# Patient Record
Sex: MALE | Race: WHITE | NOT HISPANIC OR LATINO | Employment: OTHER | URBAN - METROPOLITAN AREA
[De-identification: names, ages, dates, MRNs, and addresses within clinical notes are randomized per-mention and may not be internally consistent; named-entity substitution may affect disease eponyms.]

---

## 2020-06-04 ENCOUNTER — LAB VISIT (OUTPATIENT)
Dept: PRIMARY CARE CLINIC | Facility: OTHER | Age: 70
End: 2020-06-04
Payer: MEDICARE

## 2020-06-04 DIAGNOSIS — Z11.59 ENCOUNTER FOR SCREENING FOR OTHER VIRAL DISEASES: ICD-10-CM

## 2020-06-04 PROCEDURE — U0003 INFECTIOUS AGENT DETECTION BY NUCLEIC ACID (DNA OR RNA); SEVERE ACUTE RESPIRATORY SYNDROME CORONAVIRUS 2 (SARS-COV-2) (CORONAVIRUS DISEASE [COVID-19]), AMPLIFIED PROBE TECHNIQUE, MAKING USE OF HIGH THROUGHPUT TECHNOLOGIES AS DESCRIBED BY CMS-2020-01-R: HCPCS

## 2020-06-05 LAB — SARS-COV-2 RNA RESP QL NAA+PROBE: NOT DETECTED

## 2021-01-07 ENCOUNTER — CLINICAL SUPPORT (OUTPATIENT)
Dept: URGENT CARE | Facility: CLINIC | Age: 71
End: 2021-01-07
Payer: MEDICARE

## 2021-01-07 DIAGNOSIS — Z03.818 ENCOUNTER FOR OBSERVATION FOR SUSPECTED EXPOSURE TO OTHER BIOLOGICAL AGENTS RULED OUT: Primary | ICD-10-CM

## 2021-01-07 LAB
CTP QC/QA: YES
SARS-COV-2 RDRP RESP QL NAA+PROBE: NEGATIVE

## 2021-01-07 PROCEDURE — U0002: ICD-10-PCS | Mod: QW,S$GLB,, | Performed by: INTERNAL MEDICINE

## 2021-01-07 PROCEDURE — U0002 COVID-19 LAB TEST NON-CDC: HCPCS | Mod: QW,S$GLB,, | Performed by: INTERNAL MEDICINE

## 2022-08-10 ENCOUNTER — OFFICE VISIT (OUTPATIENT)
Dept: URGENT CARE | Facility: CLINIC | Age: 72
End: 2022-08-10
Payer: MEDICARE

## 2022-08-10 VITALS
TEMPERATURE: 97 F | SYSTOLIC BLOOD PRESSURE: 161 MMHG | HEART RATE: 90 BPM | OXYGEN SATURATION: 96 % | DIASTOLIC BLOOD PRESSURE: 96 MMHG | RESPIRATION RATE: 18 BRPM | WEIGHT: 150 LBS | BODY MASS INDEX: 22.22 KG/M2 | HEIGHT: 69 IN

## 2022-08-10 DIAGNOSIS — R05.9 COUGH: ICD-10-CM

## 2022-08-10 DIAGNOSIS — B97.89 VIRAL SINUSITIS: Primary | ICD-10-CM

## 2022-08-10 DIAGNOSIS — J32.9 VIRAL SINUSITIS: Primary | ICD-10-CM
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
## 2022-08-10 PROBLEM — Z86.73 HISTORY OF CVA (CEREBROVASCULAR ACCIDENT): Status: ACTIVE | Noted: 2019-03-06

## 2022-08-10 PROBLEM — I25.2 HISTORY OF MI (MYOCARDIAL INFARCTION): Status: ACTIVE | Noted: 2019-03-06

## 2022-08-10 PROBLEM — Z87.891 FORMER SMOKER: Status: ACTIVE | Noted: 2020-06-23

## 2022-08-10 PROBLEM — Z95.810 ICD (IMPLANTABLE CARDIOVERTER-DEFIBRILLATOR), SINGLE, IN SITU: Status: ACTIVE | Noted: 2020-07-09

## 2022-08-10 PROBLEM — I25.5 ISCHEMIC CARDIOMYOPATHY: Status: ACTIVE | Noted: 2022-02-16

## 2022-08-10 PROBLEM — Z79.01 CHRONIC ANTICOAGULATION: Status: ACTIVE | Noted: 2020-06-23

## 2022-08-10 PROBLEM — I48.91 ATRIAL FIBRILLATION: Status: ACTIVE | Noted: 2019-03-06

## 2022-08-10 PROBLEM — G40.909 SEIZURE DISORDER: Status: ACTIVE | Noted: 2019-03-06

## 2022-08-10 PROBLEM — E78.00 PURE HYPERCHOLESTEROLEMIA: Status: ACTIVE | Noted: 2019-03-06

## 2022-08-10 PROBLEM — I47.29 NONSUSTAINED VENTRICULAR TACHYCARDIA: Status: ACTIVE | Noted: 2022-08-10

## 2022-08-10 PROBLEM — I45.2 RIGHT BUNDLE BRANCH BLOCK (RBBB) WITH LEFT ANTERIOR FASCICULAR BLOCK (LAFB): Status: ACTIVE | Noted: 2020-01-08

## 2022-08-10 PROBLEM — I63.9 CVA (CEREBRAL VASCULAR ACCIDENT): Status: ACTIVE | Noted: 2022-08-10

## 2022-08-10 PROBLEM — I25.10 CORONARY ARTERY DISEASE INVOLVING NATIVE CORONARY ARTERY OF NATIVE HEART WITHOUT ANGINA PECTORIS: Status: ACTIVE | Noted: 2019-03-06

## 2022-08-10 PROCEDURE — 1159F PR MEDICATION LIST DOCUMENTED IN MEDICAL RECORD: ICD-10-PCS | Mod: CPTII,S$GLB,, | Performed by: FAMILY MEDICINE

## 2022-08-10 PROCEDURE — 1159F MED LIST DOCD IN RCRD: CPT | Mod: CPTII,S$GLB,, | Performed by: FAMILY MEDICINE

## 2022-08-10 PROCEDURE — 3008F PR BODY MASS INDEX (BMI) DOCUMENTED: ICD-10-PCS | Mod: CPTII,S$GLB,, | Performed by: FAMILY MEDICINE

## 2022-08-10 PROCEDURE — 71046 X-RAY EXAM CHEST 2 VIEWS: CPT | Mod: S$GLB,,, | Performed by: RADIOLOGY

## 2022-08-10 PROCEDURE — 3077F SYST BP >= 140 MM HG: CPT | Mod: CPTII,S$GLB,, | Performed by: FAMILY MEDICINE

## 2022-08-10 PROCEDURE — 1126F PR PAIN SEVERITY QUANTIFIED, NO PAIN PRESENT: ICD-10-PCS | Mod: CPTII,S$GLB,, | Performed by: FAMILY MEDICINE

## 2022-08-10 PROCEDURE — 3008F BODY MASS INDEX DOCD: CPT | Mod: CPTII,S$GLB,, | Performed by: FAMILY MEDICINE

## 2022-08-10 PROCEDURE — 3080F PR MOST RECENT DIASTOLIC BLOOD PRESSURE >= 90 MM HG: ICD-10-PCS | Mod: CPTII,S$GLB,, | Performed by: FAMILY MEDICINE

## 2022-08-10 PROCEDURE — 71046 XR CHEST PA AND LATERAL: ICD-10-PCS | Mod: S$GLB,,, | Performed by: RADIOLOGY

## 2022-08-10 PROCEDURE — 99203 PR OFFICE/OUTPT VISIT, NEW, LEVL III, 30-44 MIN: ICD-10-PCS | Mod: S$GLB,,, | Performed by: FAMILY MEDICINE

## 2022-08-10 PROCEDURE — 1160F RVW MEDS BY RX/DR IN RCRD: CPT | Mod: CPTII,S$GLB,, | Performed by: FAMILY MEDICINE

## 2022-08-10 PROCEDURE — 3080F DIAST BP >= 90 MM HG: CPT | Mod: CPTII,S$GLB,, | Performed by: FAMILY MEDICINE

## 2022-08-10 PROCEDURE — 99203 OFFICE O/P NEW LOW 30 MIN: CPT | Mod: S$GLB,,, | Performed by: FAMILY MEDICINE

## 2022-08-10 PROCEDURE — 1160F PR REVIEW ALL MEDS BY PRESCRIBER/CLIN PHARMACIST DOCUMENTED: ICD-10-PCS | Mod: CPTII,S$GLB,, | Performed by: FAMILY MEDICINE

## 2022-08-10 PROCEDURE — 3077F PR MOST RECENT SYSTOLIC BLOOD PRESSURE >= 140 MM HG: ICD-10-PCS | Mod: CPTII,S$GLB,, | Performed by: FAMILY MEDICINE

## 2022-08-10 PROCEDURE — 1126F AMNT PAIN NOTED NONE PRSNT: CPT | Mod: CPTII,S$GLB,, | Performed by: FAMILY MEDICINE

## 2022-08-10 RX ORDER — CARBAMAZEPINE 200 MG/1
400 TABLET ORAL 2 TIMES DAILY
Status: ON HOLD | COMMUNITY
Start: 2022-06-22 | End: 2022-12-06 | Stop reason: HOSPADM

## 2022-08-10 RX ORDER — CETIRIZINE HYDROCHLORIDE 10 MG/1
10 TABLET ORAL DAILY
Qty: 30 TABLET | Refills: 0 | Status: ON HOLD | OUTPATIENT
Start: 2022-08-10 | End: 2022-12-06 | Stop reason: HOSPADM

## 2022-08-10 RX ORDER — PRAVASTATIN SODIUM 20 MG/1
20 TABLET ORAL NIGHTLY
Status: ON HOLD | COMMUNITY
Start: 2021-09-28 | End: 2022-12-06 | Stop reason: HOSPADM

## 2022-08-10 RX ORDER — NITROGLYCERIN 0.4 MG/1
0.4 TABLET SUBLINGUAL EVERY 5 MIN PRN
Status: ON HOLD | COMMUNITY
Start: 2022-02-16 | End: 2022-12-06 | Stop reason: SDUPTHER

## 2022-08-10 RX ORDER — WARFARIN 6 MG/1
6 TABLET ORAL DAILY
Status: ON HOLD | COMMUNITY
Start: 2021-09-28 | End: 2022-12-06 | Stop reason: HOSPADM

## 2022-08-10 RX ORDER — FLUTICASONE PROPIONATE 50 MCG
1 SPRAY, SUSPENSION (ML) NASAL DAILY
Qty: 16 G | Refills: 0 | Status: ON HOLD | OUTPATIENT
Start: 2022-08-10 | End: 2022-12-06 | Stop reason: HOSPADM

## 2022-08-10 NOTE — PROGRESS NOTES
"Subjective:       Patient ID: Blabir Rebollar Sr. is a 72 y.o. male.    Vitals:  height is 5' 9" (1.753 m) and weight is 68 kg (150 lb). His temperature is 97.1 °F (36.2 °C). His blood pressure is 161/96 (abnormal) and his pulse is 90. His respiration is 18 and oxygen saturation is 96%.     Chief Complaint: URI    Pt states that he is having congestion and nasal congestion for a couple of months now . Pt states that he is taking otc meds wit no relief   Provider note begins below:  Problem List:  Active Problem List with Overview Notes   Diagnosis Date Noted    AICD (automatic cardioverter/defibrillator) present 07/09/2020    Former smoker 06/23/2020    Chronic anticoagulation 06/23/2020    Right bundle branch block (RBBB) with left anterior fascicular block (LAFB) 01/08/2020    Pure hypercholesterolemia 03/06/2019    Paroxysmal atrial fibrillation 03/06/2019    Coronary artery disease involving native coronary artery of native heart without angina pectoris 03/06/2019    History of MI (myocardial infarction) 03/06/2019    History of CVA (cerebrovascular accident) 03/06/2019    Seizure disorder 03/06/2019    Patient with above past medical history presents with clear nasal drainage and clear non odorous sputum x 4 mos, he says he got tired of it so came today. He is est with pcp. No fever or chills. No cp or SOB. No GI related symptoms, including, N/v/D or constipation. No anosmia or ageusia. He tried coricidin and has relief with this. Has fu with pcp, Dr. Hanna next week. He says the drainage may be coming from his teeth, wears dentures.     URI   This is a new problem. The current episode started more than 1 month ago. The problem has been unchanged. There has been no fever. Associated symptoms include congestion, coughing and rhinorrhea. Pertinent negatives include no abdominal pain, chest pain, diarrhea, dysuria, ear pain, headaches, joint pain, joint swelling, nausea, neck pain, plugged ear " sensation, rash, sinus pain, sneezing, sore throat, swollen glands, vomiting or wheezing. Treatments tried: OTC meds  The treatment provided no relief.       Constitution: Negative for activity change, appetite change, chills, sweating, fatigue, fever, unexpected weight change and generalized weakness.   HENT: Positive for congestion. Negative for ear pain, nosebleeds, foreign body in nose, postnasal drip, sinus pain, sinus pressure, sore throat, trouble swallowing and voice change.    Neck: Negative for neck pain and neck stiffness.   Cardiovascular: Negative for chest pain, leg swelling, palpitations and sob on exertion.   Respiratory: Positive for cough and sputum production. Negative for sleep apnea, chest tightness, bloody sputum, COPD, shortness of breath, stridor, wheezing and asthma.    Gastrointestinal: Negative for abdominal pain, nausea, vomiting and diarrhea.   Genitourinary: Negative for dysuria.   Skin: Negative for rash.   Allergic/Immunologic: Negative for asthma and sneezing.   Neurological: Negative for headaches.   Hematologic/Lymphatic: Positive for easy bruising/bleeding (pt on coumadin). Bruises/bleeds easily (pt on coumadin).       Objective:      Physical Exam   Constitutional: He is oriented to person, place, and time. He appears well-developed.  Non-toxic appearance. He does not appear ill. No distress.   HENT:   Head: Normocephalic and atraumatic.   Ears:   Right Ear: External ear normal.   Left Ear: External ear normal.   Nose: Nose normal.   Mouth/Throat: Uvula is midline, oropharynx is clear and moist and mucous membranes are normal. He has dentures.   Eyes: Conjunctivae, EOM and lids are normal. Pupils are equal, round, and reactive to light.   Neck: Trachea normal and phonation normal. Neck supple.   Cardiovascular: S1 normal and S2 normal.   Pulmonary/Chest: Effort normal and breath sounds normal.   Musculoskeletal: Normal range of motion.         General: Normal range of motion.    Neurological: He is alert and oriented to person, place, and time.   Skin: Skin is warm, dry, intact and not diaphoretic.   Psychiatric: His speech is normal and behavior is normal. Judgment and thought content normal.   Nursing note and vitals reviewed.        Assessment:       1. Viral sinusitis    2. Cough        XR CHEST PA AND LATERAL    Result Date: 8/10/2022  EXAMINATION: XR CHEST PA AND LATERAL CLINICAL HISTORY: Cough, unspecified TECHNIQUE: PA and lateral views of the chest were performed. COMPARISON: None FINDINGS: Left subclavian approach AICD.  Cardiomediastinal contours appear within normal limits.  Lungs appear essentially clear.  No definite pneumothorax or large volume pleural effusion.  No acute findings identified in the visualized abdomen.  Osseous and soft tissue structures appear without definite acute abnormality.     No convincing evidence of acute cardiopulmonary disease. Electronically signed by: Tam Fung Date:    08/10/2022 Time:    10:57    Plan:       Vss, lungs ctab, cxr wnl, fu with pcp, try antihistamine and flonase, benign exam, well appearing. May continue the coricidin.   Risks v benefits discussed, decided to hold off of any abx given interactions with coumadin, he verbalized understanding and agreed with plan. No sign of bacterial infection at this time.     Discussed results/diagnosis/plan with patient in clinic. Strict precautions given to patient to monitor for worsening signs and symptoms. Advised to follow up with PCP or specialist.    Explained side effects of medications prescribed with patient and informed him/her to discontinue use if he/she has any side effects and to inform UC or PCP if this occurs. All questions answered. Strict ED verses clinic return precautions stressed and given in depth. Advised if symptoms worsens of fail to improve he/she should go to the Emergency Room. Discharge and follow-up instructions given verbally/printed with the patient who  expressed understanding and willingness to comply with my recommendations. Patient voiced understanding and in agreement with current treatment plan. Patient exits the exam room in no acute distress. Conversant and engaged during discharge discussion, verbalized understanding.      Viral sinusitis  -     fluticasone propionate (FLONASE) 50 mcg/actuation nasal spray; 1 spray (50 mcg total) by Each Nostril route once daily.  Dispense: 16 g; Refill: 0  -     cetirizine (ZYRTEC) 10 MG tablet; Take 1 tablet (10 mg total) by mouth once daily.  Dispense: 30 tablet; Refill: 0    Cough  -     XR CHEST PA AND LATERAL; Future; Expected date: 08/10/2022           Medical Decision Making:   History:   Old Medical Records: I decided to obtain old medical records.  Old Records Summarized: records from clinic visits, records from another hospital and other records.  Clinical Tests:   Radiological Study: Ordered and Reviewed    Additional MDM:     Heart Failure Score:   COPD = No    Patient Instructions   General Discharge Instructions   PLEASE READ YOUR DISCHARGE INSTRUCTIONS ENTIRELY AS IT CONTAINS IMPORTANT INFORMATION.  If you were prescribed a narcotic or controlled medication, do not drive or operate heavy equipment or machinery while taking these medications.  If you were prescribed antibiotics, please take them to completion.  You must understand that you've received an Urgent Care treatment only and that you may be released before all your medical problems are known or treated. You, the patient, will arrange for follow up care as instructed.    OVER THE COUNTER RECOMMENDATIONS/SUGGESTIONS.    Make sure to stay well hydrated.    Use Nasal Saline to mechanically move any post nasal drip from your eustachian tube or from the back of your throat.    Use warm salt water gargles to ease your throat pain. Warm salt water gargles as needed for sore throat- 1/2 tsp salt to 1 cup warm water, gargle as desired.    Use an antihistamine  such as Claritin, Zyrtec or Allegra to dry you out.    Use pseudoephedrine (behind the counter) to decongest. Pseudoephedrine 30 mg up to 240 mg /day. It can raise your blood pressure and give you palpitations.    Use mucinex (guaifenesin) to break up mucous up to 2400mg/day to loosen any mucous.    The mucinex DM pill has a cough suppressant that can be sedating. It can be used at night to stop the tickle at the back of your throat.    You can use Mucinex D (it has guaifenesin and a high dose of pseudoephedrine) in the mornings to help decongest.    Use Afrin in each nare for no longer than 3 days, as it is addictive. It can also dry out your mucous membranes and cause elevated blood pressure. This is especially useful if you are flying.    Use Flonase 1-2 sprays/nostril per day. It is a local acting steroid nasal spray, if you develop a bloody nose, stop using the medication immediately.    Sometimes Nyquil at night is beneficial to help you get some rest, however it is sedating and it does have an antihistamine, and tylenol.    Honey is a natural cough suppressant that can be used.    Tylenol up to 4,000 mg a day is safe for short periods and can be used for body aches, pain, and fever. However in high doses and prolonged use it can cause liver irritation.    Ibuprofen is a non-steroidal anti-inflammatory that can be used for body aches, pain, and fever.However it can also cause stomach irritation if over used.     Follow up with your PCP or specialty clinic as instructed in the next 2-3 days if not improved or as needed. You can call [phone] to schedule an appointment with appropriate provider.      If you condition worsens, we recommend that you receive another evaluation at the emergency room immediately or contact your primary medical clinic's after hours call service to discuss your concerns.      Please return here or go to the Emergency Department for any concerns or worsening condition.   You can  also call [phone] to schedule an appointment with the appropriate provider.    Please return here or go to the Emergency Department for any concerns or worsening of condition.    Thank you for choosing [facility]!    Our goal in the Urgent Care is to always provide outstanding medical care. You may receive a survey by mail or e-mail in the next week regarding your experience today. We would greatly appreciate you completing and returning the survey. Your feedback provides us with a way to recognize our staff who provide very good care, and it helps us learn how to improve when your experience was below our aspiration of excellence.      We appreciate you trusting us with your medical care. We hope you feel better soon. We will be happy to take care of you for all of your future medical needs.    Sincerely,    DIXIE Miller  Follow up with your Primary Care Provider in 2-3 days if no improvement.  If you do not have one, please see the list provided and become established with one.  If your condition worsens we recommend that you receive another evaluation at the emergency room immediately or contact your primary medical clinics after hours call service to discuss your concerns.  Flonase nasal spray as directed. Breathe right strips at night to help you breathe.  A cool mist humidifier in bedroom may help with cough and relieve stuffy nose.  Sore throat:  Lozenge, hard candy or honey.  Sinus rinses DO NOT USE TAP WATER, if you must, water must be a rolling boil for 1 minute, let it cool, then use.  May use distilled water.  Vics vapor rub in shower to help open nasal passages.  May use nasal gel to keep passages moisturized.  May use Nasal saline sprays during the day for added relief of congestion.   For those who go to the gym, please do not use the sauna or steam room now to clear sinuses.  During pollen season, change shirt if you are outside for a while when you go in.  Also wash your  face.  Do not touch your face with your hands.  Wash your hands often in general while ill, avoid face contact with hands. Good nutrition. Lots of rest You may or may not have received a steroid injection, if you have, you may experience some jitters or develop nervousness.  You may also not rest well this night- these symptoms will resolve.  If you were give a prescription for steroids, do not medications such as Motrin, Advil, Ibuprofen, Aleve, Mobic, or Toradol while taking the steroid. these are non-steroidal anti-inflammatory medications which you do not need while taking steroids.  If you were given an antibiotic to take at home, please take it until it is completed even if you begin feeling better prior to the course of therapy being completed.  To attempt to minimize abdominal discomfort while taking antibiotics, you may try to take probiotics.  SEPARATE the antibiotics and probiotics by TWO hours, if not neither medication will work.  If you received a steroid shot today - this can elevate your blood pressure, elevate your blood sugar, water weight gain, nervous energy, redness to the face and dimpling of the skin where the shot goes in.     You must understand that you've received an Urgent Care treatment only and that you may be released before all your medical problems are known or treated. You, the patient, will arrange for follow up care as instructed

## 2022-08-10 NOTE — PATIENT INSTRUCTIONS
General Discharge Instructions   PLEASE READ YOUR DISCHARGE INSTRUCTIONS ENTIRELY AS IT CONTAINS IMPORTANT INFORMATION.  If you were prescribed a narcotic or controlled medication, do not drive or operate heavy equipment or machinery while taking these medications.  If you were prescribed antibiotics, please take them to completion.  You must understand that you've received an Urgent Care treatment only and that you may be released before all your medical problems are known or treated. You, the patient, will arrange for follow up care as instructed.    OVER THE COUNTER RECOMMENDATIONS/SUGGESTIONS.    Make sure to stay well hydrated.    Use Nasal Saline to mechanically move any post nasal drip from your eustachian tube or from the back of your throat.    Use warm salt water gargles to ease your throat pain. Warm salt water gargles as needed for sore throat- 1/2 tsp salt to 1 cup warm water, gargle as desired.    Use an antihistamine such as Claritin, Zyrtec or Allegra to dry you out.    Use pseudoephedrine (behind the counter) to decongest. Pseudoephedrine 30 mg up to 240 mg /day. It can raise your blood pressure and give you palpitations.    Use mucinex (guaifenesin) to break up mucous up to 2400mg/day to loosen any mucous.    The mucinex DM pill has a cough suppressant that can be sedating. It can be used at night to stop the tickle at the back of your throat.    You can use Mucinex D (it has guaifenesin and a high dose of pseudoephedrine) in the mornings to help decongest.    Use Afrin in each nare for no longer than 3 days, as it is addictive. It can also dry out your mucous membranes and cause elevated blood pressure. This is especially useful if you are flying.    Use Flonase 1-2 sprays/nostril per day. It is a local acting steroid nasal spray, if you develop a bloody nose, stop using the medication immediately.    Sometimes Nyquil at night is beneficial to help you get some rest, however it is sedating and it  does have an antihistamine, and tylenol.    Honey is a natural cough suppressant that can be used.    Tylenol up to 4,000 mg a day is safe for short periods and can be used for body aches, pain, and fever. However in high doses and prolonged use it can cause liver irritation.    Ibuprofen is a non-steroidal anti-inflammatory that can be used for body aches, pain, and fever.However it can also cause stomach irritation if over used.     Follow up with your PCP or specialty clinic as instructed in the next 2-3 days if not improved or as needed. You can call [phone] to schedule an appointment with appropriate provider.      If you condition worsens, we recommend that you receive another evaluation at the emergency room immediately or contact your primary medical clinic's after hours call service to discuss your concerns.      Please return here or go to the Emergency Department for any concerns or worsening condition.   You can also call [phone] to schedule an appointment with the appropriate provider.    Please return here or go to the Emergency Department for any concerns or worsening of condition.    Thank you for choosing [facility]!    Our goal in the Urgent Care is to always provide outstanding medical care. You may receive a survey by mail or e-mail in the next week regarding your experience today. We would greatly appreciate you completing and returning the survey. Your feedback provides us with a way to recognize our staff who provide very good care, and it helps us learn how to improve when your experience was below our aspiration of excellence.      We appreciate you trusting us with your medical care. We hope you feel better soon. We will be happy to take care of you for all of your future medical needs.    Sincerely,    DIXIE Miller  Follow up with your Primary Care Provider in 2-3 days if no improvement.  If you do not have one, please see the list provided and become  established with one.  If your condition worsens we recommend that you receive another evaluation at the emergency room immediately or contact your primary medical clinics after hours call service to discuss your concerns.  Flonase nasal spray as directed. Breathe right strips at night to help you breathe.  A cool mist humidifier in bedroom may help with cough and relieve stuffy nose.  Sore throat:  Lozenge, hard candy or honey.  Sinus rinses DO NOT USE TAP WATER, if you must, water must be a rolling boil for 1 minute, let it cool, then use.  May use distilled water.  Vics vapor rub in shower to help open nasal passages.  May use nasal gel to keep passages moisturized.  May use Nasal saline sprays during the day for added relief of congestion.   For those who go to the gym, please do not use the sauna or steam room now to clear sinuses.  During pollen season, change shirt if you are outside for a while when you go in.  Also wash your face.  Do not touch your face with your hands.  Wash your hands often in general while ill, avoid face contact with hands. Good nutrition. Lots of rest You may or may not have received a steroid injection, if you have, you may experience some jitters or develop nervousness.  You may also not rest well this night- these symptoms will resolve.  If you were give a prescription for steroids, do not medications such as Motrin, Advil, Ibuprofen, Aleve, Mobic, or Toradol while taking the steroid. these are non-steroidal anti-inflammatory medications which you do not need while taking steroids.  If you were given an antibiotic to take at home, please take it until it is completed even if you begin feeling better prior to the course of therapy being completed.  To attempt to minimize abdominal discomfort while taking antibiotics, you may try to take probiotics.  SEPARATE the antibiotics and probiotics by TWO hours, if not neither medication will work.  If you received a steroid shot today - this  can elevate your blood pressure, elevate your blood sugar, water weight gain, nervous energy, redness to the face and dimpling of the skin where the shot goes in.     You must understand that you've received an Urgent Care treatment only and that you may be released before all your medical problems are known or treated. You, the patient, will arrange for follow up care as instructed

## 2022-11-14 PROBLEM — I63.9 CVA (CEREBRAL VASCULAR ACCIDENT): Status: RESOLVED | Noted: 2022-08-10 | Resolved: 2022-11-14

## 2022-11-22 ENCOUNTER — HOSPITAL ENCOUNTER (INPATIENT)
Facility: HOSPITAL | Age: 72
LOS: 13 days | Discharge: NURSING, REHAB OR LONG-TERM CARE | DRG: 246 | End: 2022-12-06
Attending: EMERGENCY MEDICINE | Admitting: STUDENT IN AN ORGANIZED HEALTH CARE EDUCATION/TRAINING PROGRAM
Payer: MEDICARE

## 2022-11-22 DIAGNOSIS — Z98.61 HISTORY OF PERCUTANEOUS CORONARY INTERVENTION: ICD-10-CM

## 2022-11-22 DIAGNOSIS — I47.20 VENTRICULAR TACHYCARDIA: ICD-10-CM

## 2022-11-22 DIAGNOSIS — R53.1 WEAKNESS: ICD-10-CM

## 2022-11-22 DIAGNOSIS — I25.10 CAD (CORONARY ARTERY DISEASE): ICD-10-CM

## 2022-11-22 DIAGNOSIS — I25.10 CORONARY ARTERY DISEASE INVOLVING NATIVE CORONARY ARTERY OF NATIVE HEART WITHOUT ANGINA PECTORIS: ICD-10-CM

## 2022-11-22 DIAGNOSIS — I50.40 COMBINED SYSTOLIC AND DIASTOLIC CONGESTIVE HEART FAILURE, UNSPECIFIED HF CHRONICITY: ICD-10-CM

## 2022-11-22 DIAGNOSIS — I50.9 CHF (CONGESTIVE HEART FAILURE): ICD-10-CM

## 2022-11-22 DIAGNOSIS — I50.22 CHRONIC SYSTOLIC CONGESTIVE HEART FAILURE: ICD-10-CM

## 2022-11-22 DIAGNOSIS — I47.20 SUSTAINED VT (VENTRICULAR TACHYCARDIA): ICD-10-CM

## 2022-11-22 DIAGNOSIS — I47.20 VENTRICULAR TACHYARRHYTHMIA: ICD-10-CM

## 2022-11-22 DIAGNOSIS — R07.9 CHEST PAIN: ICD-10-CM

## 2022-11-22 DIAGNOSIS — I25.5 ISCHEMIC CARDIOMYOPATHY: ICD-10-CM

## 2022-11-22 DIAGNOSIS — Z86.73 HISTORY OF CVA (CEREBROVASCULAR ACCIDENT): ICD-10-CM

## 2022-11-22 DIAGNOSIS — I49.8 BIGEMINY: ICD-10-CM

## 2022-11-22 DIAGNOSIS — I50.43 ACUTE ON CHRONIC COMBINED SYSTOLIC AND DIASTOLIC CONGESTIVE HEART FAILURE: ICD-10-CM

## 2022-11-22 DIAGNOSIS — I25.10 CORONARY ARTERY DISEASE, UNSPECIFIED VESSEL OR LESION TYPE, UNSPECIFIED WHETHER ANGINA PRESENT, UNSPECIFIED WHETHER NATIVE OR TRANSPLANTED HEART: ICD-10-CM

## 2022-11-22 DIAGNOSIS — Z86.69 HISTORY OF SEIZURE DISORDER: ICD-10-CM

## 2022-11-22 DIAGNOSIS — G93.41 ENCEPHALOPATHY, METABOLIC: Primary | ICD-10-CM

## 2022-11-22 LAB
ALBUMIN SERPL BCP-MCNC: 3.6 G/DL (ref 3.5–5.2)
ALP SERPL-CCNC: 81 U/L (ref 55–135)
ALT SERPL W/O P-5'-P-CCNC: 12 U/L (ref 10–44)
ANION GAP SERPL CALC-SCNC: 10 MMOL/L (ref 8–16)
AST SERPL-CCNC: 29 U/L (ref 10–40)
BASOPHILS # BLD AUTO: 0.03 K/UL (ref 0–0.2)
BASOPHILS NFR BLD: 0.6 % (ref 0–1.9)
BILIRUB SERPL-MCNC: 0.3 MG/DL (ref 0.1–1)
BILIRUB UR QL STRIP: NEGATIVE
BNP SERPL-MCNC: 1441 PG/ML (ref 0–99)
BUN SERPL-MCNC: 18 MG/DL (ref 8–23)
CALCIUM SERPL-MCNC: 8.6 MG/DL (ref 8.7–10.5)
CHLORIDE SERPL-SCNC: 113 MMOL/L (ref 95–110)
CLARITY UR: ABNORMAL
CO2 SERPL-SCNC: 20 MMOL/L (ref 23–29)
COLOR UR: YELLOW
CREAT SERPL-MCNC: 1 MG/DL (ref 0.5–1.4)
CTP QC/QA: YES
CTP QC/QA: YES
DIFFERENTIAL METHOD: ABNORMAL
EOSINOPHIL # BLD AUTO: 0.1 K/UL (ref 0–0.5)
EOSINOPHIL NFR BLD: 1.5 % (ref 0–8)
ERYTHROCYTE [DISTWIDTH] IN BLOOD BY AUTOMATED COUNT: 13.7 % (ref 11.5–14.5)
EST. GFR  (NO RACE VARIABLE): >60 ML/MIN/1.73 M^2
GLUCOSE SERPL-MCNC: 131 MG/DL (ref 70–110)
GLUCOSE UR QL STRIP: ABNORMAL
HCT VFR BLD AUTO: 43.3 % (ref 40–54)
HGB BLD-MCNC: 14.9 G/DL (ref 14–18)
HGB UR QL STRIP: ABNORMAL
IMM GRANULOCYTES # BLD AUTO: 0.01 K/UL (ref 0–0.04)
IMM GRANULOCYTES NFR BLD AUTO: 0.2 % (ref 0–0.5)
KETONES UR QL STRIP: NEGATIVE
LEUKOCYTE ESTERASE UR QL STRIP: NEGATIVE
LYMPHOCYTES # BLD AUTO: 1.6 K/UL (ref 1–4.8)
LYMPHOCYTES NFR BLD: 33.4 % (ref 18–48)
MCH RBC QN AUTO: 31.2 PG (ref 27–31)
MCHC RBC AUTO-ENTMCNC: 34.4 G/DL (ref 32–36)
MCV RBC AUTO: 91 FL (ref 82–98)
MONOCYTES # BLD AUTO: 0.5 K/UL (ref 0.3–1)
MONOCYTES NFR BLD: 10 % (ref 4–15)
NEUTROPHILS # BLD AUTO: 2.6 K/UL (ref 1.8–7.7)
NEUTROPHILS NFR BLD: 54.3 % (ref 38–73)
NITRITE UR QL STRIP: NEGATIVE
NRBC BLD-RTO: 0 /100 WBC
PH UR STRIP: 7 [PH] (ref 5–8)
PLATELET # BLD AUTO: 117 K/UL (ref 150–450)
PMV BLD AUTO: 11.8 FL (ref 9.2–12.9)
POC MOLECULAR INFLUENZA A AGN: NEGATIVE
POC MOLECULAR INFLUENZA B AGN: NEGATIVE
POTASSIUM SERPL-SCNC: ABNORMAL MMOL/L (ref 3.5–5.1)
PROT SERPL-MCNC: 7.2 G/DL (ref 6–8.4)
PROT UR QL STRIP: ABNORMAL
RBC # BLD AUTO: 4.78 M/UL (ref 4.6–6.2)
SARS-COV-2 RDRP RESP QL NAA+PROBE: NEGATIVE
SODIUM SERPL-SCNC: 143 MMOL/L (ref 136–145)
SP GR UR STRIP: 1.02 (ref 1–1.03)
TROPONIN I SERPL DL<=0.01 NG/ML-MCNC: 0.04 NG/ML (ref 0–0.03)
TSH SERPL DL<=0.005 MIU/L-ACNC: 1.46 UIU/ML (ref 0.4–4)
URN SPEC COLLECT METH UR: ABNORMAL
UROBILINOGEN UR STRIP-ACNC: ABNORMAL EU/DL
WBC # BLD AUTO: 4.82 K/UL (ref 3.9–12.7)

## 2022-11-22 PROCEDURE — 87635 SARS-COV-2 COVID-19 AMP PRB: CPT | Performed by: EMERGENCY MEDICINE

## 2022-11-22 PROCEDURE — 93010 EKG 12-LEAD: ICD-10-PCS | Mod: ,,, | Performed by: INTERNAL MEDICINE

## 2022-11-22 PROCEDURE — 81003 URINALYSIS AUTO W/O SCOPE: CPT | Performed by: STUDENT IN AN ORGANIZED HEALTH CARE EDUCATION/TRAINING PROGRAM

## 2022-11-22 PROCEDURE — 87502 INFLUENZA DNA AMP PROBE: CPT

## 2022-11-22 PROCEDURE — 93005 ELECTROCARDIOGRAM TRACING: CPT

## 2022-11-22 PROCEDURE — 93010 ELECTROCARDIOGRAM REPORT: CPT | Mod: ,,, | Performed by: INTERNAL MEDICINE

## 2022-11-22 PROCEDURE — 85025 COMPLETE CBC W/AUTO DIFF WBC: CPT | Performed by: STUDENT IN AN ORGANIZED HEALTH CARE EDUCATION/TRAINING PROGRAM

## 2022-11-22 PROCEDURE — 83880 ASSAY OF NATRIURETIC PEPTIDE: CPT | Performed by: STUDENT IN AN ORGANIZED HEALTH CARE EDUCATION/TRAINING PROGRAM

## 2022-11-22 PROCEDURE — 80053 COMPREHEN METABOLIC PANEL: CPT | Performed by: STUDENT IN AN ORGANIZED HEALTH CARE EDUCATION/TRAINING PROGRAM

## 2022-11-22 PROCEDURE — 84484 ASSAY OF TROPONIN QUANT: CPT | Performed by: STUDENT IN AN ORGANIZED HEALTH CARE EDUCATION/TRAINING PROGRAM

## 2022-11-22 PROCEDURE — 84443 ASSAY THYROID STIM HORMONE: CPT | Performed by: EMERGENCY MEDICINE

## 2022-11-22 PROCEDURE — 99285 EMERGENCY DEPT VISIT HI MDM: CPT | Mod: 25

## 2022-11-22 NOTE — Clinical Note
The DP pulses were 1+ bilaterally. The PT pulses were 1+ bilaterally. The radial pulses were +1 bilaterally.

## 2022-11-22 NOTE — Clinical Note
The catheter was repositioned into the ostium   right coronary artery. An angiography was performed of the right coronary arteries. Multiple views were taken.  Catheter removed.

## 2022-11-22 NOTE — Clinical Note
Diagnosis: Weakness [401340]   Future Attending Provider: SPENCER BENSON [[id number]]   Admitting Provider:: SPENCER BENSON [[id number]]

## 2022-11-22 NOTE — Clinical Note
25 ml of contrast were injected throughout the case. 75 mL of contrast was the total wasted during the case. 100 mL was the total amount used during the case.

## 2022-11-22 NOTE — Clinical Note
The catheter was inserted into the left ventricle. Hemodynamics were performed.   Wire removed prior to hemos.

## 2022-11-22 NOTE — Clinical Note
The site was marked. The right groin was prepped. The site was prepped with ChloraPrep. The site was clipped. The patient was draped. The patient was positioned supine.

## 2022-11-22 NOTE — Clinical Note
35 ml of contrast were injected throughout the case. 65 mL of contrast was the total wasted during the case. 100 mL was the total amount used during the case.

## 2022-11-22 NOTE — Clinical Note
The catheter was repositioned into the aorta. Hemodynamics were performed.  and Pullback was recorded.

## 2022-11-22 NOTE — Clinical Note
The catheter was removed from the ostium   left main. An angiography was performed of the left coronary arteries.

## 2022-11-23 PROBLEM — I77.810 AORTIC ROOT DILATATION: Status: ACTIVE | Noted: 2022-11-23

## 2022-11-23 PROBLEM — I47.20 VENTRICULAR TACHYCARDIA: Status: ACTIVE | Noted: 2022-11-23

## 2022-11-23 PROBLEM — I50.43 ACUTE ON CHRONIC COMBINED SYSTOLIC AND DIASTOLIC CONGESTIVE HEART FAILURE: Status: ACTIVE | Noted: 2022-11-23

## 2022-11-23 PROBLEM — I48.0 PAROXYSMAL ATRIAL FIBRILLATION: Status: ACTIVE | Noted: 2019-03-06

## 2022-11-23 PROBLEM — I49.8 BIGEMINAL RHYTHM: Status: ACTIVE | Noted: 2022-11-23

## 2022-11-23 PROBLEM — R53.1 WEAKNESS: Status: ACTIVE | Noted: 2022-11-23

## 2022-11-23 LAB
ANION GAP SERPL CALC-SCNC: 10 MMOL/L (ref 8–16)
BASOPHILS # BLD AUTO: 0.04 K/UL (ref 0–0.2)
BASOPHILS NFR BLD: 1 % (ref 0–1.9)
BUN SERPL-MCNC: 12 MG/DL (ref 8–23)
CALCIUM SERPL-MCNC: 6.4 MG/DL (ref 8.7–10.5)
CHLORIDE SERPL-SCNC: 121 MMOL/L (ref 95–110)
CO2 SERPL-SCNC: 16 MMOL/L (ref 23–29)
CREAT SERPL-MCNC: 0.7 MG/DL (ref 0.5–1.4)
DIFFERENTIAL METHOD: ABNORMAL
EOSINOPHIL # BLD AUTO: 0.1 K/UL (ref 0–0.5)
EOSINOPHIL NFR BLD: 1.4 % (ref 0–8)
ERYTHROCYTE [DISTWIDTH] IN BLOOD BY AUTOMATED COUNT: 13.8 % (ref 11.5–14.5)
EST. GFR  (NO RACE VARIABLE): >60 ML/MIN/1.73 M^2
GLUCOSE SERPL-MCNC: 84 MG/DL (ref 70–110)
HCT VFR BLD AUTO: 31.7 % (ref 40–54)
HGB BLD-MCNC: 10.9 G/DL (ref 14–18)
IMM GRANULOCYTES # BLD AUTO: 0.01 K/UL (ref 0–0.04)
IMM GRANULOCYTES NFR BLD AUTO: 0.2 % (ref 0–0.5)
INR PPP: 4.2 (ref 0.8–1.2)
INR PPP: 4.6 (ref 0.8–1.2)
LYMPHOCYTES # BLD AUTO: 1.4 K/UL (ref 1–4.8)
LYMPHOCYTES NFR BLD: 32.3 % (ref 18–48)
MAGNESIUM SERPL-MCNC: 1.5 MG/DL (ref 1.6–2.6)
MCH RBC QN AUTO: 31.7 PG (ref 27–31)
MCHC RBC AUTO-ENTMCNC: 34.4 G/DL (ref 32–36)
MCV RBC AUTO: 92 FL (ref 82–98)
MONOCYTES # BLD AUTO: 0.4 K/UL (ref 0.3–1)
MONOCYTES NFR BLD: 9.8 % (ref 4–15)
NEUTROPHILS # BLD AUTO: 2.3 K/UL (ref 1.8–7.7)
NEUTROPHILS NFR BLD: 55.3 % (ref 38–73)
NRBC BLD-RTO: 0 /100 WBC
PLATELET # BLD AUTO: 119 K/UL (ref 150–450)
PMV BLD AUTO: 9.8 FL (ref 9.2–12.9)
POCT GLUCOSE: 119 MG/DL (ref 70–110)
POTASSIUM SERPL-SCNC: 2.5 MMOL/L (ref 3.5–5.1)
POTASSIUM SERPL-SCNC: 4.5 MMOL/L (ref 3.5–5.1)
PROTHROMBIN TIME: 41.4 SEC (ref 9–12.5)
PROTHROMBIN TIME: 45.2 SEC (ref 9–12.5)
RBC # BLD AUTO: 3.44 M/UL (ref 4.6–6.2)
SODIUM SERPL-SCNC: 147 MMOL/L (ref 136–145)
TROPONIN I SERPL DL<=0.01 NG/ML-MCNC: 0.04 NG/ML (ref 0–0.03)
TROPONIN I SERPL DL<=0.01 NG/ML-MCNC: 0.04 NG/ML (ref 0–0.03)
WBC # BLD AUTO: 4.18 K/UL (ref 3.9–12.7)

## 2022-11-23 PROCEDURE — 63600175 PHARM REV CODE 636 W HCPCS: Performed by: HOSPITALIST

## 2022-11-23 PROCEDURE — 85025 COMPLETE CBC W/AUTO DIFF WBC: CPT | Performed by: HOSPITALIST

## 2022-11-23 PROCEDURE — 63600175 PHARM REV CODE 636 W HCPCS: Performed by: INTERNAL MEDICINE

## 2022-11-23 PROCEDURE — 21400001 HC TELEMETRY ROOM

## 2022-11-23 PROCEDURE — 99223 PR INITIAL HOSPITAL CARE,LEVL III: ICD-10-PCS | Mod: ,,, | Performed by: INTERNAL MEDICINE

## 2022-11-23 PROCEDURE — 25000003 PHARM REV CODE 250

## 2022-11-23 PROCEDURE — 85610 PROTHROMBIN TIME: CPT | Mod: 91 | Performed by: EMERGENCY MEDICINE

## 2022-11-23 PROCEDURE — 25000003 PHARM REV CODE 250: Performed by: HOSPITALIST

## 2022-11-23 PROCEDURE — 97165 OT EVAL LOW COMPLEX 30 MIN: CPT

## 2022-11-23 PROCEDURE — 84132 ASSAY OF SERUM POTASSIUM: CPT | Performed by: HOSPITALIST

## 2022-11-23 PROCEDURE — 85610 PROTHROMBIN TIME: CPT | Performed by: STUDENT IN AN ORGANIZED HEALTH CARE EDUCATION/TRAINING PROGRAM

## 2022-11-23 PROCEDURE — 83735 ASSAY OF MAGNESIUM: CPT | Performed by: HOSPITALIST

## 2022-11-23 PROCEDURE — 80048 BASIC METABOLIC PNL TOTAL CA: CPT | Performed by: HOSPITALIST

## 2022-11-23 PROCEDURE — 97161 PT EVAL LOW COMPLEX 20 MIN: CPT

## 2022-11-23 PROCEDURE — 99223 1ST HOSP IP/OBS HIGH 75: CPT | Mod: ,,, | Performed by: INTERNAL MEDICINE

## 2022-11-23 PROCEDURE — 84484 ASSAY OF TROPONIN QUANT: CPT | Mod: 91

## 2022-11-23 RX ORDER — IPRATROPIUM BROMIDE AND ALBUTEROL SULFATE 2.5; .5 MG/3ML; MG/3ML
3 SOLUTION RESPIRATORY (INHALATION) EVERY 4 HOURS PRN
Status: DISCONTINUED | OUTPATIENT
Start: 2022-11-23 | End: 2022-12-03

## 2022-11-23 RX ORDER — ACETAMINOPHEN 325 MG/1
650 TABLET ORAL EVERY 4 HOURS PRN
Status: DISCONTINUED | OUTPATIENT
Start: 2022-11-23 | End: 2022-12-06 | Stop reason: HOSPADM

## 2022-11-23 RX ORDER — ONDANSETRON 8 MG/1
8 TABLET, ORALLY DISINTEGRATING ORAL EVERY 8 HOURS PRN
Status: DISCONTINUED | OUTPATIENT
Start: 2022-11-23 | End: 2022-11-26

## 2022-11-23 RX ORDER — POTASSIUM CHLORIDE 20 MEQ/1
40 TABLET, EXTENDED RELEASE ORAL ONCE
Status: COMPLETED | OUTPATIENT
Start: 2022-11-23 | End: 2022-11-23

## 2022-11-23 RX ORDER — NALOXONE HCL 0.4 MG/ML
0.02 VIAL (ML) INJECTION
Status: DISCONTINUED | OUTPATIENT
Start: 2022-11-23 | End: 2022-12-06 | Stop reason: HOSPADM

## 2022-11-23 RX ORDER — CARBAMAZEPINE 200 MG/1
400 TABLET ORAL 2 TIMES DAILY
Status: DISCONTINUED | OUTPATIENT
Start: 2022-11-23 | End: 2022-12-06 | Stop reason: HOSPADM

## 2022-11-23 RX ORDER — LOPERAMIDE HYDROCHLORIDE 2 MG/1
2 CAPSULE ORAL 4 TIMES DAILY PRN
Status: DISCONTINUED | OUTPATIENT
Start: 2022-11-23 | End: 2022-12-06 | Stop reason: HOSPADM

## 2022-11-23 RX ORDER — PROCHLORPERAZINE EDISYLATE 5 MG/ML
5 INJECTION INTRAMUSCULAR; INTRAVENOUS EVERY 6 HOURS PRN
Status: DISCONTINUED | OUTPATIENT
Start: 2022-11-23 | End: 2022-11-24

## 2022-11-23 RX ORDER — MAG HYDROX/ALUMINUM HYD/SIMETH 200-200-20
30 SUSPENSION, ORAL (FINAL DOSE FORM) ORAL 4 TIMES DAILY PRN
Status: DISCONTINUED | OUTPATIENT
Start: 2022-11-23 | End: 2022-12-06 | Stop reason: HOSPADM

## 2022-11-23 RX ORDER — PRAVASTATIN SODIUM 10 MG/1
20 TABLET ORAL DAILY
Status: DISCONTINUED | OUTPATIENT
Start: 2022-11-23 | End: 2022-11-29

## 2022-11-23 RX ORDER — POTASSIUM CHLORIDE 7.45 MG/ML
10 INJECTION INTRAVENOUS
Status: COMPLETED | OUTPATIENT
Start: 2022-11-23 | End: 2022-11-23

## 2022-11-23 RX ORDER — AMIODARONE HYDROCHLORIDE 200 MG/1
200 TABLET ORAL 2 TIMES DAILY
Status: ON HOLD | COMMUNITY
End: 2022-12-06 | Stop reason: HOSPADM

## 2022-11-23 RX ORDER — L. ACIDOPHILUS/L.BULGARICUS 100MM CELL
1 GRANULES IN PACKET (EA) ORAL 2 TIMES DAILY
Status: DISCONTINUED | OUTPATIENT
Start: 2022-11-23 | End: 2022-12-06 | Stop reason: HOSPADM

## 2022-11-23 RX ORDER — AMOXICILLIN 250 MG
1 CAPSULE ORAL 2 TIMES DAILY
Status: DISCONTINUED | OUTPATIENT
Start: 2022-11-23 | End: 2022-11-23

## 2022-11-23 RX ORDER — WARFARIN 6 MG/1
6 TABLET ORAL DAILY
Status: DISCONTINUED | OUTPATIENT
Start: 2022-11-23 | End: 2022-11-23

## 2022-11-23 RX ORDER — GLUCAGON 1 MG
1 KIT INJECTION
Status: DISCONTINUED | OUTPATIENT
Start: 2022-11-23 | End: 2022-12-06 | Stop reason: HOSPADM

## 2022-11-23 RX ORDER — TALC
6 POWDER (GRAM) TOPICAL NIGHTLY PRN
Status: DISCONTINUED | OUTPATIENT
Start: 2022-11-23 | End: 2022-11-26

## 2022-11-23 RX ORDER — AMIODARONE HYDROCHLORIDE 200 MG/1
200 TABLET ORAL 2 TIMES DAILY
Status: DISCONTINUED | OUTPATIENT
Start: 2022-11-23 | End: 2022-11-24

## 2022-11-23 RX ORDER — FUROSEMIDE 10 MG/ML
20 INJECTION INTRAMUSCULAR; INTRAVENOUS DAILY
Status: DISCONTINUED | OUTPATIENT
Start: 2022-11-23 | End: 2022-11-24

## 2022-11-23 RX ORDER — MAGNESIUM SULFATE HEPTAHYDRATE 40 MG/ML
2 INJECTION, SOLUTION INTRAVENOUS ONCE
Status: COMPLETED | OUTPATIENT
Start: 2022-11-23 | End: 2022-11-23

## 2022-11-23 RX ORDER — IBUPROFEN 200 MG
16 TABLET ORAL
Status: DISCONTINUED | OUTPATIENT
Start: 2022-11-23 | End: 2022-12-06 | Stop reason: HOSPADM

## 2022-11-23 RX ORDER — IBUPROFEN 200 MG
24 TABLET ORAL
Status: DISCONTINUED | OUTPATIENT
Start: 2022-11-23 | End: 2022-12-06 | Stop reason: HOSPADM

## 2022-11-23 RX ORDER — ACETAMINOPHEN 325 MG/1
650 TABLET ORAL EVERY 8 HOURS PRN
Status: DISCONTINUED | OUTPATIENT
Start: 2022-11-23 | End: 2022-12-01

## 2022-11-23 RX ADMIN — PRAVASTATIN SODIUM 20 MG: 10 TABLET ORAL at 11:11

## 2022-11-23 RX ADMIN — FUROSEMIDE 20 MG: 10 INJECTION, SOLUTION INTRAMUSCULAR; INTRAVENOUS at 07:11

## 2022-11-23 RX ADMIN — CARBAMAZEPINE 400 MG: 200 TABLET ORAL at 11:11

## 2022-11-23 RX ADMIN — AMIODARONE HYDROCHLORIDE 200 MG: 200 TABLET ORAL at 08:11

## 2022-11-23 RX ADMIN — POTASSIUM CHLORIDE 10 MEQ: 10 INJECTION, SOLUTION INTRAVENOUS at 12:11

## 2022-11-23 RX ADMIN — POTASSIUM CHLORIDE 10 MEQ: 10 INJECTION, SOLUTION INTRAVENOUS at 09:11

## 2022-11-23 RX ADMIN — LACTOBACILLUS ACIDOPHILUS / LACTOBACILLUS BULGARICUS 1 EACH: 100 MILLION CFU STRENGTH GRANULES at 11:11

## 2022-11-23 RX ADMIN — POTASSIUM CHLORIDE 40 MEQ: 1500 TABLET, EXTENDED RELEASE ORAL at 09:11

## 2022-11-23 RX ADMIN — POTASSIUM CHLORIDE 10 MEQ: 10 INJECTION, SOLUTION INTRAVENOUS at 11:11

## 2022-11-23 RX ADMIN — LACTOBACILLUS ACIDOPHILUS / LACTOBACILLUS BULGARICUS 1 EACH: 100 MILLION CFU STRENGTH GRANULES at 08:11

## 2022-11-23 RX ADMIN — CARBAMAZEPINE 400 MG: 200 TABLET ORAL at 08:11

## 2022-11-23 RX ADMIN — MAGNESIUM SULFATE HEPTAHYDRATE 2 G: 40 INJECTION, SOLUTION INTRAVENOUS at 04:11

## 2022-11-23 RX ADMIN — METOPROLOL SUCCINATE 25 MG: 25 TABLET, EXTENDED RELEASE ORAL at 07:11

## 2022-11-23 RX ADMIN — POTASSIUM CHLORIDE 10 MEQ: 10 INJECTION, SOLUTION INTRAVENOUS at 01:11

## 2022-11-23 RX ADMIN — AMIODARONE HYDROCHLORIDE 200 MG: 200 TABLET ORAL at 09:11

## 2022-11-23 NOTE — ED NOTES
Patient reports also having spots in his field of vision that pulsate. Reports this as having happened the past two times he has been in the hospital. Ongoing 2-3 weeks

## 2022-11-23 NOTE — HPI
72 y.o. male with CAD with LAD PCI placement in 2006, ischemic cardiomyopathy HTN, Afib on long term use of anticoagulants with warfarin, CVA, and ICD, presents to the OSH with a chief complaint of fatigue and weakness with acute onset of one-week. Patient reports he was seen 3 times in a period of a week for the same symptoms and was discharged. Patient's daughter says he was having difficulty getting around and did not have the same enjoy he normally does. Patient reports he was prescribed a new medication (amiodarone) on 11/18/2022 that is causing him to feel more fatigued.     In the OSH ED patient is afebrile without leukocytosis INR 4.6, BNP 1 441, troponin 0.038, 2nd troponin 0.041 Patient follows with Dr. Fournier seen earlier this month. Pt's device should pt had been having frequent VT which is why he was placed on amio by his cardiologist Dr. Fournier at [facility]. At [facility] pt underwent a LHC on 11/29 that showed an occluded LAD, severe Lcx stenosis. His cath was complicated by Vtach. Pt was requested to be transferred to [facility] in [facility] for high risk PCI. Pt is full code.

## 2022-11-23 NOTE — SUBJECTIVE & OBJECTIVE
No past medical history on file.    Past Surgical History:   Procedure Laterality Date    REPLACEMENT OF DUAL CHAMBER IMPLANTABLE CARDIOVERTER-DEFIBRILLATOR         Review of patient's allergies indicates:  No Known Allergies    No current facility-administered medications on file prior to encounter.     Current Outpatient Medications on File Prior to Encounter   Medication Sig    amiodarone (PACERONE) 200 MG Tab Take 200 mg by mouth 2 (two) times daily.    carBAMazepine (TEGRETOL) 200 mg tablet Take 400 mg by mouth 2 (two) times daily.    cetirizine (ZYRTEC) 10 MG tablet Take 1 tablet (10 mg total) by mouth once daily.    fluticasone propionate (FLONASE) 50 mcg/actuation nasal spray 1 spray (50 mcg total) by Each Nostril route once daily.    nitroGLYCERIN (NITROSTAT) 0.4 MG SL tablet Place 0.4 mg under the tongue.    pravastatin (PRAVACHOL) 20 MG tablet Take 20 mg by mouth.    warfarin (COUMADIN) 6 MG tablet Take 6 mg by mouth.     Family History    None       Tobacco Use    Smoking status: Former    Smokeless tobacco: Never   Substance and Sexual Activity    Alcohol use: Never    Drug use: Never    Sexual activity: Not on file     Review of Systems   Constitutional: Positive for malaise/fatigue. Negative for chills, diaphoresis and fever.   HENT:  Negative for nosebleeds.    Eyes:  Negative for blurred vision and double vision.   Cardiovascular:  Negative for chest pain, claudication, cyanosis, dyspnea on exertion, leg swelling, orthopnea, palpitations, paroxysmal nocturnal dyspnea and syncope.   Respiratory:  Negative for cough, shortness of breath and wheezing.    Skin:  Negative for dry skin and poor wound healing.   Musculoskeletal:  Negative for back pain, joint swelling and myalgias.   Gastrointestinal:  Negative for abdominal pain, nausea and vomiting.   Genitourinary:  Negative for hematuria.   Neurological:  Negative for dizziness, headaches, numbness, seizures and weakness.   Psychiatric/Behavioral:   Negative for altered mental status and depression.    Objective:     Vital Signs (Most Recent):  Temp: 98.5 °F (36.9 °C) (11/22/22 2129)  Pulse: 68 (11/23/22 0502)  Resp: 16 (11/23/22 0502)  BP: (!) 144/81 (11/23/22 0502)  SpO2: 95 % (11/23/22 0502)   Vital Signs (24h Range):  Temp:  [98.5 °F (36.9 °C)] 98.5 °F (36.9 °C)  Pulse:  [44-78] 68  Resp:  [16-22] 16  SpO2:  [95 %-99 %] 95 %  BP: (115-174)/(60-81) 144/81     Weight: 68.5 kg (151 lb)  Body mass index is 22.3 kg/m².    SpO2: 95 %  O2 Device (Oxygen Therapy): room air    No intake or output data in the 24 hours ending 11/23/22 0642    Lines/Drains/Airways       Peripheral Intravenous Line  Duration                  Peripheral IV - Single Lumen 11/22/22 2142 20 G Anterior;Right Forearm <1 day                    Physical Exam  Constitutional:       General: He is not in acute distress.     Appearance: Normal appearance. He is well-developed and normal weight. He is not ill-appearing, toxic-appearing or diaphoretic.   HENT:      Head: Normocephalic and atraumatic.   Eyes:      General: No scleral icterus.     Extraocular Movements: Extraocular movements intact.      Conjunctiva/sclera: Conjunctivae normal.      Pupils: Pupils are equal, round, and reactive to light.   Neck:      Thyroid: No thyromegaly.      Vascular: No JVD.      Trachea: No tracheal deviation.   Cardiovascular:      Rate and Rhythm: Normal rate and regular rhythm. FrequentExtrasystoles are present.     Heart sounds: S1 normal and S2 normal. No murmur heard.    No friction rub. No gallop.   Pulmonary:      Effort: Pulmonary effort is normal. No respiratory distress.      Breath sounds: Normal breath sounds. No stridor. No wheezing, rhonchi or rales.   Chest:      Chest wall: No tenderness.   Abdominal:      General: There is no distension.      Palpations: Abdomen is soft.   Musculoskeletal:         General: No swelling or tenderness. Normal range of motion.      Cervical back: Normal range of  motion and neck supple. No rigidity.      Right lower leg: No edema.      Left lower leg: No edema.   Skin:     General: Skin is warm and dry.      Coloration: Skin is not jaundiced.   Neurological:      General: No focal deficit present.      Mental Status: He is alert and oriented to person, place, and time.      Cranial Nerves: No cranial nerve deficit.   Psychiatric:         Mood and Affect: Mood normal.         Behavior: Behavior normal.       Current Medications:   amiodarone  200 mg Oral BID    carBAMazepine  400 mg Oral BID    pravastatin  20 mg Oral Daily    senna-docusate 8.6-50 mg  1 tablet Oral BID    warfarin  6 mg Oral Daily       acetaminophen, acetaminophen, albuterol-ipratropium, aluminum-magnesium hydroxide-simethicone, dextrose 10%, dextrose 10%, glucagon (human recombinant), glucose, glucose, melatonin, naloxone, ondansetron, prochlorperazine    Laboratory (all labs reviewed):  CBC:  Recent Labs   Lab 11/22/22 2152   WBC 4.82   Hemoglobin 14.9   Hematocrit 43.3   Platelets 117 L       CHEMISTRIES:  Recent Labs   Lab 11/22/22 2152   Glucose 131 H   Sodium 143   Potassium SEE COMMENT   BUN 18   Creatinine 1.0   eGFR >60   Calcium 8.6 L       CARDIAC BIOMARKERS:  Recent Labs   Lab 11/22/22 2152 11/23/22  0230   Troponin I 0.038 H 0.041 H       COAGS:  Recent Labs   Lab 11/23/22  0020 11/23/22  0516   INR 4.6 H 4.2 H       LIPIDS/LFTS:  Recent Labs   Lab 11/22/22 2152   AST 29   ALT 12       BNP:  Recent Labs   Lab 11/22/22 2152   BNP 1,441 H       TSH:  Recent Labs   Lab 11/22/22  2306   TSH 1.461       Free T4:        Diagnostic Results:  ECG (personally reviewed and interpreted tracing(s)):  11/22/22 2143 SR 87, RBBB/LAD, bigeminal PACs, QTc 474    Chest X-Ray (personally reviewed and interpreted image(s)): 11/22/22 NAD, L ICD 1 lead    Echo: 10/19/22 (care everywhere)  EF is approximately 20-30%, Ao root 4.1cm.    1. Severely decreased left ventricular systolic function.       2. Grade II  diastolic dysfunction.       3. Wall motion abnormalities imply disease of the left anterior descending coronary artery.     4. Mild nonrheumatic mitral valve regurgitation with no suggestion of left atrial enlargement.     5. Mild nonrheumatic aortic valve regurgitation.       6. Mild-to-moderate aortic root dilation.     7. Although estimation of pulmonary artery systolic pressure is not possible due to incomplete tricuspid regurgitation velocity       profile, pulmonary hypertension is not suspected.     Compared to previous echocardiogram 11/01/2021, no significant changes are seen       Cath: ([place] 11/17[place]22)  (09/03/2006):  DE Stent placement in LAD due to anterior STEMI

## 2022-11-23 NOTE — ASSESSMENT & PLAN NOTE
Patient presents with weakness for 1 week Associated symptoms include difficulty walking, diarrhea, frequent urination, and muscle spasms. interrogation of ICS in ED showed that pt is going in and out of bigeminal rhythm with runs of Vtach and an increase in PVC's to over 600 per hour.   Tele monitoring   Continue home amiodarone   Consult to cardiology   PT/OT eval pending  Continue warfarin, statin

## 2022-11-23 NOTE — HPI
"Balbir Rebollar Sr. 72 y.o. male with CAD, HTN, Afib, long term use of anticoagulants, CVA, and ICD, presents to the hospital with a chief complaint of fatigue and weakness with acute onset of one-week. Patient reports he was seen 3 times in a period of a week for the same symptoms and was discharged.  Patient describes his symptoms as feeling tired but I can not sleep. Associated symptoms include difficulty walking, diarrhea, frequent urination, and muscle spasms. Patient's daughter says," he is stumbling going up and down the stairs". Patient reports he was prescribed a new medication that is causing him to have dry mouth and feeling more fatigued (on top of original fatigue complaint). Patient's daughter reports patient is usually very active, but recently has been staying in bed more often this past week. No other exacerbating or alleviating factors. Patient denies dysuria, nausea, vomiting, cough, numbness, slurred speech, black stool , paresthesia, or other associated symptoms. Patient endorses compliance of coumadin. Family reports that the patient seen at  several times recently for symptoms and state they were told nothing was found. Follows with cardiology at  and was recently started on Amiodarone on 11/18/2022.      In the ED patient is afebrile without leukocytosis PT 45.2, INR 4.6, CO2 20, calcium 8.6, BNP 1 441, troponin 0.038, 2nd troponin 0.041 TSH WNL, UA is hazy with trace protein glucose and occult blood, negative for flu and COVID, CXR was negative, EKG showed "Sinus rhythm with Premature supraventricular complexes in a pattern of bigeminy, Right bundle branch block, Left anterior fascicular block" without previous EKG for comparison.  Patient was placed in observation for further workup an assessment by Cardiology.  Previous Echo from [facility] everywhere     ECHOCARDIOGRAM   Interpretation Summary  11/01/2021  MRN:  [id number]        FIN:    [id number]      Accession #:[id number]     "     Order #:[id number]    Indications:    Coronary artery disease involving native coronary artery of native heart without angina pectoris; Paroxysmal                     atrial fibrillation (CMS/HCC); Nonsustained ventricular tachycardia (CMS/HCC)       BP:  128   / 74      HR: 67                       Rhythm:           Sinus                                                      Technical Quality:Very technically difficult study    MEASUREMENTS  (Male / Female) Normal Values   Left Ventricle:    Appears to be normal left ventricular cavity size.  Severely reduced left ventricular systolic function.  Dense hypokinesis and thinning of the anterior wall, septum, and apex with normal contractility elsewhere.   Visually estimated EF is less than 30%.  Grade I diastolic dysfunction (abnormal relaxation filling pattern),    with normal or mildly elevated filling pressures.

## 2022-11-23 NOTE — SUBJECTIVE & OBJECTIVE
No past medical history on file.    Past Surgical History:   Procedure Laterality Date    REPLACEMENT OF DUAL CHAMBER IMPLANTABLE CARDIOVERTER-DEFIBRILLATOR         Review of patient's allergies indicates:  No Known Allergies    No current facility-administered medications on file prior to encounter.     Current Outpatient Medications on File Prior to Encounter   Medication Sig    amiodarone (PACERONE) 200 MG Tab Take 200 mg by mouth 2 (two) times daily.    carBAMazepine (TEGRETOL) 200 mg tablet Take 400 mg by mouth 2 (two) times daily.    cetirizine (ZYRTEC) 10 MG tablet Take 1 tablet (10 mg total) by mouth once daily.    fluticasone propionate (FLONASE) 50 mcg/actuation nasal spray 1 spray (50 mcg total) by Each Nostril route once daily.    nitroGLYCERIN (NITROSTAT) 0.4 MG SL tablet Place 0.4 mg under the tongue.    pravastatin (PRAVACHOL) 20 MG tablet Take 20 mg by mouth.    warfarin (COUMADIN) 6 MG tablet Take 6 mg by mouth.     Family History    None       Tobacco Use    Smoking status: Former    Smokeless tobacco: Never   Substance and Sexual Activity    Alcohol use: Never    Drug use: Never    Sexual activity: Not on file     Review of Systems   Constitutional:  Positive for fatigue. Negative for chills and fever.   HENT:  Negative for congestion and rhinorrhea.    Eyes:  Negative for photophobia and visual disturbance.   Respiratory:  Negative for cough and shortness of breath.    Cardiovascular:  Negative for chest pain, palpitations and leg swelling.   Gastrointestinal:  Positive for diarrhea. Negative for abdominal pain, nausea and vomiting.   Genitourinary:  Negative for dysuria, frequency, hematuria and urgency.        (+) increased urine frequency    Musculoskeletal:  Positive for gait problem and neck stiffness.        (+) muscle spasms    Skin:  Negative for pallor, rash and wound.   Neurological:  Positive for weakness. Negative for light-headedness and headaches.   Psychiatric/Behavioral:  Negative  for confusion and decreased concentration.    Objective:     Vital Signs (Most Recent):  Temp: 98.5 °F (36.9 °C) (11/22/22 2129)  Pulse: 68 (11/23/22 0233)  Resp: 20 (11/23/22 0233)  BP: (!) 151/70 (11/23/22 0233)  SpO2: 99 % (11/23/22 0233)   Vital Signs (24h Range):  Temp:  [98.5 °F (36.9 °C)] 98.5 °F (36.9 °C)  Pulse:  [44-78] 68  Resp:  [18-22] 20  SpO2:  [96 %-99 %] 99 %  BP: (115-174)/(60-74) 151/70     Weight: 68.5 kg (151 lb)  Body mass index is 22.3 kg/m².    Physical Exam  Vitals and nursing note reviewed.   Constitutional:       General: He is not in acute distress.     Appearance: He is well-developed. He is ill-appearing. He is not diaphoretic.   HENT:      Head: Normocephalic and atraumatic.      Right Ear: External ear normal.      Left Ear: External ear normal.      Nose: Nose normal.      Mouth/Throat:      Mouth: Mucous membranes are dry.   Eyes:      Conjunctiva/sclera: Conjunctivae normal.      Pupils: Pupils are equal, round, and reactive to light.   Cardiovascular:      Rate and Rhythm: Bradycardia present. Rhythm irregularly irregular.      Heart sounds: Murmur heard.      Comments: Patient is going in and out of [facility]. Heart rate fluctuates between 40's and 80's.   Pulmonary:      Effort: Pulmonary effort is normal. No respiratory distress.      Breath sounds: Normal breath sounds. No wheezing or rales.   Abdominal:      General: Bowel sounds are normal. There is no distension.      Palpations: Abdomen is soft.      Tenderness: There is no abdominal tenderness.      Comments: No palpable hepatomegaly or splenomegaly   Musculoskeletal:         General: No tenderness. Normal range of motion.      Cervical back: Normal range of motion and neck supple.   Skin:     General: Skin is warm and dry.      Coloration: Skin is pale.   Neurological:      Mental Status: He is alert and oriented to person, place, and time.   Psychiatric:         Thought Content: Thought content normal.         CRANIAL  NERVES     CN III, IV, VI   Pupils are equal, round, and reactive to light.     Significant Labs: All pertinent labs within the past 24 hours have been reviewed.  Recent Lab Results  (Last 5 results in the past 24 hours)        11/23/22  0230   11/23/22  0020   11/22/22  2333   11/22/22  2306   11/22/22  2257        POC Molecular Influenza A Ag     Negative           POC Molecular Influenza B Ag     Negative           Appearance, UA         Hazy       Bilirubin (UA)         Negative       Color, UA         Yellow       Glucose, UA         Trace       INR   4.6  Comment: Coumadin Therapy:  2.0 - 3.0 for INR for all indicators except mechanical heart valves  and antiphospholipid syndromes which should use 2.5 - 3.5.               Ketones, UA         Negative       Leukocytes, UA         Negative       NITRITE UA         Negative       Occult Blood UA         Trace       pH, UA         7.0       Protein, UA         Trace  Comment: Recommend a 24 hour urine protein or a urine   protein/creatinine ratio if globulin induced proteinuria is  clinically suspected.         Protime   45.2              Acceptable     Yes                Yes           SARS-CoV-2 RNA, Amplification, Qual     Negative           Specific [place], UA         1.025       Specimen UA         Urine, Clean Catch       Troponin I 0.041  Comment: The reference interval for Troponin I represents the 99th percentile   cutoff   for our facility and is consistent with 3rd generation assay   performance.                 TSH       1.461         UROBILINOGEN UA         2.0-3.0                              Significant Imaging: I have reviewed all pertinent imaging results/findings within the past 24 hours.  Imaging Results              X-Ray Chest AP Portable (Final result)  Result time 11/22/22 22:28:24      Final result by Marti Moreno MD (11/22/22 22:28:24)                   Impression:      No acute intrathoracic abnormality  detected.      Electronically signed by: Marti Moreno  Date:    11/22/2022  Time:    22:28               Narrative:    EXAMINATION:  AP PORTABLE CHEST    CLINICAL HISTORY:  weakness;    TECHNIQUE:  AP portable chest radiograph was submitted.    COMPARISON:  08/10/2022    FINDINGS:  There is single leads left subclavian pacer close to the right ventricle.  AP portable chest radiograph demonstrates a cardiac silhouette within normal limits.  There is no focal consolidation, pneumothorax, or pleural effusion. The bones are diffusely osteopenic.

## 2022-11-23 NOTE — FIRST PROVIDER EVALUATION
"Medical screening examination initiated.  I have conducted a focused provider triage encounter, findings are as follows:    Brief history of present illness:  71 yo M with multiple medical conditions including a pacemaker who presents with weakness. Family reports that the patient seen at  several times recently for symptoms and state they were told nothing was found. Follows with cardiology at  and was recently started on Amiodarone on 11/18/2022.     Vitals:    11/22/22 2129   BP: (!) 174/74   BP Location: Left arm   Patient Position: Sitting   Pulse: (!) 44   Resp: 18   Temp: 98.5 °F (36.9 °C)   TempSrc: Oral   SpO2: 97%   Weight: 68.5 kg (151 lb)   Height: 5' 9" (1.753 m)       Pertinent physical exam:  ill appearing, pale, no respiratory distress, bradycardic, no focal neurologic deficits    Brief workup plan:  labs, ekg, chest xray     Preliminary workup initiated; this workup will be continued and followed by the physician or advanced practice provider that is assigned to the patient when roomed.  "

## 2022-11-23 NOTE — PLAN OF CARE
Problem: Physical Therapy  Goal: Physical Therapy Goal  Outcome: Adequate for Care Transition   Initial eval completed, see in chart for details.

## 2022-11-23 NOTE — ED NOTES
Pt presents to ED with generalized weakness and vision changes x2-3 days. Pt states he is seeing spots and is fatigue denies dizziness, falls, N/V/D, SOB, HA, CP, fever, or chills. PMHx HLD, arrhythmias, and MI. Pt currently taking blood thinner and amiodarone. Pt is AAOx4, NAD at this time.

## 2022-11-23 NOTE — ASSESSMENT & PLAN NOTE
Known CAD s/p LAD PCI 2006  Hx ICM, EF <30% by recent echo.  No overt anginal sxs, but freq ventricular tachycardia noted.  Hold coumadin for possible cath.

## 2022-11-23 NOTE — NURSING
Patient admitted to the floor, accompanied by his son Chris.  Patient AAOX4, denies chest pain or SOB at the present time.  Potassium rider infusing without difficulty.  Applied cardiac monitoring per orders, VSS at the present time.  Acclimated to call bell use, denies any concerns at the present time.  Will continue to monitor.

## 2022-11-23 NOTE — ED PROVIDER NOTES
"Encounter Date: 11/22/2022    SCRIBE #1 NOTE: I, Marizol Marisa, am scribing for, and in the presence of,  Parrish Morris MD. I have scribed the following portions of the note - Other sections scribed: HPI, ROS, PE.     History     Chief Complaint   Patient presents with    Fatigue     X several days. Was seen at  for same c/o and discharged. Just started amiodarone 11/18     Balbir Rebollar Sr. is a 72 y.o. male, with a PMHx of CAD, CVA, and ICD, who presents to the ED with fatigue/weakness for the past week. Patient reports he was seen 3 times in a period of a week for the same symptoms and was discharged. Patient is complaining of difficulty walking, diarrhea, frequent urination, and muscle spasms. Patient's daughter says," he is stumbling going up and down the stairs". Patient reports he was prescribed a new medication that is causing him to have dry mouth and feeling more fatigued (on top of original fatigue complaint). Patient's daughter reports patient is usually very active, but recently has been staying in bed more often this past week. No other exacerbating or alleviating factors. Patient denies dysuria, nausea, vomiting, cough, numbness, slurred speech, black stool , paresthesia, or other associated symptoms. Patient endorses compliance of coumadin.    The history is provided by the patient and a relative. No  was used.   Review of patient's allergies indicates:  No Known Allergies  No past medical history on file.  Past Surgical History:   Procedure Laterality Date    REPLACEMENT OF DUAL CHAMBER IMPLANTABLE CARDIOVERTER-DEFIBRILLATOR       No family history on file.  Social History     Tobacco Use    Smoking status: Former    Smokeless tobacco: Never   Substance Use Topics    Alcohol use: Never    Drug use: Never     Review of Systems   Constitutional:  Positive for fatigue. Negative for fever.   HENT:  Negative for congestion.         (+) dry mouth   Eyes:  Negative " for pain.   Respiratory:  Negative for cough.    Cardiovascular:  Negative for chest pain.   Gastrointestinal:  Positive for diarrhea. Negative for nausea and vomiting.        (-) black stool   Endocrine: Negative for polyuria.   Genitourinary:  Negative for dysuria.        (+) increased urine frequency   Musculoskeletal:  Positive for gait problem.        (+) muscle spasms   Skin:  Negative for rash.   Neurological:  Positive for weakness. Negative for speech difficulty and numbness.        (-) paresthesia     Physical Exam     Initial Vitals [11/22/22 2129]   BP Pulse Resp Temp SpO2   (!) 174/74 (!) 44 18 98.5 °F (36.9 °C) 97 %      MAP       --         Physical Exam    Nursing note and vitals reviewed.  Constitutional: He appears well-developed. No distress.   Patient appears frail.   HENT:   Head: Normocephalic and atraumatic.   Nose: Nose normal.   Dry mouth noted.   Eyes: EOM are normal. Pupils are equal, round, and reactive to light. No scleral icterus.   Neck: Neck supple. No JVD present.   Normal range of motion.  Cardiovascular:            Patient is going in and out of bigeminy. Heart rate fluctuates between 40's and 80's.   Pulmonary/Chest: Breath sounds normal. No stridor. He has no wheezes. He has no rales.   Abdominal: Abdomen is soft. Bowel sounds are normal. He exhibits no distension. There is no abdominal tenderness. There is no rebound and no guarding.   Musculoskeletal:         General: No tenderness.      Cervical back: Normal range of motion and neck supple.      Right lower leg: No edema.      Left lower leg: No edema.     Neurological: He is alert and oriented to person, place, and time. No cranial nerve deficit.   Skin: Skin is warm and dry. There is pallor (mild).   Psychiatric: He has a normal mood and affect.       ED Course   Procedures  Labs Reviewed   CBC W/ AUTO DIFFERENTIAL - Abnormal; Notable for the following components:       Result Value    MCH 31.2 (*)     Platelets 117 (*)      All other components within normal limits   COMPREHENSIVE METABOLIC PANEL - Abnormal; Notable for the following components:    Chloride 113 (*)     CO2 20 (*)     Glucose 131 (*)     Calcium 8.6 (*)     All other components within normal limits   TROPONIN I - Abnormal; Notable for the following components:    Troponin I 0.038 (*)     All other components within normal limits   B-TYPE NATRIURETIC PEPTIDE - Abnormal; Notable for the following components:    BNP 1,441 (*)     All other components within normal limits   URINALYSIS, REFLEX TO URINE CULTURE - Abnormal; Notable for the following components:    Appearance, UA Hazy (*)     Protein, UA Trace (*)     Glucose, UA Trace (*)     Occult Blood UA Trace (*)     Urobilinogen, UA 2.0-3.0 (*)     All other components within normal limits    Narrative:     Specimen Source->Urine   PROTIME-INR - Abnormal; Notable for the following components:    Prothrombin Time 45.2 (*)     INR 4.6 (*)     All other components within normal limits   TROPONIN I - Abnormal; Notable for the following components:    Troponin I 0.041 (*)     All other components within normal limits    Narrative:     STAT, if not done in ED, then at 2nd and 6th hour from  initial draw.   PROTIME-INR - Abnormal; Notable for the following components:    Prothrombin Time 41.4 (*)     INR 4.2 (*)     All other components within normal limits   TSH   TROPONIN I   SARS-COV-2 RDRP GENE   POCT INFLUENZA A/B MOLECULAR          Imaging Results              X-Ray Chest AP Portable (Final result)  Result time 11/22/22 22:28:24      Final result by Marti Moreno MD (11/22/22 22:28:24)                   Impression:      No acute intrathoracic abnormality detected.      Electronically signed by: Marti Moreno  Date:    11/22/2022  Time:    22:28               Narrative:    EXAMINATION:  AP PORTABLE CHEST    CLINICAL HISTORY:  weakness;    TECHNIQUE:  AP portable chest radiograph was  submitted.    COMPARISON:  08/10/2022    FINDINGS:  There is single leads left subclavian pacer close to the right ventricle.  AP portable chest radiograph demonstrates a cardiac silhouette within normal limits.  There is no focal consolidation, pneumothorax, or pleural effusion. The bones are diffusely osteopenic.                                       Medications   carBAMazepine tablet 400 mg (has no administration in time range)   pravastatin tablet 20 mg (has no administration in time range)   warfarin (COUMADIN) tablet 6 mg (has no administration in time range)   melatonin tablet 6 mg (has no administration in time range)   ondansetron disintegrating tablet 8 mg (has no administration in time range)   prochlorperazine injection Soln 5 mg (has no administration in time range)   acetaminophen tablet 650 mg (has no administration in time range)   aluminum-magnesium hydroxide-simethicone 200-200-20 mg/5 mL suspension 30 mL (has no administration in time range)   naloxone 0.4 mg/mL injection 0.02 mg (has no administration in time range)   glucose chewable tablet 16 g (has no administration in time range)   glucose chewable tablet 24 g (has no administration in time range)   glucagon (human recombinant) injection 1 mg (has no administration in time range)   acetaminophen tablet 650 mg (has no administration in time range)   senna-docusate 8.6-50 mg per tablet 1 tablet (has no administration in time range)   albuterol-ipratropium 2.5 mg-0.5 mg/3 mL nebulizer solution 3 mL (has no administration in time range)   dextrose 10% bolus 125 mL (has no administration in time range)   dextrose 10% bolus 250 mL (has no administration in time range)   amiodarone tablet 200 mg (has no administration in time range)     Medical Decision Making:   History:   Old Medical Records: I decided to obtain old medical records.  Initial Assessment:   This is an emergent evaluation of a 72 y.o. male who presents with fatigue. The patient was  seen and examined. The history and physical exam was obtained. The nursing notes and vital signs were reviewed.   Differential Diagnosis:   Differential Diagnosis includes, but is not limited to: CVA/TIA, vertigo, anemia/blood loss, cardiogenic shock, arrhythmia, orthostatic hypotension, dehydration, medication side effect, vitamin deficiency, liver disease, hypothyroidism, drug intoxication/withdrawal, metabolic derangement. Will obtain labs and imaging labs including a chest x-ray. Will monitor and frequently reassess pending results of labs, treatments, and final disposition.  Clinical Tests:   Lab Tests: Ordered and Reviewed  Radiological Study: Ordered and Reviewed        Scribe Attestation:   Scribe #1: I performed the above scribed service and the documentation accurately describes the services I performed. I attest to the accuracy of the note.            Pt presents complaining of severe fatigue and episodes of near syncope. Reports seeing black spots in vision and feeling tingling sensations in body while feeling faint.  Reports being seen at  twice and by his cardiologist since symptoms started. He was started on pacerone. Feels he is having a dry mouth and increased fatigue since. Chart review shows EF 20-25% and grade 2 diastolic failure. Pt is in bigeminy pulse drops to 40's.  [employer] interrogated and pt having significant increase in PVCs and having runs o VTACH below pacer threshold lasting for seconds to minutes with the longest 11 minutes. PT declining transfer to  where his cardiologist is located. Will keep overnight in the hospital and consult cardiology.        Clinical Impression:   Final diagnoses:  [R53.1] Weakness  [I50.40] Combined systolic and diastolic congestive heart failure, unspecified HF chronicity (Primary)  [I49.8] Bigeminy  [I47.20] Ventricular tachyarrhythmia        ED Disposition Condition    Observation Stable        I, Parrish Parker, personally performed the services  described in this documentation. All medical record entries made by the scribe were at my direction and in my presence. I have reviewed the chart and agree that the record reflects my personal performance and is accurate and complete.         Parrish Morris MD  11/23/22 0672

## 2022-11-23 NOTE — ASSESSMENT & PLAN NOTE
Known severe LV dysfxn/ICM  Not grossly vol overloaded, but optivol crossed on ICD and BNP elevated  Not on GDMT  Start toprol, then entresto/aldact  IV lasix  Consider as candidate for CardioMEMS.

## 2022-11-23 NOTE — ASSESSMENT & PLAN NOTE
Continue statin    Ap Luna RN  01/06/22 5811

## 2022-11-23 NOTE — PT/OT/SLP EVAL
Occupational Therapy   Evaluation and Discharge Note    Name: Balbir Rebollar Sr.  MRN: [id number]  Admitting Diagnosis:  Weakness   Recent Surgery: * No surgery found *      Recommendations:     Discharge Recommendations: home (w/ family support)  Discharge Equipment Recommendations:  shower chair  Barriers to discharge:  None    Assessment:     Balbir Rebollar Sr. is a 72 y.o. male with a medical diagnosis of Weakness. At this time, patient is functioning at their prior level of function and does not require further acute OT services.     Initial eval complete. Pt was able to ambulate functional distances and perform ADLs w/ supervision and no AD. Pt w/ mild weakness but tolerated well w/o LOB. Pt w/ SPO2 >95% and HR in low 90s w/ exertion. Pt is not in need of skilled acute OT services at this time; no furhter needs at tome of d/c.     Plan:     During this hospitalization, patient does not require further acute OT services.  Please re-consult if situation changes.    Plan of Care Reviewed with: patient, son    Subjective     Chief Complaint: Mild weakness  Patient/Family Comments/goals: Very pleasant; agreeable to eval     Occupational Profile:  Living Environment: Pt lives w/ son in 2SH w/ 5 AINSLEY and BHR; pt resides on 2nd floor w/ access to full bath, equipped w/ t/s combo and no DME. Pt is able to reside on 1st floor.   Previous level of function: Pt was (I) w/ ADLs and functional mobility.   Roles and Routines: None stated  Equipment Used at home:  none  Assistance upon Discharge: Son     Pain/Comfort:  Pain Rating 1: 0/10  Pain Rating Post-Intervention 1: 0/10    Patients cultural, spiritual, Roman [employer] conflicts given the current situation: no    Objective:     Communicated with: Nurse prior to session.  Patient found HOB elevated with telemetry, blood pressure cuff, pulse ox (continuous) upon OT entry to room.    General Precautions: Standard, fall   Orthopedic Precautions:N/A   Braces:  N/A  Respiratory Status: Room air     Occupational Performance:    Bed Mobility:    Patient completed Scooting/Bridging with supervision  Patient completed Supine to Sit with supervision  Patient completed Sit to Supine with supervision    Functional Mobility/Transfers:  Patient completed Sit <> Stand Transfer with supervision  with  no assistive device   Patient completed Toilet Transfer Step Transfer technique with supervision with  no AD  Functional Mobility: Pt was able to ambulate functional distances in room and in unit w/ SBA-supervision and no AD; pt w/ o LOB despite complaints of mild weakness.     Activities of Daily Living:  Upper Body Dressing: minimum assistance for donning gown over back   Lower Body Dressing: stand by assistance for donning socks while sitting EOB   Toileting: supervision for performing front ingrid-care and urinating in standing   Grooming: supervision-SBA for standing at sink to wash hands     Cognitive/Visual Perceptual:  Cognitive/Psychosocial Skills:     -       Oriented to: Person, Place, and Time   -       Follows Commands/attention:Follows multistep  commands  -       Communication: clear/fluent  -       Memory: No Deficits noted  -       Safety awareness/insight to disability: intact     Physical Exam:  Balance:    -       good sitting balance; fair + standing balance  Postural examination/scapula alignment:    -       Rounded shoulders  -       Forward head  Skin integrity: Visible skin intact  Edema:  None noted  Sensation:    -       Intact  Upper Extremity Range of Motion:     -       Right Upper Extremity: WFL  -       Left Upper Extremity: WFL  Upper Extremity Strength:    -       Right Upper Extremity: WFL  -       Left Upper Extremity: WFL   Strength:    -       Right Upper Extremity: WFL  -       Left Upper Extremity: WFL    AMPAC 6 Click ADL:  AMPAC Total Score: 24    Treatment & Education:  -Initial eval complete.   -Pt educated on role of OT and POC.   -Pt  educated on safe functional mobility and ADL performance.   -Pt educated on importance of implementing rest breaks and energy conservation strategies into daily routines.   -Questions and concerns addressed within OT scope.     Patient left HOB elevated with all lines intact, call button in reach, and nurse notified    GOALS:   Multidisciplinary Problems       Occupational Therapy Goals       Not on file              Multidisciplinary Problems (Resolved)          Problem: Occupational Therapy    Goal Priority Disciplines Outcome Interventions   Occupational Therapy Goal   (Resolved)     OT, PT/OT Met                        History:     No past medical history on file.      Past Surgical History:   Procedure Laterality Date    REPLACEMENT OF DUAL CHAMBER IMPLANTABLE CARDIOVERTER-DEFIBRILLATOR         Time Tracking:     OT Date of Treatment: 11/23/22  OT Start Time: 0908  OT Stop Time: 0923  OT Total Time (min): 15 min    Billable Minutes:Evaluation 15  Total Time 15 (co-modesta w/ PT)     11/23/2022

## 2022-11-23 NOTE — H&P
"[facility]  History & Physical    Patient Name: Balbir Rebollar Sr.  MRN: [id number]  Patient Class: OP- Observation  Admission Date: 11/22/2022  Attending Physician: Ron Pickard MD   Primary Care Provider: Primary Doctor No         Patient information was obtained from patient, past medical records and ER records.     Subjective:     Principal Problem:Weakness    Chief Complaint:   Chief Complaint   Patient presents with    Fatigue     X several days. Was seen at  for same c/o and discharged. Just started amiodarone 11/18        HPI: Balbir Rebollar Sr. 72 y.o. male with CAD, HTN, Afib, long term use of anticoagulants, CVA, and ICD, presents to the hospital with a chief complaint of fatigue and weakness with acute onset of one-week. Patient reports he was seen 3 times in a period of a week for the same symptoms and was discharged.  Patient describes his symptoms as feeling tired but I can not sleep. Associated symptoms include difficulty walking, diarrhea, frequent urination, and muscle spasms. Patient's daughter says," he is stumbling going up and down the stairs". Patient reports he was prescribed a new medication that is causing him to have dry mouth and feeling more fatigued (on top of original fatigue complaint). Patient's daughter reports patient is usually very active, but recently has been staying in bed more often this past week. No other exacerbating or alleviating factors. Patient denies dysuria, nausea, vomiting, cough, numbness, slurred speech, black stool , paresthesia, or other associated symptoms. Patient endorses compliance of coumadin. Family reports that the patient seen at  several times recently for symptoms and state they were told nothing was found. Follows with cardiology at  and was recently started on Amiodarone on 11/18/2022.      In the ED patient is afebrile without leukocytosis PT 45.2, INR 4.6, CO2 20, calcium 8.6, BNP 1 441, " "troponin 0.038, 2nd troponin 0.041 TSH WNL, UA is hazy with trace protein glucose and occult blood, negative for flu and COVID, CXR was negative, EKG showed "Sinus rhythm with Premature supraventricular complexes in a pattern of bigeminy, Right bundle branch block, Left anterior fascicular block" without previous EKG for comparison.  Patient was placed in observation for further workup an assessment by Cardiology.  Previous Echo from [facility] everywhere     ECHOCARDIOGRAM   Interpretation Summary  11/01/2021  MRN:  [id number]        FIN:    [id number]      Accession #:[id number]         Order #:[id number]    Indications:    Coronary artery disease involving native coronary artery of native heart without angina pectoris; Paroxysmal                     atrial fibrillation (CMS/HCC); Nonsustained ventricular tachycardia (CMS/HCC)       BP:  128   / 74      HR: 67                       Rhythm:           Sinus                                                      Technical Quality:Very technically difficult study    MEASUREMENTS  (Male / Female) Normal Values   Left Ventricle:    Appears to be normal left ventricular cavity size.  Severely reduced left ventricular systolic function.  Dense hypokinesis and thinning of the anterior wall, septum, and apex with normal contractility elsewhere.   Visually estimated EF is less than 30%.  Grade I diastolic dysfunction (abnormal relaxation filling pattern),    with normal or mildly elevated filling pressures.      No past medical history on file.    Past Surgical History:   Procedure Laterality Date    REPLACEMENT OF DUAL CHAMBER IMPLANTABLE CARDIOVERTER-DEFIBRILLATOR         Review of patient's allergies indicates:  No Known Allergies    No current facility-administered medications on file prior to encounter.     Current Outpatient Medications on File Prior to Encounter   Medication Sig    amiodarone (PACERONE) 200 MG Tab Take 200 mg by mouth 2 (two) times daily.    " carBAMazepine (TEGRETOL) 200 mg tablet Take 400 mg by mouth 2 (two) times daily.    cetirizine (ZYRTEC) 10 MG tablet Take 1 tablet (10 mg total) by mouth once daily.    fluticasone propionate (FLONASE) 50 mcg/actuation nasal spray 1 spray (50 mcg total) by Each Nostril route once daily.    nitroGLYCERIN (NITROSTAT) 0.4 MG SL tablet Place 0.4 mg under the tongue.    pravastatin (PRAVACHOL) 20 MG tablet Take 20 mg by mouth.    warfarin (COUMADIN) 6 MG tablet Take 6 mg by mouth.     Family History    None       Tobacco Use    Smoking status: Former    Smokeless tobacco: Never   Substance and Sexual Activity    Alcohol use: Never    Drug use: Never    Sexual activity: Not on file     Review of Systems   Constitutional:  Positive for fatigue. Negative for chills and fever.   HENT:  Negative for congestion and rhinorrhea.    Eyes:  Negative for photophobia and visual disturbance.   Respiratory:  Negative for cough and shortness of breath.    Cardiovascular:  Negative for chest pain, palpitations and leg swelling.   Gastrointestinal:  Positive for diarrhea. Negative for abdominal pain, nausea and vomiting.   Genitourinary:  Negative for dysuria, frequency, hematuria and urgency.        (+) increased urine frequency    Musculoskeletal:  Positive for gait problem and neck stiffness.        (+) muscle spasms    Skin:  Negative for pallor, rash and wound.   Neurological:  Positive for weakness. Negative for light-headedness and headaches.   Psychiatric/Behavioral:  Negative for confusion and decreased concentration.    Objective:     Vital Signs (Most Recent):  Temp: 98.5 °F (36.9 °C) (11/22/22 2129)  Pulse: 68 (11/23/22 0233)  Resp: 20 (11/23/22 0233)  BP: (!) 151/70 (11/23/22 0233)  SpO2: 99 % (11/23/22 0233)   Vital Signs (24h Range):  Temp:  [98.5 °F (36.9 °C)] 98.5 °F (36.9 °C)  Pulse:  [44-78] 68  Resp:  [18-22] 20  SpO2:  [96 %-99 %] 99 %  BP: (115-174)/(60-74) 151/70     Weight: 68.5 kg (151 lb)  Body mass  index is 22.3 kg/m².    Physical Exam  Vitals and nursing note reviewed.   Constitutional:       General: He is not in acute distress.     Appearance: He is well-developed. He is ill-appearing. He is not diaphoretic.   HENT:      Head: Normocephalic and atraumatic.      Right Ear: External ear normal.      Left Ear: External ear normal.      Nose: Nose normal.      Mouth/Throat:      Mouth: Mucous membranes are dry.   Eyes:      Conjunctiva/sclera: Conjunctivae normal.      Pupils: Pupils are equal, round, and reactive to light.   Cardiovascular:      Rate and Rhythm: Bradycardia present. Rhythm irregularly irregular.      Heart sounds: Murmur heard.      Comments: [facility] Heart rate fluctuates between 40's and 80's.   Pulmonary:      Effort: Pulmonary effort is normal. No respiratory distress.      Breath sounds: Normal breath sounds. No wheezing or rales.   Abdominal:      General: Bowel sounds are normal. There is no distension.      Palpations: Abdomen is soft.      Tenderness: There is no abdominal tenderness.      Comments: No palpable hepatomegaly or splenomegaly   Musculoskeletal:         General: No tenderness. Normal range of motion.      Cervical back: Normal range of motion and neck supple.   Skin:     General: Skin is warm and dry.      Coloration: Skin is pale.   Neurological:      Mental Status: He is alert and oriented to person, place, and time.   Psychiatric:         Thought Content: Thought content normal.         CRANIAL NERVES     CN III, IV, VI   Pupils are equal, round, and reactive to light.     Significant Labs: All pertinent labs within the past 24 hours have been reviewed.  Recent Lab Results  (Last 5 results in the past 24 hours)        11/23/22  0230   11/23/22  0020   11/22/22  2333   11/22/22  2306   11/22/22  2257        POC Molecular Influenza A Ag     Negative           POC Molecular Influenza B Ag     Negative           Appearance, UA         Hazy        Bilirubin (UA)         Negative       Color, UA         Yellow       Glucose, UA         Trace       INR   4.6  Comment: Coumadin Therapy:  2.0 - 3.0 for INR for all indicators except mechanical heart valves  and antiphospholipid syndromes which should use 2.5 - 3.5.               Ketones, UA         Negative       Leukocytes, UA         Negative       NITRITE UA         Negative       Occult Blood UA         Trace       pH, UA         7.0       Protein, UA         Trace  Comment: Recommend a 24 hour urine protein or a urine   protein/creatinine ratio if globulin induced proteinuria is  clinically suspected.         Protime   45.2              Acceptable     Yes                Yes           SARS-CoV-2 RNA, Amplification, Qual     Negative           Specific Duncan, UA         1.025       Specimen UA         Urine, Clean Catch       Troponin I 0.041  Comment: The reference interval for Troponin I represents the 99th percentile   cutoff   for our facility and is consistent with 3rd generation assay   performance.                 TSH       1.461         UROBILINOGEN UA         2.0-3.0                              Significant Imaging: I have reviewed all pertinent imaging results/findings within the past 24 hours.  Imaging Results              X-Ray Chest AP Portable (Final result)  Result time 11/22/22 22:28:24      Final result by Marti Moreno MD (11/22/22 22:28:24)                   Impression:      No acute intrathoracic abnormality detected.      Electronically signed by: Marti Moreno  Date:    11/22/2022  Time:    22:28               Narrative:    EXAMINATION:  AP PORTABLE CHEST    CLINICAL HISTORY:  weakness;    TECHNIQUE:  AP portable chest radiograph was submitted.    COMPARISON:  08/10/2022    FINDINGS:  There is single leads left subclavian pacer close to the right ventricle.  AP portable chest radiograph demonstrates a cardiac silhouette within normal limits.  There is no focal  consolidation, pneumothorax, or pleural effusion. The bones are diffusely osteopenic.                                        Assessment/Plan:     * Weakness  Patient presents with weakness for 1 week Associated symptoms include difficulty walking, diarrhea, frequent urination, and muscle spasms. interrogation of ICS in ED showed that pt is going in and out of bigeminal rhythm with runs of Vtach and an increase in PVC's to over 600 per hour.   Tele monitoring   Continue home amiodarone   Consult to cardiology   PT/OT eval pending  Continue warfarin, statin      Bigeminal rhythm  As above #1      Pure hypercholesterolemia  Continue statin     History of MI (myocardial infarction)  chronically elevated troponin, continue to monitor     History of CVA (cerebrovascular accident)  Chronic, stable without complaint today     Chronic anticoagulation  As above #1    Atrial fibrillation  Patient with Long standing persistent (>12 months) atrial fibrillation which is uncontrolled currently with Beta Blocker and Amiodarone. Patient is currently in sinus rhythm.[id number] Score: 2. Anticoagulation indicated. Anticoagulation done with warfarin.      VTE Risk Mitigation (From admission, onward)         Ordered     Reason for No Pharmacological VTE Prophylaxis  Once        Question:  Reasons:  Answer:  Already adequately anticoagulated on oral Anticoagulants    11/23/22 0042     IP VTE HIGH RISK PATIENT  Once         11/23/22 0042     Place sequential compression device  Until discontinued         11/23/22 0042                 As clarification, on 11/23/2022, patient should be admitted for hospital observation services under my care in collaboration with SPENCER BENSON NP  Department of Hospital Medicine   [facility] [place] - Emergency Dept

## 2022-11-23 NOTE — ASSESSMENT & PLAN NOTE
Patient with Long standing persistent (>12 months) atrial fibrillation which is uncontrolled currently with Beta Blocker and Amiodarone. Patient is currently in sinus rhythm.[id number] Score: 2. Anticoagulation indicated. Anticoagulation done with warfarin.

## 2022-11-23 NOTE — ASSESSMENT & PLAN NOTE
Freq sustained episodes of VT noted on ICD interrogation, below treatment threshold.  ?r/t CHF, ischemia, meds.  Will reprogram ICD.  Start toprol XL, IV lasix, eventual entresto +/- aldact.

## 2022-11-23 NOTE — ED NOTES
Report received. Care assumed. Patient lying in bed awake, alert. Denies any needs at this time. Call light within reach. Son at bedside. Updated on plan of care.

## 2022-11-23 NOTE — CONSULTS
"[facility] [place] Emergency Dept  Cardiology  Consult Note    Patient Name: Balbir Rebollar Sr.  MRN: [id number]  Admission Date: 11/22/2022  Hospital Length of Stay: 0 days  Code Status: Full Code   Attending Provider: Sarmad Banegas MD   Consulting Provider: Wesley Rico MD  Primary Care Physician: Primary Doctor No  Principal Problem:Weakness    Patient information was obtained from patient and ER records.     Inpatient consult to Cardiology  Consult performed by: Wesley Rico MD  Consult ordered by: Derrick Bernardo NP  Reason for consult: CHF/CAD/ICM/VT        Subjective:     Chief Complaint:  weakness     HPI:   72 y.o. male with CAD, HTN, Afib, long term use of anticoagulants, CVA, and ICD, presents to the hospital with a chief complaint of fatigue and weakness with acute onset of one-week. Patient reports he was seen 3 times in a period of a week for the same symptoms and was discharged.  Patient describes his symptoms as feeling tired but I can not sleep. Associated symptoms include difficulty walking, diarrhea, frequent urination, and muscle spasms. Patient's daughter says," he is stumbling going up and down the stairs". Patient reports he was prescribed a new medication that is causing him to have dry mouth and feeling more fatigued (on top of original fatigue complaint). Patient's daughter reports patient is usually very active, but recently has been staying in bed more often this past week. No other exacerbating or alleviating factors. Patient denies dysuria, nausea, vomiting, cough, numbness, slurred speech, black stool , paresthesia, or other associated symptoms. Patient endorses compliance of coumadin. Family reports that the patient seen at  several times recently for symptoms and state they were told nothing was found. Follows with cardiology at  and was recently started on Amiodarone on 11/18/2022.    In the ED patient is afebrile without leukocytosis PT 45.2, INR 4.6, CO2 20, " "calcium 8.6, BNP 1 441, troponin 0.038, 2nd troponin 0.041 TSH WNL, UA is hazy with trace protein glucose and occult blood, negative for flu and COVID, CXR was negative, EKG showed "Sinus rhythm with Premature supraventricular complexes in a pattern of bigeminy, Right bundle branch block, Left anterior fascicular block" without previous EKG for comparison.  Patient was placed in observation for further workup an assessment by Cardiology.  Previous Echo from care everywhere.    Patient follows with Dr. Fournier seen earlier this month.    Cardiology consulted for sustained VT and freq PVCs.    The patient presents to the emergency room with progressive fatigue and weakness of the past week.  He was seen at the [facility] emergency room, apparently discharged without a diagnosis.  He has a history of coronary artery disease status post LAD PCI in 2006 with resultant ischemic cardiomyopathy status post Medtronic ICD.  He also has a history of paroxysmal atrial fibrillation maintained on warfarin.  He was recently started on amiodarone for apparent frequent ventricular tachycardia.  He tells me he is feeling worse since that time.  He denies any defibrillator discharges.  Interrogation of the Medtronic device notes frequent sustained ventricular tachycardia below the threshold for treatment.  The patient denies syncope.  Has cross the OptiVol threshold.  He is not on any goal directed medical therapy for his heart failure.  At this point, I recommend initiating beta-blocker and Entresto next.  I will also gently diurese him.  I plan to stop his Coumadin and start him on NOAC prior to discharge.  Given the frequent ventricular tachycardia known CAD, I also think he should have a heart catheterization for further assessment of his coronary anatomy seeing as how he is not had any ischemic assessment in some time.      No past medical history on file.    Past Surgical History:   Procedure Laterality Date    REPLACEMENT OF " DUAL CHAMBER IMPLANTABLE CARDIOVERTER-DEFIBRILLATOR         Review of patient's allergies indicates:  No Known Allergies    No current facility-administered medications on file prior to encounter.     Current Outpatient Medications on File Prior to Encounter   Medication Sig    amiodarone (PACERONE) 200 MG Tab Take 200 mg by mouth 2 (two) times daily.    carBAMazepine (TEGRETOL) 200 mg tablet Take 400 mg by mouth 2 (two) times daily.    cetirizine (ZYRTEC) 10 MG tablet Take 1 tablet (10 mg total) by mouth once daily.    fluticasone propionate (FLONASE) 50 mcg/actuation nasal spray 1 spray (50 mcg total) by Each Nostril route once daily.    nitroGLYCERIN (NITROSTAT) 0.4 MG SL tablet Place 0.4 mg under the tongue.    pravastatin (PRAVACHOL) 20 MG tablet Take 20 mg by mouth.    warfarin (COUMADIN) 6 MG tablet Take 6 mg by mouth.     Family History    None       Tobacco Use    Smoking status: Former    Smokeless tobacco: Never   Substance and Sexual Activity    Alcohol use: Never    Drug use: Never    Sexual activity: Not on file     Review of Systems   Constitutional: Positive for malaise/fatigue. Negative for chills, diaphoresis and fever.   HENT:  Negative for nosebleeds.    Eyes:  Negative for blurred vision and double vision.   Cardiovascular:  Negative for chest pain, claudication, cyanosis, dyspnea on exertion, leg swelling, orthopnea, palpitations, paroxysmal nocturnal dyspnea and syncope.   Respiratory:  Negative for cough, shortness of breath and wheezing.    Skin:  Negative for dry skin and poor wound healing.   Musculoskeletal:  Negative for back pain, joint swelling and myalgias.   Gastrointestinal:  Negative for abdominal pain, nausea and vomiting.   Genitourinary:  Negative for hematuria.   Neurological:  Negative for dizziness, headaches, numbness, seizures and weakness.   Psychiatric/Behavioral:  Negative for altered mental status and depression.    Objective:     Vital Signs (Most  Recent):  Temp: 98.5 °F (36.9 °C) (11/22/22 2129)  Pulse: 68 (11/23/22 0502)  Resp: 16 (11/23/22 0502)  BP: (!) 144/81 (11/23/22 0502)  SpO2: 95 % (11/23/22 0502)   Vital Signs (24h Range):  Temp:  [98.5 °F (36.9 °C)] 98.5 °F (36.9 °C)  Pulse:  [44-78] 68  Resp:  [16-22] 16  SpO2:  [95 %-99 %] 95 %  BP: (115-174)/(60-81) 144/81     Weight: 68.5 kg (151 lb)  Body mass index is 22.3 kg/m².    SpO2: 95 %  O2 Device (Oxygen Therapy): room air    No intake or output data in the 24 hours ending 11/23/22 0642    Lines/Drains/Airways       Peripheral Intravenous Line  Duration                  Peripheral IV - Single Lumen 11/22/22 2142 20 G Anterior;Right Forearm <1 day                    Physical Exam  Constitutional:       General: He is not in acute distress.     Appearance: Normal appearance. He is well-developed and normal weight. He is not ill-appearing, toxic-appearing or diaphoretic.   HENT:      Head: Normocephalic and atraumatic.   Eyes:      General: No scleral icterus.     Extraocular Movements: Extraocular movements intact.      Conjunctiva/sclera: Conjunctivae normal.      Pupils: Pupils are equal, round, and reactive to light.   Neck:      Thyroid: No thyromegaly.      Vascular: No JVD.      Trachea: No tracheal deviation.   Cardiovascular:      Rate and Rhythm: Normal rate and regular rhythm. FrequentExtrasystoles are present.     Heart sounds: S1 normal and S2 normal. No murmur heard.    No friction rub. No gallop.   Pulmonary:      Effort: Pulmonary effort is normal. No respiratory distress.      Breath sounds: Normal breath sounds. No stridor. No wheezing, rhonchi or rales.   Chest:      Chest wall: No tenderness.   Abdominal:      General: There is no distension.      Palpations: Abdomen is soft.   Musculoskeletal:         General: No swelling or tenderness. Normal range of motion.      Cervical back: Normal range of motion and neck supple. No rigidity.      Right lower leg: No edema.      Left lower  leg: No edema.   Skin:     General: Skin is warm and dry.      Coloration: Skin is not jaundiced.   Neurological:      General: No focal deficit present.      Mental Status: He is alert and oriented to person, place, and time.      Cranial Nerves: No cranial nerve deficit.   Psychiatric:         Mood and Affect: Mood normal.         Behavior: Behavior normal.       Current Medications:   amiodarone  200 mg Oral BID    carBAMazepine  400 mg Oral BID    pravastatin  20 mg Oral Daily    senna-docusate 8.6-50 mg  1 tablet Oral BID    warfarin  6 mg Oral Daily       acetaminophen, acetaminophen, albuterol-ipratropium, aluminum-magnesium hydroxide-simethicone, dextrose 10%, dextrose 10%, glucagon (human recombinant), glucose, glucose, melatonin, naloxone, ondansetron, prochlorperazine    Laboratory (all labs reviewed):  CBC:  Recent Labs   Lab 11/22/22 2152   WBC 4.82   Hemoglobin 14.9   Hematocrit 43.3   Platelets 117 L       CHEMISTRIES:  Recent Labs   Lab 11/22/22 2152   Glucose 131 H   Sodium 143   Potassium SEE COMMENT   BUN 18   Creatinine 1.0   eGFR >60   Calcium 8.6 L       CARDIAC BIOMARKERS:  Recent Labs   Lab 11/22/22 2152 11/23/22  0230   Troponin I 0.038 H 0.041 H       COAGS:  Recent Labs   Lab 11/23/22  0020 11/23/22  0516   INR 4.6 H 4.2 H       LIPIDS/LFTS:  Recent Labs   Lab 11/22/22 2152   AST 29   ALT 12       BNP:  Recent Labs   Lab 11/22/22 2152   BNP 1,441 H       TSH:  Recent Labs   Lab 11/22/22  2306   TSH 1.461       Free T4:        Diagnostic Results:  ECG (personally reviewed and interpreted tracing(s)):  11/22/22 2143 SR 87, RBBB/LAD, bigeminal PACs, QTc 474    Chest X-Ray (personally reviewed and interpreted image(s)): 11/22/22 NAD, L ICD 1 lead    Echo: 10/19/22 (care everywhere)  EF is approximately 20-30%, Ao root 4.1cm.    1. Severely decreased left ventricular systolic function.       2. Grade II diastolic dysfunction.       3. Wall motion abnormalities imply disease of the  left anterior descending coronary artery.     4. Mild nonrheumatic mitral valve regurgitation with no suggestion of left atrial enlargement.     5. Mild nonrheumatic aortic valve regurgitation.       6. Mild-to-moderate aortic root dilation.     7. Although estimation of pulmonary artery systolic pressure is not possible due to incomplete tricuspid regurgitation velocity       profile, pulmonary hypertension is not suspected.     Compared to previous echocardiogram 11/01/2021, no significant changes are seen       Cath: ([facility] [place] 11/17/22)  (09/03/2006):  DE Stent placement in LAD due to anterior STEMI      Assessment and Plan:     * Weakness  ?related to freq ventricular ectopy/sustained VT.  Other dx/mgmt per IM    Acute on chronic combined systolic and diastolic congestive heart failure  Known severe LV dysfxn/ICM  Not grossly vol overloaded, but optivol crossed on ICD and BNP elevated  Not on GDMT  Start toprol, then entresto/aldact  IV lasix  Consider as candidate for CardioMEMS.      Ventricular tachycardia  Freq sustained episodes of VT noted on ICD interrogation, below treatment threshold.  ?r/t CHF, ischemia, meds.  Will reprogram ICD.  Start toprol XL, IV lasix, eventual entresto +/- aldact.    Coronary artery disease involving native coronary artery of native heart without angina pectoris  Known CAD s/p LAD PCI 2006  Hx ICM, EF <30% by recent echo.  No overt anginal sxs, but freq ventricular tachycardia noted.  Hold coumadin for possible cath.    Paroxysmal atrial fibrillation  In SR on coumadin  Stop coumadin (allow INR to drift down for cath)  Will start DOAC on discharge    Ischemic cardiomyopathy  As above  Start IV lasix  Start toprol XL  Add entresto +/- aldact    ICD (implantable cardioverter-defibrillator), single, in situ  Medtronic single chamber ICD    Chronic anticoagulation  INR high.  Hold coumadin and allow to drift down for cath.    Pure hypercholesterolemia  Cont  statin    Aortic root dilatation  4.1 cm by echo 10/2022  Repeat echo in 6 months (May 2023)        VTE Risk Mitigation (From admission, onward)         Ordered     Reason for No Pharmacological VTE Prophylaxis  Once        Question:  Reasons:  Answer:  Already adequately anticoagulated on oral Anticoagulants    11/23/22 0042     IP VTE HIGH RISK PATIENT  Once         11/23/22 0042     Place sequential compression device  Until discontinued         11/23/22 0042              Pt wishes to transfer his care to my practice.    Thank you for your consult. I will follow-up with patient. Please contact us if you have any additional questions.    Wesely Rico MD  Cardiology   [facility] [place] - Emergency Dept

## 2022-11-23 NOTE — PLAN OF CARE
11/23/22 0912   Discharge Planning   Assessment Type Discharge Planning Brief Assessment   Resource/Environmental Concerns none   Support Systems Children;Family members   Equipment Currently Used at Home none   Current Living Arrangements home/apartment/condo   Patient/Family Anticipates Transition to home with family   Patient/Family Anticipated Services at Transition none   DME Needed Upon Discharge  none   Discharge Plan A Home with family  (with instructions to follow up)     [facility] [facility] [place] - [address]  [address]  [place] [address]  Phone: [phone] Fax: [phone]

## 2022-11-23 NOTE — ED NOTES
Bed: 09main  Expected date: 11/22/22  Expected time: 9:30 PM  Means of arrival: Personal Transportation  Comments:

## 2022-11-23 NOTE — CARE UPDATE
Patient seen and examined.  Chart reviewed.  72 year old man with CAD, HTN, Afib on coumadin, prior stroke, chronic systolic/diastolic CHF w AICD who presented for evaluation of lethargy/increased somnolence since recently starting amiodarone for frequent ventricular tachycardia.  He was diagnosed with recurrent/frequent VT, supratherapeutic INR, Hypokalemia and debility.  This is his third ER/hospital visit in the last week.  Cardiology consulted and input appreciated.  AICD interrogated and showed frequent sustained VT below threshold for device discharge  AICD was reprogramed, amiodarone continued and and toprol initiated.  We are gently diuresing him with IV lasix.  Cardiology suspects VT/chf is etiology of his lethargy and that VT is being driven by his severe CHF.  If we can get his CHF under control and if he has no further VT at that time then can pursue outpatient angiogram.  If further VT once CHF controlled will need angio sooner.  Anticipate at least 48 hours in the hospital but could be longer.  Additionally his INR is supratherapeutic and we are holding coumadin and plan transition to NOAC prior to discharge.  We are replacing potassium and monitoring closely as it was very low.  PT/OT are consulted for evaluation.  Presently in observation but more appropriate for inpatient so will discuss with MELANY.

## 2022-11-23 NOTE — PLAN OF CARE
Problem: Occupational Therapy  Goal: Occupational Therapy Goal  Outcome: Met     Initial eval complete. Pt was able to ambulate functional distances and perform ADLs w/ supervision and no AD. Pt w/ mild weakness but tolerated well w/o LOB. Pt w/ SPO2 >95% and HR in low 90s w/ exertion. Pt is not in need of skilled acute OT services at this time; no furhter needs at tome of d/c.

## 2022-11-23 NOTE — PT/OT/SLP EVAL
"Physical Therapy Evaluation and Discharge Note    Patient Name:  Balbir Rebollar Sr.   MRN:  [id number]    Recommendations:     Discharge Recommendations:  home   Discharge Equipment Recommendations: none     Assessment:     Balbir Rebollar Sr. is a 72 y.o. male admitted with a medical diagnosis of Weakness.   At this time, patient is functioning at their prior level of function and does not require further acute PT services.     Recent Surgery: * No surgery found *      Plan:     During this hospitalization, patient does not require further acute PT services.  Please re-consult if situation changes.      Subjective     Chief Complaint: "I need to have a BM."  Patient/Family Comments/goals: Pt agreeable to therapy eval.  Pain/Comfort:  Pain Rating 1: 0/10    Patients cultural, spiritual, Yarsanism conflicts given the current situation: no    Living Environment:  PTA pt's son lived with him in a 2 story house with 5 steps to enter, (B) HR; (R) HR going upstairs.  Prior to admission, patients level of function was independent.  Equipment used at home: none.  DME owned (not currently used): none.  Upon discharge, patient will have assistance from son.    Objective:     Communicated with nurse prior to session.  Patient found supine with pulse ox (continuous), telemetry, blood pressure cuff upon PT entry to room.    General Precautions: Standard,     Orthopedic Precautions:N/A   Braces: N/A   Respiratory Status: Room air    Exams:  Cognitive Exam:  Patient is oriented to Person, Place, and Situation  RLE ROM: WFL  RLE Strength: WFL  LLE ROM: WFL  LLE Strength: WFL    Functional Mobility:  Bed Mobility:     Supine to Sit: modified independence  Sit to Supine: modified independence  Transfers:     Sit to Stand:  supervision with no AD  Gait: 130' /s AD SBA  Balance: Good static/dynamic standing balance.    AM-PAC 6 CLICK MOBILITY  Total Score:22       Treatment and Education:   Educated on role of PT and " POC; pt ambulated to BR; on/off toilet mod I, stood at sink to wash hands.    AM-PAC 6 CLICK MOBILITY  Total Score:22     Patient left supine with all lines intact, call button in reach, and nurse notified.    GOALS:   Multidisciplinary Problems       Physical Therapy Goals          Problem: Physical Therapy    Goal Priority Disciplines Outcome Goal Variances Interventions   Physical Therapy Goal     PT, PT/OT Adequate for Care Transition                         History:     No past medical history on file.    Past Surgical History:   Procedure Laterality Date    REPLACEMENT OF DUAL CHAMBER IMPLANTABLE CARDIOVERTER-DEFIBRILLATOR         Time Tracking:     PT Received On: 11/23/22  PT Start Time: 0908     PT Stop Time: 0923  PT Total Time (min): 15 min     Billable Minutes: Evaluation 15 (Co-eval with FAM Braun)       11/23/2022

## 2022-11-23 NOTE — PLAN OF CARE
Patient from home with children and independent.  He has no DME or OP services.  His cardiologist is Dr. Fournier @ [facility] and his PCP is Dr. Flip Hanna.  Patient prefers afternoon appts. Patient is on Coumadin.  He wasn't sure who was monitoring.

## 2022-11-24 PROBLEM — R79.1 SUPRATHERAPEUTIC INR: Status: ACTIVE | Noted: 2022-11-24

## 2022-11-24 PROBLEM — E87.6 HYPOKALEMIA: Status: ACTIVE | Noted: 2022-11-24

## 2022-11-24 PROBLEM — Z71.89 ACP (ADVANCE CARE PLANNING): Status: ACTIVE | Noted: 2022-11-24

## 2022-11-24 PROBLEM — I25.5 ISCHEMIC CARDIOMYOPATHY: Status: RESOLVED | Noted: 2022-02-16 | Resolved: 2022-11-24

## 2022-11-24 LAB
ANION GAP SERPL CALC-SCNC: 9 MMOL/L (ref 8–16)
BASOPHILS # BLD AUTO: 0.03 K/UL (ref 0–0.2)
BASOPHILS NFR BLD: 0.5 % (ref 0–1.9)
BUN SERPL-MCNC: 15 MG/DL (ref 8–23)
CALCIUM SERPL-MCNC: 8.6 MG/DL (ref 8.7–10.5)
CHLORIDE SERPL-SCNC: 110 MMOL/L (ref 95–110)
CO2 SERPL-SCNC: 22 MMOL/L (ref 23–29)
CREAT SERPL-MCNC: 1 MG/DL (ref 0.5–1.4)
DIFFERENTIAL METHOD: NORMAL
EOSINOPHIL # BLD AUTO: 0.1 K/UL (ref 0–0.5)
EOSINOPHIL NFR BLD: 0.9 % (ref 0–8)
ERYTHROCYTE [DISTWIDTH] IN BLOOD BY AUTOMATED COUNT: 14.1 % (ref 11.5–14.5)
EST. GFR  (NO RACE VARIABLE): >60 ML/MIN/1.73 M^2
GLUCOSE SERPL-MCNC: 103 MG/DL (ref 70–110)
HCT VFR BLD AUTO: 44.1 % (ref 40–54)
HGB BLD-MCNC: 14.5 G/DL (ref 14–18)
IMM GRANULOCYTES # BLD AUTO: 0 K/UL (ref 0–0.04)
IMM GRANULOCYTES NFR BLD AUTO: 0 % (ref 0–0.5)
INR PPP: 3.1 (ref 0.8–1.2)
LYMPHOCYTES # BLD AUTO: 1.6 K/UL (ref 1–4.8)
LYMPHOCYTES NFR BLD: 24.1 % (ref 18–48)
MAGNESIUM SERPL-MCNC: 2.3 MG/DL (ref 1.6–2.6)
MCH RBC QN AUTO: 30.3 PG (ref 27–31)
MCHC RBC AUTO-ENTMCNC: 32.9 G/DL (ref 32–36)
MCV RBC AUTO: 92 FL (ref 82–98)
MONOCYTES # BLD AUTO: 0.5 K/UL (ref 0.3–1)
MONOCYTES NFR BLD: 8.4 % (ref 4–15)
NEUTROPHILS # BLD AUTO: 4.2 K/UL (ref 1.8–7.7)
NEUTROPHILS NFR BLD: 66.1 % (ref 38–73)
NRBC BLD-RTO: 0 /100 WBC
PLATELET # BLD AUTO: 167 K/UL (ref 150–450)
PMV BLD AUTO: 10.5 FL (ref 9.2–12.9)
POTASSIUM SERPL-SCNC: 5.1 MMOL/L (ref 3.5–5.1)
PROTHROMBIN TIME: 31.1 SEC (ref 9–12.5)
RBC # BLD AUTO: 4.79 M/UL (ref 4.6–6.2)
SODIUM SERPL-SCNC: 141 MMOL/L (ref 136–145)
WBC # BLD AUTO: 6.42 K/UL (ref 3.9–12.7)

## 2022-11-24 PROCEDURE — 25000003 PHARM REV CODE 250: Performed by: INTERNAL MEDICINE

## 2022-11-24 PROCEDURE — 80048 BASIC METABOLIC PNL TOTAL CA: CPT | Performed by: HOSPITALIST

## 2022-11-24 PROCEDURE — 99900035 HC TECH TIME PER 15 MIN (STAT)

## 2022-11-24 PROCEDURE — 85025 COMPLETE CBC W/AUTO DIFF WBC: CPT | Performed by: HOSPITALIST

## 2022-11-24 PROCEDURE — 99233 SBSQ HOSP IP/OBS HIGH 50: CPT | Mod: ,,, | Performed by: INTERNAL MEDICINE

## 2022-11-24 PROCEDURE — 36415 COLL VENOUS BLD VENIPUNCTURE: CPT | Performed by: STUDENT IN AN ORGANIZED HEALTH CARE EDUCATION/TRAINING PROGRAM

## 2022-11-24 PROCEDURE — 85610 PROTHROMBIN TIME: CPT | Performed by: STUDENT IN AN ORGANIZED HEALTH CARE EDUCATION/TRAINING PROGRAM

## 2022-11-24 PROCEDURE — 25000003 PHARM REV CODE 250: Performed by: HOSPITALIST

## 2022-11-24 PROCEDURE — 99233 PR SUBSEQUENT HOSPITAL CARE,LEVL III: ICD-10-PCS | Mod: ,,, | Performed by: INTERNAL MEDICINE

## 2022-11-24 PROCEDURE — 25000003 PHARM REV CODE 250

## 2022-11-24 PROCEDURE — 83735 ASSAY OF MAGNESIUM: CPT | Performed by: HOSPITALIST

## 2022-11-24 PROCEDURE — 63600175 PHARM REV CODE 636 W HCPCS: Performed by: INTERNAL MEDICINE

## 2022-11-24 PROCEDURE — 94761 N-INVAS EAR/PLS OXIMETRY MLT: CPT

## 2022-11-24 PROCEDURE — 21400001 HC TELEMETRY ROOM

## 2022-11-24 RX ORDER — AMIODARONE HYDROCHLORIDE 200 MG/1
200 TABLET ORAL DAILY
Status: DISCONTINUED | OUTPATIENT
Start: 2022-11-24 | End: 2022-11-24

## 2022-11-24 RX ORDER — FUROSEMIDE 10 MG/ML
40 INJECTION INTRAMUSCULAR; INTRAVENOUS DAILY
Status: DISCONTINUED | OUTPATIENT
Start: 2022-11-24 | End: 2022-11-25

## 2022-11-24 RX ADMIN — LACTOBACILLUS ACIDOPHILUS / LACTOBACILLUS BULGARICUS 1 EACH: 100 MILLION CFU STRENGTH GRANULES at 09:11

## 2022-11-24 RX ADMIN — SACUBITRIL AND VALSARTAN 1 TABLET: 24; 26 TABLET, FILM COATED ORAL at 09:11

## 2022-11-24 RX ADMIN — PRAVASTATIN SODIUM 20 MG: 10 TABLET ORAL at 09:11

## 2022-11-24 RX ADMIN — Medication 6 MG: at 09:11

## 2022-11-24 RX ADMIN — ONDANSETRON 8 MG: 8 TABLET, ORALLY DISINTEGRATING ORAL at 10:11

## 2022-11-24 RX ADMIN — CARBAMAZEPINE 400 MG: 200 TABLET ORAL at 09:11

## 2022-11-24 RX ADMIN — FUROSEMIDE 40 MG: 10 INJECTION, SOLUTION INTRAVENOUS at 09:11

## 2022-11-24 RX ADMIN — METOPROLOL SUCCINATE 25 MG: 25 TABLET, EXTENDED RELEASE ORAL at 09:11

## 2022-11-24 NOTE — ASSESSMENT & PLAN NOTE
Freq sustained episodes of VT noted on ICD interrogation, below treatment threshold.  ?r/t CHF, ischemia, meds.  ICD reprogrammed 11/23/22.  Cont toprol  Inc lasix 40mg IV qd, strict I/Os  Add entresto 24/26mg bid   Eventual aldact.  Amio stopped

## 2022-11-24 NOTE — PLAN OF CARE
Problem: Fall Injury Risk  Goal: Absence of Fall and Fall-Related Injury  Outcome: Ongoing, Progressing     Problem: Adult Inpatient Plan of Care  Goal: Plan of Care Review  11/24/2022 0216 by Gricel Singleton RN  Outcome: Ongoing, Progressing  11/24/2022 0214 by Gricel Singleton RN  Outcome: Ongoing, Progressing  11/24/2022 0213 by Gricel Singleton RN  Outcome: Ongoing, Progressing     Problem: Adult Inpatient Plan of Care  Goal: Patient-Specific Goal (Individualized)  11/24/2022 0216 by Gricel Singleton RN  Outcome: Ongoing, Progressing  11/24/2022 0214 by Gricel Singleton RN  Outcome: Ongoing, Progressing  11/24/2022 0213 by Gricel Singleton RN  Outcome: Ongoing, Progressing     Problem: Adult Inpatient Plan of Care  Goal: Absence of Hospital-Acquired Illness or Injury  11/24/2022 0216 by Gricel Singleton RN  Outcome: Ongoing, Progressing  11/24/2022 0214 by Gricel Singleton RN  Outcome: Ongoing, Progressing  11/24/2022 0213 by Gricel Singleton RN  Outcome: Ongoing, Progressing     Problem: Adult Inpatient Plan of Care  Goal: Optimal Comfort and Wellbeing  11/24/2022 0216 by Gricel Singleton RN  Outcome: Ongoing, Progressing  11/24/2022 0213 by Gricel Singleton RN  Outcome: Ongoing, Progressing     Problem: Adult Inpatient Plan of Care  Goal: Readiness for Transition of Care  11/24/2022 0216 by Gricel Singleton RN  Outcome: Ongoing, Progressing  11/24/2022 0214 by Gricel Singleton RN  Outcome: Ongoing, Progressing  11/24/2022 0213 by Gricel Singleton RN  Outcome: Ongoing, Progressing

## 2022-11-24 NOTE — ASSESSMENT & PLAN NOTE
-History noted  -No chest pain  -Needs angiogram at some point - possibly as outpatient if no further VT.

## 2022-11-24 NOTE — PROGRESS NOTES
"[facility] Medicine  Progress Note    Patient Name: Balbir Rebollar Sr.  MRN: [id number]  Patient Class: IP- Inpatient   Admission Date: 11/22/2022  Length of Stay: 1 days  Attending Physician: Sarmad Banegas MD  Primary Care Provider: Flip Hanna MD        Subjective:     Principal Problem:Acute on chronic combined systolic and diastolic congestive heart failure        HPI:  Balbir Rebollar Sr. 72 y.o. male with CAD, HTN, Afib, long term use of anticoagulants, CVA, and ICD, presents to the hospital with a chief complaint of fatigue and weakness with acute onset of one-week. Patient reports he was seen 3 times in a period of a week for the same symptoms and was discharged.  Patient describes his symptoms as feeling tired but I can not sleep. Associated symptoms include difficulty walking, diarrhea, frequent urination, and muscle spasms. Patient's daughter says," he is stumbling going up and down the stairs". Patient reports he was prescribed a new medication that is causing him to have dry mouth and feeling more fatigued (on top of original fatigue complaint). Patient's daughter reports patient is usually very active, but recently has been staying in bed more often this past week. No other exacerbating or alleviating factors. Patient denies dysuria, nausea, vomiting, cough, numbness, slurred speech, black stool , paresthesia, or other associated symptoms. Patient endorses compliance of coumadin. Family reports that the patient seen at  several times recently for symptoms and state they were told nothing was found. Follows with cardiology at  and was recently started on Amiodarone on 11/18/2022.      In the ED patient is afebrile without leukocytosis PT 45.2, INR 4.6, CO2 20, calcium 8.6, BNP 1 441, troponin 0.038, 2nd troponin 0.041 TSH WNL, UA is hazy with trace protein glucose and occult blood, negative for flu and COVID, CXR was negative, EKG showed "Sinus rhythm with " "Premature supraventricular complexes in a pattern of bigeminy, Right bundle branch block, Left anterior fascicular block" without previous EKG for comparison.  Patient was placed in observation for further workup an assessment by Cardiology.  Previous Echo from [facility] everywhere     ECHOCARDIOGRAM   Interpretation Summary  11/01/2021  MRN:  [id number]        FIN:    [id number]      Accession #:[id number]         Order #:[id number]    Indications:    Coronary artery disease involving native coronary artery of native heart without angina pectoris; Paroxysmal                     atrial fibrillation (CMS/HCC); Nonsustained ventricular tachycardia (CMS/HCC)       BP:  128   / 74      HR: 67                       Rhythm:           Sinus                                                      Technical Quality:Very technically difficult study    MEASUREMENTS  (Male / Female) Normal Values   Left Ventricle:    Appears to be normal left ventricular cavity size.  Severely reduced left ventricular systolic function.  Dense hypokinesis and thinning of the anterior wall, septum, and apex with normal contractility elsewhere.   Visually estimated EF is less than 30%.  Grade I diastolic dysfunction (abnormal relaxation filling pattern),    with normal or mildly elevated filling pressures.      Overview/Hospital Course:  No notes on file    Interval History: Noted patient had fall from toilet overnight - scraped his elbow but no head trauma.  He is in good spirits today but still feels very weak and mildly confused.  He and his son feel the amiodarone causes his significant weakness and confusion.  Son at bedside.  All questions answered and patient had no further complaints.    Review of Systems   Constitutional:  Positive for fatigue. Negative for chills and fever.   HENT:  Negative for congestion and rhinorrhea.    Eyes:  Negative for photophobia and visual disturbance.   Respiratory:  Negative for cough and shortness of breath.  "   Cardiovascular:  Negative for chest pain, palpitations and leg swelling.   Gastrointestinal:  Negative for abdominal pain, diarrhea, nausea and vomiting.   Genitourinary:  Negative for dysuria, frequency, hematuria and urgency.        (+) increased urine frequency    Musculoskeletal:  Positive for gait problem and neck stiffness.        (+) muscle spasms    Skin:  Negative for pallor, rash and wound.   Neurological:  Positive for weakness. Negative for light-headedness and headaches.   Psychiatric/Behavioral:  Positive for confusion. Negative for decreased concentration.    Objective:     Vital Signs (Most Recent):  Temp: 97.8 °F (36.6 °C) (11/24/22 1119)  Pulse: 70 (11/24/22 1119)  Resp: 17 (11/24/22 1119)  BP: 133/72 (11/24/22 1119)  SpO2: (!) 92 % (11/24/22 1119)   Vital Signs (24h Range):  Temp:  [97.4 °F (36.3 °C)-98.2 °F (36.8 °C)] 97.8 °F (36.6 °C)  Pulse:  [63-73] 70  Resp:  [16-19] 17  SpO2:  [91 %-99 %] 92 %  BP: (120-140)/(67-81) 133/72     Weight: 68 kg (149 lb 14.6 oz)  Body mass index is 22.14 kg/m².    Intake/Output Summary (Last 24 hours) at 11/24/2022 1250  Last data filed at 11/24/2022 1030  Gross per 24 hour   Intake 587.53 ml   Output 300 ml   Net 287.53 ml      Physical Exam  Vitals and nursing note reviewed.   Constitutional:       General: He is not in acute distress.     Appearance: He is well-developed. He is not ill-appearing or diaphoretic.      Comments: Frail in appearance   HENT:      Head: Normocephalic and atraumatic.      Right Ear: External ear normal.      Left Ear: External ear normal.      Nose: Nose normal.      Mouth/Throat:      Mouth: Mucous membranes are moist.   Eyes:      Extraocular Movements: Extraocular movements intact.      Conjunctiva/sclera: Conjunctivae normal.   Cardiovascular:      Rate and Rhythm: Normal rate and regular rhythm.      Heart sounds: Murmur heard.      Comments: Patient is going in and out of bigeminy. Heart rate fluctuates between 40's and 80's.    Pulmonary:      Effort: Pulmonary effort is normal. No respiratory distress.      Breath sounds: Normal breath sounds. No wheezing or rales.   Abdominal:      General: Bowel sounds are normal. There is no distension.      Palpations: Abdomen is soft.      Tenderness: There is no abdominal tenderness.      Comments: No palpable hepatomegaly or splenomegaly   Musculoskeletal:         General: No tenderness. Normal range of motion.      Cervical back: Normal range of motion. No rigidity.   Skin:     General: Skin is warm and dry.      Coloration: Skin is pale.   Neurological:      Mental Status: He is alert.      Comments: Oriented x 3, but some responses to questions are nonsensical.  Diffusely weak.   Psychiatric:         Mood and Affect: Mood normal.       Significant Labs: All pertinent labs within the past 24 hours have been reviewed.    Significant Imaging: I have reviewed all pertinent imaging results/findings within the past 24 hours.      Assessment/Plan:      * Acute on chronic combined systolic and diastolic congestive heart failure  -Admitted to inpatient status  -BNP elevated at 1441  -Echo 10/22 showed EF 20-30%, grade II diastolic dysfunction  -Known chronic ischemic cardiomyopathy with AICD in place  -Cardiology consulted and input appreciated  -AICD interogated and noted frequent sustained VT episodes below threshold of treatment.  AICD reprogramed with lower threshold  -Strict ins/outs and daily weights  -Continue diuresis with iv lasix  -Troprol started 11/23 and entresto today.  -No further VT thus far    Ventricular tachycardia  -AICD interrogated and noted sustained VT episodes below threshold of treatment  -AICD reprogrammed  -Continue toprol  -Have stopped amiodarone - family and patient feels it causes severe weakness and confusion.      ICD (implantable cardioverter-defibrillator), single, in situ  -Treatment as above.    Paroxysmal atrial fibrillation  -Patient with Long standing persistent  (>12 months) atrial fibrillation which is controlled currently with toprol. -Patient is currently in sinus rhythm.[id number] Score: 2.   -Anticoagulation indicated and is on warfarin at home.    -Holding warfarin due to supratheraputic INR on admit as well as possible need for angiogram (inpatient vs outpatient yet to be determined).  -Plan for DOAC on discharge.    Supratherapeutic INR  -INR >4 on admit and now 3.1  -Continue to hold warfarin  -Check pt/inr daily.    Chronic anticoagulation  -Treatment as above.    Weakness  -Presenting complaint was weakness x 1 week since starting amiodarone.  -Patient and son felt weakness clearly due to amiodarone, but we suspected weakness was driven by the episodes of VT which were driven by uncontrolled severe chf.  -Noted fall overnight from toilet 11/23-11/24 - no head trauma and CT head without acute findings  -Fall precautions ordered  -Amiodarone stopped 11/24  -PT/OT consulted.    ACP (advance care planning)  -Discussed with patient's son to clarify goals of care.  Patient a bit too confused to participate.  Son wishes to speak with his sister.  -Continue full code for now  -Consult palliative care team.    Hypokalemia  -K normal today  -Repeat labs in AM.    Pure hypercholesterolemia  -Continue statin     History of CVA (cerebrovascular accident)  -History noted    Coronary artery disease involving native coronary artery of native heart without angina pectoris  -History noted  -No chest pain  -Needs angiogram at some point - possibly as outpatient if no further VT.      VTE Risk Mitigation (From admission, onward)         Ordered     Reason for No Pharmacological VTE Prophylaxis  Once        Question:  Reasons:  Answer:  Already adequately anticoagulated on oral Anticoagulants    11/23/22 0042     IP VTE HIGH RISK PATIENT  Once         11/23/22 0042     Place sequential compression device  Until discontinued         11/23/22 0042                Discharge Planning    MOR: 11/24/2022     Code Status: Full Code   Is the patient medically ready for discharge?:     Reason for patient still in hospital (select all that apply): Treatment  Discharge Plan A: Home with family (with instructions to follow up)                  Sarmad Banegas MD  Department of [facility] Medicine   [facility]

## 2022-11-24 NOTE — ASSESSMENT & PLAN NOTE
-Patient with Long standing persistent (>12 months) atrial fibrillation which is controlled currently with toprol. -Patient is currently in sinus rhythm.[id number] Score: 2.   -Anticoagulation indicated and is on warfarin at home.    -Holding warfarin due to supratheraputic INR on admit as well as possible need for angiogram (inpatient vs outpatient yet to be determined).  -Plan for DOAC on discharge.

## 2022-11-24 NOTE — ASSESSMENT & PLAN NOTE
-Discussed with patient's son to clarify goals of care.  Patient a bit too confused to participate.  Son wishes to speak with his sister.  -Continue full code for now  -Consult palliative care team.

## 2022-11-24 NOTE — SUBJECTIVE & OBJECTIVE
Interval History: Noted patient had fall from toilet overnight - scraped his elbow but no head trauma.  He is in good spirits today but still feels very weak and mildly confused.  He and his son feel the amiodarone causes his significant weakness and confusion.  Son at bedside.  All questions answered and patient had no further complaints.    Review of Systems   Constitutional:  Positive for fatigue. Negative for chills and fever.   HENT:  Negative for congestion and rhinorrhea.    Eyes:  Negative for photophobia and visual disturbance.   Respiratory:  Negative for cough and shortness of breath.    Cardiovascular:  Negative for chest pain, palpitations and leg swelling.   Gastrointestinal:  Negative for abdominal pain, diarrhea, nausea and vomiting.   Genitourinary:  Negative for dysuria, frequency, hematuria and urgency.        (+) increased urine frequency    Musculoskeletal:  Positive for gait problem and neck stiffness.        (+) muscle spasms    Skin:  Negative for pallor, rash and wound.   Neurological:  Positive for weakness. Negative for light-headedness and headaches.   Psychiatric/Behavioral:  Positive for confusion. Negative for decreased concentration.    Objective:     Vital Signs (Most Recent):  Temp: 97.8 °F (36.6 °C) (11/24/22 1119)  Pulse: 70 (11/24/22 1119)  Resp: 17 (11/24/22 1119)  BP: 133/72 (11/24/22 1119)  SpO2: (!) 92 % (11/24/22 1119)   Vital Signs (24h Range):  Temp:  [97.4 °F (36.3 °C)-98.2 °F (36.8 °C)] 97.8 °F (36.6 °C)  Pulse:  [63-73] 70  Resp:  [16-19] 17  SpO2:  [91 %-99 %] 92 %  BP: (120-140)/(67-81) 133/72     Weight: 68 kg (149 lb 14.6 oz)  Body mass index is 22.14 kg/m².    Intake/Output Summary (Last 24 hours) at 11/24/2022 1250  Last data filed at 11/24/2022 1030  Gross per 24 hour   Intake 587.53 ml   Output 300 ml   Net 287.53 ml      Physical Exam  Vitals and nursing note reviewed.   Constitutional:       General: He is not in acute distress.     Appearance: He is  well-developed. He is not ill-appearing or diaphoretic.      Comments: Frail in appearance   HENT:      Head: Normocephalic and atraumatic.      Right Ear: External ear normal.      Left Ear: External ear normal.      Nose: Nose normal.      Mouth/Throat:      Mouth: Mucous membranes are moist.   Eyes:      Extraocular Movements: Extraocular movements intact.      Conjunctiva/sclera: Conjunctivae normal.   Cardiovascular:      Rate and Rhythm: Normal rate and regular rhythm.      Heart sounds: Murmur heard.      Comments: Patient is going in and out of [facility]. Heart rate fluctuates between 40's and 80's.   Pulmonary:      Effort: Pulmonary effort is normal. No respiratory distress.      Breath sounds: Normal breath sounds. No wheezing or rales.   Abdominal:      General: Bowel sounds are normal. There is no distension.      Palpations: Abdomen is soft.      Tenderness: There is no abdominal tenderness.      Comments: No palpable hepatomegaly or splenomegaly   Musculoskeletal:         General: No tenderness. Normal range of motion.      Cervical back: Normal range of motion. No rigidity.   Skin:     General: Skin is warm and dry.      Coloration: Skin is pale.   Neurological:      Mental Status: He is alert.      Comments: Oriented x 3, but some responses to questions are nonsensical.  Diffusely weak.   Psychiatric:         Mood and Affect: Mood normal.       Significant Labs: All pertinent labs within the past 24 hours have been reviewed.    Significant Imaging: I have reviewed all pertinent imaging results/findings within the past 24 hours.

## 2022-11-24 NOTE — HOSPITAL COURSE
Pt presented to OSH 11/22 with fatigue and weakness for about a week duration. Pt was found to have elevated INR (4.6), BNP (1441) and troponin 0.041 and having some frequent episodes of VT. His device was adjusted and pt underwent a LHC at [facility] that demonstrated LAD and Lcx occulsions (note that LHC report has EF 25%). While pt was in the procedure, he continued to have episodes of VT, patient was transferred to [facility] for a high risk PCI to place stents. Pt was having no chest pain or SOB on arrival. Pt underwent LHC 12/1 and had a PCI placed in distal R main. Pt's LAD was completely occluded and stent was not able to be placed. Pt has placed on DAPT, heparin, entresto and BB. Pt experienced 2 episodes of VT on the night of 12/2. Pt was shocked by ICD once and placed back on amio gtt and also on lidocaine gtt. Subsequently discontinued, amio transitioned to oral home dose. Remained rate controlled for remainder of admission. Patient experienced altered mental status, likely due to lidocaine. PT/OT recommending SNF placement.     Patient taking carbamazepine for mood stabilization, a medication which induces amiodarone metabolism and therefore possibly decreases efficacy. Also affects metabolism of DAOCs. Contacted prescriber, patient's physchiatrist Dr. Blanchard with [facility] and discussed. Plan to discontinue carbamazepine with follow up with Dr. Blanchard. Will continue po amiodarone. Due carbamazepine's enzyme induction (affecting Eliquis metabolism) even after discontinuation, will give one week of Lovenox upon resolution of INR, and then transition to Eliquis. This allows adequate time for carbamazepine to fully clear. Initiated on GDMT with Toprol XL, Entresto, aldactone, and Farxiga. Also discharged with DAPT with asa/plavix and high intensity statin. He will need follow up with PCP. Discharge to SNF 12/6.    Constitutional:       General: sleepy, no apparent distress  HENT:      Head:  Normocephalic and atraumatic.   Cardiovascular:      Rate and Rhythm: Normal rate and regular rhythm.      Heart sounds: Normal heart sounds.   Pulmonary:      Effort: Pulmonary effort is normal.      Breath sounds: Normal breath sounds.   Abdominal:      General: Abdomen is flat.      Palpations: Abdomen is soft.   Skin:     General: Skin is warm and dry   Neurological:      Mental Status: alert, but sleepy. Engaging in conversation. Generalized weakness.  Psychiatric:         Mood and Affect: Mood normal.         Somewhat inappropriate thought processes

## 2022-11-24 NOTE — NURSING
2220 Rounded on patient. Patient son states that he assisted patient to bathroom to sit on toilet to have a bowel movement. Son left patient alone on toilet. When son checked on patient, patient was found lying on his left side. Son had already moved the patient back to bed when rounded on. Patient has a left elbow abrasion and no signs of injury to head.   2222 Vitals signs and glucose level taken. Patient was diaphoretic and Alert and oriented X2. Patient stated that he was wiping and felt dizzy and lost his balance after bowel movement. Patient unsure if he hit his head or not.   2030 Notified Vic OC  2035 Notified Dr. Kanu Aguilera and received orders for CT of head without contrast.

## 2022-11-24 NOTE — CARE UPDATE
"Notified by patient's RN that patient sustained a fall.     The patient was trying to get up to the bathroom and was assisted by his son. Once on the toilet, he was left alone and later was found on the floor when his son checked on him. He scraped his left elbow with minimal blood loss. The patient is A&Ox2 and does not remember what happened. He reports that "he could have passed out" but was unsure. Head appears atraumatic. Last INR was 4.2. Warfarin is currently held. Neurologic exam is non focal. Given that this was an unwitnessed fall, with possible prior syncopal episode and a supra-therapeutic INR. Will obtain a CT head for further evaluation. WCTM.  "

## 2022-11-24 NOTE — ASSESSMENT & PLAN NOTE
Known CAD s/p LAD PCI 2006  Hx ICM, EF <30% by recent echo.  No overt anginal sxs, but freq ventricular tachycardia noted.  Hold coumadin for possible cath (start DOAC on discharge).  May be able to do as outpatient as VT seems to have calmed down with rx of CHF.

## 2022-11-24 NOTE — ASSESSMENT & PLAN NOTE
In SR on coumadin  Coumadin stopped (allow INR to drift down for cath)  Will start DOAC on discharge

## 2022-11-24 NOTE — ASSESSMENT & PLAN NOTE
-Admitted to inpatient status  -BNP elevated at 1441  -Echo 10/22 showed EF 20-30%, grade II diastolic dysfunction  -Known chronic ischemic cardiomyopathy with AICD in place  -Cardiology consulted and input appreciated  -AICD interogated and noted frequent sustained VT episodes below threshold of treatment.  AICD reprogramed with lower threshold  -Strict ins/outs and daily weights  -Continue diuresis with iv lasix  -Troprol started 11/23 and entresto today.  -No further VT thus far

## 2022-11-24 NOTE — PLAN OF CARE
Problem: Fall Injury Risk  Goal: Absence of Fall and Fall-Related Injury  Outcome: Ongoing, Progressing     Problem: Coping Ineffective  Goal: Effective Coping  Outcome: Ongoing, Progressing     Problem: Adjustment to Illness (Delirium)  Goal: Optimal Coping  Outcome: Ongoing, Progressing     Problem: Altered Behavior (Delirium)  Goal: Improved Behavioral Control  Outcome: Ongoing, Progressing     Problem: Attention and Thought Clarity Impairment (Delirium)  Goal: Improved Attention and Thought Clarity  Outcome: Ongoing, Progressing     Problem: Sleep Disturbance (Delirium)  Goal: Improved Sleep  Outcome: Ongoing, Progressing

## 2022-11-24 NOTE — ASSESSMENT & PLAN NOTE
Known severe LV dysfxn/ICM  Not grossly vol overloaded, but optivol crossed on ICD and BNP elevated  Not on GDMT on admission  Cont toprol  Inc lasix 40mg IV qd, strict I/Os  Add entresto 24/26mg bid   Eventual aldact.  Amio stopped  Consider as candidate for CardioMEMS.

## 2022-11-24 NOTE — ASSESSMENT & PLAN NOTE
INR still supratherapeutic.  Hold coumadin and allow to drift down for cath.  Switch to DOAC on discharge.

## 2022-11-24 NOTE — ASSESSMENT & PLAN NOTE
-Presenting complaint was weakness x 1 week since starting amiodarone.  -Patient and son felt weakness clearly due to amiodarone, but we suspected weakness was driven by the episodes of VT which were driven by uncontrolled severe chf.  -Noted fall overnight from toilet 11/23-11/24 - no head trauma and CT head without acute findings  -Fall precautions ordered  -Amiodarone stopped 11/24  -PT/OT consulted.

## 2022-11-24 NOTE — PROGRESS NOTES
[facility] Telemetry  Cardiology  Progress Note    Patient Name: Balbir Rebollar Sr.  MRN: [id number]  Admission Date: 11/22/2022  Hospital Length of Stay: 1 days  Code Status: Full Code   Attending Physician: Sarmad Banegas MD   Primary Care Physician: Flip Hanna MD  Expected Discharge Date: 11/24/2022  Principal Problem:Weakness    Subjective:     Interval Hx: No cp, stil weak and with MORRELL.  Family at bedside.  Makes note of worsening sxs with amio.    Tele: SR 60s, no VT noted (personally reviewed and interpreted)        Past Medical History:   Diagnosis Date    AICD (automatic cardioverter/defibrillator) present     Anticoagulant long-term use     Coronary artery disease     History of myocardial infarction     Paroxysmal A-fib     Stroke        Past Surgical History:   Procedure Laterality Date    CHOLECYSTECTOMY      REPLACEMENT OF DUAL CHAMBER IMPLANTABLE CARDIOVERTER-DEFIBRILLATOR         Review of patient's allergies indicates:  No Known Allergies    No current facility-administered medications on file prior to encounter.     Current Outpatient Medications on File Prior to Encounter   Medication Sig    amiodarone (PACERONE) 200 MG Tab Take 200 mg by mouth 2 (two) times daily.    carBAMazepine (TEGRETOL) 200 mg tablet Take 400 mg by mouth 2 (two) times daily.    nitroGLYCERIN (NITROSTAT) 0.4 MG SL tablet Place 0.4 mg under the tongue every 5 (five) minutes as needed.    pravastatin (PRAVACHOL) 20 MG tablet Take 20 mg by mouth.    warfarin (COUMADIN) 6 MG tablet Take 6 mg by mouth. 0.5 (3 mg) Sun, Mon, Tues; 6 mg Wed.; 0.5 (3 mg) Thurs, Fri.; 6 mg Sat.    cetirizine (ZYRTEC) 10 MG tablet Take 1 tablet (10 mg total) by mouth once daily. (Patient not taking: Reported on 11/23/2022)    fluticasone propionate (FLONASE) 50 mcg/actuation nasal spray 1 spray (50 mcg total) by Each Nostril route once daily. (Patient not taking: Reported on 11/23/2022)     Family History    None        Tobacco Use    Smoking status: Former    Smokeless tobacco: Never   Substance and Sexual Activity    Alcohol use: Never    Drug use: Never    Sexual activity: Not on file     Review of Systems   Gastrointestinal:  Negative for melena.   Genitourinary:  Negative for hematuria.   Objective:     Vital Signs (Most Recent):  Temp: 97.6 °F (36.4 °C) (11/24/22 0723)  Pulse: 63 (11/24/22 0723)  Resp: 18 (11/24/22 0723)  BP: 128/72 (11/24/22 0723)  SpO2: 95 % (11/24/22 0723)   Vital Signs (24h Range):  Temp:  [97.4 °F (36.3 °C)-98.2 °F (36.8 °C)] 97.6 °F (36.4 °C)  Pulse:  [63-73] 63  Resp:  [16-19] 18  SpO2:  [91 %-99 %] 95 %  BP: (120-162)/(67-81) 128/72     Weight: 68 kg (149 lb 14.6 oz)  Body mass index is 22.14 kg/m².    SpO2: 95 %  O2 Device (Oxygen Therapy): room air      Intake/Output Summary (Last 24 hours) at 11/24/2022 0806  Last data filed at 11/23/2022 1757  Gross per 24 hour   Intake 147.53 ml   Output 300 ml   Net -152.47 ml       Lines/Drains/Airways       Peripheral Intravenous Line  Duration                  Peripheral IV - Single Lumen 11/22/22 2142 20 G Anterior;Right Forearm 1 day                    Physical Exam  Constitutional:       General: He is not in acute distress.     Appearance: Normal appearance. He is well-developed and normal weight. He is not ill-appearing, toxic-appearing or diaphoretic.   HENT:      Head: Normocephalic and atraumatic.   Eyes:      General: No scleral icterus.     Extraocular Movements: Extraocular movements intact.      Conjunctiva/sclera: Conjunctivae normal.      Pupils: Pupils are equal, round, and reactive to light.   Neck:      Thyroid: No thyromegaly.      Vascular: No JVD.      Trachea: No tracheal deviation.   Cardiovascular:      Rate and Rhythm: Normal rate and regular rhythm.      Heart sounds: S1 normal and S2 normal. No murmur heard.    No friction rub. No gallop.   Pulmonary:      Effort: Pulmonary effort is normal. No respiratory distress.       Breath sounds: Normal breath sounds. No stridor. No wheezing, rhonchi or rales.   Chest:      Chest wall: No tenderness.   Abdominal:      General: There is no distension.      Palpations: Abdomen is soft.   Musculoskeletal:         General: No swelling or tenderness. Normal range of motion.      Cervical back: Normal range of motion and neck supple. No rigidity.      Right lower leg: No edema.      Left lower leg: No edema.   Skin:     General: Skin is warm and dry.      Coloration: Skin is not jaundiced.   Neurological:      General: No focal deficit present.      Mental Status: He is alert and oriented to person, place, and time.      Cranial Nerves: No cranial nerve deficit.   Psychiatric:         Mood and Affect: Mood normal.         Behavior: Behavior normal.       Current Medications:   amiodarone  200 mg Oral BID    carBAMazepine  400 mg Oral BID    furosemide (LASIX) injection  20 mg Intravenous Daily    lactobacillus acidophilus & bulgar  1 packet Oral BID    metoprolol succinate  25 mg Oral Daily    pravastatin  20 mg Oral Daily       acetaminophen, acetaminophen, albuterol-ipratropium, aluminum-magnesium hydroxide-simethicone, dextrose 10%, dextrose 10%, glucagon (human recombinant), glucose, glucose, loperamide, melatonin, naloxone, ondansetron, prochlorperazine    Laboratory (all labs reviewed):  CBC:  Recent Labs   Lab 11/22/22 2152 11/23/22  0756 11/24/22  0448   WBC 4.82 4.18 6.42   Hemoglobin 14.9 10.9 L 14.5   Hematocrit 43.3 31.7 L 44.1   Platelets 117 L 119 L 167         CHEMISTRIES:  Recent Labs   Lab 11/22/22 2152 11/23/22  0756 11/23/22  1415 11/24/22  0448   Glucose 131 H 84  --  103   Sodium 143 147 H  --  141   Potassium SEE COMMENT 2.5 LL 4.5 5.1   BUN 18 12  --  15   Creatinine 1.0 0.7  --  1.0   eGFR >60 >60  --  >60   Calcium 8.6 L 6.4 LL  --  8.6 L   Magnesium  --  1.5 L  --  2.3         CARDIAC BIOMARKERS:  Recent Labs   Lab 11/22/22 2152 11/23/22  0230 11/23/22  0616    Troponin I 0.038 H 0.041 H 0.038 H         COAGS:  Recent Labs   Lab 11/23/22  0020 11/23/22  0516 11/24/22  0448   INR 4.6 H 4.2 H 3.1 H         LIPIDS/LFTS:  Recent Labs   Lab 11/22/22  2152   AST 29   ALT 12         BNP:  Recent Labs   Lab 11/22/22  2152   BNP 1,441 H         TSH:  Recent Labs   Lab 11/22/22  2306   TSH 1.461         Free T4:        Diagnostic Results:  ECG (personally reviewed and interpreted tracing(s)):  11/22/22 2143 SR 87, RBBB/LAD, bigeminal PACs, QTc 474    Chest X-Ray (personally reviewed and interpreted image(s)): 11/22/22 NAD, L ICD 1 lead    Echo: 10/19/22 (care everywhere)  EF is approximately 20-30%, Ao root 4.1cm.    1. Severely decreased left ventricular systolic function.       2. Grade II diastolic dysfunction.       3. Wall motion abnormalities imply disease of the left anterior descending coronary artery.     4. Mild nonrheumatic mitral valve regurgitation with no suggestion of left atrial enlargement.     5. Mild nonrheumatic aortic valve regurgitation.       6. Mild-to-moderate aortic root dilation.     7. Although estimation of pulmonary artery systolic pressure is not possible due to incomplete tricuspid regurgitation velocity       profile, pulmonary hypertension is not suspected.     Compared to previous echocardiogram 11/01/2021, no significant changes are seen       Cath: ([place] note 11/17/22)  (09/03/2006):  DE Stent placement in LAD due to anterior STEMI      Assessment and Plan:     * Weakness  ?related to freq ventricular ectopy/sustained VT.  Other dx/mgmt per IM    Acute on chronic combined systolic and diastolic congestive heart failure  Known severe LV dysfxn/ICM  Not grossly vol overloaded, but optivol crossed on ICD and BNP elevated  Not on GDMT on admission  Cont toprol  Inc lasix 40mg IV qd, strict I/Os  Add entresto 24/26mg bid   Eventual aldact.  Amio stopped  Consider as candidate for CardioMEMS.      Ventricular tachycardia  Freq  sustained episodes of VT noted on ICD interrogation, below treatment threshold.  ?r/t CHF, ischemia, meds.  ICD reprogrammed 11/23/22.  Cont toprol  Inc lasix 40mg IV qd, strict I/Os  Add entresto 24/26mg bid   Eventual aldact.  Amio stopped    Coronary artery disease involving native coronary artery of native heart without angina pectoris  Known CAD s/p LAD PCI 2006  Hx ICM, EF <30% by recent echo.  No overt anginal sxs, but freq ventricular tachycardia noted.  Hold coumadin for possible cath (start DOAC on discharge).  May be able to do as outpatient as VT seems to have calmed down with rx of CHF.    Paroxysmal atrial fibrillation  In SR on coumadin  Coumadin stopped (allow INR to drift down for cath)  Will start DOAC on discharge    Ischemic cardiomyopathy  As above    ICD (implantable cardioverter-defibrillator), single, in situ  [employer] single chamber ICD  Reprogrammed with lower rate threshold and more ATP on 11/23/22    Chronic anticoagulation  INR still supratherapeutic.  Hold coumadin and allow to drift down for cath.  Switch to DOAC on discharge.    Pure hypercholesterolemia  Cont statin    Aortic root dilatation  4.1 cm by echo 10/2022  Repeat echo in 6 months (May 2023)        VTE Risk Mitigation (From admission, onward)         Ordered     Reason for No Pharmacological VTE Prophylaxis  Once        Question:  Reasons:  Answer:  Already adequately anticoagulated on oral Anticoagulants    11/23/22 0042     IP VTE HIGH RISK PATIENT  Once         11/23/22 0042     Place sequential compression device  Until discontinued         11/23/22 0042                Wesley Rico MD  Cardiology  [facility] [place] [facility]

## 2022-11-24 NOTE — ASSESSMENT & PLAN NOTE
-AICD interrogated and noted sustained VT episodes below threshold of treatment  -AICD reprogrammed  -Continue toprol  -Have stopped amiodarone - family and patient feels it causes severe weakness and confusion.

## 2022-11-24 NOTE — SUBJECTIVE & OBJECTIVE
Past Medical History:   Diagnosis Date    AICD (automatic cardioverter/defibrillator) present     Anticoagulant long-term use     Coronary artery disease     History of myocardial infarction     Paroxysmal A-fib     Stroke        Past Surgical History:   Procedure Laterality Date    CHOLECYSTECTOMY      REPLACEMENT OF DUAL CHAMBER IMPLANTABLE CARDIOVERTER-DEFIBRILLATOR         Review of patient's allergies indicates:  No Known Allergies    No current facility-administered medications on file prior to encounter.     Current Outpatient Medications on File Prior to Encounter   Medication Sig    amiodarone (PACERONE) 200 MG Tab Take 200 mg by mouth 2 (two) times daily.    carBAMazepine (TEGRETOL) 200 mg tablet Take 400 mg by mouth 2 (two) times daily.    nitroGLYCERIN (NITROSTAT) 0.4 MG SL tablet Place 0.4 mg under the tongue every 5 (five) minutes as needed.    pravastatin (PRAVACHOL) 20 MG tablet Take 20 mg by mouth.    warfarin (COUMADIN) 6 MG tablet Take 6 mg by mouth. 0.5 (3 mg) Sun, Mon, Tues; 6 mg Wed.; 0.5 (3 mg) Thurs, Fri.; 6 mg Sat.    cetirizine (ZYRTEC) 10 MG tablet Take 1 tablet (10 mg total) by mouth once daily. (Patient not taking: Reported on 11/23/2022)    fluticasone propionate (FLONASE) 50 mcg/actuation nasal spray 1 spray (50 mcg total) by Each Nostril route once daily. (Patient not taking: Reported on 11/23/2022)     Family History    None       Tobacco Use    Smoking status: Former    Smokeless tobacco: Never   Substance and Sexual Activity    Alcohol use: Never    Drug use: Never    Sexual activity: Not on file     Review of Systems   Gastrointestinal:  Negative for melena.   Genitourinary:  Negative for hematuria.   Objective:     Vital Signs (Most Recent):  Temp: 97.6 °F (36.4 °C) (11/24/22 0723)  Pulse: 63 (11/24/22 0723)  Resp: 18 (11/24/22 0723)  BP: 128/72 (11/24/22 0723)  SpO2: 95 % (11/24/22 0723)   Vital Signs (24h Range):  Temp:  [97.4 °F (36.3 °C)-98.2 °F (36.8 °C)] 97.6 °F (36.4  °C)  Pulse:  [63-73] 63  Resp:  [16-19] 18  SpO2:  [91 %-99 %] 95 %  BP: (120-162)/(67-81) 128/72     Weight: 68 kg (149 lb 14.6 oz)  Body mass index is 22.14 kg/m².    SpO2: 95 %  O2 Device (Oxygen Therapy): room air      Intake/Output Summary (Last 24 hours) at 11/24/2022 0806  Last data filed at 11/23/2022 1757  Gross per 24 hour   Intake 147.53 ml   Output 300 ml   Net -152.47 ml       Lines/Drains/Airways       Peripheral Intravenous Line  Duration                  Peripheral IV - Single Lumen 11/22/22 2142 20 G Anterior;Right Forearm 1 day                    Physical Exam  Constitutional:       General: He is not in acute distress.     Appearance: Normal appearance. He is well-developed and normal weight. He is not ill-appearing, toxic-appearing or diaphoretic.   HENT:      Head: Normocephalic and atraumatic.   Eyes:      General: No scleral icterus.     Extraocular Movements: Extraocular movements intact.      Conjunctiva/sclera: Conjunctivae normal.      Pupils: Pupils are equal, round, and reactive to light.   Neck:      Thyroid: No thyromegaly.      Vascular: No JVD.      Trachea: No tracheal deviation.   Cardiovascular:      Rate and Rhythm: Normal rate and regular rhythm.      Heart sounds: S1 normal and S2 normal. No murmur heard.    No friction rub. No gallop.   Pulmonary:      Effort: Pulmonary effort is normal. No respiratory distress.      Breath sounds: Normal breath sounds. No stridor. No wheezing, rhonchi or rales.   Chest:      Chest wall: No tenderness.   Abdominal:      General: There is no distension.      Palpations: Abdomen is soft.   Musculoskeletal:         General: No swelling or tenderness. Normal range of motion.      Cervical back: Normal range of motion and neck supple. No rigidity.      Right lower leg: No edema.      Left lower leg: No edema.   Skin:     General: Skin is warm and dry.      Coloration: Skin is not jaundiced.   Neurological:      General: No focal deficit present.       Mental Status: He is alert and oriented to person, place, and time.      Cranial Nerves: No cranial nerve deficit.   Psychiatric:         Mood and Affect: Mood normal.         Behavior: Behavior normal.       Current Medications:   amiodarone  200 mg Oral BID    carBAMazepine  400 mg Oral BID    furosemide (LASIX) injection  20 mg Intravenous Daily    lactobacillus acidophilus & bulgar  1 packet Oral BID    metoprolol succinate  25 mg Oral Daily    pravastatin  20 mg Oral Daily       acetaminophen, acetaminophen, albuterol-ipratropium, aluminum-magnesium hydroxide-simethicone, dextrose 10%, dextrose 10%, glucagon (human recombinant), glucose, glucose, loperamide, melatonin, naloxone, ondansetron, prochlorperazine    Laboratory (all labs reviewed):  CBC:  Recent Labs   Lab 11/22/22 2152 11/23/22  0756 11/24/22  0448   WBC 4.82 4.18 6.42   Hemoglobin 14.9 10.9 L 14.5   Hematocrit 43.3 31.7 L 44.1   Platelets 117 L 119 L 167         CHEMISTRIES:  Recent Labs   Lab 11/22/22 2152 11/23/22  0756 11/23/22  1415 11/24/22  0448   Glucose 131 H 84  --  103   Sodium 143 147 H  --  141   Potassium SEE COMMENT 2.5 LL 4.5 5.1   BUN 18 12  --  15   Creatinine 1.0 0.7  --  1.0   eGFR >60 >60  --  >60   Calcium 8.6 L 6.4 LL  --  8.6 L   Magnesium  --  1.5 L  --  2.3         CARDIAC BIOMARKERS:  Recent Labs   Lab 11/22/22 2152 11/23/22  0230 11/23/22  0616   Troponin I 0.038 H 0.041 H 0.038 H         COAGS:  Recent Labs   Lab 11/23/22  0020 11/23/22  0516 11/24/22  0448   INR 4.6 H 4.2 H 3.1 H         LIPIDS/LFTS:  Recent Labs   Lab 11/22/22 2152   AST 29   ALT 12         BNP:  Recent Labs   Lab 11/22/22 2152   BNP 1,441 H         TSH:  Recent Labs   Lab 11/22/22  2306   TSH 1.461         Free T4:        Diagnostic Results:  ECG (personally reviewed and interpreted tracing(s)):  11/22/22 2143 SR 87, RBBB/LAD, bigeminal PACs, QTc 474    Chest X-Ray (personally reviewed and interpreted image(s)): 11/22/22 NAD, L ICD 1  lead    Echo: 10/19/22 (care everywhere)  EF is approximately 20-30%, Ao root 4.1cm.    1. Severely decreased left ventricular systolic function.       2. Grade II diastolic dysfunction.       3. Wall motion abnormalities imply disease of the left anterior descending coronary artery.     4. Mild nonrheumatic mitral valve regurgitation with no suggestion of left atrial enlargement.     5. Mild nonrheumatic aortic valve regurgitation.       6. Mild-to-moderate aortic root dilation.     7. Although estimation of pulmonary artery systolic pressure is not possible due to incomplete tricuspid regurgitation velocity       profile, pulmonary hypertension is not suspected.     Compared to previous echocardiogram 11/01/2021, no significant changes are seen       Cath: (care [place] note 11/17/22)  (09/03/2006):  DE Stent placement in LAD due to anterior STEMI

## 2022-11-25 PROBLEM — G93.41 ENCEPHALOPATHY, METABOLIC: Status: ACTIVE | Noted: 2022-11-25

## 2022-11-25 LAB
AMMONIA PLAS-SCNC: 25 UMOL/L (ref 10–50)
ANION GAP SERPL CALC-SCNC: 13 MMOL/L (ref 8–16)
BASOPHILS # BLD AUTO: 0.03 K/UL (ref 0–0.2)
BASOPHILS NFR BLD: 0.5 % (ref 0–1.9)
BUN SERPL-MCNC: 20 MG/DL (ref 8–23)
CALCIUM SERPL-MCNC: 8.8 MG/DL (ref 8.7–10.5)
CHLORIDE SERPL-SCNC: 103 MMOL/L (ref 95–110)
CO2 SERPL-SCNC: 22 MMOL/L (ref 23–29)
CREAT SERPL-MCNC: 1.1 MG/DL (ref 0.5–1.4)
DIFFERENTIAL METHOD: NORMAL
EOSINOPHIL # BLD AUTO: 0.1 K/UL (ref 0–0.5)
EOSINOPHIL NFR BLD: 0.9 % (ref 0–8)
ERYTHROCYTE [DISTWIDTH] IN BLOOD BY AUTOMATED COUNT: 13.9 % (ref 11.5–14.5)
EST. GFR  (NO RACE VARIABLE): >60 ML/MIN/1.73 M^2
GLUCOSE SERPL-MCNC: 104 MG/DL (ref 70–110)
HCT VFR BLD AUTO: 46.2 % (ref 40–54)
HGB BLD-MCNC: 15.3 G/DL (ref 14–18)
IMM GRANULOCYTES # BLD AUTO: 0.01 K/UL (ref 0–0.04)
IMM GRANULOCYTES NFR BLD AUTO: 0.2 % (ref 0–0.5)
INR PPP: 1.9 (ref 0.8–1.2)
LYMPHOCYTES # BLD AUTO: 1.1 K/UL (ref 1–4.8)
LYMPHOCYTES NFR BLD: 19.9 % (ref 18–48)
MAGNESIUM SERPL-MCNC: 2.3 MG/DL (ref 1.6–2.6)
MCH RBC QN AUTO: 30 PG (ref 27–31)
MCHC RBC AUTO-ENTMCNC: 33.1 G/DL (ref 32–36)
MCV RBC AUTO: 91 FL (ref 82–98)
MONOCYTES # BLD AUTO: 0.6 K/UL (ref 0.3–1)
MONOCYTES NFR BLD: 10.5 % (ref 4–15)
NEUTROPHILS # BLD AUTO: 3.9 K/UL (ref 1.8–7.7)
NEUTROPHILS NFR BLD: 68 % (ref 38–73)
NRBC BLD-RTO: 0 /100 WBC
PLATELET # BLD AUTO: 180 K/UL (ref 150–450)
PMV BLD AUTO: 10.1 FL (ref 9.2–12.9)
POTASSIUM SERPL-SCNC: 4.6 MMOL/L (ref 3.5–5.1)
PROTHROMBIN TIME: 19.4 SEC (ref 9–12.5)
RBC # BLD AUTO: 5.1 M/UL (ref 4.6–6.2)
SODIUM SERPL-SCNC: 138 MMOL/L (ref 136–145)
TSH SERPL DL<=0.005 MIU/L-ACNC: 0.81 UIU/ML (ref 0.4–4)
WBC # BLD AUTO: 5.72 K/UL (ref 3.9–12.7)

## 2022-11-25 PROCEDURE — 80048 BASIC METABOLIC PNL TOTAL CA: CPT | Performed by: HOSPITALIST

## 2022-11-25 PROCEDURE — 82140 ASSAY OF AMMONIA: CPT | Performed by: HOSPITALIST

## 2022-11-25 PROCEDURE — 85610 PROTHROMBIN TIME: CPT | Performed by: STUDENT IN AN ORGANIZED HEALTH CARE EDUCATION/TRAINING PROGRAM

## 2022-11-25 PROCEDURE — 99223 PR INITIAL HOSPITAL CARE,LEVL III: ICD-10-PCS | Mod: ,,, | Performed by: STUDENT IN AN ORGANIZED HEALTH CARE EDUCATION/TRAINING PROGRAM

## 2022-11-25 PROCEDURE — 99900035 HC TECH TIME PER 15 MIN (STAT)

## 2022-11-25 PROCEDURE — 25000003 PHARM REV CODE 250: Performed by: HOSPITALIST

## 2022-11-25 PROCEDURE — 99233 SBSQ HOSP IP/OBS HIGH 50: CPT | Mod: ,,, | Performed by: INTERNAL MEDICINE

## 2022-11-25 PROCEDURE — 83735 ASSAY OF MAGNESIUM: CPT | Performed by: HOSPITALIST

## 2022-11-25 PROCEDURE — 21400001 HC TELEMETRY ROOM

## 2022-11-25 PROCEDURE — 84443 ASSAY THYROID STIM HORMONE: CPT | Performed by: HOSPITALIST

## 2022-11-25 PROCEDURE — 82746 ASSAY OF FOLIC ACID SERUM: CPT | Performed by: HOSPITALIST

## 2022-11-25 PROCEDURE — 97164 PT RE-EVAL EST PLAN CARE: CPT

## 2022-11-25 PROCEDURE — 97110 THERAPEUTIC EXERCISES: CPT

## 2022-11-25 PROCEDURE — 99233 PR SUBSEQUENT HOSPITAL CARE,LEVL III: ICD-10-PCS | Mod: ,,, | Performed by: INTERNAL MEDICINE

## 2022-11-25 PROCEDURE — 25000003 PHARM REV CODE 250

## 2022-11-25 PROCEDURE — 82607 VITAMIN B-12: CPT | Performed by: HOSPITALIST

## 2022-11-25 PROCEDURE — 97530 THERAPEUTIC ACTIVITIES: CPT

## 2022-11-25 PROCEDURE — 85025 COMPLETE CBC W/AUTO DIFF WBC: CPT | Performed by: HOSPITALIST

## 2022-11-25 PROCEDURE — 36415 COLL VENOUS BLD VENIPUNCTURE: CPT | Performed by: HOSPITALIST

## 2022-11-25 PROCEDURE — 36415 COLL VENOUS BLD VENIPUNCTURE: CPT | Performed by: STUDENT IN AN ORGANIZED HEALTH CARE EDUCATION/TRAINING PROGRAM

## 2022-11-25 PROCEDURE — 86592 SYPHILIS TEST NON-TREP QUAL: CPT | Performed by: HOSPITALIST

## 2022-11-25 PROCEDURE — 99223 1ST HOSP IP/OBS HIGH 75: CPT | Mod: ,,, | Performed by: STUDENT IN AN ORGANIZED HEALTH CARE EDUCATION/TRAINING PROGRAM

## 2022-11-25 PROCEDURE — 94760 N-INVAS EAR/PLS OXIMETRY 1: CPT

## 2022-11-25 RX ORDER — POLYETHYLENE GLYCOL 3350 17 G/17G
17 POWDER, FOR SOLUTION ORAL DAILY
Status: DISCONTINUED | OUTPATIENT
Start: 2022-11-25 | End: 2022-12-06 | Stop reason: HOSPADM

## 2022-11-25 RX ORDER — SCOLOPAMINE TRANSDERMAL SYSTEM 1 MG/1
1 PATCH, EXTENDED RELEASE TRANSDERMAL
Status: DISCONTINUED | OUTPATIENT
Start: 2022-11-26 | End: 2022-12-06 | Stop reason: HOSPADM

## 2022-11-25 RX ORDER — DOCUSATE SODIUM 100 MG/1
100 CAPSULE, LIQUID FILLED ORAL 2 TIMES DAILY
Status: DISCONTINUED | OUTPATIENT
Start: 2022-11-25 | End: 2022-12-06 | Stop reason: HOSPADM

## 2022-11-25 RX ADMIN — Medication 6 MG: at 09:11

## 2022-11-25 RX ADMIN — ONDANSETRON 8 MG: 8 TABLET, ORALLY DISINTEGRATING ORAL at 11:11

## 2022-11-25 RX ADMIN — LACTOBACILLUS ACIDOPHILUS / LACTOBACILLUS BULGARICUS 1 EACH: 100 MILLION CFU STRENGTH GRANULES at 09:11

## 2022-11-25 RX ADMIN — DOCUSATE SODIUM 100 MG: 100 CAPSULE, LIQUID FILLED ORAL at 09:11

## 2022-11-25 RX ADMIN — PROMETHAZINE HYDROCHLORIDE 12.5 MG: 25 INJECTION INTRAMUSCULAR; INTRAVENOUS at 11:11

## 2022-11-25 RX ADMIN — CARBAMAZEPINE 400 MG: 200 TABLET ORAL at 09:11

## 2022-11-25 RX ADMIN — CARBAMAZEPINE 400 MG: 200 TABLET ORAL at 08:11

## 2022-11-25 RX ADMIN — LACTOBACILLUS ACIDOPHILUS / LACTOBACILLUS BULGARICUS 1 EACH: 100 MILLION CFU STRENGTH GRANULES at 08:11

## 2022-11-25 RX ADMIN — PRAVASTATIN SODIUM 20 MG: 10 TABLET ORAL at 08:11

## 2022-11-25 RX ADMIN — POLYETHYLENE GLYCOL 3350 17 G: 17 POWDER, FOR SOLUTION ORAL at 02:11

## 2022-11-25 NOTE — CONSULTS
[facility] - Telemetry  Palliative Medicine  Consult Note    Patient Name: Balbir Rebollar Sr.  MRN: [id number]  Admission Date: 11/22/2022  Hospital Length of Stay: 2 days  Code Status: Full Code   Attending Provider: Sarmad Banegas MD  Consulting Provider: Tim Paul MD  Primary Care Physician: Flip Hanna MD  Principal Problem:Acute on chronic combined systolic and diastolic congestive heart failure    Patient information was obtained from patient, relative(s), past medical records and primary team.      Inpatient consult to Palliative Care  Consult performed by: Tim Paul MD  Consult ordered by: Sarmad Banegas MD  Reason for consult: ACP/Support/GOC      Assessment/Plan:     * Acute on chronic combined systolic and diastolic congestive heart failure  - per 10/2022 echo on care everywhere:     -EF is approximately 20-30%     1. Severely decreased left ventricular systolic function.       2. Grade II diastolic dysfunction.       - cardiology following with plans for cath on Monday for further evaluation    ACP (advance care planning)  - patient unable to participate in conversation thus talked with his son Chris and daughter Silvia   - they have a good understanding of his medical ongoings and care for their father a lot  - he has been declining over the past few weeks saying he has been feeling off and weak  - they found out it might have been due to his heart failure and his odd rythyms at times  - I went over this further with them and explained to them what heart failure entails and what to expect longer term  - they understood and felt like they had a better understanding now. Looking forward to the cath on Monday to get more answers.   - Chris had talked some with Dr. Banegas yesterday regarding big picture GOC/code status  - I talked with Chris and Silvia more regarding this and the nature of DNR in a patient who has a defibrillator and what it entails  - they would like to get more  "answers from the cath Monday, get their father eating and pooping and hopefully feeling more like himself before they talk more about that, but they definitely understood the severity of his condition and the importance of hoping for the best but planning for the worst. They were appreciative of the open and honest conversation.   [] no changes to treatment plan  [] our team will follow up after his cath for further support    Ventricular tachycardia  - seen on pacemaker  - cardiology following and adjusted some ICD settings    Weakness  - unclear etiology, primary team and cardiology following  - to work with pt/ot        Thank you for your consult. I will follow-up with patient. Please contact us if you have any additional questions.    Subjective:     HPI:   Per H&P: "Balbir Rebollar Sr. 72 y.o. male with CAD, HTN, Afib, long term use of anticoagulants, CVA, and ICD, presents to the hospital with a chief complaint of fatigue and weakness with acute onset of one-week. Patient reports he was seen 3 times in a period of a week for the same symptoms and was discharged.  Patient describes his symptoms as feeling tired but I can not sleep. Associated symptoms include difficulty walking, diarrhea, frequent urination, and muscle spasms. Patient's daughter says," he is stumbling going up and down the stairs". Patient reports he was prescribed a new medication that is causing him to have dry mouth and feeling more fatigued (on top of original fatigue complaint). Patient's daughter reports patient is usually very active, but recently has been staying in bed more often this past week. No other exacerbating or alleviating factors. Patient denies dysuria, nausea, vomiting, cough, numbness, slurred speech, black stool , paresthesia, or other associated symptoms. Patient endorses compliance of coumadin. Family reports that the patient seen at  several times recently for symptoms and state they were told nothing was " "found. Follows with cardiology at  and was recently started on Amiodarone on 11/18/2022.       In the ED patient is afebrile without leukocytosis PT 45.2, INR 4.6, CO2 20, calcium 8.6, BNP 1 441, troponin 0.038, 2nd troponin 0.041 TSH WNL, UA is hazy with trace protein glucose and occult blood, negative for flu and COVID, CXR was negative, EKG showed "Sinus rhythm with Premature supraventricular complexes in a pattern of bigeminy, Right bundle branch block, Left anterior fascicular block" without previous EKG for comparison.  Patient was placed in observation for further workup an assessment by Cardiology.  Previous Echo from care everywhere "       Hospital Course:  No notes on file    Interval History/Subjective:     - seen at bedside, he is too tired to participate in a meaningful conversation. At times chooses not to give a response, but when he does it tends to be appropriate.   - he has not had a proper bowel movement in a couple days and his food/drink intake has been rather limited  - daughter Silvia and son Chris present at bedside too, talked outside the room further    Past Medical History:   Diagnosis Date    AICD (automatic cardioverter/defibrillator) present     Anticoagulant long-term use     Coronary artery disease     History of myocardial infarction     Paroxysmal A-fib     Stroke        Past Surgical History:   Procedure Laterality Date    CHOLECYSTECTOMY      REPLACEMENT OF DUAL CHAMBER IMPLANTABLE CARDIOVERTER-DEFIBRILLATOR         Review of patient's allergies indicates:  No Known Allergies    Medications:  Continuous Infusions:  Scheduled Meds:   carBAMazepine  400 mg Oral BID    furosemide (LASIX) injection  40 mg Intravenous Daily    lactobacillus acidophilus & bulgar  1 packet Oral BID    metoprolol succinate  25 mg Oral Daily    pravastatin  20 mg Oral Daily    sacubitriL-valsartan  1 tablet Oral BID     PRN Meds:acetaminophen, acetaminophen, albuterol-ipratropium, aluminum-magnesium " hydroxide-simethicone, dextrose 10%, dextrose 10%, glucagon (human recombinant), glucose, glucose, loperamide, melatonin, naloxone, ondansetron    Family History    None       Tobacco Use    Smoking status: Former    Smokeless tobacco: Never   Substance and Sexual Activity    Alcohol use: Never    Drug use: Never    Sexual activity: Not on file       Review of Systems: Per HPI  Objective:     Vital Signs (Most Recent):  Temp: 97.7 °F (36.5 °C) (11/25/22 1113)  Pulse: (!) 56 (11/25/22 1113)  Resp: 17 (11/25/22 1113)  BP: 108/65 (11/25/22 1113)  SpO2: (!) 90 % (11/25/22 1113)   Vital Signs (24h Range):  Temp:  [97.5 °F (36.4 °C)-97.9 °F (36.6 °C)] 97.7 °F (36.5 °C)  Pulse:  [56-69] 56  Resp:  [15-18] 17  SpO2:  [90 %-94 %] 90 %  BP: ()/(50-69) 108/65     Weight: 68 kg (149 lb 14.6 oz)  Body mass index is 22.14 kg/m².    Physical Exam  Vitals and nursing note reviewed.   Constitutional:       Appearance: He is well-developed. He is ill-appearing.      Comments: tired   HENT:      Head: Normocephalic and atraumatic.   Eyes:      Extraocular Movements: Extraocular movements intact.   Pulmonary:      Effort: Pulmonary effort is normal. No respiratory distress.   Abdominal:      General: Abdomen is flat. There is no distension.      Palpations: Abdomen is soft.   Skin:     General: Skin is warm and dry.      Coloration: Skin is pale.   Neurological:      Comments: Arousable, answers appropriately at times, but often chooses not to respond.        Review of Symptoms      Symptom Assessment (ESAS 0-10 Scale)  Pain:  0  Dyspnea:  0  Anxiety:  0  Nausea:  0  Depression:  0  Anorexia:  0  Fatigue:  0  Insomnia:  0  Restlessness:  0  Agitation:  0         Living Arrangements:  Lives with family    Psychosocial/Cultural: - lives with son and daughter and daughters  and grand children at home  - used to be a  and used to repair typewriters  - enjoys black and white classic movies  - enjoys tv but no  particular favorite show   - love ihop usually       Advance Care Planning   Advance Directives:     Decision Making:  Patient answered questions and Family answered questions       Significant Labs: Reviewed  CBC:   Recent Labs   Lab 11/25/22 0426   WBC 5.72   HGB 15.3   HCT 46.2   MCV 91        BMP:  Recent Labs   Lab 11/25/22 0426         K 4.6      CO2 22*   BUN 20   CREATININE 1.1   CALCIUM 8.8   MG 2.3     LFT:  Lab Results   Component Value Date    AST 29 11/22/2022    ALKPHOS 81 11/22/2022    BILITOT 0.3 11/22/2022     Albumin:   Albumin   Date Value Ref Range Status   11/22/2022 3.6 3.5 - 5.2 g/dL Final     Protein:   Total Protein   Date Value Ref Range Status   11/22/2022 7.2 6.0 - 8.4 g/dL Final     Lactic acid:   No results found for: LACTATE    Significant Imaging: Reviewed        > 50% of 70 min visit spent in chart review, face to face discussion of goals of care,  symptom assessment, coordination of care and emotional support.    Tim Paul MD  Palliative Medicine  [facility]

## 2022-11-25 NOTE — ASSESSMENT & PLAN NOTE
-Patient with Long standing persistent (>12 months) atrial fibrillation which is controlled currently with toprol. -Patient is currently in sinus rhythm.[id number] Score: 2.   -Anticoagulation indicated and is on warfarin at home.    -Holding warfarin due to supratheraputic INR on admit as well as possible need for angiogram (inpatient vs outpatient yet to be determined).  -Plan for DOAC on discharge.

## 2022-11-25 NOTE — PLAN OF CARE
Problem: Physical Therapy  Goal: Physical Therapy Goal  Description: Goals to be met by: 22     Patient will increase functional independence with mobility by performin. Supine to sit with Modified [place]  2. Sit to stand transfer with Supervision  3. Bed to chair transfer with Supervision    4. Gait  x 50 feet with Contact Guard Assistance with or without AD   5. Ascend/descend 10 stair with right Handrails Contact Guard Assistance    6. Sitting at edge of bed x15 minutes with Supervision  7. Lower extremity exercise program x10 reps per handout, with supervision    Outcome: Ongoing, Progressing   PT Re-evaluation performed today.  Pt could benefit from skilled PT services 5-6x/wk in order to maximize function prior to D/C.   HHPT and family support recommended at time of D/C.  Ongoing assessment pending pt progress for DME.

## 2022-11-25 NOTE — ASSESSMENT & PLAN NOTE
- per 10/2022 echo on care everywhere:     -EF is approximately 20-30%     1. Severely decreased left ventricular systolic function.       2. Grade II diastolic dysfunction.       - cardiology following with plans for cath on Monday for further evaluation

## 2022-11-25 NOTE — PLAN OF CARE
Problem: Adult Inpatient Plan of Care  Goal: Plan of Care Review  Outcome: Ongoing, Progressing     Problem: Adult Inpatient Plan of Care  Goal: Absence of Hospital-Acquired Illness or Injury  Outcome: Ongoing, Progressing     Problem: Fall Injury Risk  Goal: Absence of Fall and Fall-Related Injury  Outcome: Ongoing, Progressing

## 2022-11-25 NOTE — ASSESSMENT & PLAN NOTE
- patient unable to participate in conversation thus talked with his son Chris and daughter Silvia   - they have a good understanding of his medical ongoings and care for their father a lot  - he has been declining over the past few weeks saying he has been feeling off and weak  - they found out it might have been due to his heart failure and his odd rythyms at times  - I went over this further with them and explained to them what heart failure entails and what to expect longer term  - they understood and felt like they had a better understanding now. Looking forward to the cath on Monday to get more answers.   - Chris had talked some with Dr. Banegas yesterday regarding big picture GOC/code status  - I talked with Chris and Silvia more regarding this and the nature of DNR in a patient who has a defibrillator and what it entails  - they would like to get more answers from the cath Monday, get their father eating and pooping and hopefully feeling more like himself before they talk more about that, but they definitely understood the severity of his condition and the importance of hoping for the best but planning for the worst. They were appreciative of the open and honest conversation.   [] no changes to treatment plan  [] our team will follow up after his cath for further support

## 2022-11-25 NOTE — ASSESSMENT & PLAN NOTE
-Presenting complaint was weakness x 1 week since starting amiodarone.  -Patient and son felt weakness clearly due to amiodarone, but we suspected weakness was driven by the episodes of VT which were driven by uncontrolled severe chf.  -Noted fall overnight from toilet 11/23-11/24 - no head trauma and CT head without acute findings  -Fall precautions ordered  -Amiodarone stopped 11/24.    -PT/OT consulted and recommend HH at discharge.

## 2022-11-25 NOTE — ASSESSMENT & PLAN NOTE
-Admitted to inpatient status  -BNP elevated at 1441  -Echo 10/22 showed EF 20-30%, grade II diastolic dysfunction  -Known chronic ischemic cardiomyopathy with AICD in place  -Cardiology consulted and input appreciated  -AICD interogated and noted frequent sustained VT episodes below threshold of treatment.  AICD reprogramed with lower threshold  -Strict ins/outs and daily weights  -Thus far have attempted gentle diuresis with iv lasix and started toprol and entresto and no further VT thus far  -He is bradycardic, mildly hypotensive and encephalopathic today  -Will stop lasix  and decrease dose of toprol today  -Place hold parameters on entresto and toprol

## 2022-11-25 NOTE — ASSESSMENT & PLAN NOTE
-History noted  -No chest pain  -Needs angiogram at some point - possibly Monday if still in hospital.

## 2022-11-25 NOTE — NURSING
Pt in bed, able to turn and reposition self. Denies pain or discomfort at this time. BP reassessed, remains on the low end. BP meds and lasix held until further notice. Tele-sitter in place.

## 2022-11-25 NOTE — PLAN OF CARE
Recommendations  1) Continue cardiac diet- consider liberalizing if low sodium restriction affecting PO intake   2) Add Boost plus BID for now   3) weigh daily or as needed   4) nutrition education in discharge packet    Goals: 1) PO inakes > 50% of meals/supplements at f/u  Nutrition Goal Status: new  Communication of RD Recs:  (POC, sticky note)

## 2022-11-25 NOTE — ASSESSMENT & PLAN NOTE
-On 11/25 more confused and lethargic  -Noted fall 2 nights ago - no apparent trauma and head ct at that time without evidence of bleeding.  -No evidence of infection  -Could be secondary to mild dehydration and low blood pressure - stopped lasix and decreased toprol.  -Ordered delirium precautions  -Check for other reversible encephalopathies:  UA, TSH, RPR, B12, Folate, Ammonia  -If not improved tomorrow will repeat head CT.

## 2022-11-25 NOTE — NURSING
Pt assisted to bedside commode by this RN. Pt began to vomit while on commode. Pt had small formed bm, approx 2 inches long, also voided. Upon transferring back to chair, pt slid from RN's grasp and slid down to floor assisted by RN. No injury noted, pt made contact with floor only with buttocks and right hand, RN assisted pt to the floor. Pt able to get up from floor with assistance from RN, placed back on chair with alarm, telesitter remains in place. Charge and MD made aware.

## 2022-11-25 NOTE — ASSESSMENT & PLAN NOTE
-AICD interrogated and noted sustained VT episodes below threshold of treatment  -AICD reprogrammed  -Stopped amiodarone 11/24- family and patient feels it causes severe weakness and confusion.  -Continue toprol but at lower dose and with hold parameters due to bradycardia and lowish blood pressure

## 2022-11-25 NOTE — PROGRESS NOTES
[facility] - Telemetry  Adult Nutrition  Progress Note    SUMMARY       Recommendations  1) Continue cardiac diet- consider liberalizing if low sodium restriction affecting PO intake   2) Add Boost plus BID for now   3) weigh daily or as needed   4) nutrition education in discharge packet    Goals: 1) PO inakes > 50% of meals/supplements at f/u  Nutrition Goal Status: new  Communication of RD Recs:  (POC, sticky note)    Assessment and Plan    Inadequate energy intake  R/t fatigue, N/V/D, decreased appetite  As evidenced by PO intakes < 50% of needs x at least 5 days  Intervention: fat/sodium modified diet, nutrition education, and nutrition supplement therapy  new    Malnutrition Assessment     Skin (Micronutrient):  (Bg = 14)  Teeth (Micronutrient):  (missing some)   Micronutrient Evaluation: suspected deficiency   Energy Intake (Malnutrition): less than or equal to 50% for greater than or equal to 5 days           Edema (Fluid Accumulation):  (none noted)             Reason for Assessment    Reason For Assessment: identified at risk by screening criteria  Diagnosis:  (acute on chronic heart failure)  Relevant Medical History: CAD, HTN, AFIB, CVA, MI  Interdisciplinary Rounds: did not attend (remote)    General Information Comments: 71 y/o male admitted with acute on chronic heart failure s/p 1 week of weankess/fatigues, loss of appetite, losse stool ( last 11/23 noted). N/V noted 11/24. Poor intakes this admission. Will add supplements and add nutrition education materials to discharge packet.  NFPE to be done by onsite RD at f/u. No significant wt loss per chart review.    Nutrition Discharge Planning: To be determined- Cardiac diet + Boost plus as needed/Boost pudding    Nutrition Risk Screen    Nutrition Risk Screen: no indicators present    Nutrition/Diet History    Spiritual, Cultural Beliefs, Mu-ism Practices, Values that Affect Care: no  Food Allergies: NKFA  Factors Affecting Nutritional Intake:  "decreased appetite, nausea/vomiting, diarrhea    Anthropometrics    Temp: 97.7 °F (36.5 °C)  Height Method: Stated  Height: 5' 9"  Height (inches): 69 in  Weight Method: Bed Scale  Weight: 68 kg (149 lb 14.6 oz)  Weight (lb): 149.91 lb  Ideal Body Weight (IBW), Male: 160 lb  % Ideal Body Weight, Male (lb): 93.69 %  BMI (Calculated): 22.1  BMI Grade: 18.5-24.9 - normal  Usual Body Weight (UBW), k.3 kg (21)  % Usual Body Weight: 96.93  % Weight Change From Usual Weight: -3.27 %       Lab/Procedures/Meds    Pertinent Labs Reviewed: reviewed  BMP  Lab Results   Component Value Date     2022    K 4.6 2022     2022    CO2 22 (L) 2022    BUN 20 2022    CREATININE 1.1 2022    CALCIUM 8.8 2022    ANIONGAP 13 2022    EGFRNORACEVR >60 2022     POCT Glucose   Date Value Ref Range Status   2022 119 (H) 70 - 110 mg/dL Final     Lab Results   Component Value Date    ALBUMIN 3.6 2022     BNP  Recent Labs   Lab 22   BNP 1,441*       Pertinent Medications Reviewed: reviewed  Pertinent Medications Comments: lasix, probiotic, senna, zofran    Estimated/Assessed Needs    Weight Used For Calorie Calculations: 68 kg (149 lb 14.6 oz)  Energy Calorie Requirements (kcal): MSJ ( x 1.2) = 1690 kcal  Energy Need Method: [facility]  Protein Requirements: 1-1.2 g protein/kg ( CHF/age) = 68-81 g  Weight Used For Protein Calculations: 68 kg (149 lb 14.6 oz)  Fluid Requirements (mL): 1700 ml or per MD     CHO Requirement: N/A      Nutrition Prescription Ordered    Current Diet Order: Cardiac    Evaluation of Received Nutrient/Fluid Intake    Energy Calories Required: not meeting needs  Protein Required: not meeting needs  Fluid Required: not meeting needs  Tolerance: tolerating     Intake/Output Summary (Last 24 hours) at 2022 1328  Last data filed at 2022 1200  Gross per 24 hour   Intake 0 ml   Output 250 ml   Net -250 ml       % " Intake of Estimated Energy Needs: 0%  % Meal Intake: 0%    Nutrition Risk    Level of Risk/Frequency of Follow-up: moderate 2 x weekly    Monitor and Evaluation    Food and Nutrient Intake: energy intake, food and beverage intake  Food and Nutrient Adminstration: diet order  Knowledge/Beliefs/Attitudes: food and nutrition knowledge/skill  Physical Activity and Function: nutrition-related ADLs and IADLs  Anthropometric Measurements: weight  Biochemical Data, Medical Tests and Procedures: electrolyte and renal panel, gastrointestinal profile  Nutrition-Focused Physical Findings: overall appearance     Nutrition Follow-Up    RD Follow-up?: Yes

## 2022-11-25 NOTE — PLAN OF CARE
11/25/22 1117   Medicare Message   Important Message from Medicare regarding Discharge Appeal Rights Given to patient/caregiver;Explained to patient/caregiver;Signed/date by patient/caregiver   Date IMM was signed 11/25/22   Time IMM was signed [id number]

## 2022-11-25 NOTE — SUBJECTIVE & OBJECTIVE
Interval History: No acute events overnight.  Patient is more encephalopathic and lethargic today.  Barely eating because afraid to have bowel movement and doesn't like assistance from female PCT.  Denies pain and sob.  Seen at bedside during SIBR rounds with palliative physician.  His son and daughter were at bedside.  All questions answered and patient had no further complaints.    Review of Systems   Constitutional:  Positive for fatigue. Negative for chills and fever.   HENT:  Negative for congestion and rhinorrhea.    Eyes:  Negative for photophobia and visual disturbance.   Respiratory:  Negative for cough and shortness of breath.    Cardiovascular:  Negative for chest pain, palpitations and leg swelling.   Gastrointestinal:  Negative for abdominal pain, diarrhea, nausea and vomiting.   Genitourinary:  Negative for dysuria, frequency, hematuria and urgency.   Musculoskeletal:  Positive for gait problem. Negative for neck stiffness.   Skin:  Negative for pallor, rash and wound.   Neurological:  Positive for weakness. Negative for light-headedness and headaches.   Psychiatric/Behavioral:  Positive for confusion. Negative for decreased concentration.    Objective:     Vital Signs (Most Recent):  Temp: 97.7 °F (36.5 °C) (11/25/22 1113)  Pulse: (!) 56 (11/25/22 1113)  Resp: 17 (11/25/22 1113)  BP: 108/65 (11/25/22 1113)  SpO2: (!) 90 % (11/25/22 1113)   Vital Signs (24h Range):  Temp:  [97.5 °F (36.4 °C)-97.9 °F (36.6 °C)] 97.7 °F (36.5 °C)  Pulse:  [56-69] 56  Resp:  [15-18] 17  SpO2:  [90 %-94 %] 90 %  BP: ()/(50-69) 108/65     Weight: 68 kg (149 lb 14.6 oz)  Body mass index is 22.14 kg/m².    Intake/Output Summary (Last 24 hours) at 11/25/2022 1414  Last data filed at 11/25/2022 1200  Gross per 24 hour   Intake 0 ml   Output 250 ml   Net -250 ml        Physical Exam  Vitals and nursing note reviewed.   Constitutional:       General: He is not in acute distress.     Appearance: He is well-developed. He is  ill-appearing. He is not diaphoretic.      Comments: Frail in appearance   HENT:      Head: Normocephalic and atraumatic.      Right Ear: External ear normal.      Left Ear: External ear normal.      Nose: Nose normal.      Mouth/Throat:      Mouth: Mucous membranes are moist.   Eyes:      Extraocular Movements: Extraocular movements intact.      Conjunctiva/sclera: Conjunctivae normal.   Cardiovascular:      Rate and Rhythm: Regular rhythm. Bradycardia present.      Heart sounds: Murmur heard.   Pulmonary:      Effort: Pulmonary effort is normal. No respiratory distress.      Breath sounds: Normal breath sounds. No wheezing or rales.   Abdominal:      General: Bowel sounds are normal. There is no distension.      Palpations: Abdomen is soft.      Tenderness: There is no abdominal tenderness.      Comments: No palpable hepatomegaly or splenomegaly   Musculoskeletal:         General: No tenderness. Normal range of motion.      Cervical back: Normal range of motion. No rigidity.   Skin:     General: Skin is warm and dry.      Coloration: Skin is pale.   Neurological:      Mental Status: He is alert.      Comments: Mildlly lethargic and confused today with significant diffuse weakness   Psychiatric:         Mood and Affect: Mood normal.       Significant Labs: All pertinent labs within the past 24 hours have been reviewed.    Significant Imaging: I have reviewed all pertinent imaging results/findings within the past 24 hours.

## 2022-11-25 NOTE — ASSESSMENT & PLAN NOTE
-Discussed with patient's son to clarify goals of care.  Patient a bit too confused to participate.  Son wishes to speak with his sister.  -Consulted palliative care team and discussed with Dr. Paul today.  Appreciate input.  -Continue full code for now

## 2022-11-25 NOTE — PLAN OF CARE
Problem: Adult Inpatient Plan of Care  Goal: Readiness for Transition of Care  Outcome: Ongoing, Progressing     Problem: Fall Injury Risk  Goal: Absence of Fall and Fall-Related Injury  Outcome: Ongoing, Progressing     Problem: Coping Ineffective  Goal: Effective Coping  Outcome: Ongoing, Progressing     Problem: Adjustment to Illness (Delirium)  Goal: Optimal Coping  Outcome: Ongoing, Progressing     Problem: Attention and Thought Clarity Impairment (Delirium)  Goal: Improved Attention and Thought Clarity  Outcome: Ongoing, Progressing     Problem: Sleep Disturbance (Delirium)  Goal: Improved Sleep  Outcome: Ongoing, Progressing     Problem: Oral Intake Inadequate  Goal: Improved Oral Intake  Outcome: Ongoing, Not Progressing     Problem: Fatigue  Goal: Improved Activity Tolerance  Outcome: Ongoing, Progressing

## 2022-11-25 NOTE — PROGRESS NOTES
"[facility]  Progress Note    Patient Name: Balbir Rebollar Sr.  MRN: [id number]  Patient Class: IP- Inpatient   Admission Date: 11/22/2022  Length of Stay: 2 days  Attending Physician: Sarmad Banegas MD  Primary Care Provider: Flip Hanna MD        Subjective:     Principal Problem:Acute on chronic combined systolic and diastolic congestive heart failure        HPI:  Balbir Rebollar Sr. 72 y.o. male with CAD, HTN, Afib, long term use of anticoagulants, CVA, and ICD, presents to the hospital with a chief complaint of fatigue and weakness with acute onset of one-week. Patient reports he was seen 3 times in a period of a week for the same symptoms and was discharged.  Patient describes his symptoms as feeling tired but I can not sleep. Associated symptoms include difficulty walking, diarrhea, frequent urination, and muscle spasms. Patient's daughter says," he is stumbling going up and down the stairs". Patient reports he was prescribed a new medication that is causing him to have dry mouth and feeling more fatigued (on top of original fatigue complaint). Patient's daughter reports patient is usually very active, but recently has been staying in bed more often this past week. No other exacerbating or alleviating factors. Patient denies dysuria, nausea, vomiting, cough, numbness, slurred speech, black stool , paresthesia, or other associated symptoms. Patient endorses compliance of coumadin. Family reports that the patient seen at  several times recently for symptoms and state they were told nothing was found. Follows with cardiology at  and was recently started on Amiodarone on 11/18/2022.      In the ED patient is afebrile without leukocytosis PT 45.2, INR 4.6, CO2 20, calcium 8.6, BNP 1 441, troponin 0.038, 2nd troponin 0.041 TSH WNL, UA is hazy with trace protein glucose and occult blood, negative for flu and COVID, CXR was negative, EKG showed "Sinus rhythm with " "Premature supraventricular complexes in a pattern of bigeminy, Right bundle branch block, Left anterior fascicular block" without previous EKG for comparison.  Patient was placed in observation for further workup an assessment by Cardiology.  Previous Echo from [facility] everywhere     ECHOCARDIOGRAM   Interpretation Summary  11/01/2021  MRN:  [id number]        FIN:    [id number]      Accession #:[id number]         Order #:[id number]    Indications:    Coronary artery disease involving native coronary artery of native heart without angina pectoris; Paroxysmal                     atrial fibrillation (CMS/HCC); Nonsustained ventricular tachycardia (CMS/HCC)       BP:  128   / 74      HR: 67                       Rhythm:           Sinus                                                      Technical Quality:Very technically difficult study    MEASUREMENTS  (Male / Female) Normal Values   Left Ventricle:    Appears to be normal left ventricular cavity size.  Severely reduced left ventricular systolic function.  Dense hypokinesis and thinning of the anterior wall, septum, and apex with normal contractility elsewhere.   Visually estimated EF is less than 30%.  Grade I diastolic dysfunction (abnormal relaxation filling pattern),    with normal or mildly elevated filling pressures.      Overview/Hospital Course:  No notes on file    Interval History: No acute events overnight.  Patient is more encephalopathic and lethargic today.  Barely eating because afraid to have bowel movement and doesn't like assistance from female PCT.  Denies pain and sob.  Seen at bedside during SIBR rounds with palliative physician.  His son and daughter were at bedside.  All questions answered and patient had no further complaints.    Review of Systems   Constitutional:  Positive for fatigue. Negative for chills and fever.   HENT:  Negative for congestion and rhinorrhea.    Eyes:  Negative for photophobia and visual disturbance.   Respiratory:  " Negative for cough and shortness of breath.    Cardiovascular:  Negative for chest pain, palpitations and leg swelling.   Gastrointestinal:  Negative for abdominal pain, diarrhea, nausea and vomiting.   Genitourinary:  Negative for dysuria, frequency, hematuria and urgency.   Musculoskeletal:  Positive for gait problem. Negative for neck stiffness.   Skin:  Negative for pallor, rash and wound.   Neurological:  Positive for weakness. Negative for light-headedness and headaches.   Psychiatric/Behavioral:  Positive for confusion. Negative for decreased concentration.    Objective:     Vital Signs (Most Recent):  Temp: 97.7 °F (36.5 °C) (11/25/22 1113)  Pulse: (!) 56 (11/25/22 1113)  Resp: 17 (11/25/22 1113)  BP: 108/65 (11/25/22 1113)  SpO2: (!) 90 % (11/25/22 1113)   Vital Signs (24h Range):  Temp:  [97.5 °F (36.4 °C)-97.9 °F (36.6 °C)] 97.7 °F (36.5 °C)  Pulse:  [56-69] 56  Resp:  [15-18] 17  SpO2:  [90 %-94 %] 90 %  BP: ()/(50-69) 108/65     Weight: 68 kg (149 lb 14.6 oz)  Body mass index is 22.14 kg/m².    Intake/Output Summary (Last 24 hours) at 11/25/2022 1414  Last data filed at 11/25/2022 1200  Gross per 24 hour   Intake 0 ml   Output 250 ml   Net -250 ml        Physical Exam  Vitals and nursing note reviewed.   Constitutional:       General: He is not in acute distress.     Appearance: He is well-developed. He is ill-appearing. He is not diaphoretic.      Comments: Frail in appearance   HENT:      Head: Normocephalic and atraumatic.      Right Ear: External ear normal.      Left Ear: External ear normal.      Nose: Nose normal.      Mouth/Throat:      Mouth: Mucous membranes are moist.   Eyes:      Extraocular Movements: Extraocular movements intact.      Conjunctiva/sclera: Conjunctivae normal.   Cardiovascular:      Rate and Rhythm: Regular rhythm. Bradycardia present.      Heart sounds: Murmur heard.   Pulmonary:      Effort: Pulmonary effort is normal. No respiratory distress.      Breath sounds:  Normal breath sounds. No wheezing or rales.   Abdominal:      General: Bowel sounds are normal. There is no distension.      Palpations: Abdomen is soft.      Tenderness: There is no abdominal tenderness.      Comments: No palpable hepatomegaly or splenomegaly   Musculoskeletal:         General: No tenderness. Normal range of motion.      Cervical back: Normal range of motion. No rigidity.   Skin:     General: Skin is warm and dry.      Coloration: Skin is pale.   Neurological:      Mental Status: He is alert.      Comments: Mildlly lethargic and confused today with significant diffuse weakness   Psychiatric:         Mood and Affect: Mood normal.       Significant Labs: All pertinent labs within the past 24 hours have been reviewed.    Significant Imaging: I have reviewed all pertinent imaging results/findings within the past 24 hours.      Assessment/Plan:      * Acute on chronic combined systolic and diastolic congestive heart failure  -Admitted to inpatient status  -BNP elevated at 1441  -Echo 10/22 showed EF 20-30%, grade II diastolic dysfunction  -Known chronic ischemic cardiomyopathy with AICD in place  -Cardiology consulted and input appreciated  -AICD interogated and noted frequent sustained VT episodes below threshold of treatment.  AICD reprogramed with lower threshold  -Strict ins/outs and daily weights  -Thus far have attempted gentle diuresis with iv lasix and started toprol and entresto and no further VT thus far  -He is bradycardic, mildly hypotensive and encephalopathic today  -Will stop lasix  and decrease dose of toprol today  -Place hold parameters on entresto and toprol    Ventricular tachycardia  -AICD interrogated and noted sustained VT episodes below threshold of treatment  -AICD reprogrammed  -Stopped amiodarone 11/24- family and patient feels it causes severe weakness and confusion.  -Continue toprol but at lower dose and with hold parameters due to bradycardia and lowish blood  pressure    ICD (implantable cardioverter-defibrillator), single, in situ  -Treatment as above.    Paroxysmal atrial fibrillation  -Patient with Long standing persistent (>12 months) atrial fibrillation which is controlled currently with toprol. -Patient is currently in sinus rhythm.[id number] Score: 2.   -Anticoagulation indicated and is on warfarin at home.    -Holding warfarin due to supratheraputic INR on admit as well as possible need for angiogram (inpatient vs outpatient yet to be determined).  -Plan for DOAC on discharge.    Supratherapeutic INR  -INR >4 on admit and now 1.9  -Continue to hold warfarin  -Check pt/inr daily.    Chronic anticoagulation  -Treatment as above.    Encephalopathy, metabolic  -On 11/25 more confused and lethargic  -Noted fall 2 nights ago - no apparent trauma and head ct at that time without evidence of bleeding.  -No evidence of infection  -Could be secondary to mild dehydration and low blood pressure - stopped lasix and decreased toprol.  -Ordered delirium precautions  -Check for other reversible encephalopathies:  UA, TSH, RPR, B12, Folate, Ammonia  -If not improved tomorrow will repeat head CT.    Weakness  -Presenting complaint was weakness x 1 week since starting amiodarone.  -Patient and son felt weakness clearly due to amiodarone, but we suspected weakness was driven by the episodes of VT which were driven by uncontrolled severe chf.  -Noted fall overnight from toilet 11/23-11/24 - no head trauma and CT head without acute findings  -Fall precautions ordered  -Amiodarone stopped 11/24.    -PT/OT consulted and recommend HH at discharge.    ACP (advance care planning)  -Discussed with patient's son to clarify goals of care.  Patient a bit too confused to participate.  Son wishes to speak with his sister.  -Consulted palliative care team and discussed with Dr. Paul today.  Appreciate input.  -Continue full code for now    Hypokalemia  -K normal today  -Repeat labs in  AM.    Pure hypercholesterolemia  -Continue statin     History of CVA (cerebrovascular accident)  -History noted    Coronary artery disease involving native coronary artery of native heart without angina pectoris  -History noted  -No chest pain  -Needs angiogram at some point - possibly Monday if still in hospital.      VTE Risk Mitigation (From admission, onward)         Ordered     Reason for No Pharmacological VTE Prophylaxis  Once        Question:  Reasons:  Answer:  Already adequately anticoagulated on oral Anticoagulants    11/23/22 0042     IP VTE HIGH RISK PATIENT  Once         11/23/22 0042     Place sequential compression device  Until discontinued         11/23/22 0042                Discharge Planning   MOR: 11/24/2022     Code Status: Full Code   Is the patient medically ready for discharge?:     Reason for patient still in hospital (select all that apply): Treatment  Discharge Plan A: Home with family                  Sarmad Banegas MD  Department of Hospital Medicine   [facility] [place] [facility]

## 2022-11-25 NOTE — HPI
"Per H&P: "Balbir Rebollar Sr. 72 y.o. male with CAD, HTN, Afib, long term use of anticoagulants, CVA, and ICD, presents to the hospital with a chief complaint of fatigue and weakness with acute onset of one-week. Patient reports he was seen 3 times in a period of a week for the same symptoms and was discharged.  Patient describes his symptoms as feeling tired but I can not sleep. Associated symptoms include difficulty walking, diarrhea, frequent urination, and muscle spasms. Patient's daughter says," he is stumbling going up and down the stairs". Patient reports he was prescribed a new medication that is causing him to have dry mouth and feeling more fatigued (on top of original fatigue complaint). Patient's daughter reports patient is usually very active, but recently has been staying in bed more often this past week. No other exacerbating or alleviating factors. Patient denies dysuria, nausea, vomiting, cough, numbness, slurred speech, black stool , paresthesia, or other associated symptoms. Patient endorses compliance of coumadin. Family reports that the patient seen at  several times recently for symptoms and state they were told nothing was found. Follows with cardiology at  and was recently started on Amiodarone on 11/18/2022.       In the ED patient is afebrile without leukocytosis PT 45.2, INR 4.6, CO2 20, calcium 8.6, BNP 1 441, troponin 0.038, 2nd troponin 0.041 TSH WNL, UA is hazy with trace protein glucose and occult blood, negative for flu and COVID, CXR was negative, EKG showed "Sinus rhythm with Premature supraventricular complexes in a pattern of bigeminy, Right bundle branch block, Left anterior fascicular block" without previous EKG for comparison.  Patient was placed in observation for further workup an assessment by Cardiology.  Previous Echo from care everywhere "   "

## 2022-11-25 NOTE — SUBJECTIVE & OBJECTIVE
Interval History:     Review of Systems   Constitutional: Negative for decreased appetite.   HENT:  Negative for ear discharge.    Eyes:  Negative for blurred vision.   Endocrine: Negative for polyphagia.   Skin:  Negative for nail changes.   Genitourinary:  Negative for bladder incontinence.   Neurological:  Negative for aphonia.   Psychiatric/Behavioral:  Negative for hallucinations.    Allergic/Immunologic: Negative for hives.   Objective:     Vital Signs (Most Recent):  Temp: 97.7 °F (36.5 °C) (11/25/22 0713)  Pulse: (!) 59 (11/25/22 0800)  Resp: 18 (11/25/22 0713)  BP: (!) 90/50 (11/25/22 0800)  SpO2: (!) 92 % (11/25/22 0829)   Vital Signs (24h Range):  Temp:  [97.5 °F (36.4 °C)-97.9 °F (36.6 °C)] 97.7 °F (36.5 °C)  Pulse:  [59-70] 59  Resp:  [15-18] 18  SpO2:  [90 %-94 %] 92 %  BP: ()/(50-72) 90/50     Weight: 68 kg (149 lb 14.6 oz)  Body mass index is 22.14 kg/m².     SpO2: (!) 92 %  O2 Device (Oxygen Therapy): room air      Intake/Output Summary (Last 24 hours) at 11/25/2022 0943  Last data filed at 11/25/2022 0900  Gross per 24 hour   Intake 100 ml   Output 350 ml   Net -250 ml       Lines/Drains/Airways       Peripheral Intravenous Line  Duration                  Peripheral IV - Single Lumen 11/22/22 2142 20 G Anterior;Right Forearm 2 days                    Physical Exam  Constitutional:       Appearance: He is well-developed.   HENT:      Head: Normocephalic and atraumatic.   Eyes:      Conjunctiva/sclera: Conjunctivae normal.      Pupils: Pupils are equal, round, and reactive to light.   Cardiovascular:      Rate and Rhythm: Normal rate.      Pulses: Intact distal pulses.      Heart sounds: Normal heart sounds.   Pulmonary:      Effort: Pulmonary effort is normal.      Breath sounds: Normal breath sounds.   Abdominal:      General: Bowel sounds are normal.      Palpations: Abdomen is soft.   Musculoskeletal:         General: Normal range of motion.      Cervical back: Normal range of motion and  neck supple.   Skin:     General: Skin is warm and dry.   Neurological:      Mental Status: He is alert and oriented to person, place, and time.       Significant Labs: All pertinent lab results from the last 24 hours have been reviewed.    Significant Imaging: Echocardiogram: 2D echo with color flow doppler: No results found for this or any previous visit.

## 2022-11-25 NOTE — ASSESSMENT & PLAN NOTE
Known CAD s/p LAD PCI 2006  Hx ICM, EF <30% by recent echo.  No overt anginal sxs, but freq ventricular tachycardia noted.  Hold coumadin for possible cath (start DOAC on discharge).  May be able to do as outpatient as VT seems to have calmed down with rx of CHF.    11/25/22 INR 1.9. Case discussed with Dr Rico. Ok to do cath out patient or Monday if he is still in the hospital. Start DOAC at discharge if he goes home

## 2022-11-25 NOTE — NURSING
Per family, pt is not eating so that he does not have a bm and only wants male staff to help him with toileting. Pt offered toilet and urinal by male RN. Pt refuses and states he wants to sleep. He is offered different solutions to toilet and eat but continues to refuse.

## 2022-11-25 NOTE — PT/OT/SLP RE-EVAL
Physical Therapy Re-evaluation    Patient Name:  Balbir Rebollar Sr.   MRN:  [id number]    Recommendations:     Discharge Recommendations:  home health PT (Family support)   Discharge Equipment Recommendations:  (Ongoing assessment pending pt progress)   Barriers to discharge:  Dizziness with N&V sitting at EOB, pt not functioning at prior level and is at increased risk for falls and readmission    Assessment:     Balbir Rebollar Sr. is a 72 y.o. 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  He presents with the following impairments/functional limitations:  weakness, gait instability, impaired endurance, impaired balance, impaired coordination, decreased safety awareness, impaired self care skills, impaired functional mobility, decreased coordination (N&V) .    Rehab Prognosis:  Good; patient would benefit from acute skilled PT services to address these deficits and reach maximum level of function.      Recent Surgery: * No surgery found *      Plan:     During this hospitalization, patient to be seen  (5-6x/wk) to address the above listed problems via gait training, therapeutic activities, therapeutic exercises  Plan of Care Expires:  12/09/22  Plan of Care Reviewed with: patient, son    Subjective     Communicated with nsg prior to session.  Patient found  slid down in bed with feet touching Foot board  with telemetry, bed alarm upon PT entry to room, agreeable to evaluation.      Chief Complaint: Dizziness sitting at EOB  Patient comments/goals: Pt requesting to lay back down  Pain/Comfort:  Pain Rating 1: 0/10    Patients cultural, spiritual, Adventism conflicts given the current situation: no      Objective:     Patient found with: telemetry, bed alarm     General Precautions: Standard, fall   Orthopedic Precautions:N/A   Braces: N/A  Respiratory Status: Room air    Exams:  Cognitive Exam:  Patient is oriented to Pt  lethargic, able to follow commands, A&O person, place  Gross Motor Coordination:  impaired 2/2 gen weakness and deconditioning  Postural Exam:  Patient presented with the following abnormalities:    -       Rounded shoulders  -       Forward head  Skin Integrity/Edema:      -       Skin integrity: Visible skin intact  -       Edema: None noted   LLE Strength: WFL  RLE ROM: WFL  RLE Strength: WFL  LLE ROM: WFL    Functional Mobility:  Bed Mobility:     Rolling Right: contact guard assistance and with Vc/TC for reaching for BR  Scooting: Min A and with VC/TC for hand placement and forward weight shift   Supine to Sit: contact guard assistance and with VC/TC for hand placement and body mechanics with HOB elevated  Sit to Supine: moderate assistance and with Vc/TC for hand placement and body mechanics  Transfers:     Sit to Stand:  minimum assistance with no AD and hand-held assist  Bed to Chair: moderate assistance with  no AD and hand-held assist  using  Step Transfer  Gait: Mod A/HHA approx 3 steps from bed to chair  Balance: Fair sit, fair- stand    AM-PAC 6 CLICK MOBILITY  Total Score:12       Treatment and Education:   Pt performed B HS, TKE's, and AP's x 10 rps ea.  Pt sat at EOB with Min A for balance then required Mod A 2/2 vomiting.  Pt returned to R SL then dependently repositioned with HOB elevated in R SL for continued vomiting.  Nursing notified and administered Nausea meds.  Pt Required Total A to dof/don Gown and set-up assist for face, hand and mouth hygiene.  Pt transferred to chair per nursing request.  Handout provided to and reviewed with CG for:  B FAQ's, AP's, and Marching to be performed sitting up in chair and B AP's, TKE's, and GS in recliner x 10 reps ea.  CG verbalized  understanding.  Educated CG on importance of mobility on recovery and PT POC.      Patient left up in chair with all lines intact, call button in reach, chair alarm on, nsg notified, and son present.    GOALS:    Multidisciplinary Problems       Physical Therapy Goals          Problem: Physical Therapy    Goal Priority Disciplines Outcome Goal Variances Interventions   Physical Therapy Goal     PT, PT/OT Ongoing, Progressing     Description: Goals to be met by: 22     Patient will increase functional independence with mobility by performin. Supine to sit with Modified [place]  2. Sit to stand transfer with Supervision  3. Bed to chair transfer with Supervision    4. Gait  x 50 feet with Contact Guard Assistance with or without AD   5. Ascend/descend 10 stair with right Handrails Contact Guard Assistance    6. Sitting at edge of bed x15 minutes with Supervision  7. Lower extremity exercise program x10 reps per handout, with supervision                         History:     Past Medical History:   Diagnosis Date    AICD (automatic cardioverter/defibrillator) present     Anticoagulant long-term use     Coronary artery disease     History of myocardial infarction     Paroxysmal A-fib     Stroke        Past Surgical History:   Procedure Laterality Date    CHOLECYSTECTOMY      REPLACEMENT OF DUAL CHAMBER IMPLANTABLE CARDIOVERTER-DEFIBRILLATOR         Time Tracking:     PT Received On: 22  PT Start Time: 1114     PT Stop Time: 1147  PT Total Time (min): 33 min     Billable Minutes: Re-eval 8, Therapeutic Activity 10, and Therapeutic Exercise 15      2022

## 2022-11-25 NOTE — PLAN OF CARE
[facility] [place] - Telemetry  Initial Discharge Assessment       Primary Care Provider: Flip Hanna MD    Admission Diagnosis: Ventricular tachyarrhythmia [I47.20]  Bigeminy [I49.8]  Weakness [R53.1]  Chest pain [R07.9]  Combined systolic and diastolic congestive heart failure, unspecified HF chronicity [I50.40]  CHF (congestive heart failure) [I50.9]    Admission Date: 11/22/2022  Expected Discharge Date: 11/24/2022    CM discussed discharge planning with pt's daughter, Cassie. CM explained Important Message from Medicare and signature obtained. Assessment completed and role of CM discussed. Plan A ( home with family) and Plan B ( other- tbd).    Discharge Barriers Identified: None    Payor: HU[employer] MANAGED MEDICARE / Plan: HU[employer] MEDICARE HMO / Product Type: Capitation /     Extended Emergency Contact Information  Primary Emergency Contact: Silvia Braga  Mobile Phone: [phone]  Relation: Daughter  Preferred language: English   needed? No  Secondary Emergency Contact: HILARY JIMENEZ  Mobile Phone: [phone]  Relation: Son  Preferred language: English   needed? No    Discharge Plan A: Home with family  Discharge Plan B: Other (tbd)      [facility], [place] - [address]  [address] [place] [address]  Phone: [phone] Fax: [phone]      Initial Assessment (most recent)       Adult Discharge Assessment - 11/25/22 1123          Discharge Assessment    Assessment Type Discharge Planning Assessment     Confirmed/corrected address, phone number and insurance Yes     Confirmed Demographics Correct on Facesheet     Source of Information family     When was your last doctors appointment? 11/17/22     Reason For Admission Acute on chronic combined systolic and diastolic CHF     Lives With child(carlton), adult     Facility Arrived From: HOME     Do you expect to return to your current living situation? No     Do you have help at home or someone to help you manage your  care at home? Yes     Who are your caregiver(s) and their phone number(s)? Silvia Braga (Daughter)   [phone]     Prior to hospitilization cognitive status: Alert/Oriented     Current cognitive status: Unable to Assess     Walking or Climbing Stairs Difficulty ambulation difficulty, requires equipment;stair climbing difficulty, assistance 1 person     Dressing/Bathing Difficulty bathing difficulty, requires equipment;dressing difficulty, assistance 1 person     Equipment Currently Used at Home none     Readmission within 30 days? No     Patient currently being followed by outpatient case management? No     Do you currently have service(s) that help you manage your care at home? No     Do you take prescription medications? Yes     Do you have prescription coverage? Yes     Coverage [employer] O     Do you have any problems affording any of your prescribed medications? No     Is the patient taking medications as prescribed? yes     Who is going to help you get home at discharge? Cassie- daughter     How do you get to doctors appointments? family or friend will provide     Are you on dialysis? No     Do you take coumadin? Yes     Who monitors your labs? Simon Mane MD [facility]     Discharge Plan A Home with family     Discharge Plan B Other   tbd    DME Needed Upon Discharge  other (see comments)   tbd    Discharge Plan discussed with: Adult children     Discharge Barriers Identified None        Physical Activity    On average, how many days per week do you engage in moderate to strenuous exercise (like a brisk walk)? 0 days     On average, how many minutes do you engage in exercise at this level? 0 min        Financial Resource Strain    How hard is it for you to pay for the very basics like food, housing, medical care, and heating? Not hard at all        Housing Stability    In the last 12 months, was there a time when you were not able to pay the mortgage or rent on time? No     In the last 12  months, how many places have you lived? 1     In the last 12 months, was there a time when you did not have a steady place to sleep or slept in a shelter (including now)? No        Transportation Needs    In the past 12 months, has lack of transportation kept you from medical appointments or from getting medications? No     In the past 12 months, has lack of transportation kept you from meetings, work, or from getting things needed for daily living? No        Food Insecurity    Within the past 12 months, you worried that your food would run out before you got the money to buy more. Never true     Within the past 12 months, the food you bought just didn't last and you didn't have money to get more. Never true        Stress    Do you feel stress - tense, restless, nervous, or anxious, or unable to sleep at night because your mind is troubled all the time - these days? Not at all        Social Connections    In a typical week, how many times do you talk on the phone with family, friends, or neighbors? Three times a week     How often do you get together with friends or relatives? Three times a week     How often do you attend Orthodoxy or Mormon services? Never     Do you belong to any clubs or organizations such as Orthodoxy groups, unions, fraternal or athletic groups, or school groups? No     How often do you attend meetings of the clubs or organizations you belong to? Never     Are you , , , , never , or living with a partner?         Alcohol Use    Q1: How often do you have a drink containing alcohol? Never     Q2: How many drinks containing alcohol do you have on a typical day when you are drinking? Patient does not drink     Q3: How often do you have six or more drinks on one occasion? Never        Relationship/Environment    Name(s) of Who Lives With Patient Silvia Braga (Daughter)   [phone] (P)

## 2022-11-25 NOTE — PROGRESS NOTES
[facility] [place] Telemetry  Cardiology  Progress Note    Patient Name: Balbir Rebollar Sr.  MRN: [id number]  Admission Date: 11/22/2022  Hospital Length of Stay: 2 days  Code Status: Full Code   Attending Physician: Sarmad Banegas MD   Primary Care Physician: Flip Hanna MD  Expected Discharge Date: 11/24/2022  Principal Problem:Acute on chronic combined systolic and diastolic congestive heart failure    Subjective:     Hospital Course:   Interval Hx: No cp, stil weak and with MORRELL.  Family at bedside.  Makes note of worsening sxs with amio.    Tele: SR 60s, no VT noted (personally reviewed and interpreted)    11/25/22 Weak. Denies CP. Mild MORRELL. INR 1.9      Interval History:     Review of Systems   Constitutional: Negative for decreased appetite.   HENT:  Negative for ear discharge.    Eyes:  Negative for blurred vision.   Endocrine: Negative for polyphagia.   Skin:  Negative for nail changes.   Genitourinary:  Negative for bladder incontinence.   Neurological:  Negative for aphonia.   Psychiatric/Behavioral:  Negative for hallucinations.    Allergic/Immunologic: Negative for hives.   Objective:     Vital Signs (Most Recent):  Temp: 97.7 °F (36.5 °C) (11/25/22 0713)  Pulse: (!) 59 (11/25/22 0800)  Resp: 18 (11/25/22 0713)  BP: (!) 90/50 (11/25/22 0800)  SpO2: (!) 92 % (11/25/22 0829)   Vital Signs (24h Range):  Temp:  [97.5 °F (36.4 °C)-97.9 °F (36.6 °C)] 97.7 °F (36.5 °C)  Pulse:  [59-70] 59  Resp:  [15-18] 18  SpO2:  [90 %-94 %] 92 %  BP: ()/(50-72) 90/50     Weight: 68 kg (149 lb 14.6 oz)  Body mass index is 22.14 kg/m².     SpO2: (!) 92 %  O2 Device (Oxygen Therapy): room air      Intake/Output Summary (Last 24 hours) at 11/25/2022 0943  Last data filed at 11/25/2022 0900  Gross per 24 hour   Intake 100 ml   Output 350 ml   Net -250 ml       Lines/Drains/Airways       Peripheral Intravenous Line  Duration                  Peripheral IV - Single Lumen 11/22/22 2142 20 G Anterior;Right Forearm 2 days                     Physical Exam  Constitutional:       Appearance: He is well-developed.   HENT:      Head: Normocephalic and atraumatic.   Eyes:      Conjunctiva/sclera: Conjunctivae normal.      Pupils: Pupils are equal, round, and reactive to light.   Cardiovascular:      Rate and Rhythm: Normal rate.      Pulses: Intact distal pulses.      Heart sounds: Normal heart sounds.   Pulmonary:      Effort: Pulmonary effort is normal.      Breath sounds: Normal breath sounds.   Abdominal:      General: Bowel sounds are normal.      Palpations: Abdomen is soft.   Musculoskeletal:         General: Normal range of motion.      Cervical back: Normal range of motion and neck supple.   Skin:     General: Skin is warm and dry.   Neurological:      Mental Status: He is alert and oriented to person, place, and time.       Significant Labs: All pertinent lab results from the last 24 hours have been reviewed.    Significant Imaging: Echocardiogram: 2D echo with color flow doppler: No results found for this or any previous visit.    Assessment and Plan:     Brief HPI:     * Acute on chronic combined systolic and diastolic congestive heart failure  Known severe LV dysfxn/ICM  Not grossly vol overloaded, but optivol crossed on ICD and BNP elevated  Not on GDMT on admission  Cont toprol  Inc lasix 40mg IV qd, strict I/Os  Add entresto 24/26mg bid   Eventual aldact.  Amio stopped  Consider as candidate for CardioMEMS.      Ventricular tachycardia  Freq sustained episodes of VT noted on ICD interrogation, below treatment threshold.  ?r/t CHF, ischemia, meds.  ICD reprogrammed 11/23/22.  Cont toprol  Inc lasix 40mg IV qd, strict I/Os  Add entresto 24/26mg bid   Eventual aldact.  Amio stopped    Weakness  ?related to freq ventricular ectopy/sustained VT.  Other dx/mgmt per IM    Pure hypercholesterolemia  Cont statin    ICD (implantable cardioverter-defibrillator), single, in situ  [employer]tronic single chamber ICD  Reprogrammed with lower  rate threshold and more ATP on 11/23/22    Coronary artery disease involving native coronary artery of native heart without angina pectoris  Known CAD s/p LAD PCI 2006  Hx ICM, EF <30% by recent echo.  No overt anginal sxs, but freq ventricular tachycardia noted.  Hold coumadin for possible cath (start DOAC on discharge).  May be able to do as outpatient as VT seems to have calmed down with rx of CHF.    11/25/22 INR 1.9. Case discussed with Dr Rico. Ok to do cath out patient or Monday if he is still in the hospital. Start DOAC at discharge if he goes home    Chronic anticoagulation  INR still supratherapeutic.  Hold coumadin and allow to drift down for cath.  Switch to DOAC on discharge.    Paroxysmal atrial fibrillation  In SR on coumadin  Coumadin stopped (allow INR to drift down for cath)  Will start DOAC on discharge        VTE Risk Mitigation (From admission, onward)         Ordered     Reason for No Pharmacological VTE Prophylaxis  Once        Question:  Reasons:  Answer:  Already adequately anticoagulated on oral Anticoagulants    11/23/22 0042     IP VTE HIGH RISK PATIENT  Once         11/23/22 0042     Place sequential compression device  Until discontinued         11/23/22 0042                Stephen Dodd MD  Cardiology  [facility] [place] - Telemetry

## 2022-11-25 NOTE — NURSING
Pt vomited as he was assisted into a sitting position by PT while in bed. Pt able to stand briefly and sit in chair with alarm while he was cleaned up and his linens were changed. Pt given prn zofran. Family remains at the bedside. Pt remains sitting in chair.

## 2022-11-25 NOTE — SUBJECTIVE & OBJECTIVE
Interval History/Subjective:     - seen at bedside, he is too tired to participate in a meaningful conversation. At times chooses not to give a response, but when he does it tends to be appropriate.   - he has not had a proper bowel movement in a couple days and his food/drink intake has been rather limited  - daughter Silvia and son Chris present at bedside too, talked outside the room further    Past Medical History:   Diagnosis Date    AICD (automatic cardioverter/defibrillator) present     Anticoagulant long-term use     Coronary artery disease     History of myocardial infarction     Paroxysmal A-fib     Stroke        Past Surgical History:   Procedure Laterality Date    CHOLECYSTECTOMY      REPLACEMENT OF DUAL CHAMBER IMPLANTABLE CARDIOVERTER-DEFIBRILLATOR         Review of patient's allergies indicates:  No Known Allergies    Medications:  Continuous Infusions:  Scheduled Meds:   carBAMazepine  400 mg Oral BID    furosemide (LASIX) injection  40 mg Intravenous Daily    lactobacillus acidophilus & bulgar  1 packet Oral BID    metoprolol succinate  25 mg Oral Daily    pravastatin  20 mg Oral Daily    sacubitriL-valsartan  1 tablet Oral BID     PRN Meds:acetaminophen, acetaminophen, albuterol-ipratropium, aluminum-magnesium hydroxide-simethicone, dextrose 10%, dextrose 10%, glucagon (human recombinant), glucose, glucose, loperamide, melatonin, naloxone, ondansetron    Family History    None       Tobacco Use    Smoking status: Former    Smokeless tobacco: Never   Substance and Sexual Activity    Alcohol use: Never    Drug use: Never    Sexual activity: Not on file       Review of Systems: Per HPI  Objective:     Vital Signs (Most Recent):  Temp: 97.7 °F (36.5 °C) (11/25/22 1113)  Pulse: (!) 56 (11/25/22 1113)  Resp: 17 (11/25/22 1113)  BP: 108/65 (11/25/22 1113)  SpO2: (!) 90 % (11/25/22 1113)   Vital Signs (24h Range):  Temp:  [97.5 °F (36.4 °C)-97.9 °F (36.6 °C)] 97.7 °F (36.5 °C)  Pulse:  [56-69] 56  Resp:   [15-18] 17  SpO2:  [90 %-94 %] 90 %  BP: ()/(50-69) 108/65     Weight: 68 kg (149 lb 14.6 oz)  Body mass index is 22.14 kg/m².    Physical Exam  Vitals and nursing note reviewed.   Constitutional:       Appearance: He is well-developed. He is ill-appearing.      Comments: tired   HENT:      Head: Normocephalic and atraumatic.   Eyes:      Extraocular Movements: Extraocular movements intact.   Pulmonary:      Effort: Pulmonary effort is normal. No respiratory distress.   Abdominal:      General: Abdomen is flat. There is no distension.      Palpations: Abdomen is soft.   Skin:     General: Skin is warm and dry.      Coloration: Skin is pale.   Neurological:      Comments: Arousable, answers appropriately at times, but often chooses not to respond.        Review of Symptoms      Symptom Assessment (ESAS 0-10 Scale)  Pain:  0  Dyspnea:  0  Anxiety:  0  Nausea:  0  Depression:  0  Anorexia:  0  Fatigue:  0  Insomnia:  0  Restlessness:  0  Agitation:  0         Living Arrangements:  Lives with family    Psychosocial/Cultural: - lives with son and daughter and daughters  and grand children at home  - used to be a  and used to repair typewriters  - enjoys black and white classic movies  - enjoys tv but no particular favorite show   - love ihop usually       Advance Care Planning   Advance Directives:     Decision Making:  Patient answered questions and Family answered questions       Significant Labs: Reviewed  CBC:   Recent Labs   Lab 11/25/22 0426   WBC 5.72   HGB 15.3   HCT 46.2   MCV 91        BMP:  Recent Labs   Lab 11/25/22 0426         K 4.6      CO2 22*   BUN 20   CREATININE 1.1   CALCIUM 8.8   MG 2.3     LFT:  Lab Results   Component Value Date    AST 29 11/22/2022    ALKPHOS 81 11/22/2022    BILITOT 0.3 11/22/2022     Albumin:   Albumin   Date Value Ref Range Status   11/22/2022 3.6 3.5 - 5.2 g/dL Final     Protein:   Total Protein   Date Value Ref Range Status    11/22/2022 7.2 6.0 - 8.4 g/dL Final     Lactic acid:   No results found for: LACTATE    Significant Imaging: Reviewed

## 2022-11-26 PROBLEM — R11.2 NAUSEA AND VOMITING: Status: ACTIVE | Noted: 2022-11-26

## 2022-11-26 LAB
ALLENS TEST: ABNORMAL
ANION GAP SERPL CALC-SCNC: 12 MMOL/L (ref 8–16)
BACTERIA #/AREA URNS HPF: ABNORMAL /HPF
BASOPHILS # BLD AUTO: 0.01 K/UL (ref 0–0.2)
BASOPHILS # BLD AUTO: 0.02 K/UL (ref 0–0.2)
BASOPHILS NFR BLD: 0.1 % (ref 0–1.9)
BASOPHILS NFR BLD: 0.3 % (ref 0–1.9)
BILIRUB UR QL STRIP: NEGATIVE
BUN SERPL-MCNC: 33 MG/DL (ref 8–23)
CALCIUM SERPL-MCNC: 8.9 MG/DL (ref 8.7–10.5)
CHLORIDE SERPL-SCNC: 104 MMOL/L (ref 95–110)
CLARITY UR: ABNORMAL
CO2 SERPL-SCNC: 20 MMOL/L (ref 23–29)
COLOR UR: YELLOW
CREAT SERPL-MCNC: 1.2 MG/DL (ref 0.5–1.4)
DELSYS: ABNORMAL
DIFFERENTIAL METHOD: ABNORMAL
DIFFERENTIAL METHOD: ABNORMAL
EOSINOPHIL # BLD AUTO: 0 K/UL (ref 0–0.5)
EOSINOPHIL # BLD AUTO: 0 K/UL (ref 0–0.5)
EOSINOPHIL NFR BLD: 0.1 % (ref 0–8)
EOSINOPHIL NFR BLD: 0.4 % (ref 0–8)
ERYTHROCYTE [DISTWIDTH] IN BLOOD BY AUTOMATED COUNT: 13.7 % (ref 11.5–14.5)
ERYTHROCYTE [DISTWIDTH] IN BLOOD BY AUTOMATED COUNT: 13.9 % (ref 11.5–14.5)
ERYTHROCYTE [SEDIMENTATION RATE] IN BLOOD BY WESTERGREN METHOD: 18 MM/H
EST. GFR  (NO RACE VARIABLE): >60 ML/MIN/1.73 M^2
FIO2: 21
FOLATE SERPL-MCNC: 11.8 NG/ML (ref 4–24)
GLUCOSE SERPL-MCNC: 104 MG/DL (ref 70–110)
GLUCOSE UR QL STRIP: NEGATIVE
HCO3 UR-SCNC: 22 MMOL/L (ref 24–28)
HCT VFR BLD AUTO: 45 % (ref 40–54)
HCT VFR BLD AUTO: 46.1 % (ref 40–54)
HGB BLD-MCNC: 15.3 G/DL (ref 14–18)
HGB BLD-MCNC: 15.3 G/DL (ref 14–18)
HGB UR QL STRIP: NEGATIVE
HYALINE CASTS #/AREA URNS LPF: 27 /LPF
IMM GRANULOCYTES # BLD AUTO: 0.01 K/UL (ref 0–0.04)
IMM GRANULOCYTES # BLD AUTO: 0.03 K/UL (ref 0–0.04)
IMM GRANULOCYTES NFR BLD AUTO: 0.1 % (ref 0–0.5)
IMM GRANULOCYTES NFR BLD AUTO: 0.4 % (ref 0–0.5)
INR PPP: 2.5 (ref 0.8–1.2)
KETONES UR QL STRIP: ABNORMAL
LEUKOCYTE ESTERASE UR QL STRIP: NEGATIVE
LYMPHOCYTES # BLD AUTO: 0.7 K/UL (ref 1–4.8)
LYMPHOCYTES # BLD AUTO: 0.9 K/UL (ref 1–4.8)
LYMPHOCYTES NFR BLD: 14.1 % (ref 18–48)
LYMPHOCYTES NFR BLD: 9.4 % (ref 18–48)
MAGNESIUM SERPL-MCNC: 2.5 MG/DL (ref 1.6–2.6)
MCH RBC QN AUTO: 30.2 PG (ref 27–31)
MCH RBC QN AUTO: 31.4 PG (ref 27–31)
MCHC RBC AUTO-ENTMCNC: 33.2 G/DL (ref 32–36)
MCHC RBC AUTO-ENTMCNC: 34 G/DL (ref 32–36)
MCV RBC AUTO: 91 FL (ref 82–98)
MCV RBC AUTO: 92 FL (ref 82–98)
MICROSCOPIC COMMENT: ABNORMAL
MODE: ABNORMAL
MONOCYTES # BLD AUTO: 0.5 K/UL (ref 0.3–1)
MONOCYTES # BLD AUTO: 0.7 K/UL (ref 0.3–1)
MONOCYTES NFR BLD: 8.1 % (ref 4–15)
MONOCYTES NFR BLD: 8.4 % (ref 4–15)
NEUTROPHILS # BLD AUTO: 5.2 K/UL (ref 1.8–7.7)
NEUTROPHILS # BLD AUTO: 6.3 K/UL (ref 1.8–7.7)
NEUTROPHILS NFR BLD: 77.5 % (ref 38–73)
NEUTROPHILS NFR BLD: 81.1 % (ref 38–73)
NITRITE UR QL STRIP: NEGATIVE
NRBC BLD-RTO: 0 /100 WBC
NRBC BLD-RTO: 0 /100 WBC
PCO2 BLDA: 35.5 MMHG (ref 35–45)
PH SMN: 7.4 [PH] (ref 7.35–7.45)
PH UR STRIP: 5 [PH] (ref 5–8)
PLATELET # BLD AUTO: 176 K/UL (ref 150–450)
PLATELET # BLD AUTO: 181 K/UL (ref 150–450)
PMV BLD AUTO: 10 FL (ref 9.2–12.9)
PMV BLD AUTO: 9.9 FL (ref 9.2–12.9)
PO2 BLDA: 71 MMHG (ref 80–100)
POC BE: -2 MMOL/L
POC SATURATED O2: 94 % (ref 95–100)
POC TCO2: 23 MMOL/L (ref 23–27)
POCT GLUCOSE: 167 MG/DL (ref 70–110)
POTASSIUM SERPL-SCNC: 4.6 MMOL/L (ref 3.5–5.1)
PROT UR QL STRIP: ABNORMAL
PROTHROMBIN TIME: 25.2 SEC (ref 9–12.5)
RBC # BLD AUTO: 4.88 M/UL (ref 4.6–6.2)
RBC # BLD AUTO: 5.06 M/UL (ref 4.6–6.2)
RBC #/AREA URNS HPF: 4 /HPF (ref 0–4)
SAMPLE: ABNORMAL
SITE: ABNORMAL
SODIUM SERPL-SCNC: 136 MMOL/L (ref 136–145)
SP GR UR STRIP: >1.03 (ref 1–1.03)
SP02: 96
URN SPEC COLLECT METH UR: ABNORMAL
UROBILINOGEN UR STRIP-ACNC: ABNORMAL EU/DL
VIT B12 SERPL-MCNC: 198 PG/ML (ref 210–950)
WBC # BLD AUTO: 6.68 K/UL (ref 3.9–12.7)
WBC # BLD AUTO: 7.74 K/UL (ref 3.9–12.7)
WBC #/AREA URNS HPF: 3 /HPF (ref 0–5)

## 2022-11-26 PROCEDURE — 94760 N-INVAS EAR/PLS OXIMETRY 1: CPT

## 2022-11-26 PROCEDURE — 99900035 HC TECH TIME PER 15 MIN (STAT)

## 2022-11-26 PROCEDURE — 25000003 PHARM REV CODE 250: Performed by: NURSE PRACTITIONER

## 2022-11-26 PROCEDURE — 80048 BASIC METABOLIC PNL TOTAL CA: CPT | Performed by: HOSPITALIST

## 2022-11-26 PROCEDURE — 25000003 PHARM REV CODE 250: Performed by: HOSPITALIST

## 2022-11-26 PROCEDURE — 81000 URINALYSIS NONAUTO W/SCOPE: CPT | Performed by: HOSPITALIST

## 2022-11-26 PROCEDURE — 51798 US URINE CAPACITY MEASURE: CPT

## 2022-11-26 PROCEDURE — 85025 COMPLETE CBC W/AUTO DIFF WBC: CPT | Mod: 91 | Performed by: HOSPITALIST

## 2022-11-26 PROCEDURE — 83735 ASSAY OF MAGNESIUM: CPT | Performed by: HOSPITALIST

## 2022-11-26 PROCEDURE — 36600 WITHDRAWAL OF ARTERIAL BLOOD: CPT

## 2022-11-26 PROCEDURE — 36415 COLL VENOUS BLD VENIPUNCTURE: CPT | Performed by: STUDENT IN AN ORGANIZED HEALTH CARE EDUCATION/TRAINING PROGRAM

## 2022-11-26 PROCEDURE — 21400001 HC TELEMETRY ROOM

## 2022-11-26 PROCEDURE — 36415 COLL VENOUS BLD VENIPUNCTURE: CPT | Performed by: HOSPITALIST

## 2022-11-26 PROCEDURE — 63600175 PHARM REV CODE 636 W HCPCS: Performed by: NURSE PRACTITIONER

## 2022-11-26 PROCEDURE — 25000003 PHARM REV CODE 250

## 2022-11-26 PROCEDURE — 82803 BLOOD GASES ANY COMBINATION: CPT

## 2022-11-26 PROCEDURE — 85610 PROTHROMBIN TIME: CPT | Performed by: STUDENT IN AN ORGANIZED HEALTH CARE EDUCATION/TRAINING PROGRAM

## 2022-11-26 RX ORDER — ONDANSETRON 2 MG/ML
4 INJECTION INTRAMUSCULAR; INTRAVENOUS EVERY 4 HOURS PRN
Status: DISCONTINUED | OUTPATIENT
Start: 2022-11-26 | End: 2022-12-03

## 2022-11-26 RX ADMIN — CARBAMAZEPINE 400 MG: 200 TABLET ORAL at 09:11

## 2022-11-26 RX ADMIN — LACTOBACILLUS ACIDOPHILUS / LACTOBACILLUS BULGARICUS 1 EACH: 100 MILLION CFU STRENGTH GRANULES at 09:11

## 2022-11-26 RX ADMIN — DOCUSATE SODIUM 100 MG: 100 CAPSULE, LIQUID FILLED ORAL at 09:11

## 2022-11-26 RX ADMIN — PRAVASTATIN SODIUM 20 MG: 10 TABLET ORAL at 09:11

## 2022-11-26 RX ADMIN — SACUBITRIL AND VALSARTAN 1 TABLET: 24; 26 TABLET, FILM COATED ORAL at 09:11

## 2022-11-26 NOTE — ASSESSMENT & PLAN NOTE
-Discussed with patient's son to clarify goals of care.  Patient a bit too confused to participate.  Son wishes to speak with his sister.  -Consulted palliative care team and discussed with Dr. Paul today.  Appreciate input.  -Continue full code for now, but given the daily deterioration I am worried he may have cardiac arrest in the next 48 hours.  Discussed with his son and daughter regarding my fears 11/26.  Continue full code for now.

## 2022-11-26 NOTE — NURSING
Patient to scheduled procedure as ordered. Tele monitoring in progress. Patient awake and oriented. No apparent distress noted.  Pt went per bed.

## 2022-11-26 NOTE — PT/OT/SLP PROGRESS
Occupational Therapy      Patient Name:  Balbir Andersonales Sr.   MRN:  [id number]    Patient not seen today secondary to the following: Patient was in CT scan upon attempt to evaluate him at 10:00 AM.  OT to follow-up tomorrow/as able.     11/26/2022

## 2022-11-26 NOTE — PROGRESS NOTES
"[facility] Medicine  Progress Note    Patient Name: Balbir Rebollar Sr.  MRN: [id number]  Patient Class: IP- Inpatient   Admission Date: 11/22/2022  Length of Stay: 3 days  Attending Physician: Sarmad Banegas MD  Primary Care Provider: Flip Hanna MD        Subjective:     Principal Problem:Acute on chronic combined systolic and diastolic congestive heart failure        HPI:  Balbir Rebollar Sr. 72 y.o. male with CAD, HTN, Afib, long term use of anticoagulants, CVA, and ICD, presents to the hospital with a chief complaint of fatigue and weakness with acute onset of one-week. Patient reports he was seen 3 times in a period of a week for the same symptoms and was discharged.  Patient describes his symptoms as feeling tired but I can not sleep. Associated symptoms include difficulty walking, diarrhea, frequent urination, and muscle spasms. Patient's daughter says," he is stumbling going up and down the stairs". Patient reports he was prescribed a new medication that is causing him to have dry mouth and feeling more fatigued (on top of original fatigue complaint). Patient's daughter reports patient is usually very active, but recently has been staying in bed more often this past week. No other exacerbating or alleviating factors. Patient denies dysuria, nausea, vomiting, cough, numbness, slurred speech, black stool , paresthesia, or other associated symptoms. Patient endorses compliance of coumadin. Family reports that the patient seen at  several times recently for symptoms and state they were told nothing was found. Follows with cardiology at  and was recently started on Amiodarone on 11/18/2022.      In the ED patient is afebrile without leukocytosis PT 45.2, INR 4.6, CO2 20, calcium 8.6, BNP 1 441, troponin 0.038, 2nd troponin 0.041 TSH WNL, UA is hazy with trace protein glucose and occult blood, negative for flu and COVID, CXR was negative, EKG showed "Sinus rhythm with " "Premature supraventricular complexes in a pattern of bigeminy, Right bundle branch block, Left anterior fascicular block" without previous EKG for comparison.  Patient was placed in observation for further workup an assessment by Cardiology.  Previous Echo from [facility] everywhere     ECHOCARDIOGRAM   Interpretation Summary  11/01/2021  MRN:  [id number]        FIN:    [id number]      Accession #:[id number]         Order #:[id number]    Indications:    Coronary artery disease involving native coronary artery of native heart without angina pectoris; Paroxysmal                     atrial fibrillation (CMS/HCC); Nonsustained ventricular tachycardia (CMS/HCC)       BP:  128   / 74      HR: 67                       Rhythm:           Sinus                                                      Technical Quality:Very technically difficult study    MEASUREMENTS  (Male / Female) Normal Values   Left Ventricle:    Appears to be normal left ventricular cavity size.  Severely reduced left ventricular systolic function.  Dense hypokinesis and thinning of the anterior wall, septum, and apex with normal contractility elsewhere.   Visually estimated EF is less than 30%.  Grade I diastolic dysfunction (abnormal relaxation filling pattern),    with normal or mildly elevated filling pressures.      Overview/Hospital Course:  No notes on file    Interval History: Noted nausea and vomiting overnight.  Today remains lethargic and encephalopathic.  Is able to name his son who is at bedside.  Family requests that we give morning meds a bit later to allow him to eat breakfast before meds.  After my visit family attempted to feed some hashbrowns and noted coughing suspicious for aspiration.  All questions answered and patient had no further complaints.    Review of Systems   Constitutional:  Positive for fatigue. Negative for chills and fever.   HENT:  Positive for trouble swallowing. Negative for congestion and rhinorrhea.    Eyes:  Negative " for photophobia and visual disturbance.   Respiratory:  Negative for cough and shortness of breath.    Cardiovascular:  Negative for chest pain, palpitations and leg swelling.   Gastrointestinal:  Negative for abdominal pain, diarrhea, nausea and vomiting.   Genitourinary:  Negative for dysuria, frequency, hematuria and urgency.   Musculoskeletal:  Positive for gait problem. Negative for neck stiffness.   Skin:  Negative for pallor, rash and wound.   Neurological:  Positive for weakness. Negative for light-headedness and headaches.   Psychiatric/Behavioral:  Positive for confusion. Negative for decreased concentration.    Objective:     Vital Signs (Most Recent):  Temp: 98.2 °F (36.8 °C) (11/26/22 1102)  Pulse: 74 (11/26/22 1250)  Resp: 20 (11/26/22 1250)  BP: (!) 99/58 (11/26/22 1250)  SpO2: (!) 93 % (11/26/22 1250)   Vital Signs (24h Range):  Temp:  [97.4 °F (36.3 °C)-98.4 °F (36.9 °C)] 98.2 °F (36.8 °C)  Pulse:  [58-74] 74  Resp:  [15-20] 20  SpO2:  [92 %-96 %] 93 %  BP: ()/(52-72) 99/58     Weight: 68 kg (149 lb 14.6 oz)  Body mass index is 22.14 kg/m².    Intake/Output Summary (Last 24 hours) at 11/26/2022 1338  Last data filed at 11/25/2022 1817  Gross per 24 hour   Intake 0 ml   Output --   Net 0 ml        Physical Exam  Vitals and nursing note reviewed.   Constitutional:       General: He is not in acute distress.     Appearance: He is well-developed. He is ill-appearing. He is not diaphoretic.      Comments: Frail in appearance.  Lethargic   HENT:      Head: Normocephalic and atraumatic.      Right Ear: External ear normal.      Left Ear: External ear normal.      Nose: Nose normal.      Mouth/Throat:      Mouth: Mucous membranes are moist.   Eyes:      Extraocular Movements: Extraocular movements intact.      Conjunctiva/sclera: Conjunctivae normal.   Cardiovascular:      Rate and Rhythm: Normal rate and regular rhythm.      Heart sounds: Murmur heard.   Pulmonary:      Effort: Pulmonary effort is  normal. No respiratory distress.      Breath sounds: Normal breath sounds. No wheezing or rales.   Abdominal:      General: Bowel sounds are normal. There is no distension.      Palpations: Abdomen is soft.      Tenderness: There is no abdominal tenderness.      Comments: No palpable hepatomegaly or splenomegaly   Musculoskeletal:         General: No tenderness. Normal range of motion.      Cervical back: Normal range of motion. No rigidity.   Skin:     General: Skin is warm and dry.      Coloration: Skin is pale.   Neurological:      Mental Status: He is alert.      Comments: Severly lethargic and encephalopathic.  Able to say his own name and his son's name.  Nods head yes when asked if hungry.  Significant diffuse weakness   Psychiatric:         Mood and Affect: Mood normal.       Significant Labs: All pertinent labs within the past 24 hours have been reviewed.    Significant Imaging: I have reviewed all pertinent imaging results/findings within the past 24 hours.      Assessment/Plan:      * Acute on chronic combined systolic and diastolic congestive heart failure  -Admitted to inpatient status  -BNP elevated at 1441  -Echo 10/22 showed EF 20-30%, grade II diastolic dysfunction  -Known chronic ischemic cardiomyopathy with AICD in place  -Cardiology consulted and input appreciated  -AICD interogated and noted frequent sustained VT episodes below threshold of treatment.  AICD reprogramed with lower threshold  -Strict ins/outs and daily weights  -Have attempted gentle diuresis with iv lasix and started toprol and entresto and no further VT thus far  -No longer bradycardic, BP has improved but seems to be deteriorating and remains lethargic, encephalopathic with ongoing nausea and vomiting overnight.  -Continue to hold lasix and cautiously give toprol and entresto if BP permits    Ventricular tachycardia  -AICD interrogated and noted sustained VT episodes below threshold of treatment  -AICD reprogrammed  -Stopped  amiodarone 11/24- family and patient feels it causes severe weakness and confusion.  -Continue toprol but at lower dose and with hold parameters due to bradycardia and lowish blood pressure    ICD (implantable cardioverter-defibrillator), single, in situ  -Treatment as above.    Paroxysmal atrial fibrillation  -Patient with Long standing persistent (>12 months) atrial fibrillation which is controlled currently with toprol. -Patient is currently in sinus rhythm.[id number] Score: 2.   -Anticoagulation indicated and is on warfarin at home.    -Holding warfarin due to supratheraputic INR on admit as well as possible need for angiogram (inpatient vs outpatient yet to be determined).  -Plan for DOAC on discharge.    Supratherapeutic INR  -INR >4 on admit and now 2.5  -Continue to hold warfarin  -Check pt/inr daily.    Chronic anticoagulation  -Treatment as above.    Encephalopathy, metabolic  -On 11/25 more confused and lethargic  -Noted fall 2 nights ago - no apparent trauma and head ct at that time without evidence of bleeding.  -No evidence of infection  -Could be secondary to mild dehydration and low blood pressure - stopped lasix and decreased toprol.  -Ordered delirium precautions  -Checking  for other reversible encephalopathies:  TSH, Ammonia normal.  No UTI.  Await B12, Folate  -Repeat head CT 11/26 without any acute findings and no evidence of bleeding.  -Concerned there could have been a stroke as he now has dysphagia.  -Will consult neurology.  NPO pending SLP evaluation.  -Will attempt to determine if AICD is MRI compatible.    Weakness  -Presenting complaint was weakness x 1 week since starting amiodarone.  -Patient and son felt weakness clearly due to amiodarone, but we suspected weakness was driven by the episodes of VT which were driven by uncontrolled severe chf.  -Noted fall overnight from toilet 11/23-11/24 - no head trauma and CT head without acute findings  -Noted near fall / slip to the floor with  assistance of nurse on 11/25 - no trauma at all.  -Fall precautions ordered  -Amiodarone stopped 11/24.    -PT/OT consulted and recommend HH at discharge.    Nausea and vomiting  -Significant nausea and vomiting overnight 11/25-26.  -KUB with non-obstructive bowel gas pattern and abdomen is quite soft albeit with diminished bowel sounds  -Noted dysphagia and suspected aspiration around 1PM on 11/26  -Make NPO pending SLP evaluation  -Continue anti-emetics  -Continue bowel regimen    ACP (advance care planning)  -Discussed with patient's son to clarify goals of care.  Patient a bit too confused to participate.  Son wishes to speak with his sister.  -Consulted palliative care team and discussed with Dr. Paul today.  Appreciate input.  -Continue full code for now, but given the daily deterioration I am worried he may have cardiac arrest in the next 48 hours.  Discussed with his son and daughter regarding my fears 11/26.  Continue full code for now.    Hypokalemia  -K normal today  -Repeat labs in AM.    Pure hypercholesterolemia  -Continue statin     History of CVA (cerebrovascular accident)  -History noted    Coronary artery disease involving native coronary artery of native heart without angina pectoris  -History noted  -No chest pain  -Needs angiogram at some point - possibly Monday if still in hospital.      VTE Risk Mitigation (From admission, onward)         Ordered     Reason for No Pharmacological VTE Prophylaxis  Once        Question:  Reasons:  Answer:  Already adequately anticoagulated on oral Anticoagulants    11/23/22 0042     IP VTE HIGH RISK PATIENT  Once         11/23/22 0042     Place sequential compression device  Until discontinued         11/23/22 0042                Discharge Planning   MOR: 11/28/2022     Code Status: Full Code   Is the patient medically ready for discharge?:     Reason for patient still in hospital (select all that apply): Treatment  Discharge Plan A: Home with family                   Sarmad Banegas MD  Department of Hospital Medicine   [facility] [place] - Telemetry

## 2022-11-26 NOTE — PT/OT/SLP PROGRESS
Physical Therapy      Patient Name:  Balbir Rebollar Sr.   MRN:  [id number]    Patient not seen today secondary to Testing/imaging (xray/CT/MRI). Pt off the floor for CT scan at 937. Checked back in with pt at 1048, family member reports pt just took medication that makes him drowsy and would like to hold of on therapy until this afternoon. Will follow-up later as able.

## 2022-11-26 NOTE — NURSING
Pt vomiting and very lethargic. Zofran ODT given and rapid response nurse called to assess patient.

## 2022-11-26 NOTE — NURSING
Patient returned to unit from scheduled procedure. Patient awake, oriented. Patient without c/o discomfort. Tele monitoring in progress. No apparent distress noted.  Family at the bedside.

## 2022-11-26 NOTE — NURSING
[facility] [place]-  Rapid Response Nurse Note     Rapid Response Metrics:     Admit Date: 2022  LOS: 3  Code Status: Full Code   Date of Consult: 2022  : 1950  Age: 72 y.o.  Weight:   Wt Readings from Last 1 Encounters:   22 68 kg (149 lb 14.6 oz)     Sex: male  Race: White   [facility]:   MRN: [id number]      Was the patient discharged from an ICU this admission?   no  Was the patient discharged from a PACU within last 24 hours?  no  Did the patient receive conscious sedation/general anesthesia within last 24 hours?  no  Was the patient in the ED within the past 24 hours?  no  Was the patient started on NIPPV within the past 24 hours?  no  Did this progress into an ARC or CPA:  no    Attending Physician: Sarmad Banegas MD  Rapid Response Indication(s): call by primary nurse    SITUATION:     Reason for Call:   Called to evaluate the patient for AMS    BACKGROUND:     Why is the patient in the hospital?: AMS, weakness  What changed?: Mental status has worsened, no longer oriented to time or place    ASSESSMENT:     What did you find: pt with continuous nausea and frequent vomiting. He was able to tell me his name and the name of his grandson who was at the bedside, but no other information. Voice is weak. Pt had no charted intake or output during the day, and none this evening.     RECOMMENDATIONS:   Followed up with Dr. Mac    FOLLOW-UP/CONTINGENCY PLAN:       Call the rapid response Nurse at [phone] for additional questions or concerns.      PHYSICIAN ESCALATION:     Orders received for ABG and CBC    Disposition: Remain in room 333.

## 2022-11-26 NOTE — SUBJECTIVE & OBJECTIVE
Interval History: Noted nausea and vomiting overnight.  Today remains lethargic and encephalopathic.  Is able to name his son who is at bedside.  Family requests that we give morning meds a bit later to allow him to eat breakfast before meds.  After my visit family attempted to feed some hashbrowns and noted coughing suspicious for aspiration.  All questions answered and patient had no further complaints.    Review of Systems   Constitutional:  Positive for fatigue. Negative for chills and fever.   HENT:  Positive for trouble swallowing. Negative for congestion and rhinorrhea.    Eyes:  Negative for photophobia and visual disturbance.   Respiratory:  Negative for cough and shortness of breath.    Cardiovascular:  Negative for chest pain, palpitations and leg swelling.   Gastrointestinal:  Negative for abdominal pain, diarrhea, nausea and vomiting.   Genitourinary:  Negative for dysuria, frequency, hematuria and urgency.   Musculoskeletal:  Positive for gait problem. Negative for neck stiffness.   Skin:  Negative for pallor, rash and wound.   Neurological:  Positive for weakness. Negative for light-headedness and headaches.   Psychiatric/Behavioral:  Positive for confusion. Negative for decreased concentration.    Objective:     Vital Signs (Most Recent):  Temp: 98.2 °F (36.8 °C) (11/26/22 1102)  Pulse: 74 (11/26/22 1250)  Resp: 20 (11/26/22 1250)  BP: (!) 99/58 (11/26/22 1250)  SpO2: (!) 93 % (11/26/22 1250)   Vital Signs (24h Range):  Temp:  [97.4 °F (36.3 °C)-98.4 °F (36.9 °C)] 98.2 °F (36.8 °C)  Pulse:  [58-74] 74  Resp:  [15-20] 20  SpO2:  [92 %-96 %] 93 %  BP: ()/(52-72) 99/58     Weight: 68 kg (149 lb 14.6 oz)  Body mass index is 22.14 kg/m².    Intake/Output Summary (Last 24 hours) at 11/26/2022 1338  Last data filed at 11/25/2022 1817  Gross per 24 hour   Intake 0 ml   Output --   Net 0 ml        Physical Exam  Vitals and nursing note reviewed.   Constitutional:       General: He is not in acute  distress.     Appearance: He is well-developed. He is ill-appearing. He is not diaphoretic.      Comments: Frail in appearance.  Lethargic   HENT:      Head: Normocephalic and atraumatic.      Right Ear: External ear normal.      Left Ear: External ear normal.      Nose: Nose normal.      Mouth/Throat:      Mouth: Mucous membranes are moist.   Eyes:      Extraocular Movements: Extraocular movements intact.      Conjunctiva/sclera: Conjunctivae normal.   Cardiovascular:      Rate and Rhythm: Normal rate and regular rhythm.      Heart sounds: Murmur heard.   Pulmonary:      Effort: Pulmonary effort is normal. No respiratory distress.      Breath sounds: Normal breath sounds. No wheezing or rales.   Abdominal:      General: Bowel sounds are normal. There is no distension.      Palpations: Abdomen is soft.      Tenderness: There is no abdominal tenderness.      Comments: No palpable hepatomegaly or splenomegaly   Musculoskeletal:         General: No tenderness. Normal range of motion.      Cervical back: Normal range of motion. No rigidity.   Skin:     General: Skin is warm and dry.      Coloration: Skin is pale.   Neurological:      Mental Status: He is alert.      Comments: Severly lethargic and encephalopathic.  Able to say his own name and his son's name.  Nods head yes when asked if hungry.  Significant diffuse weakness   Psychiatric:         Mood and Affect: Mood normal.       Significant Labs: All pertinent labs within the past 24 hours have been reviewed.    Significant Imaging: I have reviewed all pertinent imaging results/findings within the past 24 hours.

## 2022-11-26 NOTE — ASSESSMENT & PLAN NOTE
-On 11/25 more confused and lethargic  -Noted fall 2 nights ago - no apparent trauma and head ct at that time without evidence of bleeding.  -No evidence of infection  -Could be secondary to mild dehydration and low blood pressure - stopped lasix and decreased toprol.  -Ordered delirium precautions  -Checking  for other reversible encephalopathies:  TSH, Ammonia normal.  No UTI.  Await B12, Folate  -Repeat head CT 11/26 without any acute findings and no evidence of bleeding.  -Concerned there could have been a stroke as he now has dysphagia.  -Will consult neurology.  NPO pending SLP evaluation.  -Will attempt to determine if AICD is MRI compatible.

## 2022-11-26 NOTE — ASSESSMENT & PLAN NOTE
-Significant nausea and vomiting overnight 11/25-26.  -KUB with non-obstructive bowel gas pattern and abdomen is quite soft albeit with diminished bowel sounds  -Noted dysphagia and suspected aspiration around 1PM on 11/26  -Make NPO pending SLP evaluation  -Continue anti-emetics  -Continue bowel regimen

## 2022-11-26 NOTE — ASSESSMENT & PLAN NOTE
-Admitted to inpatient status  -BNP elevated at 1441  -Echo 10/22 showed EF 20-30%, grade II diastolic dysfunction  -Known chronic ischemic cardiomyopathy with AICD in place  -Cardiology consulted and input appreciated  -AICD interogated and noted frequent sustained VT episodes below threshold of treatment.  AICD reprogramed with lower threshold  -Strict ins/outs and daily weights  -Have attempted gentle diuresis with iv lasix and started toprol and entresto and no further VT thus far  -No longer bradycardic, BP has improved but seems to be deteriorating and remains lethargic, encephalopathic with ongoing nausea and vomiting overnight.  -Continue to hold lasix and cautiously give toprol and entresto if BP permits

## 2022-11-26 NOTE — NURSING
I was able to get the urine specimen. the family was feeding the patient hash browns and it appears he aspirated it. he began choking and coughing profusely. Dr. Banegas notified.

## 2022-11-26 NOTE — ASSESSMENT & PLAN NOTE
-Patient with Long standing persistent (>12 months) atrial fibrillation which is controlled currently with toprol. -Patient is currently in sinus rhythm.[id number] Score: 2.   -Anticoagulation indicated and is on warfarin at home.    -Holding warfarin due to supratheraputic INR on admit as well as possible need for angiogram (inpatient vs outpatient yet to be determined).  -Plan for DOAC on discharge.

## 2022-11-26 NOTE — PLAN OF CARE
Problem: Adult Inpatient Plan of Care  Goal: Plan of Care Review  Outcome: Ongoing, Progressing     Problem: Adult Inpatient Plan of Care  Goal: Absence of Hospital-Acquired Illness or Injury  Outcome: Ongoing, Progressing     Problem: Fall Injury Risk  Goal: Absence of Fall and Fall-Related Injury  Outcome: Ongoing, Progressing     Problem: Skin Injury Risk Increased  Goal: Skin Health and Integrity  Outcome: Ongoing, Progressing

## 2022-11-26 NOTE — PLAN OF CARE
Problem: Adult Inpatient Plan of Care  Goal: Plan of Care Review  Outcome: Ongoing, Progressing  Goal: Patient-Specific Goal (Individualized)  Outcome: Ongoing, Progressing  Goal: Absence of Hospital-Acquired Illness or Injury  Outcome: Ongoing, Progressing  Goal: Optimal Comfort and Wellbeing  Outcome: Ongoing, Progressing  Goal: Readiness for Transition of Care  Outcome: Ongoing, Progressing     Problem: Fall Injury Risk  Goal: Absence of Fall and Fall-Related Injury  Outcome: Ongoing, Progressing     Problem: Skin Injury Risk Increased  Goal: Skin Health and Integrity  Outcome: Ongoing, Progressing     Problem: Coping Ineffective  Goal: Effective Coping  Outcome: Ongoing, Progressing     Problem: Adjustment to Illness (Delirium)  Goal: Optimal Coping  Outcome: Ongoing, Progressing     Problem: Altered Behavior (Delirium)  Goal: Improved Behavioral Control  Outcome: Ongoing, Progressing     Problem: Attention and Thought Clarity Impairment (Delirium)  Goal: Improved Attention and Thought Clarity  Outcome: Ongoing, Progressing     Problem: Sleep Disturbance (Delirium)  Goal: Improved Sleep  Outcome: Ongoing, Progressing     Problem: Oral Intake Inadequate  Goal: Improved Oral Intake  Outcome: Ongoing, Progressing     Problem: Fatigue  Goal: Improved Activity Tolerance  Outcome: Ongoing, Progressing

## 2022-11-26 NOTE — ASSESSMENT & PLAN NOTE
-Presenting complaint was weakness x 1 week since starting amiodarone.  -Patient and son felt weakness clearly due to amiodarone, but we suspected weakness was driven by the episodes of VT which were driven by uncontrolled severe chf.  -Noted fall overnight from toilet 11/23-11/24 - no head trauma and CT head without acute findings  -Noted near fall / slip to the floor with assistance of nurse on 11/25 - no trauma at all.  -Fall precautions ordered  -Amiodarone stopped 11/24.    -PT/OT consulted and recommend HH at discharge.

## 2022-11-27 LAB
ANION GAP SERPL CALC-SCNC: 15 MMOL/L (ref 8–16)
BASOPHILS # BLD AUTO: 0.04 K/UL (ref 0–0.2)
BASOPHILS NFR BLD: 0.7 % (ref 0–1.9)
BUN SERPL-MCNC: 34 MG/DL (ref 8–23)
CALCIUM SERPL-MCNC: 8.9 MG/DL (ref 8.7–10.5)
CHLORIDE SERPL-SCNC: 105 MMOL/L (ref 95–110)
CO2 SERPL-SCNC: 20 MMOL/L (ref 23–29)
CREAT SERPL-MCNC: 1 MG/DL (ref 0.5–1.4)
DIFFERENTIAL METHOD: NORMAL
EOSINOPHIL # BLD AUTO: 0.1 K/UL (ref 0–0.5)
EOSINOPHIL NFR BLD: 2.2 % (ref 0–8)
ERYTHROCYTE [DISTWIDTH] IN BLOOD BY AUTOMATED COUNT: 13.9 % (ref 11.5–14.5)
EST. GFR  (NO RACE VARIABLE): >60 ML/MIN/1.73 M^2
GLUCOSE SERPL-MCNC: 81 MG/DL (ref 70–110)
HCT VFR BLD AUTO: 46.6 % (ref 40–54)
HGB BLD-MCNC: 15.7 G/DL (ref 14–18)
IMM GRANULOCYTES # BLD AUTO: 0.01 K/UL (ref 0–0.04)
IMM GRANULOCYTES NFR BLD AUTO: 0.2 % (ref 0–0.5)
INR PPP: 2.4 (ref 0.8–1.2)
LYMPHOCYTES # BLD AUTO: 1.5 K/UL (ref 1–4.8)
LYMPHOCYTES NFR BLD: 27.3 % (ref 18–48)
MAGNESIUM SERPL-MCNC: 2.5 MG/DL (ref 1.6–2.6)
MCH RBC QN AUTO: 30.8 PG (ref 27–31)
MCHC RBC AUTO-ENTMCNC: 33.7 G/DL (ref 32–36)
MCV RBC AUTO: 91 FL (ref 82–98)
MONOCYTES # BLD AUTO: 0.6 K/UL (ref 0.3–1)
MONOCYTES NFR BLD: 10.3 % (ref 4–15)
NEUTROPHILS # BLD AUTO: 3.2 K/UL (ref 1.8–7.7)
NEUTROPHILS NFR BLD: 59.3 % (ref 38–73)
NRBC BLD-RTO: 0 /100 WBC
PLATELET # BLD AUTO: 201 K/UL (ref 150–450)
PMV BLD AUTO: 10 FL (ref 9.2–12.9)
POTASSIUM SERPL-SCNC: 4 MMOL/L (ref 3.5–5.1)
PROTHROMBIN TIME: 25 SEC (ref 9–12.5)
RBC # BLD AUTO: 5.1 M/UL (ref 4.6–6.2)
SODIUM SERPL-SCNC: 140 MMOL/L (ref 136–145)
WBC # BLD AUTO: 5.34 K/UL (ref 3.9–12.7)

## 2022-11-27 PROCEDURE — 63600175 PHARM REV CODE 636 W HCPCS: Performed by: HOSPITALIST

## 2022-11-27 PROCEDURE — 99223 1ST HOSP IP/OBS HIGH 75: CPT | Mod: ,,, | Performed by: STUDENT IN AN ORGANIZED HEALTH CARE EDUCATION/TRAINING PROGRAM

## 2022-11-27 PROCEDURE — 97168 OT RE-EVAL EST PLAN CARE: CPT

## 2022-11-27 PROCEDURE — 36415 COLL VENOUS BLD VENIPUNCTURE: CPT | Performed by: STUDENT IN AN ORGANIZED HEALTH CARE EDUCATION/TRAINING PROGRAM

## 2022-11-27 PROCEDURE — 85610 PROTHROMBIN TIME: CPT | Performed by: STUDENT IN AN ORGANIZED HEALTH CARE EDUCATION/TRAINING PROGRAM

## 2022-11-27 PROCEDURE — 25000003 PHARM REV CODE 250: Performed by: HOSPITALIST

## 2022-11-27 PROCEDURE — 80048 BASIC METABOLIC PNL TOTAL CA: CPT | Performed by: HOSPITALIST

## 2022-11-27 PROCEDURE — 85025 COMPLETE CBC W/AUTO DIFF WBC: CPT | Performed by: HOSPITALIST

## 2022-11-27 PROCEDURE — 99223 PR INITIAL HOSPITAL CARE,LEVL III: ICD-10-PCS | Mod: ,,, | Performed by: STUDENT IN AN ORGANIZED HEALTH CARE EDUCATION/TRAINING PROGRAM

## 2022-11-27 PROCEDURE — 83735 ASSAY OF MAGNESIUM: CPT | Performed by: HOSPITALIST

## 2022-11-27 PROCEDURE — 97535 SELF CARE MNGMENT TRAINING: CPT

## 2022-11-27 PROCEDURE — 21400001 HC TELEMETRY ROOM

## 2022-11-27 PROCEDURE — 92610 EVALUATE SWALLOWING FUNCTION: CPT

## 2022-11-27 PROCEDURE — 25000003 PHARM REV CODE 250

## 2022-11-27 RX ORDER — CYANOCOBALAMIN 1000 UG/ML
1000 INJECTION, SOLUTION INTRAMUSCULAR; SUBCUTANEOUS DAILY
Status: COMPLETED | OUTPATIENT
Start: 2022-11-27 | End: 2022-12-03

## 2022-11-27 RX ORDER — CYANOCOBALAMIN 1000 UG/ML
1000 INJECTION, SOLUTION INTRAMUSCULAR; SUBCUTANEOUS WEEKLY
Status: DISCONTINUED | OUTPATIENT
Start: 2022-12-04 | End: 2022-12-06 | Stop reason: HOSPADM

## 2022-11-27 RX ADMIN — LACTOBACILLUS ACIDOPHILUS / LACTOBACILLUS BULGARICUS 1 EACH: 100 MILLION CFU STRENGTH GRANULES at 08:11

## 2022-11-27 RX ADMIN — CYANOCOBALAMIN 1000 MCG: 1000 INJECTION, SOLUTION INTRAMUSCULAR; SUBCUTANEOUS at 03:11

## 2022-11-27 RX ADMIN — PHYTONADIONE 5 MG: 10 INJECTION, EMULSION INTRAMUSCULAR; INTRAVENOUS; SUBCUTANEOUS at 04:11

## 2022-11-27 RX ADMIN — DOCUSATE SODIUM 100 MG: 100 CAPSULE, LIQUID FILLED ORAL at 08:11

## 2022-11-27 RX ADMIN — SACUBITRIL AND VALSARTAN 1 TABLET: 24; 26 TABLET, FILM COATED ORAL at 08:11

## 2022-11-27 RX ADMIN — METOPROLOL SUCCINATE 12.5 MG: 25 TABLET, EXTENDED RELEASE ORAL at 03:11

## 2022-11-27 RX ADMIN — CARBAMAZEPINE 400 MG: 200 TABLET ORAL at 08:11

## 2022-11-27 NOTE — HOSPITAL COURSE
72 y.o. 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  He was diagnosed with acute/chronic systolic/diastolic chf exacerbation due to recurrent ventricular tachycardia, supratherapeutic INR, metabolic encephalopathy, dysphagia, b12 deficiency and debility.  Cardiology consulted and his AICD interrogated and reprogrammed.  VT episodes were below threshold of treatment.  Initiated GDMT with toprol, entresto and IV lasix but due to hypotension, vomiting, dysphagia these have been inconsistently tolerated and not given for several days.  He developed encephalopathy with dysphagia and signs of aspiration worrisome for new stroke, but MRI 11/27 ruled out stroke.  SLP has assessed patient and OK for puree diet with nectar thick liquids - diet advanced 11/27.  Oral amiodarone was stopped due to severe fatigue each time he took this.  Warfarin has been held.  Patient s/p  LHC on Tuesday 11/29, which showed occluded LAD and severe LCx stenosis.  Procedure complicated by sustained Vtach. Required amiodarone gtt and transfer to ICU on 11/29 for close monitoring. Plan for transfer to [facility] when bed available to discuss viability eval vs high risk PCI.  Plan to change warfarin to eliquis at discharge.  Neurology consulted due to his persistent encephalopathy.  EEG from 11/28 rule out seizure activity.  Mental status now at baseline. Started B12 replacement.  PT/OT recommend HH at discharge.  Palliative care consulted and patient remains full code.

## 2022-11-27 NOTE — SUBJECTIVE & OBJECTIVE
Interval History: No acute events overnight.  Taken for MRI at [facility] this morning - no new stroke.  More alert and conversant today, but still somewhat lethargic and weak.  Frustrated he cannot get out of bed to go to toilet.  His sister, son and daughter are at bedside today.  All questions answered and patient had no further complaints.    Review of Systems   Constitutional:  Positive for fatigue. Negative for chills and fever.   HENT:  Positive for trouble swallowing. Negative for congestion and rhinorrhea.    Eyes:  Negative for photophobia and visual disturbance.   Respiratory:  Negative for cough and shortness of breath.    Cardiovascular:  Negative for chest pain, palpitations and leg swelling.   Gastrointestinal:  Negative for abdominal pain, diarrhea, nausea and vomiting.   Genitourinary:  Negative for dysuria, frequency, hematuria and urgency.   Musculoskeletal:  Positive for gait problem. Negative for neck stiffness.   Skin:  Negative for pallor, rash and wound.   Neurological:  Positive for weakness. Negative for light-headedness and headaches.   Psychiatric/Behavioral:  Positive for confusion. Negative for decreased concentration.    Objective:     Vital Signs (Most Recent):  Temp: 98.2 °F (36.8 °C) (11/27/22 1119)  Pulse: 71 (11/27/22 1119)  Resp: 18 (11/27/22 1119)  BP: 132/66 (11/27/22 1119)  SpO2: 95 % (11/27/22 1119)   Vital Signs (24h Range):  Temp:  [97.8 °F (36.6 °C)-98.4 °F (36.9 °C)] 98.2 °F (36.8 °C)  Pulse:  [63-83] 71  Resp:  [16-18] 18  SpO2:  [92 %-95 %] 95 %  BP: (109-134)/(54-72) 132/66     Weight: 68 kg (149 lb 14.6 oz)  Body mass index is 22.14 kg/m².    Intake/Output Summary (Last 24 hours) at 11/27/2022 1432  Last data filed at 11/27/2022 1424  Gross per 24 hour   Intake --   Output 425 ml   Net -425 ml        Physical Exam  Vitals and nursing note reviewed.   Constitutional:       General: He is not in acute distress.     Appearance: He is well-developed. He is ill-appearing. He is  not toxic-appearing or diaphoretic.      Comments: Frail in appearance.  Lethargic   HENT:      Head: Normocephalic and atraumatic.      Right Ear: External ear normal.      Left Ear: External ear normal.      Nose: Nose normal.      Mouth/Throat:      Mouth: Mucous membranes are moist.   Eyes:      Extraocular Movements: Extraocular movements intact.      Conjunctiva/sclera: Conjunctivae normal.   Cardiovascular:      Rate and Rhythm: Normal rate and regular rhythm.      Heart sounds: Murmur heard.   Pulmonary:      Effort: Pulmonary effort is normal. No respiratory distress.      Breath sounds: Normal breath sounds. No wheezing or rales.   Abdominal:      General: Bowel sounds are normal. There is no distension.      Palpations: Abdomen is soft.      Tenderness: There is no abdominal tenderness.      Comments: No palpable hepatomegaly or splenomegaly   Musculoskeletal:         General: No tenderness. Normal range of motion.      Cervical back: Normal range of motion. No rigidity.   Skin:     General: Skin is warm and dry.      Coloration: Skin is pale.   Neurological:      Mental Status: He is alert.      Comments: Mildly lethargic - much more attentive and alert today.  Significant diffuse weakness   Psychiatric:         Mood and Affect: Mood normal.       Significant Labs: All pertinent labs within the past 24 hours have been reviewed.    Significant Imaging: I have reviewed all pertinent imaging results/findings within the past 24 hours.

## 2022-11-27 NOTE — ASSESSMENT & PLAN NOTE
-On 11/25 more confused and lethargic  -Noted fall 2 overnight late last week - no apparent trauma and head ct at that time without evidence of bleeding.  -No evidence of infection  -Could be secondary to mild dehydration and low blood pressure? - stopped lasix and decreased toprol.  -Ordered delirium precautions, held lasix and held/lowered toprol.  -Checking  for other reversible encephalopathies:  TSH, Ammonia normal.  No UTI.  Folate normal.  B12 is deficient (noted 11/27)  -Repeat head CT 11/26 without any acute findings and no evidence of bleeding.  -Was concerned there could have been a stroke as he now developed dysphagia.  Sent for MRI at [facility] 11/27 which did not reveal any new stroke  -11/27:  Mental status improved but still somewhat lethargic.  SLP evaluated patient and ok for dysphagia diet with nectar thick liquids.  -Await neurology evaluation.  Discussed with Dr. Isaacs - will check EEG given history of seizures.  Continue carbemazepime.  -Continue delirium precautions.

## 2022-11-27 NOTE — PT/OT/SLP PROGRESS
Physical Therapy      Patient Name:  Balbir Andersonyeison Ruano.   MRN:  [id number]    Patient not seen today secondary to  (pt transferred to [facility] for MRI). Will follow-up as able.

## 2022-11-27 NOTE — NURSING
CXR 2 view not available at bedside, patient's nurse notified he will be transported down to radiology for exam.

## 2022-11-27 NOTE — ASSESSMENT & PLAN NOTE
-Patient with Long standing persistent (>12 months) atrial fibrillation which is controlled currently with toprol. -Patient is currently in sinus rhythm.[id number] Score: 2.   -Anticoagulation indicated and is on warfarin at home.    -Holding warfarin due to supratheraputic INR on admit as well as possible need for angiogram (inpatient vs outpatient yet to be determined).  -Plan for DOAC on discharge.

## 2022-11-27 NOTE — NURSING
Patient has MRI brain ordered at [facility] tomorrow at 9am, transfer d/t AICD issue..   [facility] contacted for stat CXR with 2 views portable order as radiology is requesting that prior to transfer for MRI.  Radiology is also requesting nurse to travel with patient to [facility]. Forwarded staffing request to [facility] sup.

## 2022-11-27 NOTE — PLAN OF CARE
Problem: Occupational Therapy  Goal: Occupational Therapy Goal  Description: Goals to be met by: 12/11/22     Patient will increase functional independence with ADLs by performing:    Feeding with Modified Dolores.  UE Dressing with Modified Dolores and Supervision.  LE Dressing with Stand-by Assistance.  Grooming while standing at sink with Stand-by Assistance and Assistive Devices as needed.  Toileting from bedside commode with Supervision and Assistive Devices as needed for hygiene and clothing management.   Sitting at edge of bed x30 minutes with Supervision.  Supine to sit with Modified Dolores and Supervision.  Step transfer with Stand-by Assistance  Toilet transfer to [facility] with Stand-by Assistance.  Upper extremity exercise program x15 reps per handout, with assistance as needed.    Outcome: Ongoing, Progressing   The patient participated in OT re-eval. The patient required CGA for overall mobility but was unable to transfer to the [facility] 2* c/o dizziness.   BP was monitored:  Bed 135/74 HR 74, /79, Standing 107/68, Supine 143/71 HR 74

## 2022-11-27 NOTE — ASSESSMENT & PLAN NOTE
-Significant nausea and vomiting overnight 11/25-26.  -KUB with non-obstructive bowel gas pattern and abdomen is quite soft albeit with diminished bowel sounds  -Noted dysphagia and suspected aspiration around 1PM on 11/26  -SLP consulted and recommend dysphagia puree diet with nectar thick liquids  -Continue anti-emetics, bowel regimen and aspiration precautions

## 2022-11-27 NOTE — PLAN OF CARE
Problem: SLP  Goal: SLP Goal  Description: ST. Pt will tolerate PO diet of pureed w/nectar thick liquids w/o overt s/s of aspiration.  Outcome: Ongoing, Progressing     BSE completed. ST will follow pt per POC.

## 2022-11-27 NOTE — PT/OT/SLP PROGRESS
Speech Language Pathology      Balbir Rebollar Sr.  MRN: [id number]    Patient not seen today secondary to Other (Comment) (Pt taken to main campus for MRI.). Will follow-up tomorrow.

## 2022-11-27 NOTE — PROGRESS NOTES
"[facility] Medicine  Progress Note    Patient Name: Balbir Rebollar Sr.  MRN: [id number]  Patient Class: IP- Inpatient   Admission Date: 11/22/2022  Length of Stay: 4 days  Attending Physician: Sarmad Banegas MD  Primary Care Provider: Flip Hanna MD        Subjective:     Principal Problem:Acute on chronic combined systolic and diastolic congestive heart failure        HPI:  Balbir Rebollar Sr. 72 y.o. male with CAD, HTN, Afib, long term use of anticoagulants, CVA, and ICD, presents to the hospital with a chief complaint of fatigue and weakness with acute onset of one-week. Patient reports he was seen 3 times in a period of a week for the same symptoms and was discharged.  Patient describes his symptoms as feeling tired but I can not sleep. Associated symptoms include difficulty walking, diarrhea, frequent urination, and muscle spasms. Patient's daughter says," he is stumbling going up and down the stairs". Patient reports he was prescribed a new medication that is causing him to have dry mouth and feeling more fatigued (on top of original fatigue complaint). Patient's daughter reports patient is usually very active, but recently has been staying in bed more often this past week. No other exacerbating or alleviating factors. Patient denies dysuria, nausea, vomiting, cough, numbness, slurred speech, black stool , paresthesia, or other associated symptoms. Patient endorses compliance of coumadin. Family reports that the patient seen at  several times recently for symptoms and state they were told nothing was found. Follows with cardiology at  and was recently started on Amiodarone on 11/18/2022.      In the ED patient is afebrile without leukocytosis PT 45.2, INR 4.6, CO2 20, calcium 8.6, BNP 1 441, troponin 0.038, 2nd troponin 0.041 TSH WNL, UA is hazy with trace protein glucose and occult blood, negative for flu and COVID, CXR was negative, EKG showed "Sinus rhythm with " "Premature supraventricular complexes in a pattern of bigeminy, Right bundle branch block, Left anterior fascicular block" without previous EKG for comparison.  Patient was placed in observation for further workup an assessment by Cardiology.  Previous Echo from [facility] everywhere     ECHOCARDIOGRAM   Interpretation Summary  11/01/2021  MRN:  [id number]        FIN:    [id number]      Accession #:[id number]         Order #:[id number]    Indications:    Coronary artery disease involving native coronary artery of native heart without angina pectoris; Paroxysmal                     atrial fibrillation (CMS/HCC); Nonsustained ventricular tachycardia (CMS/HCC)       BP:  128   / 74      HR: 67                       Rhythm:           Sinus                                                      Technical Quality:Very technically difficult study    MEASUREMENTS  (Male / Female) Normal Values   Left Ventricle:    Appears to be normal left ventricular cavity size.  Severely reduced left ventricular systolic function.  Dense hypokinesis and thinning of the anterior wall, septum, and apex with normal contractility elsewhere.   Visually estimated EF is less than 30%.  Grade I diastolic dysfunction (abnormal relaxation filling pattern),    with normal or mildly elevated filling pressures.      Overview/Hospital Course:  No notes on file    Interval History: No acute events overnight.  Taken for MRI at [facility] this morning - no new stroke.  More alert and conversant today, but still somewhat lethargic and weak.  Frustrated he cannot get out of bed to go to toilet.  His sister, son and daughter are at bedside today.  All questions answered and patient had no further complaints.    Review of Systems   Constitutional:  Positive for fatigue. Negative for chills and fever.   HENT:  Positive for trouble swallowing. Negative for congestion and rhinorrhea.    Eyes:  Negative for photophobia and visual disturbance.   Respiratory:  Negative for " cough and shortness of breath.    Cardiovascular:  Negative for chest pain, palpitations and leg swelling.   Gastrointestinal:  Negative for abdominal pain, diarrhea, nausea and vomiting.   Genitourinary:  Negative for dysuria, frequency, hematuria and urgency.   Musculoskeletal:  Positive for gait problem. Negative for neck stiffness.   Skin:  Negative for pallor, rash and wound.   Neurological:  Positive for weakness. Negative for light-headedness and headaches.   Psychiatric/Behavioral:  Positive for confusion. Negative for decreased concentration.    Objective:     Vital Signs (Most Recent):  Temp: 98.2 °F (36.8 °C) (11/27/22 1119)  Pulse: 71 (11/27/22 1119)  Resp: 18 (11/27/22 1119)  BP: 132/66 (11/27/22 1119)  SpO2: 95 % (11/27/22 1119)   Vital Signs (24h Range):  Temp:  [97.8 °F (36.6 °C)-98.4 °F (36.9 °C)] 98.2 °F (36.8 °C)  Pulse:  [63-83] 71  Resp:  [16-18] 18  SpO2:  [92 %-95 %] 95 %  BP: (109-134)/(54-72) 132/66     Weight: 68 kg (149 lb 14.6 oz)  Body mass index is 22.14 kg/m².    Intake/Output Summary (Last 24 hours) at 11/27/2022 1432  Last data filed at 11/27/2022 1424  Gross per 24 hour   Intake --   Output 425 ml   Net -425 ml        Physical Exam  Vitals and nursing note reviewed.   Constitutional:       General: He is not in acute distress.     Appearance: He is well-developed. He is ill-appearing. He is not toxic-appearing or diaphoretic.      Comments: Frail in appearance.  Lethargic   HENT:      Head: Normocephalic and atraumatic.      Right Ear: External ear normal.      Left Ear: External ear normal.      Nose: Nose normal.      Mouth/Throat:      Mouth: Mucous membranes are moist.   Eyes:      Extraocular Movements: Extraocular movements intact.      Conjunctiva/sclera: Conjunctivae normal.   Cardiovascular:      Rate and Rhythm: Normal rate and regular rhythm.      Heart sounds: Murmur heard.   Pulmonary:      Effort: Pulmonary effort is normal. No respiratory distress.      Breath sounds:  Normal breath sounds. No wheezing or rales.   Abdominal:      General: Bowel sounds are normal. There is no distension.      Palpations: Abdomen is soft.      Tenderness: There is no abdominal tenderness.      Comments: No palpable hepatomegaly or splenomegaly   Musculoskeletal:         General: No tenderness. Normal range of motion.      Cervical back: Normal range of motion. No rigidity.   Skin:     General: Skin is warm and dry.      Coloration: Skin is pale.   Neurological:      Mental Status: He is alert.      Comments: Mildly lethargic - much more attentive and alert today.  Significant diffuse weakness   Psychiatric:         Mood and Affect: Mood normal.       Significant Labs: All pertinent labs within the past 24 hours have been reviewed.    Significant Imaging: I have reviewed all pertinent imaging results/findings within the past 24 hours.      Assessment/Plan:      * Acute on chronic combined systolic and diastolic congestive heart failure  -Admitted to inpatient status  -BNP elevated at 1441  -Echo 10/22 showed EF 20-30%, grade II diastolic dysfunction  -Known chronic ischemic cardiomyopathy with AICD in place  -Cardiology consulted and input appreciated  -AICD interogated and noted frequent sustained VT episodes below threshold of treatment.  AICD reprogramed with lower threshold  -Strict ins/outs and daily weights  -Have attempted gentle diuresis with iv lasix and started toprol and entresto and no further VT thus far  -Due to lethargy, nausea, vomiting and low blood pressure has not been solidly maintained on GDMT.  Has not received lasix since 11/24.  Has not received toprol since 11/24.  Received 1 dose entresto yesterday.  -11/27 looks better than in last 3 days but still quite weak.  Will resume toprol but at lower dose  -Continue to hold lasix for now    Ventricular tachycardia  -AICD interrogated and noted sustained VT episodes below threshold of treatment  -AICD reprogrammed  -Stopped  amiodarone 11/24- family and patient feels it causes severe weakness and confusion.  -Has not received toprol since 11/24 - attempt to restart at lowest dose today.  -No further VT reported.    ICD (implantable cardioverter-defibrillator), single, in situ  -Treatment as above.    Paroxysmal atrial fibrillation  -Patient with Long standing persistent (>12 months) atrial fibrillation which is controlled currently with toprol. -Patient is currently in sinus rhythm.[id number] Score: 2.   -Anticoagulation indicated and is on warfarin at home.    -Holding warfarin due to supratheraputic INR on admit as well as possible need for angiogram (inpatient vs outpatient yet to be determined).  -Plan for DOAC on discharge.    Supratherapeutic INR  -INR >4 on admit and now 2.4  -Continue to hold warfarin  -Check pt/inr daily.    Chronic anticoagulation  -Treatment as above.    Encephalopathy, metabolic  -On 11/25 more confused and lethargic  -Noted fall 2 overnight late last week - no apparent trauma and head ct at that time without evidence of bleeding.  -No evidence of infection  -Could be secondary to mild dehydration and low blood pressure? - stopped lasix and decreased toprol.  -Ordered delirium precautions, held lasix and held/lowered toprol.  -Checking  for other reversible encephalopathies:  TSH, Ammonia normal.  No UTI.  Folate normal.  B12 is deficient (noted 11/27)  -Repeat head CT 11/26 without any acute findings and no evidence of bleeding.  -Was concerned there could have been a stroke as he now developed dysphagia.  Sent for MRI at [facility] 11/27 which did not reveal any new stroke  -11/27:  Mental status improved but still somewhat lethargic.  SLP evaluated patient and ok for dysphagia diet with nectar thick liquids.  -Await neurology evaluation.  Discussed with Dr. Isaacs - will check EEG given history of seizures.  Continue carbemazepime.  -Continue delirium precautions.    Weakness  -Presenting complaint was weakness  x 1 week since starting amiodarone.  -Patient and son felt weakness clearly due to amiodarone, but we suspected weakness was driven by the episodes of VT which were driven by uncontrolled severe chf.  -Noted fall overnight from toilet 11/23-11/24 - no head trauma and CT head without acute findings  -Noted near fall / slip to the floor with assistance of nurse on 11/25 - no trauma at all.  -Fall precautions ordered  -Amiodarone stopped 11/24.    -PT/OT consulted and recommend HH at discharge.    Nausea and vomiting  -Significant nausea and vomiting overnight 11/25-26.  -KUB with non-obstructive bowel gas pattern and abdomen is quite soft albeit with diminished bowel sounds  -Noted dysphagia and suspected aspiration around 1PM on 11/26  -SLP consulted and recommend dysphagia puree diet with nectar thick liquids  -Continue anti-emetics, bowel regimen and aspiration precautions    ACP (advance care planning)  -Discussed with patient's son to clarify goals of care.  Patient a bit too confused to participate.  Son wishes to speak with his sister.  -Consulted palliative care team and discussed with Dr. Paul today.  Appreciate input.  -Continue full code for now - looks better today    Hypokalemia  -K normal today  -Repeat labs in AM.    Pure hypercholesterolemia  -Continue statin     History of CVA (cerebrovascular accident)  -History noted    Coronary artery disease involving native coronary artery of native heart without angina pectoris  -History noted  -No chest pain  -Needs angiogram at some point.  Discussed with Dr. Rico - not possible to do tomorrow - possibly Tuesday if still in hospital.      VTE Risk Mitigation (From admission, onward)         Ordered     Reason for No Pharmacological VTE Prophylaxis  Once        Question:  Reasons:  Answer:  Already adequately anticoagulated on oral Anticoagulants    11/23/22 0042     IP VTE HIGH RISK PATIENT  Once         11/23/22 0042     Place sequential compression  device  Until discontinued         11/23/22 0042                Discharge Planning   MOR: 11/28/2022     Code Status: Full Code   Is the patient medically ready for discharge?:     Reason for patient still in hospital (select all that apply): Treatment  Discharge Plan A: Home with family                  Sarmad Banegas MD  Department of Hospital Medicine   [facility]

## 2022-11-27 NOTE — NURSING
RAPID RESPONSE NURSE PROACTIVE ROUNDING NOTE       Time of Visit: 915pm     Admit Date: 2022  LOS: 3  Code Status: Full Code   Date of Visit: 2022  : 1950  Age: 72 y.o.  Sex: male  Race: White  Bed: [facility]/W[facility] A:   MRN: [id number]  Was the patient discharged from an ICU this admission? No   Was the patient discharged from a PACU within last 24 hours? No   Did the patient receive conscious sedation/general anesthesia in last 24 hours? No   Was the patient in the ED within the past 24 hours? No   Was the patient on NIPPV within the past 24 hours? No   Attending Physician: Sarmad Banegas MD  Primary Service: Hospitalist   Time spent at the bedside: < 15 min    SITUATION    Notified by charge RN during rounding  Reason for alert: daily deterioration    Diagnosis: Acute on chronic combined systolic and diastolic congestive heart failure   has a past medical history of AICD (automatic cardioverter/defibrillator) present, Anticoagulant long-term use, Coronary artery disease, History of myocardial infarction, Paroxysmal A-fib, and Stroke.    Last Vitals:  Temp: 97.8 °F (36.6 °C) (2013)  Pulse: 70 (2013)  Resp: 17 (2013)  BP: 134/67 (2013)  SpO2: 95 % (2013)    24 Hour Vitals Range:  Temp:  [97.4 °F (36.3 °C)-98.2 °F (36.8 °C)]   Pulse:  [58-74]   Resp:  [15-20]   BP: ()/(52-72)   SpO2:  [92 %-96 %]     Clinical Issues: Circulatory    ASSESSMENT/INTERVENTIONS    RECOMMENDATIONS  Keep pt NPO. Do not get pt out of bed. Continue to monitor vital signs    Discussed plan of care with bedside Ruth NELSON    PROVIDER ESCALATION    Physician escalation: No    Orders received and case discussed with  N/A .    Disposition:Remain in room 333    FOLLOW UP    Call back the Rapid Response NurseBita at [phone] for additional questions or concerns.

## 2022-11-27 NOTE — CONSULTS
[facility] [place]  Neurology  Consult Note    Patient Name: Balbir Rebollar Sr.  MRN: [id number]  Admission Date: 11/22/2022  Hospital Length of Stay: 4 days  Code Status: Full Code   Attending Provider: Sarmad Banegas MD   Consulting Provider: Lori Isaacs MD  Primary Care Physician: Flip Hanna MD  Principal Problem:Acute on chronic combined systolic and diastolic congestive heart failure    Inpatient consult to Neurology  Consult performed by: Lori Isaacs MD  Consult ordered by: Sarmad Banegas MD      Subjective:     Chief Complaint:  worsening encephalopathy    HPI:   71 yo M w CAD, HTN, afib on anticoag,  ICD with fatigue, weakness x 1 week. Remote hx of CVA, ? Hx of seizures (on carbamazepine).     Patient admitted for cardiac evaluation, was recently started on amiodarone 11/18/22.     Patient not seen today 2/2 to going for MRI , history obtained via chart review.     Neurology consulted for worsening lethargy and encephalopathy.     Past Medical History:   Diagnosis Date    AICD (automatic cardioverter/defibrillator) present     Anticoagulant long-term use     Coronary artery disease     History of myocardial infarction     Paroxysmal A-fib     Stroke        Past Surgical History:   Procedure Laterality Date    CHOLECYSTECTOMY      REPLACEMENT OF DUAL CHAMBER IMPLANTABLE CARDIOVERTER-DEFIBRILLATOR         Review of patient's allergies indicates:  No Known Allergies    Current Neurological Medications:     No current facility-administered medications on file prior to encounter.     Current Outpatient Medications on File Prior to Encounter   Medication Sig    amiodarone (PACERONE) 200 MG Tab Take 200 mg by mouth 2 (two) times daily.    carBAMazepine (TEGRETOL) 200 mg tablet Take 400 mg by mouth 2 (two) times daily.    nitroGLYCERIN (NITROSTAT) 0.4 MG SL tablet Place 0.4 mg under the tongue every 5 (five) minutes as needed.    pravastatin (PRAVACHOL) 20 MG tablet Take 20 mg by mouth.     warfarin (COUMADIN) 6 MG tablet Take 6 mg by mouth. 0.5 (3 mg) Sun, Mon, Tues; 6 mg Wed.; 0.5 (3 mg) Thurs, Fri.; 6 mg Sat.    cetirizine (ZYRTEC) 10 MG tablet Take 1 tablet (10 mg total) by mouth once daily. (Patient not taking: Reported on 11/23/2022)    fluticasone propionate (FLONASE) 50 mcg/actuation nasal spray 1 spray (50 mcg total) by Each Nostril route once daily. (Patient not taking: Reported on 11/23/2022)      Family History    None       Tobacco Use    Smoking status: Former    Smokeless tobacco: Never   Substance and Sexual Activity    Alcohol use: Never    Drug use: Never    Sexual activity: Not on file     ROS: unable to review    Objective:     Vital Signs (Most Recent):  Temp: 98.4 °F (36.9 °C) (11/27/22 0745)  Pulse: 81 (11/27/22 0745)  Resp: 18 (11/27/22 0745)  BP: 121/72 (11/27/22 0745)  SpO2: (!) 92 % (11/27/22 0745)   Vital Signs (24h Range):  Temp:  [97.8 °F (36.6 °C)-98.4 °F (36.9 °C)] 98.4 °F (36.9 °C)  Pulse:  [58-83] 81  Resp:  [16-20] 18  SpO2:  [92 %-95 %] 92 %  BP: ()/(54-72) 121/72     Weight: 68 kg (149 lb 14.6 oz)  Body mass index is 22.14 kg/m².    Physical Exam: patient not seen 2/2 to being in MRI    Significant Labs:   BNP 1441  B12 198  TSH wnl    Significant Imaging:  personally reviewed  [facility] diffuse cerebral atrophy    Assessment and Plan:     73 yo M w hx of heart failure (BNP elevated on admission) w AICD for severe ischemic cardiac myopathy, paroxysmal afib on warfarin at home (plan for DOAC on discharge), consulted to neurology for prolonged/worsening encephalopathy and new dysphagia.    MRI brain is pending this morning w concern for new CVA (patient family endorses compliance w anticoag however AICD interrogated on workup on this stay and has been reprogrammed).     Patient also w remote history of seizure on carbamazepine.  Will order routine EEG as well to assess for epileptiform discharges.    Active Diagnoses:    Diagnosis Date Noted POA    PRINCIPAL PROBLEM:   Acute on chronic combined systolic and diastolic congestive heart failure [I50.43] 11/23/2022 Yes    Nausea and vomiting [R11.2] 11/26/2022 No    Encephalopathy, metabolic [G93.41] 11/25/2022 Yes    Supratherapeutic INR [R79.1] 11/24/2022 Yes    Hypokalemia [E87.6] 11/24/2022 Yes    ACP (advance care planning) [Z71.89] 11/24/2022 Not Applicable    Weakness [R53.1] 11/23/2022 Yes    Ventricular tachycardia [I47.20] 11/23/2022 Yes    ICD (implantable cardioverter-defibrillator), single, in situ [Z95.810] 07/09/2020 Yes    Chronic anticoagulation [Z79.01] 06/23/2020 Not Applicable    Paroxysmal atrial fibrillation [I48.0] 03/06/2019 Yes    History of CVA (cerebrovascular accident) [Z86.73] 03/06/2019 Not Applicable    Pure hypercholesterolemia [E78.00] 03/06/2019 Yes    Coronary artery disease involving native coronary artery of native heart without angina pectoris [I25.10] 03/06/2019 Yes      Problems Resolved During this Admission:    Diagnosis Date Noted Date Resolved POA    Ischemic cardiomyopathy [I25.5] 02/16/2022 11/24/2022 Yes       VTE Risk Mitigation (From admission, onward)           Ordered     Reason for No Pharmacological VTE Prophylaxis  Once        Question:  Reasons:  Answer:  Already adequately anticoagulated on oral Anticoagulants    11/23/22 0042     IP VTE HIGH RISK PATIENT  Once         11/23/22 0042     Place sequential compression device  Until discontinued         11/23/22 0042                    Thank you for your consult. I will follow-up with patient. Please contact us if you have any additional questions.    Lori Isaacs MD  Neurology  [facility] [place] [facility]

## 2022-11-27 NOTE — ASSESSMENT & PLAN NOTE
-Admitted to inpatient status  -BNP elevated at 1441  -Echo 10/22 showed EF 20-30%, grade II diastolic dysfunction  -Known chronic ischemic cardiomyopathy with AICD in place  -Cardiology consulted and input appreciated  -AICD interogated and noted frequent sustained VT episodes below threshold of treatment.  AICD reprogramed with lower threshold  -Strict ins/outs and daily weights  -Have attempted gentle diuresis with iv lasix and started toprol and entresto and no further VT thus far  -Due to lethargy, nausea, vomiting and low blood pressure has not been solidly maintained on GDMT.  Has not received lasix since 11/24.  Has not received toprol since 11/24.  Received 1 dose entresto yesterday.  -11/27 looks better than in last 3 days but still quite weak.  Will resume toprol but at lower dose  -Continue to hold lasix for now

## 2022-11-27 NOTE — ASSESSMENT & PLAN NOTE
-Discussed with patient's son to clarify goals of care.  Patient a bit too confused to participate.  Son wishes to speak with his sister.  -Consulted palliative care team and discussed with Dr. Paul today.  Appreciate input.  -Continue full code for now - looks better today

## 2022-11-27 NOTE — NURSING
Patient has arrived back on the unit via stretcher, on room air, Awake and Alert, Situated in bed, oriented to the room. Bed in lowest position, wheels locked call light in reach. Side rails up, urinal at bedside. Bed alarm set. Daughter Silvia Notified of Return to Unit.

## 2022-11-27 NOTE — ASSESSMENT & PLAN NOTE
-INR >4 on admit and now trending down   -Continue to hold warfarin - has not received during this stay  -INR needs to be less than 1.7 for angiogram - pt given vitamin K 5mg po x1 on (11/27).  -INR 1.4 on 11/28  -Check pt/inr daily.

## 2022-11-27 NOTE — PT/OT/SLP EVAL
Speech Language Pathology Evaluation  Bedside Swallow    Patient Name:  Balbir Rebollar .   MRN:  [id number]  Admitting Diagnosis: Acute on chronic combined systolic and diastolic congestive heart failure    Recommendations:                 General Recommendations:  Dysphagia therapy  Diet recommendations:  Puree Diet - IDDSI Level 4, Mildly thick/Nectar thick liquids - IDDSI Level 2   Aspiration Precautions: 1 bite/sip at a time, Alternating bites/sips, Assistance with thickening liquids, HOB to 90 degrees, and Small bites/sips   General Precautions: Standard, aspiration, pureed diet, nectar thick  Communication strategies:  none    History:     Past Medical History:   Diagnosis Date    AICD (automatic cardioverter/defibrillator) present     Anticoagulant long-term use     Coronary artery disease     History of myocardial infarction     Paroxysmal A-fib     Stroke        Past Surgical History:   Procedure Laterality Date    CHOLECYSTECTOMY      REPLACEMENT OF DUAL CHAMBER IMPLANTABLE CARDIOVERTER-DEFIBRILLATOR         Social History: Patient lives with son at home.    Modified Barium Swallow: none on file    Chest X-Rays: 11/26/22: There is a left AICD with its tip in the right ventricle.  The cardiac silhouette is within normal limits.  The lung parenchyma is stable.  Bones are osteopenic.       Prior diet: unrestricted per pt report.      Subjective   Pt awake and alert upon SLP arrival just returning from [facility] for [facility]. Nsg reported choking episode yesterday while pt was eating a [employer] hash brown. No family at bedside during swallow eval. Pt denied a hx of swallowing difficulty.    Patient goals: To resume PO diet.     Pain/Comfort:  Pain Rating 1: 0/10    Respiratory Status: Room air    Objective:     Oral Musculature Evaluation  Oral Musculature: WFL  Dentition: edentulous  Secretion Management: adequate  Mucosal Quality: good  Mandibular Strength and Mobility: WFL  Oral Labial Strength and  Mobility: WFL  Lingual Strength and Mobility: WFL  Velar Elevation: WFL  Buccal Strength and Mobility: WFL  Voice Prior to PO Intake: clear vocal quality; adequate volume  Oral Musculature Comments: grossly intact    Bedside Swallow Eval:   Consistencies Assessed:  Thin liquids via cup rim x3 and straw x2  Nectar thick liquids via cup rim x2 and straw x2  Puree x2 trials      Oral Phase:   Slow oral transit time  Tongue pumping- pureed    Pharyngeal Phase:   coughing/choking- thin liquids  decreased hyolaryngeal excursion to palpation- across consistencies  delayed swallow initiation- across consistencies    Compensatory Strategies  None    Treatment: Rec: PO diet of pureed with nectar thick liquids.  Please note silent aspiration cannot be ruled out at bedside.    Education: Patient educated on aspiration precautions (1 bite/sip at a time, Alternating bites/sips, Assistance with thickening liquids, HOB to 90 degrees, and RN Justa and Dr. Banegas notified regarding diet recs. White board update in patient's room regarding diet recs.  Pt educated on thickening liquids to nectar consistency to help prevent aspiration. Pt v/b understanding of education provided, however, he stated he does not like using the thickener.  Handouts attached to patient's chart.      Assessment:     Balbir Rebollar Sr. is a 72 y.o. male with a dx of Acute on chronic combined systolic and diastolic congestive heart failure. Pt presents with dysphagia of unknown severity. Pt exhibiting positive s/s of aspiration with thin liquids and delayed swallow across consistencies. ST will follow pt to address ongoing dysphagia.    Goals:   Multidisciplinary Problems       SLP Goals          Problem: SLP    Goal Priority Disciplines Outcome   SLP Goal     SLP Ongoing, Progressing   Description: ST. Pt will tolerate PO diet of pureed w/nectar thick liquids w/o overt s/s of aspiration.                       Plan:     Patient to be seen:  3  x/week   Plan of Care expires:  12/09/22  Plan of Care reviewed with:  patient   SLP Follow-Up:  Yes       Discharge recommendations:  home with home health   Barriers to Discharge:  None    Time Tracking:     SLP Treatment Date:      Speech Start Time:  1109  Speech Stop Time:  1132     Speech Total Time (min):  23 min    Billable Minutes: Eval Swallow and Oral Function 15 min and Self Care/Home Management Training 8 min    11/27/2022

## 2022-11-27 NOTE — PT/OT/SLP PROGRESS
Occupational Therapy      Patient Name:  Balbir Cortés Smooth Ruano.   MRN:  [id number]    Patient not seen today secondary to Testing/imaging (xray/CT/MRI) (Patient at [facility] for MRI). Will follow-up later as patient is available.    11/27/2022

## 2022-11-27 NOTE — PT/OT/SLP RE-EVAL
Occupational Therapy   Re-evaluation    Name: Balbir Rebollar Sr.  MRN: [id number]  Admitting Diagnosis:  Acute on chronic combined systolic and diastolic congestive heart failure  Recent Surgery: * No surgery found *      Recommendations:     Discharge Recommendations: home health OT (with family assist)  Discharge Equipment Recommendations:  bedside commode, walker, rolling  Barriers to discharge:   (not at PLOF, currently limited by dizziness. 14 steps to 2nd floor bedroom)    Assessment:     Balbir Rebollar Sr. is a 72 y.o. male with a medical diagnosis of Acute on chronic combined systolic and diastolic congestive heart failure.   Performance deficits affecting function are weakness, impaired endurance, impaired self care skills, impaired functional mobility, gait instability, impaired balance, decreased upper extremity function, decreased coordination, decreased safety awareness, impaired cardiopulmonary response to activity.     The patient participated in OT re-eval. The patient required CGA for overall mobility but was unable to transfer to the [facility] c/o dizziness. The patient will benefit from OT to address functional deficits.    Rehab Prognosis:  good; patient would benefit from acute skilled OT services to address these deficits and reach maximum level of function.       Plan:     Patient to be seen 5 x/week to address the above listed problems via self-care/home management, therapeutic activities, therapeutic exercises  Plan of Care Expires: 12/11/22  Plan of Care Reviewed with: patient, daughter    Subjective     Chief Complaint: feels dizzy  Patient/Family stated goals: wants to be able to use the [facility]    Pain/Comfort:  Pain Rating 1: 0/10    Objective:     Communicated with: nurseJusta prior to session.  Patient found left sidelying with: telemetry, bed alarm, peripheral IV (Avasys) upon OT entry to room.    General Precautions: Standard, aspiration, pureed diet, nectar thick, fall,  diabetic   Orthopedic Precautions:N/A   Braces: N/A  Respiratory Status: Room air    Occupational Performance:    Bed Mobility:    Patient completed Scooting/Bridging with stand by assistance  Patient completed Supine to Sit with stand by assistance  Patient completed Sit to Supine with stand by assistance    Functional Mobility/Transfers:  Patient completed Sit <> Stand Transfer with contact guard assistance  with  rolling walker   Functional Mobility: The patient stood and side stepped using a RW and CGA. The patient stood to allow standing BP with c/o dizziness and swaying and CGA/min assist needed for standing balance. The patient required CGA to sit back on the EOB.    Activities of Daily Living:  Upper Body Dressing: maximal assistance to don/doff back gown  Lower Body Dressing: dependence to adjust socks    Cognitive/Visual Perceptual:  Cognitive/Psychosocial Skills:     -       Oriented to: Person, Place, and uncertain about current situation   -       Follows Commands/attention:Follows one-step commands  -       Communication: clear/fluent  -       Memory: Impaired STM  -       Safety awareness/insight to disability: impaired   -       Mood/Affect/Coping skills/emotional control: Appropriate to situation    Physical Exam:  Balance: -       fair  Postural examination/scapula alignment:    -       Rounded shoulders  -       Forward head  Skin integrity: Visible skin intact  Upper Extremity Range of Motion:     -       Right Upper Extremity: WFL  -       Left Upper Extremity: WFL  Upper Extremity Strength:    -       Right Upper Extremity: WFL  -       Left Upper Extremity: WFL   Strength:    -       Right Upper Extremity: WFL  -       Left Upper Extremity: WFL    AMPAC 6 Click:  AMPAC Total Score: 15    Treatment & Education:  Participated in OT re-eval with daughter present  The patient sat on the EOB ~10 min with SBA and c/o dizziness.   Monitored vitals:                  Bed 135/74 HR 74, /79,  Standing 107/68, Supine 143/71 HR 74  Discussed OT role and POC  Educated the patient and daughter re: unsafe to transfer to the [facility] low BP    Patient left HOB elevated with all lines intact, call button in reach, bed alarm on, nurseJusta notified, and daughter and Wendy present    GOALS:   Multidisciplinary Problems       Occupational Therapy Goals          Problem: Occupational Therapy    Goal Priority Disciplines Outcome Interventions   Occupational Therapy Goal     OT, PT/OT Ongoing, Progressing    Description: Goals to be met by: 12/11/22     Patient will increase functional independence with ADLs by performing:    Feeding with Modified [place].  UE Dressing with Modified [place] and Supervision.  LE Dressing with Stand-by Assistance.  Grooming while standing at sink with Stand-by Assistance and Assistive Devices as needed.  Toileting from bedside commode with Supervision and Assistive Devices as needed for hygiene and clothing management.   Sitting at edge of bed x30 minutes with Supervision.  Supine to sit with Modified [place] and Supervision.  Step transfer with Stand-by Assistance  Toilet transfer to [facility] with Stand-by Assistance.  Upper extremity exercise program x15 reps per handout, with assistance as needed.                   Multidisciplinary Problems (Resolved)          Problem: Occupational Therapy    Goal Priority Disciplines Outcome Interventions   Occupational Therapy Goal   (Resolved)     OT, PT/OT Met                        History:     Past Medical History:   Diagnosis Date    AICD (automatic cardioverter/defibrillator) present     Anticoagulant long-term use     Coronary artery disease     History of myocardial infarction     Paroxysmal A-fib     Stroke          Past Surgical History:   Procedure Laterality Date    CHOLECYSTECTOMY      REPLACEMENT OF DUAL CHAMBER IMPLANTABLE CARDIOVERTER-DEFIBRILLATOR         Time Tracking:     OT Date of Treatment: 11/27/22  OT Start  Time: 1331  OT Stop Time: 1400  OT Total Time (min): 29 min    Billable Minutes:Re-eval 15  Self Care/Home Management 14  Total Time 29    11/27/2022

## 2022-11-27 NOTE — ASSESSMENT & PLAN NOTE
-AICD interrogated and noted sustained VT episodes below threshold of treatment  -AICD reprogrammed  -Stopped amiodarone 11/24- family and patient feels it causes severe weakness and confusion.  -Has not received toprol since 11/24 - attempt to restart at lowest dose today.  -No further VT reported.

## 2022-11-27 NOTE — NURSING
Patient is off the unit via bed, on room air, no distress noted. Accompanied by Daughter & Rapid Nurse. Going to [facility] [place] for MRI

## 2022-11-27 NOTE — ASSESSMENT & PLAN NOTE
-History noted  -No chest pain  -Needs angiogram at some point.  Discussed with Dr. Rico - not possible to do tomorrow - possibly Tuesday if still in hospital.

## 2022-11-28 PROBLEM — E53.8 B12 DEFICIENCY: Status: ACTIVE | Noted: 2022-11-28

## 2022-11-28 PROBLEM — Z86.69 HISTORY OF SEIZURE DISORDER: Status: ACTIVE | Noted: 2022-11-28

## 2022-11-28 LAB
ANION GAP SERPL CALC-SCNC: 13 MMOL/L (ref 8–16)
BASOPHILS # BLD AUTO: 0.05 K/UL (ref 0–0.2)
BASOPHILS NFR BLD: 0.9 % (ref 0–1.9)
BUN SERPL-MCNC: 38 MG/DL (ref 8–23)
CALCIUM SERPL-MCNC: 9.4 MG/DL (ref 8.7–10.5)
CHLORIDE SERPL-SCNC: 106 MMOL/L (ref 95–110)
CO2 SERPL-SCNC: 21 MMOL/L (ref 23–29)
CREAT SERPL-MCNC: 1 MG/DL (ref 0.5–1.4)
DIFFERENTIAL METHOD: ABNORMAL
EOSINOPHIL # BLD AUTO: 0.1 K/UL (ref 0–0.5)
EOSINOPHIL NFR BLD: 1.5 % (ref 0–8)
ERYTHROCYTE [DISTWIDTH] IN BLOOD BY AUTOMATED COUNT: 13.8 % (ref 11.5–14.5)
EST. GFR  (NO RACE VARIABLE): >60 ML/MIN/1.73 M^2
GLUCOSE SERPL-MCNC: 82 MG/DL (ref 70–110)
HCT VFR BLD AUTO: 48 % (ref 40–54)
HGB BLD-MCNC: 16.7 G/DL (ref 14–18)
IMM GRANULOCYTES # BLD AUTO: 0.01 K/UL (ref 0–0.04)
IMM GRANULOCYTES NFR BLD AUTO: 0.2 % (ref 0–0.5)
INR PPP: 1.4 (ref 0.8–1.2)
LYMPHOCYTES # BLD AUTO: 1.3 K/UL (ref 1–4.8)
LYMPHOCYTES NFR BLD: 24.8 % (ref 18–48)
MAGNESIUM SERPL-MCNC: 2.4 MG/DL (ref 1.6–2.6)
MCH RBC QN AUTO: 31.7 PG (ref 27–31)
MCHC RBC AUTO-ENTMCNC: 34.8 G/DL (ref 32–36)
MCV RBC AUTO: 91 FL (ref 82–98)
MONOCYTES # BLD AUTO: 0.7 K/UL (ref 0.3–1)
MONOCYTES NFR BLD: 13 % (ref 4–15)
NEUTROPHILS # BLD AUTO: 3.2 K/UL (ref 1.8–7.7)
NEUTROPHILS NFR BLD: 59.6 % (ref 38–73)
NRBC BLD-RTO: 0 /100 WBC
PLATELET # BLD AUTO: 218 K/UL (ref 150–450)
PMV BLD AUTO: 9.3 FL (ref 9.2–12.9)
POTASSIUM SERPL-SCNC: 4.1 MMOL/L (ref 3.5–5.1)
PROTHROMBIN TIME: 15 SEC (ref 9–12.5)
RBC # BLD AUTO: 5.27 M/UL (ref 4.6–6.2)
RPR SER QL: NORMAL
SODIUM SERPL-SCNC: 140 MMOL/L (ref 136–145)
WBC # BLD AUTO: 5.29 K/UL (ref 3.9–12.7)

## 2022-11-28 PROCEDURE — 85025 COMPLETE CBC W/AUTO DIFF WBC: CPT | Performed by: HOSPITALIST

## 2022-11-28 PROCEDURE — 99233 PR SUBSEQUENT HOSPITAL CARE,LEVL III: ICD-10-PCS | Mod: ,,, | Performed by: STUDENT IN AN ORGANIZED HEALTH CARE EDUCATION/TRAINING PROGRAM

## 2022-11-28 PROCEDURE — 99233 SBSQ HOSP IP/OBS HIGH 50: CPT | Mod: ,,, | Performed by: INTERNAL MEDICINE

## 2022-11-28 PROCEDURE — 25000003 PHARM REV CODE 250

## 2022-11-28 PROCEDURE — 92526 ORAL FUNCTION THERAPY: CPT

## 2022-11-28 PROCEDURE — 99233 SBSQ HOSP IP/OBS HIGH 50: CPT | Mod: ,,, | Performed by: STUDENT IN AN ORGANIZED HEALTH CARE EDUCATION/TRAINING PROGRAM

## 2022-11-28 PROCEDURE — 21400001 HC TELEMETRY ROOM

## 2022-11-28 PROCEDURE — 36415 COLL VENOUS BLD VENIPUNCTURE: CPT | Performed by: STUDENT IN AN ORGANIZED HEALTH CARE EDUCATION/TRAINING PROGRAM

## 2022-11-28 PROCEDURE — 95819 PR EEG,W/AWAKE & ASLEEP RECORD: ICD-10-PCS | Mod: 26,,, | Performed by: PSYCHIATRY & NEUROLOGY

## 2022-11-28 PROCEDURE — 95819 EEG AWAKE AND ASLEEP: CPT

## 2022-11-28 PROCEDURE — 97530 THERAPEUTIC ACTIVITIES: CPT

## 2022-11-28 PROCEDURE — 25000003 PHARM REV CODE 250: Performed by: INTERNAL MEDICINE

## 2022-11-28 PROCEDURE — 25000003 PHARM REV CODE 250: Performed by: HOSPITALIST

## 2022-11-28 PROCEDURE — 95819 EEG AWAKE AND ASLEEP: CPT | Mod: 26,,, | Performed by: PSYCHIATRY & NEUROLOGY

## 2022-11-28 PROCEDURE — 80048 BASIC METABOLIC PNL TOTAL CA: CPT | Performed by: HOSPITALIST

## 2022-11-28 PROCEDURE — 63600175 PHARM REV CODE 636 W HCPCS: Performed by: HOSPITALIST

## 2022-11-28 PROCEDURE — 97535 SELF CARE MNGMENT TRAINING: CPT

## 2022-11-28 PROCEDURE — 85610 PROTHROMBIN TIME: CPT | Performed by: STUDENT IN AN ORGANIZED HEALTH CARE EDUCATION/TRAINING PROGRAM

## 2022-11-28 PROCEDURE — 83735 ASSAY OF MAGNESIUM: CPT | Performed by: HOSPITALIST

## 2022-11-28 PROCEDURE — 99233 PR SUBSEQUENT HOSPITAL CARE,LEVL III: ICD-10-PCS | Mod: ,,, | Performed by: INTERNAL MEDICINE

## 2022-11-28 PROCEDURE — 97116 GAIT TRAINING THERAPY: CPT

## 2022-11-28 RX ORDER — ASPIRIN 325 MG
325 TABLET ORAL ONCE
Status: COMPLETED | OUTPATIENT
Start: 2022-11-28 | End: 2022-11-28

## 2022-11-28 RX ORDER — CLOPIDOGREL 300 MG/1
300 TABLET, FILM COATED ORAL ONCE
Status: COMPLETED | OUTPATIENT
Start: 2022-11-28 | End: 2022-11-28

## 2022-11-28 RX ORDER — SODIUM CHLORIDE 9 MG/ML
INJECTION, SOLUTION INTRAVENOUS CONTINUOUS
Status: ACTIVE | OUTPATIENT
Start: 2022-11-29 | End: 2022-11-29

## 2022-11-28 RX ORDER — NAPROXEN SODIUM 220 MG/1
81 TABLET, FILM COATED ORAL DAILY
Status: DISCONTINUED | OUTPATIENT
Start: 2022-11-29 | End: 2022-12-02

## 2022-11-28 RX ORDER — CLOPIDOGREL BISULFATE 75 MG/1
75 TABLET ORAL DAILY
Status: DISCONTINUED | OUTPATIENT
Start: 2022-11-29 | End: 2022-11-29

## 2022-11-28 RX ADMIN — SACUBITRIL AND VALSARTAN 1 TABLET: 24; 26 TABLET, FILM COATED ORAL at 09:11

## 2022-11-28 RX ADMIN — CARBAMAZEPINE 400 MG: 200 TABLET ORAL at 08:11

## 2022-11-28 RX ADMIN — LACTOBACILLUS ACIDOPHILUS / LACTOBACILLUS BULGARICUS 1 EACH: 100 MILLION CFU STRENGTH GRANULES at 09:11

## 2022-11-28 RX ADMIN — CYANOCOBALAMIN 1000 MCG: 1000 INJECTION, SOLUTION INTRAMUSCULAR; SUBCUTANEOUS at 08:11

## 2022-11-28 RX ADMIN — SACUBITRIL AND VALSARTAN 1 TABLET: 24; 26 TABLET, FILM COATED ORAL at 08:11

## 2022-11-28 RX ADMIN — CARBAMAZEPINE 400 MG: 200 TABLET ORAL at 09:11

## 2022-11-28 RX ADMIN — LACTOBACILLUS ACIDOPHILUS / LACTOBACILLUS BULGARICUS 1 EACH: 100 MILLION CFU STRENGTH GRANULES at 08:11

## 2022-11-28 RX ADMIN — DOCUSATE SODIUM 100 MG: 100 CAPSULE, LIQUID FILLED ORAL at 09:11

## 2022-11-28 RX ADMIN — PRAVASTATIN SODIUM 20 MG: 10 TABLET ORAL at 08:11

## 2022-11-28 RX ADMIN — CLOPIDOGREL BISULFATE 300 MG: 300 TABLET, FILM COATED ORAL at 08:11

## 2022-11-28 RX ADMIN — ASPIRIN 325 MG ORAL TABLET 325 MG: 325 PILL ORAL at 08:11

## 2022-11-28 RX ADMIN — METOPROLOL SUCCINATE 12.5 MG: 25 TABLET, EXTENDED RELEASE ORAL at 08:11

## 2022-11-28 NOTE — PLAN OF CARE
[place] - Telemetry  Discharge Reassessment    Primary Care Provider: Flip Hanna MD    Expected Discharge Date: 11/28/2022    CM met with pt's daughterCassie to discuss dc needs. CM informed pt's daughter that PT/OT is recommending HH. CM provided a list of HH agencies and pt's daughter chose [facility].  Pt's daughter signed the Patients Choice Form.CM will follow up    Reassessment (most recent)       Discharge Reassessment - 11/28/22 1239          Discharge Reassessment    Assessment Type Discharge Planning Reassessment (P)      Did the patient's condition or plan change since previous assessment? Yes (P)      Discharge Plan discussed with: Adult children (P)      Discharge Plan A Home Health (P)      Discharge Plan B Home with family (P)      DME Needed Upon Discharge  other (see comments) (P)    tbd    Discharge Barriers Identified None (P)      Why the patient remains in the hospital Requires continued medical care (P)         Post-Acute Status    Post-Acute Authorization Home Health (P)      Home Health Status Referrals Sent (P)      Coverage [employer] HMO (P)      Patient choice form signed by patient/caregiver List from System Post-Acute Care (P)      Discharge Delays Post-Acute Set-up (P)

## 2022-11-28 NOTE — PLAN OF CARE
Recommendations  1) As medically able, continue cardiac diet- consider liberalizing if low sodium restriction affecting PO intake    A. 1:1 feedings may be warranted for pt  2) Continue Boost plus BID   3) weigh daily or as needed       Goals: 1) PO inakes > 50% of meals/supplements at f/u  Nutrition Goal Status: Continues  Communication of RD Recs:  (POC, sticky note)    Moises Adams, MPH, RDN, LDN

## 2022-11-28 NOTE — ASSESSMENT & PLAN NOTE
Known severe LV dysfxn/ICM  Not grossly vol overloaded, but optivol crossed on ICD and BNP elevated  Not on GDMT on admission  Med rx as per VT section.  Consider as candidate for CardioMEMS.

## 2022-11-28 NOTE — ASSESSMENT & PLAN NOTE
-Patient with Long standing persistent (>12 months) atrial fibrillation which is controlled currently with toprol.   -Patient is currently in sinus rhythm.[id number] Score: 2.   -Anticoagulation indicated and is on warfarin at home.    -Holding warfarin due to supratheraputic INR on admit as well as need for angiogram  -Plan for DOAC on discharge.

## 2022-11-28 NOTE — PLAN OF CARE
Problem: Physical Therapy  Goal: Physical Therapy Goal  Description: Goals to be met by: 22     Patient will increase functional independence with mobility by performin. Supine to sit with Modified [place]  2. Sit to stand transfer with Supervision  3. Bed to chair transfer with Supervision    4. Gait  x 50 feet with Contact Guard Assistance with or without AD   5. Ascend/descend 10 stair with right Handrails Contact Guard Assistance    6. Sitting at edge of bed x15 minutes with Supervision  7. Lower extremity exercise program x10 reps per handout, with supervision    Outcome: Ongoing, Progressing   Continue with POC, pt limited by subjective c/o fatigue.

## 2022-11-28 NOTE — ASSESSMENT & PLAN NOTE
-AICD interrogated and noted sustained VT episodes below threshold of treatment  -AICD reprogrammed  -Stopped amiodarone 11/24- family and patient feels it causes severe weakness and confusion.  -Resumed toprolol on 11/27 at lower dose.   -No further VT reported.  -Continue telemetry  -Cardiology following

## 2022-11-28 NOTE — PT/OT/SLP PROGRESS
Occupational Therapy   Treatment    Name: Balbir Rebollar Sr.  MRN: [id number]  Admitting Diagnosis:  Acute on chronic combined systolic and diastolic congestive heart failure       Recommendations:     Discharge Recommendations: home health OT (with family assist)  Discharge Equipment Recommendations:  bedside commode, walker, rolling  Barriers to discharge:   (not at PLOF, generalized weakess)    Assessment:     Balbir Rebollar Sr. is a 72 y.o. male with a medical diagnosis of Acute on chronic combined systolic and diastolic congestive heart failure.   Performance deficits affecting function are weakness, impaired endurance, impaired self care skills, impaired functional mobility, gait instability, impaired balance, decreased coordination, decreased upper extremity function, decreased lower extremity function, decreased safety awareness, impaired cardiopulmonary response to activity.   The patient was found seated in the chair with c/o fatigue. The patient sat in the chair ~ 3 1/2 hours. The pateint participated in OT with encouragement and education. The patient was limited by c/o fatigue and unsteadiness while standing or ambulating. The patient was somewhat impulsive with attempts to sit, stand or return to bed. OT will continue as planned.    Rehab Prognosis:  Good and Fair; patient would benefit from acute skilled OT services to address these deficits and reach maximum level of function.       Plan:     Patient to be seen 5 x/week to address the above listed problems via self-care/home management, therapeutic activities, therapeutic exercises  Plan of Care Expires: 12/11/22  Plan of Care Reviewed with: patient, daughter, son    Subjective     Pain/Comfort:  Pain Rating 1: 0/10    Objective:     Communicated with: Tor wynne prior to session.  Patient found up in chair with telemetry, chair check, peripheral IV upon OT entry to room.    General Precautions: Standard, fall, diabetic, aspiration    Orthopedic Precautions:N/A   Braces: N/A  Respiratory Status: Room air     Occupational Performance:     Bed Mobility:    Patient completed Scooting/Bridging with stand by assistance  Patient completed Sit to Supine with stand by assistance     Functional Mobility/Transfers:  Patient completed Sit <> Stand Transfer with minimum assistance  with  rolling walker and min assist/verbal cues for hand placement on transfer surface   Functional Mobility: The patient stood with min assist and a RW and min assist/verbal cues for hand placement on transfer surface and on RW. The patient required CGA/min assist for standing balance while BP was being taken. The patient c/o feeling weak and requested to sit. The patient stood a 2nd time with min assist and amb using a RW with min assist and max encouragement in the hallway with PT with chair to follow for safety.  Monitored BP: chair: 159/87, standin/51, bed: 118/68    Activities of Daily Living:  Feeding:  modified independence The patient was found seated in the chair, eating scrambled eggs from home  Upper Body Dressing: contact guard assistance to doff back gown while seated on the EOB      [facility] 6 Click ADL: 15    Treatment & Education:  Participated in self care and functional mobility as noted above  Educated the family (son and daughter) re: OT role and patient progress in OT  OT educated the patient's family re: UE ROM exercises to be performed throughout the day  Educated the patient and family re: need to request nursing assist to transfer to the BSC or chair      Patient left left sidelying with all lines intact, call button in reach, bed alarm on, nurseTor notified, and family and [employer] present    GOALS:   Multidisciplinary Problems       Occupational Therapy Goals          Problem: Occupational Therapy    Goal Priority Disciplines Outcome Interventions   Occupational Therapy Goal     OT, PT/OT Ongoing, Progressing    Description: Goals to be met by:  12/11/22     Patient will increase functional independence with ADLs by performing:    Feeding with Modified [place].  UE Dressing with Modified [place] and Supervision.  LE Dressing with Stand-by Assistance.  Grooming while standing at sink with Stand-by Assistance and Assistive Devices as needed.  Toileting from bedside commode with Supervision and Assistive Devices as needed for hygiene and clothing management.   Sitting at edge of bed x30 minutes with Supervision.  Supine to sit with Modified [place] and Supervision.  Step transfer with Stand-by Assistance  Toilet transfer to [facility] [place] [place] with Stand-by Assistance.  Upper extremity exercise program x15 reps per handout, with assistance as needed.                   Multidisciplinary Problems (Resolved)          Problem: Occupational Therapy    Goal Priority Disciplines Outcome Interventions   Occupational Therapy Goal   (Resolved)     OT, PT/OT Met                        Time Tracking:     OT Date of Treatment: 11/28/22  OT Start Time: 1413  OT Stop Time: 1436  OT Total Time (min): 23 min    Billable Minutes:Self Care/Home Management 15  Therapeutic Activity 8  Total Time 23 (with PT)    OT/ROSENDO: OT          11/28/2022

## 2022-11-28 NOTE — PT/OT/SLP PROGRESS
Physical Therapy Treatment    Patient Name:  Balbir Rebollar Sr.   MRN:  [id number]    Recommendations:     Discharge Recommendations:  home health PT (Family sup/assist PRN)   Discharge Equipment Recommendations: bedside commode, walker, rolling, bath bench   Barriers to discharge:  Pt is not functioning at baseline and is at increased risk for falls and readmission.  Pt will require more family assist     Assessment:     Balbir Rebollar Sr. is a 72 y.o. 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  He presents with the following impairments/functional limitations:  weakness, gait instability, impaired endurance, impaired balance, impaired coordination, decreased safety awareness, impaired cognition, impaired self care skills, impaired functional mobility, decreased coordination Pt progressing, limited by subjective c/o fatigue.    Rehab Prognosis: Good; patient would benefit from acute skilled PT services to address these deficits and reach maximum level of function.    Recent Surgery: Procedure(s) (LRB):  Left heart cath (Left)      Plan:     During this hospitalization, patient to be seen  (5-6x/wk) to address the identified rehab impairments via gait training, therapeutic activities, therapeutic exercises and progress toward the following goals:    Plan of Care Expires:  12/09/22    Subjective     Chief Complaint: Fatigue  Patient/Family Comments/goals: to return to bed  Pain/Comfort:  Pain Rating 1: 0/10      Objective:     Communicated with nsg prior to session.  Patient found up in chair with telemetry, chair check, peripheral IV upon PT entry to room.     General Precautions: Standard, fall, diabetic, aspiration   Orthopedic Precautions:N/A   Braces: N/A  Respiratory Status: Room air     Functional Mobility:  Bed Mobility:     Rolling Left:  stand by assistance  Scooting: stand by assistance  Sit to Supine: contact guard  assistance and with Vc/TC for body mechanics and hand placement  Transfers:     Sit to Stand:  CGA/minimum assistance and with Vc/TC for hand placement and forward weigh tshift over KEVAN with rolling walker x 2 reps from chair  Gait: Gait training with RW and CGA/Min A approx 40' with max Vc/TC for increased trunk ext and walker management/safety.  Pt requires Min A for walker management   Balance: FAir+/Fair sit, Fair stand       AM-PAC 6 CLICK MOBILITY  Turning over in bed (including adjusting bedclothes, sheets and blankets)?: 3  Sitting down on and standing up from a chair with arms (e.g., wheelchair, bedside commode, etc.): 3  Moving from lying on back to sitting on the side of the bed?: 3  Moving to and from a bed to a chair (including a wheelchair)?: 3  Need to walk in hospital room?: 3  Climbing 3-5 steps with a railing?: 2  Basic Mobility Total Score: 17       Treatment & Education:  BP in sitting 118/68, HR 73  BP in standing 113/51, HR 80  BP in sitting after ambulation 159/87 .  Pt stood with RW and CGA with Vc/Tc for increased trunk ext and distribution of weight through UE's to RW approx 2m.  Educated pt and CG's on PT POC, importance of OOB to chair, and LE TE per handout to be performed 10x ea 2-3x/day.     Patient left  HOB elevated  with all lines intact, call button in reach, bed alarm on, nsg notified, and daughter present with Pressure relief boots intact..    GOALS:   Multidisciplinary Problems       Physical Therapy Goals          Problem: Physical Therapy    Goal Priority Disciplines Outcome Goal Variances Interventions   Physical Therapy Goal     PT, PT/OT Ongoing, Progressing     Description: Goals to be met by: 22     Patient will increase functional independence with mobility by performin. Supine to sit with Modified [place]  2. Sit to stand transfer with Supervision  3. Bed to chair transfer with Supervision    4. Gait  x 50 feet with Contact Guard Assistance with  or without AD   5. Ascend/descend 10 stair with right Handrails Contact Guard Assistance    6. Sitting at edge of bed x15 minutes with Supervision  7. Lower extremity exercise program x10 reps per handout, with supervision                         Time Tracking:     PT Received On: 11/28/22  PT Start Time: 1414     PT Stop Time: 1437  PT Total Time (min): 23 min     Billable Minutes: Gait Training 8 and Therapeutic Activity 15  co-treat with OT    Treatment Type: Treatment  PT/PTA: PT     PTA Visit Number: 0     11/28/2022

## 2022-11-28 NOTE — PROGRESS NOTES
[facility] - [place] - Telemetry  Adult Nutrition  Progress Note    SUMMARY       Recommendations  1) As medically able, continue cardiac diet- consider liberalizing if low sodium restriction affecting PO intake    A. 1:1 feedings may be warranted for pt  2) Continue Boost plus BID   3) weigh daily or as needed       Goals: 1) PO inakes > 50% of meals/supplements at f/u  Nutrition Goal Status: Continues  Communication of RD Recs:  (POC, sticky note)    Assessment and Plan    Inadequate energy intake  R/t fatigue, N/V/D, decreased appetite  As evidenced by PO intakes < 50% of needs x at least 5 days  Intervention: fat/sodium modified diet, nutrition education, and nutrition supplement therapy  continues    Malnutrition Assessment     Skin (Micronutrient):  (Bg = 14)  Teeth (Micronutrient):  (missing some)   Micronutrient Evaluation: suspected deficiency   Energy Intake (Malnutrition): less than or equal to 50% for greater than or equal to 5 days           Edema (Fluid Accumulation):  (none noted)             Reason for Assessment    Reason For Assessment: identified at risk by screening criteria  Diagnosis:  (acute on chronic heart failure)  Relevant Medical History: CAD, HTN, AFIB, CVA, MI  Interdisciplinary Rounds: did not attend (remote)    General Information Comments:   11/28: Pt currently NPO due to heart cath procedure tomorrow. Prior to this was on pureed diet. Worsening encephalopathy-- may need 1:1 feeds. Elevated BUN. Wt remains stable.  Continue to monitor.   11/25:71 y/o male admitted with acute on chronic heart failure s/p 1 week of weankess/fatigues, loss of appetite, losse stool ( last 11/23 noted). N/V noted 11/24. Poor intakes this admission. Will add supplements and add nutrition education materials to discharge packet.  NFPE to be done by onsite RD at f/u. No significant wt loss per chart review.    Nutrition Discharge Planning: To be determined- Cardiac diet + Boost plus as needed/Boost  "pudding    Nutrition Risk Screen    Nutrition Risk Screen: other (see comments)    Nutrition/Diet History    Spiritual, Cultural Beliefs, Christianity Practices, Values that Affect Care: no  Food Allergies: NKFA  Factors Affecting Nutritional Intake: decreased appetite, nausea/vomiting, diarrhea    Anthropometrics    Temp: 97.7 °F (36.5 °C)  Height Method: Stated  Height: 5' 9" (175.3 cm)  Height (inches): 69 in  Weight Method: Bed Scale  Weight: 68 kg (149 lb 14.6 oz)  Weight (lb): 149.91 lb  Ideal Body Weight (IBW), Male: 160 lb  % Ideal Body Weight, Male (lb): 93.69 %  BMI (Calculated): 22.1  BMI Grade: 18.5-24.9 - normal  Usual Body Weight (UBW), k.3 kg (21)  % Usual Body Weight: 96.93  % Weight Change From Usual Weight: -3.27 %       Lab/Procedures/Meds    Pertinent Labs Reviewed: reviewed  BMP  Lab Results   Component Value Date     2022    K 4.1 2022     2022    CO2 21 (L) 2022    BUN 38 (H) 2022    CREATININE 1.0 2022    CALCIUM 9.4 2022    ANIONGAP 13 2022    EGFRNORACEVR >60 2022     POCT Glucose   Date Value Ref Range Status   2022 167 (H) 70 - 110 mg/dL Final     Lab Results   Component Value Date    ALBUMIN 3.6 2022     BNP  Recent Labs   Lab 22   BNP 1,441*       Pertinent Medications Reviewed: reviewed  Pertinent Medications Comments: Scheduled Meds:   [START ON 2022] aspirin  81 mg Oral Daily    carBAMazepine  400 mg Oral BID    [START ON 2022] clopidogreL  75 mg Oral Daily    cyanocobalamin  1,000 mcg Subcutaneous Daily    Followed by    [START ON 2022] cyanocobalamin  1,000 mcg Subcutaneous Weekly    docusate sodium  100 mg Oral BID    lactobacillus acidophilus & bulgar  1 packet Oral BID    metoprolol succinate  12.5 mg Oral Daily    polyethylene glycol  17 g Oral Daily    pravastatin  20 mg Oral Daily    sacubitriL-valsartan  1 tablet Oral BID    scopolamine  1 patch Transdermal Q3 " Days     Continuous Infusions:   [START ON 11/29/2022] sodium chloride 0.9%       PRN Meds:.acetaminophen, acetaminophen, albuterol-ipratropium, aluminum-magnesium hydroxide-simethicone, dextrose 10%, dextrose 10%, glucagon (human recombinant), glucose, glucose, loperamide, naloxone, ondansetron      Estimated/Assessed Needs    Weight Used For Calorie Calculations: 68 kg (149 lb 14.6 oz)  Energy Calorie Requirements (kcal): 2040 (30kcal/kg)  Energy Need Method: Kcal/kg  Protein Requirements: 82-95g/day (1.2-1.4g/kg (recovery fro surgery))  Weight Used For Protein Calculations: 68 kg (149 lb 14.6 oz)  Fluid Requirements (mL): 1700 ml or per MD     CHO Requirement: 255 g      Nutrition Prescription Ordered    Current Diet Order: NPO    Evaluation of Received Nutrient/Fluid Intake    I/O: -1.2L since admit  Energy Calories Required: not meeting needs  Protein Required: not meeting needs  Fluid Required: not meeting needs  Comments: LBM 11/27  Tolerance: tolerating     Intake/Output Summary (Last 24 hours) at 11/28/2022 1427  Last data filed at 11/28/2022 0900  Gross per 24 hour   Intake 120 ml   Output 450 ml   Net -330 ml       % Intake of Estimated Energy Needs: 0%  % Meal Intake: 0%    Nutrition Risk    Level of Risk/Frequency of Follow-up: moderate 2 x weekly    Monitor and Evaluation    Food and Nutrient Intake: energy intake, food and beverage intake  Food and Nutrient Adminstration: diet order  Knowledge/Beliefs/Attitudes: food and nutrition knowledge/skill  Physical Activity and Function: nutrition-related ADLs and IADLs  Anthropometric Measurements: weight  Biochemical Data, Medical Tests and Procedures: electrolyte and renal panel, gastrointestinal profile  Nutrition-Focused Physical Findings: overall appearance     Nutrition Follow-Up    RD Follow-up?: Yes  Moises Adams, MPH, RDN, LDN

## 2022-11-28 NOTE — SUBJECTIVE & OBJECTIVE
Interval History:  No acute overnight events.  Patient remained afebrile.  Alert and oriented x3 today.  He is conversant.  Attempted to make jokes.  Does not appear lethargic but is diffusely weak.  Daughter is at bedside.  Patient states he is hungry but does not like the hospital food.  Daughter requests to bring food for the patient    Review of Systems   Constitutional:  Positive for fatigue. Negative for chills and fever.   HENT:  Positive for trouble swallowing. Negative for congestion and rhinorrhea.    Eyes:  Negative for photophobia and visual disturbance.   Respiratory:  Negative for cough and shortness of breath.    Cardiovascular:  Negative for chest pain, palpitations and leg swelling.   Gastrointestinal:  Negative for abdominal pain, diarrhea, nausea and vomiting.   Genitourinary:  Negative for dysuria, frequency, hematuria and urgency.   Musculoskeletal:  Positive for gait problem. Negative for neck stiffness.   Skin:  Negative for pallor, rash and wound.   Neurological:  Positive for weakness. Negative for dizziness, light-headedness and headaches.   Psychiatric/Behavioral:  Negative for confusion, decreased concentration and hallucinations.      Objective:     Vital Signs (Most Recent):  Temp: 97.7 °F (36.5 °C) (11/28/22 1113)  Pulse: 71 (11/28/22 1113)  Resp: 18 (11/28/22 1113)  BP: 129/73 (11/28/22 1113)  SpO2: 95 % (11/28/22 1113) Vital Signs (24h Range):  Temp:  [97.5 °F (36.4 °C)-98.3 °F (36.8 °C)] 97.7 °F (36.5 °C)  Pulse:  [69-80] 71  Resp:  [16-18] 18  SpO2:  [93 %-95 %] 95 %  BP: (117-136)/(61-73) 129/73     Weight: 68 kg (149 lb 14.6 oz)  Body mass index is 22.14 kg/m².    Intake/Output Summary (Last 24 hours) at 11/28/2022 1356  Last data filed at 11/28/2022 0900  Gross per 24 hour   Intake 120 ml   Output 750 ml   Net -630 ml      Physical Exam  Vitals and nursing note reviewed.   Constitutional:       General: He is not in acute distress.     Appearance: He is well-developed. He is  ill-appearing. He is not toxic-appearing or diaphoretic.      Comments: Frail in appearance.  Awake and alert. Conversant   HENT:      Head: Normocephalic and atraumatic.      Right Ear: External ear normal.      Left Ear: External ear normal.      Nose: Nose normal.      Mouth/Throat:      Mouth: Mucous membranes are moist.   Eyes:      Extraocular Movements: Extraocular movements intact.      Conjunctiva/sclera: Conjunctivae normal.   Cardiovascular:      Rate and Rhythm: Normal rate and regular rhythm.      Heart sounds: No murmur heard.  Pulmonary:      Effort: Pulmonary effort is normal. No respiratory distress.      Breath sounds: Normal breath sounds. No wheezing or rales.   Abdominal:      General: Bowel sounds are normal. There is no distension.      Palpations: Abdomen is soft.      Tenderness: There is no abdominal tenderness. There is no guarding.   Musculoskeletal:         General: No tenderness. Normal range of motion.      Cervical back: Normal range of motion.      Right lower leg: No edema.      Left lower leg: No edema.   Skin:     General: Skin is warm and dry.      Coloration: Skin is pale.   Neurological:      Mental Status: He is alert.      Motor: Weakness present.      Comments: Appears attentive and alert today.  Significant diffuse weakness   Psychiatric:         Mood and Affect: Mood normal.         Thought Content: Thought content normal.       Significant Labs: All pertinent labs within the past 24 hours have been reviewed.    Significant Imaging: I have reviewed all pertinent imaging results/findings within the past 24 hours.

## 2022-11-28 NOTE — PT/OT/SLP PROGRESS
Speech Language Pathology Treatment    Patient Name:  Balbir Rebollar Sr.   MRN:  [id number]  Admitting Diagnosis: Acute on chronic combined systolic and diastolic congestive heart failure    Recommendations:                 General Recommendations:  Dysphagia therapy  Diet recommendations:  Puree Diet - IDDSI Level 4, Liquid Diet Level: Mildly thick/Nectar thick liquids - IDDSI Level 2  THIN WATER OKAY OUTSIDE OF MEALS AFTER GOOD ORAL CARE  Aspiration Precautions: 1 bite/sip at a time   General Precautions: Standard, aspiration, pureed diet, nectar thick  Communication strategies:  provide increased time to answer    Subjective   Pt's family present for session reporting that Pt tolerated multiple trials of thin water without overt s/s of aspiration. He is NPO after midnight tonight for angio tomorrow.   Patient goals: d/c use of thickener    Pain/Comfort:  Pain Rating 1: 0/10    Respiratory Status: Room air    Objective:     Has the patient been evaluated by SLP for swallowing?   Yes  Keep patient NPO? No     Pt with significant tongue thrust noted for thin and nectar thick liquids trials. Post intake of thin liquid trials across trials Pt displayed breathy possible throat clear which presented almost like a strong sigh post swallow. Intake of nectar thick liquids were grossly unremarkable. Pt refused further trials. Daughter educated regarding ST update POC to include permission for thin water outside of meals with good oral care and ST reassess after scheduled procedure.     Assessment:     Balbir Rebollar Sr. is a 72 y.o. male with dx of Acute on chronic combined systolic and diastolic congestive heart failure he presents with oropharyngeal dysphagia of a yet to be determined severity.     Goals:   Multidisciplinary Problems       SLP Goals          Problem: SLP    Goal Priority Disciplines Outcome   SLP Goal     SLP Ongoing, Progressing   Description: ST. Pt will tolerate PO diet of pureed  w/nectar thick liquids w/o overt s/s of aspiration.                       Plan:     Patient to be seen:  3 x/week   Plan of Care expires:  12/09/22  Plan of Care reviewed with:  patient   SLP Follow-Up:  Yes       Discharge recommendations:  home with home health   Barriers to Discharge:  None    Time Tracking:     SLP Treatment Date:   11/28/22  Speech Start Time:  1449  Speech Stop Time:  1500     Speech Total Time (min):  11 min    Billable Minutes: Treatment Swallowing Dysfunction 11    11/28/2022

## 2022-11-28 NOTE — ASSESSMENT & PLAN NOTE
-Dr. Banegas discussed with patient's son to clarify goals of care.  Patient a bit too confused to participate.  Son wishes to speak with his sister.  -Consulted palliative care team and discussed with Dr. Paul.  Appreciate input.  -Continue full code for now - looks better today

## 2022-11-28 NOTE — ASSESSMENT & PLAN NOTE
In SR on coumadin  Coumadin stopped (allow INR to drift down for cath), INR 1.4 after Vit K.  Will start DOAC on discharge

## 2022-11-28 NOTE — PLAN OF CARE
Can have thin water outside of meals after oral care, otherwise continue puree with nectar thick liquids, following for ongoing assessment

## 2022-11-28 NOTE — PLAN OF CARE
Problem: Occupational Therapy  Goal: Occupational Therapy Goal  Description: Goals to be met by: 12/11/22     Patient will increase functional independence with ADLs by performing:    Feeding with Modified [place].  UE Dressing with Modified [place] and Supervision.  LE Dressing with Stand-by Assistance.  Grooming while standing at sink with Stand-by Assistance and Assistive Devices as needed.  Toileting from bedside commode with Supervision and Assistive Devices as needed for hygiene and clothing management.   Sitting at edge of bed x30 minutes with Supervision.  Supine to sit with Modified [place] and Supervision.  Step transfer with Stand-by Assistance  Toilet transfer to [facility] with Stand-by Assistance.  Upper extremity exercise program x15 reps per handout, with assistance as needed.    Outcome: Ongoing, Progressing

## 2022-11-28 NOTE — ASSESSMENT & PLAN NOTE
Known CAD s/p LAD PCI 2006  Hx ICM, EF <30% by recent echo.  No overt anginal sxs, but freq ventricular tachycardia noted.  Hold coumadin for possible cath (start DOAC on discharge).  May be able to do as outpatient as VT seems to have calmed down with rx of CHF.  Cath planned for Tues 11/29/22.    Risks, benefits and alternatives of the catheterization procedure were discussed with the patient and his son (Juan Carlos) at the bedside, which include but are not limited to: bleeding, infection, death, heart attack, arrhythmia, kidney failure, stroke, need for emergency surgery, etc.  Patient understands and and agrees to proceed.  Consent was placed on the chart.

## 2022-11-28 NOTE — ASSESSMENT & PLAN NOTE
Freq sustained episodes of VT noted on ICD interrogation, below treatment threshold.  ?r/t CHF, ischemia, meds.  ICD reprogrammed 11/23/22.  Cont toprol, titrate as able  Cont entresto 24/26mg bid, titrate as able   Eventual aldact.  Amio stopped.

## 2022-11-28 NOTE — ASSESSMENT & PLAN NOTE
-Presenting complaint was weakness x 1 week since starting amiodarone.  -Patient and son felt weakness clearly due to amiodarone, but we suspected weakness was driven by the episodes of VT which were driven by uncontrolled severe chf.  -Noted fall overnight from toilet 11/23-11/24 - no head trauma and CT head without acute findings  -Noted near fall / slip to the floor with assistance of nurse on 11/25 - no trauma at all.  -Fall precautions   -Amiodarone stopped 11/24.    -PT/OT consulted and recommend HH at discharge.

## 2022-11-28 NOTE — ASSESSMENT & PLAN NOTE
-Significant nausea and vomiting overnight 11/25-26.  -KUB with non-obstructive bowel gas pattern and abdomen is quite soft albeit with diminished bowel sounds  -Noted dysphagia and suspected aspiration around 1PM on 11/26  -SLP consulted and recommend dysphagia puree diet with nectar thick liquids  -Continue anti-emetics, bowel regimen and aspiration precautions  -Resolved

## 2022-11-28 NOTE — PROGRESS NOTES
"[facility]  Progress Note    Patient Name: Balbir Rebollar Sr.  MRN: [id number]  Patient Class: IP- Inpatient   Admission Date: 11/22/2022  Length of Stay: 5 days  Attending Physician: Rafaela Quinones DO  Primary Care Provider: Flip Hanna MD        Subjective:     Principal Problem:Acute on chronic combined systolic and diastolic congestive heart failure        HPI:  Balbir Rebollar Sr. 72 y.o. male with CAD, HTN, Afib, long term use of anticoagulants, CVA, and ICD, presents to the hospital with a chief complaint of fatigue and weakness with acute onset of one-week. Patient reports he was seen 3 times in a period of a week for the same symptoms and was discharged.  Patient describes his symptoms as feeling tired but I can not sleep. Associated symptoms include difficulty walking, diarrhea, frequent urination, and muscle spasms. Patient's daughter says," he is stumbling going up and down the stairs". Patient reports he was prescribed a new medication that is causing him to have dry mouth and feeling more fatigued (on top of original fatigue complaint). Patient's daughter reports patient is usually very active, but recently has been staying in bed more often this past week. No other exacerbating or alleviating factors. Patient denies dysuria, nausea, vomiting, cough, numbness, slurred speech, black stool , paresthesia, or other associated symptoms. Patient endorses compliance of coumadin. Family reports that the patient seen at  several times recently for symptoms and state they were told nothing was found. Follows with cardiology at  and was recently started on Amiodarone on 11/18/2022.      In the ED patient is afebrile without leukocytosis PT 45.2, INR 4.6, CO2 20, calcium 8.6, BNP 1 441, troponin 0.038, 2nd troponin 0.041 TSH WNL, UA is hazy with trace protein glucose and occult blood, negative for flu and COVID, CXR was negative, EKG showed "Sinus rhythm with " "Premature supraventricular complexes in a pattern of bigeminy, Right bundle branch block, Left anterior fascicular block" without previous EKG for comparison.  Patient was placed in observation for further workup an assessment by Cardiology.  Previous Echo from [facility] everywhere     ECHOCARDIOGRAM   Interpretation Summary  11/01/2021  MRN:  [id number]        FIN:    [id number]      Accession #:[id number]         Order #:[id number]    Indications:    Coronary artery disease involving native coronary artery of native heart without angina pectoris; Paroxysmal                     atrial fibrillation (CMS/HCC); Nonsustained ventricular tachycardia (CMS/HCC)       BP:  128   / 74      HR: 67                       Rhythm:           Sinus                                                      Technical Quality:Very technically difficult study    MEASUREMENTS  (Male / Female) Normal Values   Left Ventricle:    Appears to be normal left ventricular cavity size.  Severely reduced left ventricular systolic function.  Dense hypokinesis and thinning of the anterior wall, septum, and apex with normal contractility elsewhere.   Visually estimated EF is less than 30%.  Grade I diastolic dysfunction (abnormal relaxation filling pattern),    with normal or mildly elevated filling pressures.      Overview/Hospital Course:  72 y.o. 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  He was diagnosed with acute/chronic systolic/diastolic chf exacerbation due to recurrent ventricular tachycardia, supratherapeutic INR, metabolic encephalopathy, dysphagia, b12 deficiency and debility.  Cardiology consulted and his AICD interrogated and reprogrammed.  VT episodes were below threshold of treatment.  Initiated GDMT with toprol, entresto and IV lasix but due to hypotension, vomiting, dysphagia these have been " inconsistently tolerated and not given for several days.  He developed encephalopathy with dysphagia and signs of aspiration worrisome for new stroke, but MRI 11/27 ruled out stroke.  SLP has assessed patient and OK for puree diet with nectar thick liquids - diet advanced 11/27.  Oral amiodarone was stopped due to severe fatigue each time he took this.  Warfarin has been held.  Tentatively planning for [facility] on Tuesday 11/29.  Plan to change warfarin to eliquis at discharge.  Neurology consulted due to his persistent encephalopathy.  Await EEG to rule out seizure activity.  Mental status now at baseline. Started B12 replacement.  PT/OT recommend HH at discharge.  Palliative care consulted and patient remains full code.      Interval History:  No acute overnight events.  Patient remained afebrile.  Alert and oriented x3 today.  He is conversant.  Attempted to make jokes.  Does not appear lethargic but is diffusely weak.  Daughter is at bedside.  Patient states he is hungry but does not like the hospital food.  Daughter requests to bring food for the patient    Review of Systems   Constitutional:  Positive for fatigue. Negative for chills and fever.   HENT:  Positive for trouble swallowing. Negative for congestion and rhinorrhea.    Eyes:  Negative for photophobia and visual disturbance.   Respiratory:  Negative for cough and shortness of breath.    Cardiovascular:  Negative for chest pain, palpitations and leg swelling.   Gastrointestinal:  Negative for abdominal pain, diarrhea, nausea and vomiting.   Genitourinary:  Negative for dysuria, frequency, hematuria and urgency.   Musculoskeletal:  Positive for gait problem. Negative for neck stiffness.   Skin:  Negative for pallor, rash and wound.   Neurological:  Positive for weakness. Negative for dizziness, light-headedness and headaches.   Psychiatric/Behavioral:  Negative for confusion, decreased concentration and hallucinations.      Objective:     Vital Signs (Most  Recent):  Temp: 97.7 °F (36.5 °C) (11/28/22 1113)  Pulse: 71 (11/28/22 1113)  Resp: 18 (11/28/22 1113)  BP: 129/73 (11/28/22 1113)  SpO2: 95 % (11/28/22 1113) Vital Signs (24h Range):  Temp:  [97.5 °F (36.4 °C)-98.3 °F (36.8 °C)] 97.7 °F (36.5 °C)  Pulse:  [69-80] 71  Resp:  [16-18] 18  SpO2:  [93 %-95 %] 95 %  BP: (117-136)/(61-73) 129/73     Weight: 68 kg (149 lb 14.6 oz)  Body mass index is 22.14 kg/m².    Intake/Output Summary (Last 24 hours) at 11/28/2022 1356  Last data filed at 11/28/2022 0900  Gross per 24 hour   Intake 120 ml   Output 750 ml   Net -630 ml      Physical Exam  Vitals and nursing note reviewed.   Constitutional:       General: He is not in acute distress.     Appearance: He is well-developed. He is ill-appearing. He is not toxic-appearing or diaphoretic.      Comments: Frail in appearance.  Awake and alert. Conversant   HENT:      Head: Normocephalic and atraumatic.      Right Ear: External ear normal.      Left Ear: External ear normal.      Nose: Nose normal.      Mouth/Throat:      Mouth: Mucous membranes are moist.   Eyes:      Extraocular Movements: Extraocular movements intact.      Conjunctiva/sclera: Conjunctivae normal.   Cardiovascular:      Rate and Rhythm: Normal rate and regular rhythm.      Heart sounds: No murmur heard.  Pulmonary:      Effort: Pulmonary effort is normal. No respiratory distress.      Breath sounds: Normal breath sounds. No wheezing or rales.   Abdominal:      General: Bowel sounds are normal. There is no distension.      Palpations: Abdomen is soft.      Tenderness: There is no abdominal tenderness. There is no guarding.   Musculoskeletal:         General: No tenderness. Normal range of motion.      Cervical back: Normal range of motion.      Right lower leg: No edema.      Left lower leg: No edema.   Skin:     General: Skin is warm and dry.      Coloration: Skin is pale.   Neurological:      Mental Status: He is alert.      Motor: Weakness present.       Comments: Appears attentive and alert today.  Significant diffuse weakness   Psychiatric:         Mood and Affect: Mood normal.         Thought Content: Thought content normal.       Significant Labs: All pertinent labs within the past 24 hours have been reviewed.    Significant Imaging: I have reviewed all pertinent imaging results/findings within the past 24 hours.      Assessment/Plan:      * Acute on chronic combined systolic and diastolic congestive heart failure  -Admitted to inpatient status  -BNP elevated at 1441  -Echo 10/22 showed EF 20-30%, grade II diastolic dysfunction  -Known chronic ischemic cardiomyopathy with AICD in place  -Cardiology consulted and input appreciated: plan for [facility] on 11/29  -AICD interogated and noted frequent sustained VT episodes below threshold of treatment.  AICD reprogramed with lower threshold on 11/23  -Strict ins/outs and daily weights  -Have attempted gentle diuresis with iv lasix and started toprol and entresto and no further VT thus far  -Due to lethargy, nausea, vomiting and low blood pressure has not been solidly maintained on GDMT.  Has not received lasix since 11/24.  Has not received toprol since 11/24.  Received 1 dose entresto 11/27.  -11/27 looks better  but still quite weak.    -Continue to hold lasix for now  -Continue toprol at 12.5mg daily and Entresto    Ventricular tachycardia  -AICD interrogated and noted sustained VT episodes below threshold of treatment  -AICD reprogrammed  -Stopped amiodarone 11/24- family and patient feels it causes severe weakness and confusion.  -Resumed toprolol on 11/27 at lower dose.   -No further VT reported.  -Continue telemetry  -Cardiology following     Paroxysmal atrial fibrillation  -Patient with Long standing persistent (>12 months) atrial fibrillation which is controlled currently with toprol.   -Patient is currently in sinus rhythm.[id number] Score: 2.   -Anticoagulation indicated and is on warfarin at home.    -Holding  warfarin due to supratheraputic INR on admit as well as need for angiogram  -Plan for DOAC on discharge.    Supratherapeutic INR  -INR >4 on admit and now trending down   -Continue to hold warfarin - has not received during this stay  -INR needs to be less than 1.7 for angiogram - pt given vitamin K 5mg po x1 on (11/27).  -INR 1.4 on 11/28  -Check pt/inr daily.    Chronic anticoagulation  -Treatment as above.  -Will dc warfarin on discharge and start pt on DOAC for anticoagulation     Encephalopathy, metabolic  -On 11/25 more confused and lethargic  -Noted fall 2 overnight late last week - no apparent trauma and head ct at that time without evidence of bleeding.  -No evidence of infection  -Could be secondary to mild dehydration and low blood pressure? - stopped lasix and decreased toprol.  -Ordered delirium precautions, held lasix and held/lowered toprol.  -Checking  for other reversible encephalopathies:  TSH, Ammonia normal.  No UTI.  Folate normal.  B12 is deficient (noted 11/27)  -Repeat head CT 11/26 without any acute findings and no evidence of bleeding.  -Was concerned there could have been a stroke as he now developed dysphagia.  Sent for MRI at [facility] 11/27 which did not reveal any new stroke  -11/27:  Mental status improved but still somewhat lethargic.  SLP evaluated patient and ok for dysphagia diet with nectar thick liquids.  -Await neurology evaluation.  Discussed with Dr. Isaacs - will check EEG given history of seizures.    -Continue carbemazepime.  -Continue delirium precautions.  -Mental status appears near baseline on 11/28 per daughter at bedside. Pt awake and alert and does not appear lethargic     Coronary artery disease involving native coronary artery of native heart without angina pectoris  -History noted  -No chest pain  -Cardiology consulted: plan for angiogram on 11/29    Hypokalemia  -K normal today  -Replace as needed  -Repeat labs in AM.    Nausea and vomiting  -Significant nausea and  vomiting overnight 11/25-26.  -KUB with non-obstructive bowel gas pattern and abdomen is quite soft albeit with diminished bowel sounds  -Noted dysphagia and suspected aspiration around 1PM on 11/26  -SLP consulted and recommend dysphagia puree diet with nectar thick liquids  -Continue anti-emetics, bowel regimen and aspiration precautions  -Resolved    ICD (implantable cardioverter-defibrillator), single, in situ  -Treatment as above.    Pure hypercholesterolemia  -Continue statin     Weakness  -Presenting complaint was weakness x 1 week since starting amiodarone.  -Patient and son felt weakness clearly due to amiodarone, but we suspected weakness was driven by the episodes of VT which were driven by uncontrolled severe chf.  -Noted fall overnight from toilet 11/23-11/24 - no head trauma and CT head without acute findings  -Noted near fall / slip to the floor with assistance of nurse on 11/25 - no trauma at all.  -Fall precautions   -Amiodarone stopped 11/24.    -PT/OT consulted and recommend HH at discharge.    History of CVA (cerebrovascular accident)  -History noted    B12 deficiency  -continue b12 supplementation     ACP (advance care planning)  -Dr. Banegas discussed with patient's son to clarify goals of care.  Patient a bit too confused to participate.  Son wishes to speak with his sister.  -Consulted palliative care team and discussed with Dr. Paul.  Appreciate input.  -Continue full code for now - looks better today      VTE Risk Mitigation (From admission, onward)         Ordered     Reason for No Pharmacological VTE Prophylaxis  Once        Question:  Reasons:  Answer:  Already adequately anticoagulated on oral Anticoagulants    11/23/22 0042     IP VTE HIGH RISK PATIENT  Once         11/23/22 0042     Place sequential compression device  Until discontinued         11/23/22 0042                Discharge Planning   MOR: 11/28/2022     Code Status: Full Code   Is the patient medically ready for discharge?:      Reason for patient still in hospital (select all that apply): Patient trending condition, Treatment, Consult recommendations and PT / OT recommendations  Discharge Plan A: Home Health   Discharge Delays: (!) Post-Acute Set-up              Rafaela Quinones DO  Department of Hospital Medicine   [facility] - Telemetry

## 2022-11-28 NOTE — PROCEDURES
Routine EEG Report    Balbir Rebollar Sr.  [id number]  1950    DATE OF SERVICE: 11/28/2022  REASON FOR CONSULT:  72-year-old man admitted with weakness and progressive lethargy.  Evaluate for evidence of epileptiform activity.    METHODOLOGY   Electroencephalographic (EEG) recording is with electrodes placed according to the International 10-20 placement system.  Thirty two (32) channels of digital signal (sampling rate of 512/sec) including T1 and T2 was simultaneously recorded from the scalp and may include  EKG, EMG, and/or eye monitors.  Recording band pass was 0.1 to 512 hz.  Digital video recording of the patient is simultaneously recorded with the EEG.  The patient is instructed report clinical symptoms which may occur during the recording session.  EEG and video recording is stored and archived in digital format. Activation procedures which include photic stimulation, hyperventilation and instructing patients to perform simple task are done in selected patients.    The EEG is displayed on a monitor screen and can be reviewed using different montages.  Computer assisted analysis is employed to detect spike and electrographic seizure activity.   The entire record is submitted for computer analysis.  The entire recording is visually reviewed and the times identified by computer analysis as being spikes or seizures are reviewed again.  Compresses spectral analysis (CSA) is also performed on the activity recorded from each individual channel.  This is displayed as a power display of frequencies from 0 to 30 Hz over time.   The CSA is reviewed looking for asymmetries in power between homologous areas of the scalp and then compared with the original EEG recording.     [employer] software is also utilized in the review of this study.  This software suite analyzes the EEG recording in multiple domains.  Coherence and rhythmicity is computed to identify EEG sections which may contain organized seizures.  Each  channel undergoes analysis to detect presence of spike and sharp waves which have special and morphological characteristic of epileptic activity.  The routine EEG recording is converted from spacial into frequency domain.  This is then displayed comparing homologous areas to identify areas of significant asymmetry.  Algorithm to identify non-cortically generated artifact is used to separate eye movement, EMG and other artifact from the EEG.      EEG FINDINGS  Background activity:   The background is continuous, relatively symmetric, mildly disorganized mixed frequency activity.  There is plenty of generalized and multifocal slowing seen bilaterally, right>left.    Sleep:  The patient transitions from wakefulness to sleep with the appearance of sleep architecture.    Activation procedures:   The patient is able to follow simple commands and answers most orientation questions correctly except he does not know the current year.    Cardiac Monitor:   Heart rate appears generally regular on a single lead EKG.    Impression:   This is an abnormal awake and asleep routine EEG because of generalized background slowing consistent with a mild-moderate encephalopathy with evidence of subcortical/deep midline dysfunction, right>left.  There are no epileptiform discharges and no electrographic seizures.    Monique Morris MD PhD  Neurology-Epilepsy  [facility].

## 2022-11-28 NOTE — ASSESSMENT & PLAN NOTE
-On 11/25 more confused and lethargic  -Noted fall 2 overnight late last week - no apparent trauma and head ct at that time without evidence of bleeding.  -No evidence of infection  -Could be secondary to mild dehydration and low blood pressure? - stopped lasix and decreased toprol.  -Ordered delirium precautions, held lasix and held/lowered toprol.  -Checking  for other reversible encephalopathies:  TSH, Ammonia normal.  No UTI.  Folate normal.  B12 is deficient (noted 11/27)  -Repeat head CT 11/26 without any acute findings and no evidence of bleeding.  -Was concerned there could have been a stroke as he now developed dysphagia.  Sent for MRI at [facility] 11/27 which did not reveal any new stroke  -11/27:  Mental status improved but still somewhat lethargic.  SLP evaluated patient and ok for dysphagia diet with nectar thick liquids.  -Await neurology evaluation.  Discussed with Dr. Isaacs - will check EEG given history of seizures.    -Continue carbemazepime.  -Continue delirium precautions.  -Mental status appears near baseline on 11/28 per daughter at bedside. Pt awake and alert and does not appear lethargic

## 2022-11-28 NOTE — PROGRESS NOTES
[facility] Telemetry  Cardiology  Progress Note    Patient Name: Balbir Rebollar Sr.  MRN: [id number]  Admission Date: 11/22/2022  Hospital Length of Stay: 5 days  Code Status: Full Code   Attending Physician: Rafaela Quinones DO   Primary Care Physician: Flip Hanna MD  Expected Discharge Date: 11/28/2022  Principal Problem:Acute on chronic combined systolic and diastolic congestive heart failure    Subjective:      Interval Hx: No cp/sob, looks weak.  Case d/w son (Juan Carlos) at bedside.  Case d/w Dr. Quinones.    Tele: SR 70s, no VT noted (personally reviewed and interpreted)        Past Medical History:   Diagnosis Date    AICD (automatic cardioverter/defibrillator) present     Anticoagulant long-term use     Coronary artery disease     History of myocardial infarction     Paroxysmal A-fib     Stroke        Past Surgical History:   Procedure Laterality Date    CHOLECYSTECTOMY      REPLACEMENT OF DUAL CHAMBER IMPLANTABLE CARDIOVERTER-DEFIBRILLATOR         Review of patient's allergies indicates:  No Known Allergies    No current facility-administered medications on file prior to encounter.     Current Outpatient Medications on File Prior to Encounter   Medication Sig    amiodarone (PACERONE) 200 MG Tab Take 200 mg by mouth 2 (two) times daily.    carBAMazepine (TEGRETOL) 200 mg tablet Take 400 mg by mouth 2 (two) times daily.    nitroGLYCERIN (NITROSTAT) 0.4 MG SL tablet Place 0.4 mg under the tongue every 5 (five) minutes as needed.    pravastatin (PRAVACHOL) 20 MG tablet Take 20 mg by mouth.    warfarin (COUMADIN) 6 MG tablet Take 6 mg by mouth. 0.5 (3 mg) Sun, Mon, Tues; 6 mg Wed.; 0.5 (3 mg) Thurs, Fri.; 6 mg Sat.    cetirizine (ZYRTEC) 10 MG tablet Take 1 tablet (10 mg total) by mouth once daily. (Patient not taking: Reported on 11/23/2022)    fluticasone propionate (FLONASE) 50 mcg/actuation nasal spray 1 spray (50 mcg total) by Each Nostril route once daily. (Patient not taking: Reported  on 11/23/2022)     Family History    None       Tobacco Use    Smoking status: Former    Smokeless tobacco: Never   Substance and Sexual Activity    Alcohol use: Never    Drug use: Never    Sexual activity: Not on file     Review of Systems   Gastrointestinal:  Negative for melena.   Genitourinary:  Negative for hematuria.   Objective:     Vital Signs (Most Recent):  Temp: 98.3 °F (36.8 °C) (11/28/22 0506)  Pulse: 79 (11/28/22 0506)  Resp: 16 (11/28/22 0506)  BP: 117/61 (11/28/22 0506)  SpO2: 95 % (11/28/22 0506)   Vital Signs (24h Range):  Temp:  [97.5 °F (36.4 °C)-98.4 °F (36.9 °C)] 98.3 °F (36.8 °C)  Pulse:  [69-81] 79  Resp:  [16-18] 16  SpO2:  [92 %-95 %] 95 %  BP: (117-136)/(61-72) 117/61     Weight: 68 kg (149 lb 14.6 oz)  Body mass index is 22.14 kg/m².    SpO2: 95 %  O2 Device (Oxygen Therapy): room air      Intake/Output Summary (Last 24 hours) at 11/28/2022 0706  Last data filed at 11/27/2022 2258  Gross per 24 hour   Intake --   Output 750 ml   Net -750 ml         Lines/Drains/Airways       Peripheral Intravenous Line  Duration                  Peripheral IV - Single Lumen 11/26/22 0034 18 G Anterior;Left Forearm 2 days                    Physical Exam  Constitutional:       General: He is not in acute distress.     Appearance: Normal appearance. He is well-developed and normal weight. He is not ill-appearing, toxic-appearing or diaphoretic.   HENT:      Head: Normocephalic and atraumatic.   Eyes:      General: No scleral icterus.     Extraocular Movements: Extraocular movements intact.      Conjunctiva/sclera: Conjunctivae normal.      Pupils: Pupils are equal, round, and reactive to light.   Neck:      Thyroid: No thyromegaly.      Vascular: No JVD.      Trachea: No tracheal deviation.   Cardiovascular:      Rate and Rhythm: Normal rate and regular rhythm.      Pulses:           Radial pulses are 2+ on the right side.      Heart sounds: S1 normal and S2 normal. No murmur heard.    No friction rub.  No gallop.   Pulmonary:      Effort: Pulmonary effort is normal. No respiratory distress.      Breath sounds: Normal breath sounds. No stridor. No wheezing, rhonchi or rales.   Chest:      Chest wall: No tenderness.   Abdominal:      General: There is no distension.      Palpations: Abdomen is soft.   Musculoskeletal:         General: No swelling or tenderness. Normal range of motion.      Cervical back: Normal range of motion and neck supple. No rigidity.      Right lower leg: No edema.      Left lower leg: No edema.   Skin:     General: Skin is warm and dry.      Coloration: Skin is not jaundiced.   Neurological:      General: No focal deficit present.      Mental Status: He is alert and oriented to person, place, and time.      Cranial Nerves: No cranial nerve deficit.   Psychiatric:         Mood and Affect: Mood normal.         Behavior: Behavior normal.       Current Medications:   carBAMazepine  400 mg Oral BID    cyanocobalamin  1,000 mcg Subcutaneous Daily    Followed by    [START ON 12/4/2022] cyanocobalamin  1,000 mcg Subcutaneous Weekly    docusate sodium  100 mg Oral BID    lactobacillus acidophilus & bulgar  1 packet Oral BID    metoprolol succinate  12.5 mg Oral Daily    polyethylene glycol  17 g Oral Daily    pravastatin  20 mg Oral Daily    sacubitriL-valsartan  1 tablet Oral BID    scopolamine  1 patch Transdermal Q3 Days       acetaminophen, acetaminophen, albuterol-ipratropium, aluminum-magnesium hydroxide-simethicone, dextrose 10%, dextrose 10%, glucagon (human recombinant), glucose, glucose, loperamide, naloxone, ondansetron    Laboratory (all labs reviewed):  CBC:  Recent Labs   Lab 11/25/22 0426 11/26/22  0107 11/26/22 0456 11/27/22 0456 11/28/22  0514   WBC 5.72 7.74 6.68 5.34 5.29   Hemoglobin 15.3 15.3 15.3 15.7 16.7   Hematocrit 46.2 45.0 46.1 46.6 48.0   Platelets 180 176 181 201 218         CHEMISTRIES:  Recent Labs   Lab 11/24/22 0448 11/25/22 0426 11/26/22 0456  11/27/22  0456 11/28/22  0514   Glucose 103 104 104 81 82   Sodium 141 138 136 140 140   Potassium 5.1 4.6 4.6 4.0 4.1   BUN 15 20 33 H 34 H 38 H   Creatinine 1.0 1.1 1.2 1.0 1.0   eGFR >60 >60 >60 >60 >60   Calcium 8.6 L 8.8 8.9 8.9 9.4   Magnesium 2.3 2.3 2.5 2.5 2.4         CARDIAC BIOMARKERS:  Recent Labs   Lab 11/22/22  2152 11/23/22  0230 11/23/22  0616   Troponin I 0.038 H 0.041 H 0.038 H         COAGS:  Recent Labs   Lab 11/24/22  0448 11/25/22  0426 11/26/22  0637 11/27/22  0456 11/28/22  0514   INR 3.1 H 1.9 H 2.5 H 2.4 H 1.4 H         LIPIDS/LFTS:  Recent Labs   Lab 11/22/22 2152   AST 29   ALT 12         BNP:  Recent Labs   Lab 11/22/22 2152   BNP 1,441 H         TSH:  Recent Labs   Lab 11/22/22  2306 11/25/22  1606   TSH 1.461 0.805         Free T4:        Diagnostic Results:  ECG (personally reviewed and interpreted tracing(s)):  11/22/22 2143 SR 87, RBBB/LAD, bigeminal PACs, QTc 474    Chest X-Ray (personally reviewed and interpreted image(s)): 11/22/22 NAD, L ICD 1 lead    Echo: 10/19/22 (care everywhere)  EF is approximately 20-30%, Ao root 4.1cm.    1. Severely decreased left ventricular systolic function.       2. Grade II diastolic dysfunction.       3. Wall motion abnormalities imply disease of the left anterior descending coronary artery.     4. Mild nonrheumatic mitral valve regurgitation with no suggestion of left atrial enlargement.     5. Mild nonrheumatic aortic valve regurgitation.       6. Mild-to-moderate aortic root dilation.     7. Although estimation of pulmonary artery systolic pressure is not possible due to incomplete tricuspid regurgitation velocity       profile, pulmonary hypertension is not suspected.     Compared to previous echocardiogram 11/01/2021, no significant changes are seen       Cath: (care [place] note 11/17/22)  (09/03/2006):  DE Stent placement in LAD due to anterior STEMI      Assessment and Plan:     * Acute on chronic combined systolic and  diastolic congestive heart failure  Known severe LV dysfxn/ICM  Not grossly vol overloaded, but optivol crossed on ICD and BNP elevated  Not on GDMT on admission  Med rx as per VT section.  Consider as candidate for CardioMEMS.      Weakness  ?related to freq ventricular ectopy/sustained VT.  Other dx/mgmt per IM    Ventricular tachycardia  Freq sustained episodes of VT noted on ICD interrogation, below treatment threshold.  ?r/t CHF, ischemia, meds.  ICD reprogrammed 11/23/22.  Cont toprol, titrate as able  Cont entresto 24/26mg bid, titrate as able   Eventual aldact.  Amio stopped.    Coronary artery disease involving native coronary artery of native heart without angina pectoris  Known CAD s/p LAD PCI 2006  Hx ICM, EF <30% by recent echo.  No overt anginal sxs, but freq ventricular tachycardia noted.  Hold coumadin for possible cath (start DOAC on discharge).  May be able to do as outpatient as VT seems to have calmed down with rx of CHF.  Cath planned for es 11/29/22.    Risks, benefits and alternatives of the catheterization procedure were discussed with the patient and his son (Juan Carlos) at the bedside, which include but are not limited to: bleeding, infection, death, heart attack, arrhythmia, kidney failure, stroke, need for emergency surgery, etc.  Patient understands and and agrees to proceed.  Consent was placed on the chart.          Paroxysmal atrial fibrillation  In SR on coumadin  Coumadin stopped (allow INR to drift down for cath), INR 1.4 after Vit K.  Will start DOAC on discharge    ICD (implantable cardioverter-defibrillator), single, in situ  [employer] single chamber ICD  Reprogrammed with lower rate threshold and more ATP on 11/23/22    Chronic anticoagulation  INR now 1.4  Hold coumadin and allow to drift down for cath.  Switch to DOAC on discharge.    Pure hypercholesterolemia  Cont statin        VTE Risk Mitigation (From admission, onward)         Ordered     Reason for No Pharmacological VTE  Prophylaxis  Once        Question:  Reasons:  Answer:  Already adequately anticoagulated on oral Anticoagulants    11/23/22 0042     IP VTE HIGH RISK PATIENT  Once         11/23/22 0042     Place sequential compression device  Until discontinued         11/23/22 0042                Wesley Rico MD  Cardiology  [facility] - [place] - [facility]

## 2022-11-28 NOTE — PROGRESS NOTES
[facility] - [place] - Telemetry  Neurology  Progress Note    Patient Name: Balbir Rebollar Sr.  MRN: [id number]  Admission Date: 11/22/2022  Hospital Length of Stay: 5 days  Code Status: Full Code   Attending Provider: Rafaela Quinones DO   Consulting Provider: Lori Isaacs MD  Primary Care Physician: Flip Hanna MD  Principal Problem:Acute on chronic combined systolic and diastolic congestive heart failure      Subjective:     Chief Complaint:  worsening encephalopathy     HPI:   71 yo M w CAD, HTN, afib on anticoag,  ICD with fatigue, weakness x 1 week. Remote hx of CVA, ? Hx of seizures (on carbamazepine).      Patient admitted for cardiac evaluation, was recently started on amiodarone 11/18/22.      Patient not seen today 2/2 to going for MRI , history obtained via chart review.      Neurology consulted for worsening lethargy and encephalopathy.     11/28/22:  Patient seen today, MRI brain obtained yesterday. Feels better this morning, alert. Patient's daughter at bedside.  Discussed seizure history - remote hx of seizures when he was 10 years old and fell off a bike, has been on carbamazepine since.   Discussed decreased PO intake and fatigue.     Past Medical History:   Diagnosis Date    AICD (automatic cardioverter/defibrillator) present     Anticoagulant long-term use     Coronary artery disease     History of myocardial infarction     Paroxysmal A-fib     Stroke        Past Surgical History:   Procedure Laterality Date    CHOLECYSTECTOMY      REPLACEMENT OF DUAL CHAMBER IMPLANTABLE CARDIOVERTER-DEFIBRILLATOR         Review of patient's allergies indicates:  No Known Allergies    Current Neurological Medications:     No current facility-administered medications on file prior to encounter.     Current Outpatient Medications on File Prior to Encounter   Medication Sig    amiodarone (PACERONE) 200 MG Tab Take 200 mg by mouth 2 (two) times daily.    carBAMazepine (TEGRETOL) 200 mg tablet Take 400 mg by mouth 2  (two) times daily.    nitroGLYCERIN (NITROSTAT) 0.4 MG SL tablet Place 0.4 mg under the tongue every 5 (five) minutes as needed.    pravastatin (PRAVACHOL) 20 MG tablet Take 20 mg by mouth.    warfarin (COUMADIN) 6 MG tablet Take 6 mg by mouth. 0.5 (3 mg) Sun, Mon, Tues; 6 mg Wed.; 0.5 (3 mg) Thurs, Fri.; 6 mg Sat.    cetirizine (ZYRTEC) 10 MG tablet Take 1 tablet (10 mg total) by mouth once daily. (Patient not taking: Reported on 11/23/2022)    fluticasone propionate (FLONASE) 50 mcg/actuation nasal spray 1 spray (50 mcg total) by Each Nostril route once daily. (Patient not taking: Reported on 11/23/2022)      Family History    None       Tobacco Use    Smoking status: Former    Smokeless tobacco: Never   Substance and Sexual Activity    Alcohol use: Never    Drug use: Never    Sexual activity: Not on file       Objective:     Vital Signs (Most Recent):  Temp: 98.3 °F (36.8 °C) (11/28/22 0752)  Pulse: 80 (11/28/22 0752)  Resp: 18 (11/28/22 0752)  BP: 128/66 (11/28/22 0752)  SpO2: (!) 94 % (11/28/22 0752)   Vital Signs (24h Range):  Temp:  [97.5 °F (36.4 °C)-98.3 °F (36.8 °C)] 98.3 °F (36.8 °C)  Pulse:  [69-80] 80  Resp:  [16-18] 18  SpO2:  [93 %-95 %] 94 %  BP: (117-136)/(61-70) 128/66     Weight: 68 kg (149 lb 14.6 oz)  Body mass index is 22.14 kg/m².    Gen: in NAD  HEENT: NCAT, MMM  Resp effort: normal  Abd: soft  Skin: no rashes    Gen: alert and oriented to person, place, year  Attn: normal  Speech: no evidence of dysarthria  CN: PERRL, EOMI, facial sensation V1-V3 intact, symmetric facies, hearing grossly intact, uvula and palate midline  Motor: no focal weakness appreciated, able to hold extremities to gravity bilateral upper and lower  Reflexes symmetric bilateral upper and lower extremities  Sensation to light touch intact    Plantar reflexes downgoing    Coordination not assessed  Gait not assessed      Significant Labs:   BNP 1441  B12 198  TSH wnl    Significant Imaging: personally reviewed  CTH diffuse  cerebral atrophy  MRI brain with prior infarcts and chronic microvascular change    Assessment and Plan:     71 yo M w hx of heart failure (BNP elevated on admission) w AICD for severe ischemic cardiac myopathy, paroxysmal afib on warfarin at home (plan for DOAC on discharge, holding anticog pending cath 11/29/22), remote hx of CVA and seizures, consulted to neurology for encephalopathy, resolved.    Likely multifactorial 2/2 to sustained VT, deconditioning. Resolved. MRI brain wo evidence of acute infarct or acute intracranial processes.    Patient w remote hx of seizures on carbamazepine. Pending routine EEG today to assess for epileptiform discharges given remote hx and recently resolved encephalopathy.    Active Diagnoses:    Diagnosis Date Noted POA    PRINCIPAL PROBLEM:  Acute on chronic combined systolic and diastolic congestive heart failure [I50.43] 11/23/2022 Yes    Nausea and vomiting [R11.2] 11/26/2022 No    Encephalopathy, metabolic [G93.41] 11/25/2022 Yes    Supratherapeutic INR [R79.1] 11/24/2022 Yes    Hypokalemia [E87.6] 11/24/2022 Yes    ACP (advance care planning) [Z71.89] 11/24/2022 Not Applicable    Weakness [R53.1] 11/23/2022 Yes    Ventricular tachycardia [I47.20] 11/23/2022 Yes    ICD (implantable cardioverter-defibrillator), single, in situ [Z95.810] 07/09/2020 Yes    Chronic anticoagulation [Z79.01] 06/23/2020 Not Applicable    Paroxysmal atrial fibrillation [I48.0] 03/06/2019 Yes    History of CVA (cerebrovascular accident) [Z86.73] 03/06/2019 Not Applicable    Pure hypercholesterolemia [E78.00] 03/06/2019 Yes    Coronary artery disease involving native coronary artery of native heart without angina pectoris [I25.10] 03/06/2019 Yes      Problems Resolved During this Admission:    Diagnosis Date Noted Date Resolved POA    Ischemic cardiomyopathy [I25.5] 02/16/2022 11/24/2022 Yes       VTE Risk Mitigation (From admission, onward)           Ordered     Reason for No Pharmacological VTE  Prophylaxis  Once        Question:  Reasons:  Answer:  Already adequately anticoagulated on oral Anticoagulants    11/23/22 0042     IP VTE HIGH RISK PATIENT  Once         11/23/22 0042     Place sequential compression device  Until discontinued         11/23/22 0042                    Thank you for your consult. I will follow-up with patient. Please contact us if you have any additional questions.  Pending EEG today.     Lori Isaacs MD  Neurology  [facility] - Telemetry

## 2022-11-28 NOTE — SUBJECTIVE & OBJECTIVE
Past Medical History:   Diagnosis Date    AICD (automatic cardioverter/defibrillator) present     Anticoagulant long-term use     Coronary artery disease     History of myocardial infarction     Paroxysmal A-fib     Stroke        Past Surgical History:   Procedure Laterality Date    CHOLECYSTECTOMY      REPLACEMENT OF DUAL CHAMBER IMPLANTABLE CARDIOVERTER-DEFIBRILLATOR         Review of patient's allergies indicates:  No Known Allergies    No current facility-administered medications on file prior to encounter.     Current Outpatient Medications on File Prior to Encounter   Medication Sig    amiodarone (PACERONE) 200 MG Tab Take 200 mg by mouth 2 (two) times daily.    carBAMazepine (TEGRETOL) 200 mg tablet Take 400 mg by mouth 2 (two) times daily.    nitroGLYCERIN (NITROSTAT) 0.4 MG SL tablet Place 0.4 mg under the tongue every 5 (five) minutes as needed.    pravastatin (PRAVACHOL) 20 MG tablet Take 20 mg by mouth.    warfarin (COUMADIN) 6 MG tablet Take 6 mg by mouth. 0.5 (3 mg) Sun, Mon, Tues; 6 mg Wed.; 0.5 (3 mg) Thurs, Fri.; 6 mg Sat.    cetirizine (ZYRTEC) 10 MG tablet Take 1 tablet (10 mg total) by mouth once daily. (Patient not taking: Reported on 11/23/2022)    fluticasone propionate (FLONASE) 50 mcg/actuation nasal spray 1 spray (50 mcg total) by Each Nostril route once daily. (Patient not taking: Reported on 11/23/2022)     Family History    None       Tobacco Use    Smoking status: Former    Smokeless tobacco: Never   Substance and Sexual Activity    Alcohol use: Never    Drug use: Never    Sexual activity: Not on file     Review of Systems   Gastrointestinal:  Negative for melena.   Genitourinary:  Negative for hematuria.   Objective:     Vital Signs (Most Recent):  Temp: 98.3 °F (36.8 °C) (11/28/22 0506)  Pulse: 79 (11/28/22 0506)  Resp: 16 (11/28/22 0506)  BP: 117/61 (11/28/22 0506)  SpO2: 95 % (11/28/22 0506)   Vital Signs (24h Range):  Temp:  [97.5 °F (36.4 °C)-98.4 °F (36.9 °C)] 98.3 °F (36.8  °C)  Pulse:  [69-81] 79  Resp:  [16-18] 16  SpO2:  [92 %-95 %] 95 %  BP: (117-136)/(61-72) 117/61     Weight: 68 kg (149 lb 14.6 oz)  Body mass index is 22.14 kg/m².    SpO2: 95 %  O2 Device (Oxygen Therapy): room air      Intake/Output Summary (Last 24 hours) at 11/28/2022 0706  Last data filed at 11/27/2022 2258  Gross per 24 hour   Intake --   Output 750 ml   Net -750 ml         Lines/Drains/Airways       Peripheral Intravenous Line  Duration                  Peripheral IV - Single Lumen 11/26/22 0034 18 G Anterior;Left Forearm 2 days                    Physical Exam  Constitutional:       General: He is not in acute distress.     Appearance: Normal appearance. He is well-developed and normal weight. He is not ill-appearing, toxic-appearing or diaphoretic.   HENT:      Head: Normocephalic and atraumatic.   Eyes:      General: No scleral icterus.     Extraocular Movements: Extraocular movements intact.      Conjunctiva/sclera: Conjunctivae normal.      Pupils: Pupils are equal, round, and reactive to light.   Neck:      Thyroid: No thyromegaly.      Vascular: No JVD.      Trachea: No tracheal deviation.   Cardiovascular:      Rate and Rhythm: Normal rate and regular rhythm.      Pulses:           Radial pulses are 2+ on the right side.      Heart sounds: S1 normal and S2 normal. No murmur heard.    No friction rub. No gallop.   Pulmonary:      Effort: Pulmonary effort is normal. No respiratory distress.      Breath sounds: Normal breath sounds. No stridor. No wheezing, rhonchi or rales.   Chest:      Chest wall: No tenderness.   Abdominal:      General: There is no distension.      Palpations: Abdomen is soft.   Musculoskeletal:         General: No swelling or tenderness. Normal range of motion.      Cervical back: Normal range of motion and neck supple. No rigidity.      Right lower leg: No edema.      Left lower leg: No edema.   Skin:     General: Skin is warm and dry.      Coloration: Skin is not jaundiced.    Neurological:      General: No focal deficit present.      Mental Status: He is alert and oriented to person, place, and time.      Cranial Nerves: No cranial nerve deficit.   Psychiatric:         Mood and Affect: Mood normal.         Behavior: Behavior normal.       Current Medications:   carBAMazepine  400 mg Oral BID    cyanocobalamin  1,000 mcg Subcutaneous Daily    Followed by    [START ON 12/4/2022] cyanocobalamin  1,000 mcg Subcutaneous Weekly    docusate sodium  100 mg Oral BID    lactobacillus acidophilus & bulgar  1 packet Oral BID    metoprolol succinate  12.5 mg Oral Daily    polyethylene glycol  17 g Oral Daily    pravastatin  20 mg Oral Daily    sacubitriL-valsartan  1 tablet Oral BID    scopolamine  1 patch Transdermal Q3 Days       acetaminophen, acetaminophen, albuterol-ipratropium, aluminum-magnesium hydroxide-simethicone, dextrose 10%, dextrose 10%, glucagon (human recombinant), glucose, glucose, loperamide, naloxone, ondansetron    Laboratory (all labs reviewed):  CBC:  Recent Labs   Lab 11/25/22 0426 11/26/22  0107 11/26/22 0456 11/27/22  0456 11/28/22  0514   WBC 5.72 7.74 6.68 5.34 5.29   Hemoglobin 15.3 15.3 15.3 15.7 16.7   Hematocrit 46.2 45.0 46.1 46.6 48.0   Platelets 180 176 181 201 218         CHEMISTRIES:  Recent Labs   Lab 11/24/22 0448 11/25/22 0426 11/26/22 0456 11/27/22  0456 11/28/22  0514   Glucose 103 104 104 81 82   Sodium 141 138 136 140 140   Potassium 5.1 4.6 4.6 4.0 4.1   BUN 15 20 33 H 34 H 38 H   Creatinine 1.0 1.1 1.2 1.0 1.0   eGFR >60 >60 >60 >60 >60   Calcium 8.6 L 8.8 8.9 8.9 9.4   Magnesium 2.3 2.3 2.5 2.5 2.4         CARDIAC BIOMARKERS:  Recent Labs   Lab 11/22/22  2152 11/23/22  0230 11/23/22  0616   Troponin I 0.038 H 0.041 H 0.038 H         COAGS:  Recent Labs   Lab 11/24/22 0448 11/25/22 0426 11/26/22  0637 11/27/22  0456 11/28/22  0514   INR 3.1 H 1.9 H 2.5 H 2.4 H 1.4 H         LIPIDS/LFTS:  Recent Labs   Lab 11/22/22  2152   AST 29   ALT 12          BNP:  Recent Labs   Lab 11/22/22  2152   BNP 1,441 H         TSH:  Recent Labs   Lab 11/22/22  2306 11/25/22  1606   TSH 1.461 0.805         Free T4:        Diagnostic Results:  ECG (personally reviewed and interpreted tracing(s)):  11/22/22 2143 SR 87, RBBB/LAD, bigeminal PACs, QTc 474    Chest X-Ray (personally reviewed and interpreted image(s)): 11/22/22 NAD, L ICD 1 lead    Echo: 10/19/22 (care everywhere)  EF is approximately 20-30%, Ao root 4.1cm.    1. Severely decreased left ventricular systolic function.       2. Grade II diastolic dysfunction.       3. Wall motion abnormalities imply disease of the left anterior descending coronary artery.     4. Mild nonrheumatic mitral valve regurgitation with no suggestion of left atrial enlargement.     5. Mild nonrheumatic aortic valve regurgitation.       6. Mild-to-moderate aortic root dilation.     7. Although estimation of pulmonary artery systolic pressure is not possible due to incomplete tricuspid regurgitation velocity       profile, pulmonary hypertension is not suspected.     Compared to previous echocardiogram 11/01/2021, no significant changes are seen       Cath: (care [place] note 11/17/22)  (09/03/2006):  DE Stent placement in LAD due to anterior STEMI

## 2022-11-28 NOTE — ASSESSMENT & PLAN NOTE
-Admitted to inpatient status  -BNP elevated at 1441  -Echo 10/22 showed EF 20-30%, grade II diastolic dysfunction  -Known chronic ischemic cardiomyopathy with AICD in place  -Cardiology consulted and input appreciated: plan for [facility] on 11/29  -AICD interogated and noted frequent sustained VT episodes below threshold of treatment.  AICD reprogramed with lower threshold on 11/23  -Strict ins/outs and daily weights  -Have attempted gentle diuresis with iv lasix and started toprol and entresto and no further VT thus far  -Due to lethargy, nausea, vomiting and low blood pressure has not been solidly maintained on GDMT.  Has not received lasix since 11/24.  Has not received toprol since 11/24.  Received 1 dose entresto 11/27.  -11/27 looks better  but still quite weak.    -Continue to hold lasix for now  -Continue toprol at 12.5mg daily and Entresto

## 2022-11-29 LAB
ANION GAP SERPL CALC-SCNC: 15 MMOL/L (ref 8–16)
BASOPHILS # BLD AUTO: 0.03 K/UL (ref 0–0.2)
BASOPHILS NFR BLD: 0.6 % (ref 0–1.9)
BUN SERPL-MCNC: 37 MG/DL (ref 8–23)
CALCIUM SERPL-MCNC: 9.3 MG/DL (ref 8.7–10.5)
CHLORIDE SERPL-SCNC: 107 MMOL/L (ref 95–110)
CO2 SERPL-SCNC: 19 MMOL/L (ref 23–29)
CREAT SERPL-MCNC: 0.9 MG/DL (ref 0.5–1.4)
DIFFERENTIAL METHOD: NORMAL
EOSINOPHIL # BLD AUTO: 0 K/UL (ref 0–0.5)
EOSINOPHIL NFR BLD: 0.8 % (ref 0–8)
ERYTHROCYTE [DISTWIDTH] IN BLOOD BY AUTOMATED COUNT: 13.9 % (ref 11.5–14.5)
EST. GFR  (NO RACE VARIABLE): >60 ML/MIN/1.73 M^2
GLUCOSE SERPL-MCNC: 107 MG/DL (ref 70–110)
HCT VFR BLD AUTO: 48.7 % (ref 40–54)
HGB BLD-MCNC: 16.5 G/DL (ref 14–18)
IMM GRANULOCYTES # BLD AUTO: 0.01 K/UL (ref 0–0.04)
IMM GRANULOCYTES NFR BLD AUTO: 0.2 % (ref 0–0.5)
INR PPP: 1.1 (ref 0.8–1.2)
LYMPHOCYTES # BLD AUTO: 1.1 K/UL (ref 1–4.8)
LYMPHOCYTES NFR BLD: 22.1 % (ref 18–48)
MAGNESIUM SERPL-MCNC: 2.3 MG/DL (ref 1.6–2.6)
MCH RBC QN AUTO: 30.5 PG (ref 27–31)
MCHC RBC AUTO-ENTMCNC: 33.9 G/DL (ref 32–36)
MCV RBC AUTO: 90 FL (ref 82–98)
MONOCYTES # BLD AUTO: 0.6 K/UL (ref 0.3–1)
MONOCYTES NFR BLD: 12.4 % (ref 4–15)
NEUTROPHILS # BLD AUTO: 3.1 K/UL (ref 1.8–7.7)
NEUTROPHILS NFR BLD: 63.9 % (ref 38–73)
NRBC BLD-RTO: 0 /100 WBC
PLATELET # BLD AUTO: 234 K/UL (ref 150–450)
PMV BLD AUTO: 9.4 FL (ref 9.2–12.9)
POTASSIUM SERPL-SCNC: 4 MMOL/L (ref 3.5–5.1)
PROTHROMBIN TIME: 11.7 SEC (ref 9–12.5)
RBC # BLD AUTO: 5.41 M/UL (ref 4.6–6.2)
SODIUM SERPL-SCNC: 141 MMOL/L (ref 136–145)
WBC # BLD AUTO: 4.84 K/UL (ref 3.9–12.7)

## 2022-11-29 PROCEDURE — 25000003 PHARM REV CODE 250: Performed by: INTERNAL MEDICINE

## 2022-11-29 PROCEDURE — 99233 SBSQ HOSP IP/OBS HIGH 50: CPT | Mod: ,,, | Performed by: STUDENT IN AN ORGANIZED HEALTH CARE EDUCATION/TRAINING PROGRAM

## 2022-11-29 PROCEDURE — 20000000 HC ICU ROOM

## 2022-11-29 PROCEDURE — 99152 PR MOD CONSCIOUS SEDATION, SAME PHYS, 5+ YRS, FIRST 15 MIN: ICD-10-PCS | Mod: ,,, | Performed by: INTERNAL MEDICINE

## 2022-11-29 PROCEDURE — 99152 MOD SED SAME PHYS/QHP 5/>YRS: CPT | Performed by: INTERNAL MEDICINE

## 2022-11-29 PROCEDURE — 63600175 PHARM REV CODE 636 W HCPCS: Performed by: INTERNAL MEDICINE

## 2022-11-29 PROCEDURE — 25500020 PHARM REV CODE 255: Performed by: INTERNAL MEDICINE

## 2022-11-29 PROCEDURE — C1894 INTRO/SHEATH, NON-LASER: HCPCS | Performed by: INTERNAL MEDICINE

## 2022-11-29 PROCEDURE — 93458 L HRT ARTERY/VENTRICLE ANGIO: CPT | Performed by: INTERNAL MEDICINE

## 2022-11-29 PROCEDURE — 25000003 PHARM REV CODE 250: Performed by: STUDENT IN AN ORGANIZED HEALTH CARE EDUCATION/TRAINING PROGRAM

## 2022-11-29 PROCEDURE — 36415 COLL VENOUS BLD VENIPUNCTURE: CPT | Performed by: STUDENT IN AN ORGANIZED HEALTH CARE EDUCATION/TRAINING PROGRAM

## 2022-11-29 PROCEDURE — C1769 GUIDE WIRE: HCPCS | Performed by: INTERNAL MEDICINE

## 2022-11-29 PROCEDURE — C1887 CATHETER, GUIDING: HCPCS | Performed by: INTERNAL MEDICINE

## 2022-11-29 PROCEDURE — 93458 L HRT ARTERY/VENTRICLE ANGIO: CPT | Mod: 26,,, | Performed by: INTERNAL MEDICINE

## 2022-11-29 PROCEDURE — 85610 PROTHROMBIN TIME: CPT | Performed by: STUDENT IN AN ORGANIZED HEALTH CARE EDUCATION/TRAINING PROGRAM

## 2022-11-29 PROCEDURE — 25000003 PHARM REV CODE 250: Performed by: HOSPITALIST

## 2022-11-29 PROCEDURE — 83735 ASSAY OF MAGNESIUM: CPT | Performed by: HOSPITALIST

## 2022-11-29 PROCEDURE — 85025 COMPLETE CBC W/AUTO DIFF WBC: CPT | Performed by: HOSPITALIST

## 2022-11-29 PROCEDURE — 93458 PR CATH PLACE/CORON ANGIO, IMG SUPER/INTERP,W LEFT HEART VENTRICULOGRAPHY: ICD-10-PCS | Mod: 26,,, | Performed by: INTERNAL MEDICINE

## 2022-11-29 PROCEDURE — 25000003 PHARM REV CODE 250

## 2022-11-29 PROCEDURE — 99900035 HC TECH TIME PER 15 MIN (STAT)

## 2022-11-29 PROCEDURE — 92526 ORAL FUNCTION THERAPY: CPT

## 2022-11-29 PROCEDURE — 63600175 PHARM REV CODE 636 W HCPCS: Performed by: HOSPITALIST

## 2022-11-29 PROCEDURE — 99233 PR SUBSEQUENT HOSPITAL CARE,LEVL III: ICD-10-PCS | Mod: ,,, | Performed by: STUDENT IN AN ORGANIZED HEALTH CARE EDUCATION/TRAINING PROGRAM

## 2022-11-29 PROCEDURE — 99152 MOD SED SAME PHYS/QHP 5/>YRS: CPT | Mod: ,,, | Performed by: INTERNAL MEDICINE

## 2022-11-29 PROCEDURE — 80048 BASIC METABOLIC PNL TOTAL CA: CPT | Performed by: HOSPITALIST

## 2022-11-29 PROCEDURE — 99153 MOD SED SAME PHYS/QHP EA: CPT | Performed by: INTERNAL MEDICINE

## 2022-11-29 PROCEDURE — 94761 N-INVAS EAR/PLS OXIMETRY MLT: CPT

## 2022-11-29 RX ORDER — DIPHENHYDRAMINE HCL 25 MG
25 CAPSULE ORAL ONCE
Status: DISCONTINUED | OUTPATIENT
Start: 2022-11-29 | End: 2022-11-29 | Stop reason: HOSPADM

## 2022-11-29 RX ORDER — PRAVASTATIN SODIUM 20 MG/1
20 TABLET ORAL NIGHTLY
Status: DISCONTINUED | OUTPATIENT
Start: 2022-11-29 | End: 2022-12-01

## 2022-11-29 RX ORDER — LIDOCAINE HYDROCHLORIDE 10 MG/ML
INJECTION, SOLUTION EPIDURAL; INFILTRATION; INTRACAUDAL; PERINEURAL
Status: DISCONTINUED | OUTPATIENT
Start: 2022-11-29 | End: 2022-11-29 | Stop reason: HOSPADM

## 2022-11-29 RX ORDER — VERAPAMIL HYDROCHLORIDE 2.5 MG/ML
INJECTION, SOLUTION INTRAVENOUS
Status: DISCONTINUED | OUTPATIENT
Start: 2022-11-29 | End: 2022-11-29 | Stop reason: HOSPADM

## 2022-11-29 RX ORDER — MORPHINE SULFATE 4 MG/ML
2 INJECTION, SOLUTION INTRAMUSCULAR; INTRAVENOUS EVERY 10 MIN PRN
Status: DISCONTINUED | OUTPATIENT
Start: 2022-11-29 | End: 2022-12-01

## 2022-11-29 RX ORDER — NITROGLYCERIN 20 MG/100ML
INJECTION INTRAVENOUS
Status: DISCONTINUED | OUTPATIENT
Start: 2022-11-29 | End: 2022-11-29 | Stop reason: HOSPADM

## 2022-11-29 RX ORDER — FENTANYL CITRATE 50 UG/ML
INJECTION, SOLUTION INTRAMUSCULAR; INTRAVENOUS
Status: DISCONTINUED | OUTPATIENT
Start: 2022-11-29 | End: 2022-11-29 | Stop reason: HOSPADM

## 2022-11-29 RX ORDER — AMIODARONE HYDROCHLORIDE 150 MG/3ML
INJECTION, SOLUTION INTRAVENOUS
Status: DISCONTINUED | OUTPATIENT
Start: 2022-11-29 | End: 2022-11-29 | Stop reason: HOSPADM

## 2022-11-29 RX ORDER — ATROPINE SULFATE 0.1 MG/ML
0.5 INJECTION INTRAVENOUS
Status: DISCONTINUED | OUTPATIENT
Start: 2022-11-29 | End: 2022-12-01

## 2022-11-29 RX ORDER — CLOPIDOGREL BISULFATE 75 MG/1
75 TABLET ORAL ONCE
Status: COMPLETED | OUTPATIENT
Start: 2022-11-29 | End: 2022-11-29

## 2022-11-29 RX ORDER — ONDANSETRON 8 MG/1
8 TABLET, ORALLY DISINTEGRATING ORAL EVERY 8 HOURS PRN
Status: DISCONTINUED | OUTPATIENT
Start: 2022-11-29 | End: 2022-12-03

## 2022-11-29 RX ORDER — MIDAZOLAM HYDROCHLORIDE 1 MG/ML
INJECTION, SOLUTION INTRAMUSCULAR; INTRAVENOUS
Status: DISCONTINUED | OUTPATIENT
Start: 2022-11-29 | End: 2022-11-29 | Stop reason: HOSPADM

## 2022-11-29 RX ORDER — CLOPIDOGREL BISULFATE 75 MG/1
75 TABLET ORAL NIGHTLY
Status: DISCONTINUED | OUTPATIENT
Start: 2022-11-29 | End: 2022-11-29

## 2022-11-29 RX ORDER — HYDROCODONE BITARTRATE AND ACETAMINOPHEN 5; 325 MG/1; MG/1
1 TABLET ORAL EVERY 4 HOURS PRN
Status: DISCONTINUED | OUTPATIENT
Start: 2022-11-29 | End: 2022-12-06 | Stop reason: HOSPADM

## 2022-11-29 RX ORDER — ACETAMINOPHEN 325 MG/1
650 TABLET ORAL EVERY 4 HOURS PRN
Status: DISCONTINUED | OUTPATIENT
Start: 2022-11-29 | End: 2022-12-01

## 2022-11-29 RX ORDER — CLOPIDOGREL BISULFATE 75 MG/1
75 TABLET ORAL DAILY
Status: DISCONTINUED | OUTPATIENT
Start: 2022-11-29 | End: 2022-11-29 | Stop reason: SDUPTHER

## 2022-11-29 RX ORDER — CLOPIDOGREL BISULFATE 75 MG/1
75 TABLET ORAL NIGHTLY
Status: DISCONTINUED | OUTPATIENT
Start: 2022-11-30 | End: 2022-12-06 | Stop reason: HOSPADM

## 2022-11-29 RX ORDER — MUPIROCIN 20 MG/G
OINTMENT TOPICAL 2 TIMES DAILY
Status: COMPLETED | OUTPATIENT
Start: 2022-11-29 | End: 2022-12-03

## 2022-11-29 RX ADMIN — LACTOBACILLUS ACIDOPHILUS / LACTOBACILLUS BULGARICUS 1 EACH: 100 MILLION CFU STRENGTH GRANULES at 10:11

## 2022-11-29 RX ADMIN — SODIUM CHLORIDE 800 ML: 0.9 INJECTION, SOLUTION INTRAVENOUS at 10:11

## 2022-11-29 RX ADMIN — SACUBITRIL AND VALSARTAN 1 TABLET: 24; 26 TABLET, FILM COATED ORAL at 09:11

## 2022-11-29 RX ADMIN — ASPIRIN 81 MG CHEWABLE TABLET 81 MG: 81 TABLET CHEWABLE at 08:11

## 2022-11-29 RX ADMIN — CARBAMAZEPINE 400 MG: 200 TABLET ORAL at 09:11

## 2022-11-29 RX ADMIN — ONDANSETRON 4 MG: 2 INJECTION INTRAMUSCULAR; INTRAVENOUS at 09:11

## 2022-11-29 RX ADMIN — AMIODARONE HYDROCHLORIDE 1 MG/MIN: 1.8 INJECTION, SOLUTION INTRAVENOUS at 10:11

## 2022-11-29 RX ADMIN — LACTOBACILLUS ACIDOPHILUS / LACTOBACILLUS BULGARICUS 1 EACH: 100 MILLION CFU STRENGTH GRANULES at 09:11

## 2022-11-29 RX ADMIN — MUPIROCIN: 20 OINTMENT TOPICAL at 09:11

## 2022-11-29 RX ADMIN — MUPIROCIN: 20 OINTMENT TOPICAL at 10:11

## 2022-11-29 RX ADMIN — SODIUM CHLORIDE: 0.9 INJECTION, SOLUTION INTRAVENOUS at 01:11

## 2022-11-29 RX ADMIN — CARBAMAZEPINE 400 MG: 200 TABLET ORAL at 11:11

## 2022-11-29 RX ADMIN — CLOPIDOGREL BISULFATE 75 MG: 75 TABLET ORAL at 08:11

## 2022-11-29 RX ADMIN — SACUBITRIL AND VALSARTAN 1 TABLET: 24; 26 TABLET, FILM COATED ORAL at 10:11

## 2022-11-29 RX ADMIN — AMIODARONE HYDROCHLORIDE 0.5 MG/MIN: 1.8 INJECTION, SOLUTION INTRAVENOUS at 04:11

## 2022-11-29 RX ADMIN — CYANOCOBALAMIN 1000 MCG: 1000 INJECTION, SOLUTION INTRAMUSCULAR; SUBCUTANEOUS at 10:11

## 2022-11-29 RX ADMIN — METOPROLOL SUCCINATE 12.5 MG: 25 TABLET, EXTENDED RELEASE ORAL at 10:11

## 2022-11-29 RX ADMIN — PRAVASTATIN SODIUM 20 MG: 20 TABLET ORAL at 09:11

## 2022-11-29 NOTE — PT/OT/SLP PROGRESS
Occupational Therapy      Patient Name:  Balbir Andersonyeison Ruano.   MRN:  [id number]    Patient not seen today secondary to  (patient off the floor for cardiac testing/procedure. OT will hold today.). Will follow-up tomorrow.    11/29/2022

## 2022-11-29 NOTE — PROGRESS NOTES
[facility] [place]  Progress Note    Patient Name: Balbir Rebollar Sr.  MRN: [id number]  Admission Date: 11/22/2022  Hospital Length of Stay: 6 days  Code Status: Full Code   Attending Provider: Rafaela Quinones DO   Consulting Provider: Lori Isaacs MD  Primary Care Physician: Flip Hanna MD  Principal Problem:Acute on chronic combined systolic and diastolic congestive heart failure      Subjective:     Chief Complaint:  worsening encephalopathy     HPI:   71 yo M w CAD, HTN, afib on anticoag,  ICD with fatigue, weakness x 1 week. Remote hx of CVA, ? Hx of seizures (on carbamazepine).      Patient admitted for cardiac evaluation, was recently started on amiodarone 11/18/22.      Patient not seen today 2/2 to going for MRI , history obtained via chart review.      Neurology consulted for worsening lethargy and encephalopathy.     11/28/22:  Patient seen today, MRI brain obtained yesterday. Feels better this morning, alert. Patient's daughter at bedside.  Discussed seizure history - remote hx of seizures when he was 10 years old and fell off a bike, has been on carbamazepine since.   Discussed decreased PO intake and fatigue.     11/29/22:   Patient not seen this AM 2/2 to cardiology procedure. EEG obtained yesterday.     Past Medical History:   Diagnosis Date    AICD (automatic cardioverter/defibrillator) present     Anticoagulant long-term use     Coronary artery disease     History of myocardial infarction     Paroxysmal A-fib     Stroke        Past Surgical History:   Procedure Laterality Date    CHOLECYSTECTOMY      REPLACEMENT OF DUAL CHAMBER IMPLANTABLE CARDIOVERTER-DEFIBRILLATOR         Review of patient's allergies indicates:  No Known Allergies    Current Neurological Medications: tegretol    No current facility-administered medications on file prior to encounter.     Current Outpatient Medications on File Prior to Encounter   Medication Sig    amiodarone (PACERONE) 200 MG Tab Take  200 mg by mouth 2 (two) times daily.    carBAMazepine (TEGRETOL) 200 mg tablet Take 400 mg by mouth 2 (two) times daily.    nitroGLYCERIN (NITROSTAT) 0.4 MG SL tablet Place 0.4 mg under the tongue every 5 (five) minutes as needed.    pravastatin (PRAVACHOL) 20 MG tablet Take 20 mg by mouth.    warfarin (COUMADIN) 6 MG tablet Take 6 mg by mouth. 0.5 (3 mg) Sun, Mon, Tues; 6 mg Wed.; 0.5 (3 mg) Thurs, Fri.; 6 mg Sat.    cetirizine (ZYRTEC) 10 MG tablet Take 1 tablet (10 mg total) by mouth once daily. (Patient not taking: Reported on 11/23/2022)    fluticasone propionate (FLONASE) 50 mcg/actuation nasal spray 1 spray (50 mcg total) by Each Nostril route once daily. (Patient not taking: Reported on 11/23/2022)      Family History    None       Tobacco Use    Smoking status: Former    Smokeless tobacco: Never   Substance and Sexual Activity    Alcohol use: Never    Drug use: Never    Sexual activity: Not on file       Objective:     Vital Signs (Most Recent):  Temp: 97.8 °F (36.6 °C) (11/29/22 1100)  Pulse: 61 (11/29/22 1139)  Resp: 14 (11/29/22 1100)  BP: 128/65 (11/29/22 1100)  SpO2: 96 % (11/29/22 1139)   Vital Signs (24h Range):  Temp:  [97.8 °F (36.6 °C)-99.9 °F (37.7 °C)] 97.8 °F (36.6 °C)  Pulse:  [60-87] 61  Resp:  [14-20] 14  SpO2:  [93 %-96 %] 96 %  BP: (110-155)/(63-89) 128/65     Weight: 68 kg (149 lb 14.6 oz)  Body mass index is 22.14 kg/m².    Physical exam: not seen today    Significant Labs:   BNP 1441  B12 198  TSH wnl    Significant Imaging: personally reviewed  CTH diffuse cerebral atrophy  MRI brain with prior infarcts in R frontal L left cerebellar and chronic microvascular change.    EEG with generalized background slowing right > left    Assessment and Plan:     73 yo M w hx of heart failure (BNP elevated on admission) w AICD for severe ischemic cardiac myopathy, paroxysmal afib on warfarin at home (plan for DOAC on discharge, holding anticog pending cath 11/29/22), remote hx of CVA and seizures,  consulted to neurology for encephalopathy, resolved.    Likely multifactorial 2/2 to sustained VT, deconditioning. Resolved. MRI brain wo evidence of acute infarct or acute intracranial processes. EEG with diffuse generalized background slowing (R > L). No epileptiform discharges or seizures.     Continue home carbamazepine regiment for seizure control, given patient w background asymmetry on EEG. To followup with PCP who prescribes patient AED.      Patient was instructed about the following seizure precautions:   - Take all medications as prescribed by your doctor. Specifically take your anti-epileptic drugs at timed intervals that are on the prescription label.  - Do not drive or operate heavy machinery for a state-law specific period of time from seizure activity, 6 months from last seizure  - If you ride a bicycle or any other manually propelled device, please use a helmet  - Never swim alone or without close supervision  - Use showers only; do not take a bath or put yourself in a situation where loss of responsiveness could lead to drowning  - Only cook under supervision. Use the back burners only.  - Do not hold a child or carry an infant. If breastfeeding, only perform this in a seated position with cushioning.   - Do not engage in any activity that in the event of a seizure or loss of responsiveness could lead to any type of bodily injury to yourself or others    Active Diagnoses:    Diagnosis Date Noted POA    PRINCIPAL PROBLEM:  Acute on chronic combined systolic and diastolic congestive heart failure [I50.43] 11/23/2022 Yes    B12 deficiency [E53.8] 11/28/2022 Yes    History of seizure disorder [Z86.69] 11/28/2022 Not Applicable    Nausea and vomiting [R11.2] 11/26/2022 No    Encephalopathy, metabolic [G93.41] 11/25/2022 Yes    Supratherapeutic INR [R79.1] 11/24/2022 Yes    Hypokalemia [E87.6] 11/24/2022 Yes    ACP (advance care planning) [Z71.89] 11/24/2022 Not Applicable    Weakness [R53.1] 11/23/2022  Yes    Ventricular tachycardia [I47.20] 11/23/2022 Yes    ICD (implantable cardioverter-defibrillator), single, in situ [Z95.810] 07/09/2020 Yes    Chronic anticoagulation [Z79.01] 06/23/2020 Not Applicable    Paroxysmal atrial fibrillation [I48.0] 03/06/2019 Yes    History of CVA (cerebrovascular accident) [Z86.73] 03/06/2019 Not Applicable    Pure hypercholesterolemia [E78.00] 03/06/2019 Yes    Coronary artery disease involving native coronary artery of native heart without angina pectoris [I25.10] 03/06/2019 Yes      Problems Resolved During this Admission:    Diagnosis Date Noted Date Resolved POA    Ischemic cardiomyopathy [I25.5] 02/16/2022 11/24/2022 Yes       VTE Risk Mitigation (From admission, onward)           Ordered     Reason for No Pharmacological VTE Prophylaxis  Once        Question:  Reasons:  Answer:  Already adequately anticoagulated on oral Anticoagulants    11/23/22 0042     IP VTE HIGH RISK PATIENT  Once         11/23/22 0042     Place sequential compression device  Until discontinued         11/23/22 0042                    Thank you for your consult.Will sign off, please call with further questions.     Lori Isaacs MD  Neurology  [facility]

## 2022-11-29 NOTE — ASSESSMENT & PLAN NOTE
-Presenting complaint was weakness x 1 week since starting amiodarone.  -Patient and son felt weakness clearly due to amiodarone, but we suspected weakness was driven by the episodes of VT which were driven by uncontrolled severe chf.  -Noted fall overnight from toilet 11/23-11/24 - no head trauma and CT head without acute findings  -Noted near fall / slip to the floor with assistance of nurse on 11/25 - no trauma at all.  -Fall precautions   -Amiodarone stopped 11/24.    -PT/OT consulted

## 2022-11-29 NOTE — PLAN OF CARE
No complaints of dizziness or n/v. Patient bilateral forearms and groin clipped. CHG bath completed for procedure.    Problem: Adult Inpatient Plan of Care  Goal: Plan of Care Review  Outcome: Ongoing, Progressing  Goal: Patient-Specific Goal (Individualized)  Outcome: Ongoing, Progressing  Goal: Absence of Hospital-Acquired Illness or Injury  Outcome: Ongoing, Progressing  Goal: Optimal Comfort and Wellbeing  Outcome: Ongoing, Progressing  Goal: Readiness for Transition of Care  Outcome: Ongoing, Progressing     Problem: Fall Injury Risk  Goal: Absence of Fall and Fall-Related Injury  Outcome: Ongoing, Progressing     Problem: Skin Injury Risk Increased  Goal: Skin Health and Integrity  Outcome: Ongoing, Progressing     Problem: Coping Ineffective  Goal: Effective Coping  Outcome: Ongoing, Progressing     Problem: Adjustment to Illness (Delirium)  Goal: Optimal Coping  Outcome: Ongoing, Progressing     Problem: Altered Behavior (Delirium)  Goal: Improved Behavioral Control  Outcome: Ongoing, Progressing     Problem: Attention and Thought Clarity Impairment (Delirium)  Goal: Improved Attention and Thought Clarity  Outcome: Ongoing, Progressing     Problem: Sleep Disturbance (Delirium)  Goal: Improved Sleep  Outcome: Ongoing, Progressing     Problem: Oral Intake Inadequate  Goal: Improved Oral Intake  Outcome: Ongoing, Progressing     Problem: Fatigue  Goal: Improved Activity Tolerance  Outcome: Ongoing, Progressing

## 2022-11-29 NOTE — SUBJECTIVE & OBJECTIVE
Interval History:  No acute overnight events.  Patient remained afebrile.  Alert and oriented x3 today.  He is conversant.  Son at bedside and states his father looks better today. Does not appear lethargic but is diffusely weak.      Review of Systems   Constitutional:  Positive for fatigue. Negative for chills and fever.   HENT:  Positive for trouble swallowing. Negative for congestion and rhinorrhea.    Eyes:  Negative for photophobia and visual disturbance.   Respiratory:  Negative for cough and shortness of breath.    Cardiovascular:  Negative for chest pain, palpitations and leg swelling.   Gastrointestinal:  Negative for abdominal pain, diarrhea, nausea and vomiting.   Genitourinary:  Negative for dysuria, frequency, hematuria and urgency.   Musculoskeletal:  Positive for gait problem. Negative for neck stiffness.   Skin:  Negative for pallor, rash and wound.   Neurological:  Positive for weakness. Negative for dizziness, light-headedness and headaches.   Psychiatric/Behavioral:  Negative for confusion, decreased concentration and hallucinations.      Objective:     Vital Signs (Most Recent):  Temp: 99.9 °F (37.7 °C) (11/29/22 0737)  Pulse: 83 (11/29/22 0737)  Resp: 19 (11/29/22 0737)  BP: (!) 155/89 (11/29/22 1000)  SpO2: (!) 93 % (11/29/22 0737) Vital Signs (24h Range):  Temp:  [97.7 °F (36.5 °C)-99.9 °F (37.7 °C)] 99.9 °F (37.7 °C)  Pulse:  [70-87] 83  Resp:  [18-20] 19  SpO2:  [93 %-95 %] 93 %  BP: (110-155)/(63-89) 155/89     Weight: 68 kg (149 lb 14.6 oz)  Body mass index is 22.14 kg/m².  No intake or output data in the 24 hours ending 11/29/22 1049     Physical Exam  Vitals and nursing note reviewed.   Constitutional:       General: He is not in acute distress.     Appearance: He is well-developed. He is ill-appearing. He is not toxic-appearing or diaphoretic.      Comments: Frail in appearance.  Awake and alert. Conversant   HENT:      Head: Normocephalic and atraumatic.      Right Ear: External ear  normal.      Left Ear: External ear normal.      Nose: Nose normal.      Mouth/Throat:      Mouth: Mucous membranes are moist.   Eyes:      Extraocular Movements: Extraocular movements intact.      Conjunctiva/sclera: Conjunctivae normal.   Cardiovascular:      Rate and Rhythm: Normal rate and regular rhythm.      Heart sounds: No murmur heard.  Pulmonary:      Effort: Pulmonary effort is normal. No respiratory distress.      Breath sounds: Normal breath sounds. No wheezing or rales.   Abdominal:      General: Bowel sounds are normal. There is no distension.      Palpations: Abdomen is soft.      Tenderness: There is no abdominal tenderness. There is no guarding.   Musculoskeletal:         General: No tenderness. Normal range of motion.      Cervical back: Normal range of motion.      Right lower leg: No edema.      Left lower leg: No edema.   Skin:     General: Skin is warm and dry.      Coloration: Skin is pale.   Neurological:      Mental Status: He is alert.      Motor: Weakness present.      Comments: Appears attentive and alert today.  Significant diffuse weakness   Psychiatric:         Mood and Affect: Mood normal.         Thought Content: Thought content normal.       Significant Labs: All pertinent labs within the past 24 hours have been reviewed.    Significant Imaging: I have reviewed all pertinent imaging results/findings within the past 24 hours.

## 2022-11-29 NOTE — CARE UPDATE
Case d/w [facility] Transfer [facility].  No beds available at present.  Pt currently hemodynamically stable on amio gtt.  Will plan to monitor in ICU pending bed availability at [facility] to consider viab eval of LAD territory vs high risk MV PCI (?poor CABG candidate).  Case d/w Dr. Quinones ([facility] hosp attending) and pt's daughter (Holly).

## 2022-11-29 NOTE — ASSESSMENT & PLAN NOTE
-History noted  -No chest pain  -Cardiology consulted: s/p angiogram on 11/29. Occluded LAD,Severe LCx stenosis. Transfer to [facility] to discuss viability eval vs high risk PCI of LAD. Awaiting bed availiability  -Continue ASA/plavix/statin

## 2022-11-29 NOTE — ASSESSMENT & PLAN NOTE
-Admitted to inpatient status  -BNP elevated at 1441  -Echo 10/22 showed EF 20-30%, grade II diastolic dysfunction  -Known chronic ischemic cardiomyopathy with AICD in place  -Cardiology consulted and input appreciated: plan for [facility] on 11/29  -AICD interogated and noted frequent sustained VT episodes below threshold of treatment.  AICD reprogramed with lower threshold on 11/23  -Strict ins/outs and daily weights  -Have attempted gentle diuresis with iv lasix and started toprol and entresto and no further VT thus far  -Due to lethargy, nausea, vomiting and low blood pressure has not been solidly maintained on GDMT.  Has not received lasix since 11/24.  Has not received toprol since 11/24.  Received 1 dose entresto 11/27.  -11/27 looks better  but still quite weak.    -Continue to hold lasix for now  -Continue toprol at 12.5mg daily and Entresto

## 2022-11-29 NOTE — BRIEF OP NOTE
[facility] - Cath Lab  Brief Operative Note     SUMMARY     Surgery Date: 11/29/2022     Surgeon(s) and Role:     * Wesley Rico MD - Primary    Assisting Surgeon: None    Pre-op Diagnosis:  Ventricular tachyarrhythmia [I47.20]  Ventricular tachycardia [I47.20]  Acute on chronic combined systolic and diastolic congestive heart failure [I50.43]  Ischemic cardiomyopathy [I25.5]  History of percutaneous coronary intervention [Z98.61]    Post-op Diagnosis:  Post-Op Diagnosis Codes:     * Ventricular tachyarrhythmia [I47.20]     * Ventricular tachycardia [I47.20]     * Acute on chronic combined systolic and diastolic congestive heart failure [I50.43]     * Ischemic cardiomyopathy [I25.5]     * History of percutaneous coronary intervention [Z98.61]    Procedure(s) (LRB):  Left heart cath (Left)    Anesthesia: RN IV Sedation    Description of the findings of the procedure: uneventful LHC/cor angio via R radial.  MV CAD with occluded LAD.    Findings/Key Components:  LVEDP: 1mmHg  LVEF: 25% by echo  Wall Motion: LAD WMA by echo    Dominance: Right  LM: MLI  LAD: ost occluded, faint catherine from RCA via septals  LCx: prox 90%, T3 flow  RCA: diffuse up to 50-70%    Hemostasis:  R Radial band    Impression:  Sustained VT  Severe LV dysfxn  Occluded LAD, collateralized from RCA  Severe LCx stenosis  Mod RCA CAD  Sustained VT during cath successfully treated with ATP and subsequent amio IV.  R rad vasband for hemostasis    Plan:  Cont med rx  Cont ASA/Plavix/statin  Amio gtt/transfer to ICU  Transfer to [facility] to discuss viability eval vs high risk PCI of LAD +/- LCx (+/- MCS).  Currently on diversion.  Will manage pt at [facility] ICU pending bed availability.    Estimated Blood Loss: <50cc         Specimens: None

## 2022-11-29 NOTE — PROGRESS NOTES
"[facility] Intensive Care  [facility] Medicine  Progress Note    Patient Name: Balbir Rebollar Sr.  MRN: [id number]  Patient Class: IP- Inpatient   Admission Date: 11/22/2022  Length of Stay: 6 days  Attending Physician: Rafaela Quinones DO  Primary Care Provider: Flip Hanna MD        Subjective:     Principal Problem:Acute on chronic combined systolic and diastolic congestive heart failure        HPI:  Balbir Rebollar Sr. 72 y.o. male with CAD, HTN, Afib, long term use of anticoagulants, CVA, and ICD, presents to the hospital with a chief complaint of fatigue and weakness with acute onset of one-week. Patient reports he was seen 3 times in a period of a week for the same symptoms and was discharged.  Patient describes his symptoms as feeling tired but I can not sleep. Associated symptoms include difficulty walking, diarrhea, frequent urination, and muscle spasms. Patient's daughter says," he is stumbling going up and down the stairs". Patient reports he was prescribed a new medication that is causing him to have dry mouth and feeling more fatigued (on top of original fatigue complaint). Patient's daughter reports patient is usually very active, but recently has been staying in bed more often this past week. No other exacerbating or alleviating factors. Patient denies dysuria, nausea, vomiting, cough, numbness, slurred speech, black stool , paresthesia, or other associated symptoms. Patient endorses compliance of coumadin. Family reports that the patient seen at  several times recently for symptoms and state they were told nothing was found. Follows with cardiology at  and was recently started on Amiodarone on 11/18/2022.      In the ED patient is afebrile without leukocytosis PT 45.2, INR 4.6, CO2 20, calcium 8.6, BNP 1 441, troponin 0.038, 2nd troponin 0.041 TSH WNL, UA is hazy with trace protein glucose and occult blood, negative for flu and COVID, CXR was negative, EKG showed "Sinus rhythm " "with Premature supraventricular complexes in a pattern of bigeminy, Right bundle branch block, Left anterior fascicular block" without previous EKG for comparison.  Patient was placed in observation for further workup an assessment by Cardiology.  Previous Echo from [facility] everywhere     ECHOCARDIOGRAM   Interpretation Summary  11/01/2021  MRN:  [id number]        FIN:    [id number]      Accession #:[id number]         Order #:[id number]    Indications:    Coronary artery disease involving native coronary artery of native heart without angina pectoris; Paroxysmal                     atrial fibrillation (CMS/HCC); Nonsustained ventricular tachycardia (CMS/HCC)       BP:  128   / 74      HR: 67                       Rhythm:           Sinus                                                      Technical Quality:Very technically difficult study    MEASUREMENTS  (Male / Female) Normal Values   Left Ventricle:    Appears to be normal left ventricular cavity size.  Severely reduced left ventricular systolic function.  Dense hypokinesis and thinning of the anterior wall, septum, and apex with normal contractility elsewhere.   Visually estimated EF is less than 30%.  Grade I diastolic dysfunction (abnormal relaxation filling pattern),    with normal or mildly elevated filling pressures.      Overview/Hospital Course:  72 y.o. 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  He was diagnosed with acute/chronic systolic/diastolic chf exacerbation due to recurrent ventricular tachycardia, supratherapeutic INR, metabolic encephalopathy, dysphagia, b12 deficiency and debility.  Cardiology consulted and his AICD interrogated and reprogrammed.  VT episodes were below threshold of treatment.  Initiated GDMT with toprol, entresto and IV lasix but due to hypotension, vomiting, dysphagia these have " been inconsistently tolerated and not given for several days.  He developed encephalopathy with dysphagia and signs of aspiration worrisome for new stroke, but MRI 11/27 ruled out stroke.  SLP has assessed patient and OK for puree diet with nectar thick liquids - diet advanced 11/27.  Oral amiodarone was stopped due to severe fatigue each time he took this.  Warfarin has been held.  Patient s/p  LHC on Tuesday 11/29, which showed occluded LAD and severe LCx stenosis.  Procedure complicated by sustained Vtach. Required amiodarone gtt and transfer to ICU on 11/29 for close monitoring. Plan for transfer to [facility] when bed available to discuss viability eval vs high risk PCI.  Plan to change warfarin to eliquis at discharge.  Neurology consulted due to his persistent encephalopathy.  EEG from 11/28 rule out seizure activity.  Mental status now at baseline. Started B12 replacement.  PT/OT recommend HH at discharge.  Palliative care consulted and patient remains full code.      Interval History:  No acute overnight events.  Patient remained afebrile.  Alert and oriented x3 today.  He is conversant.  Son at bedside and states his father looks better today. Does not appear lethargic but is diffusely weak.      Review of Systems   Constitutional:  Positive for fatigue. Negative for chills and fever.   HENT:  Positive for trouble swallowing. Negative for congestion and rhinorrhea.    Eyes:  Negative for photophobia and visual disturbance.   Respiratory:  Negative for cough and shortness of breath.    Cardiovascular:  Negative for chest pain, palpitations and leg swelling.   Gastrointestinal:  Negative for abdominal pain, diarrhea, nausea and vomiting.   Genitourinary:  Negative for dysuria, frequency, hematuria and urgency.   Musculoskeletal:  Positive for gait problem. Negative for neck stiffness.   Skin:  Negative for pallor, rash and wound.   Neurological:  Positive for weakness. Negative for dizziness,  light-headedness and headaches.   Psychiatric/Behavioral:  Negative for confusion, decreased concentration and hallucinations.      Objective:     Vital Signs (Most Recent):  Temp: 99.9 °F (37.7 °C) (11/29/22 0737)  Pulse: 83 (11/29/22 0737)  Resp: 19 (11/29/22 0737)  BP: (!) 155/89 (11/29/22 1000)  SpO2: (!) 93 % (11/29/22 0737) Vital Signs (24h Range):  Temp:  [97.7 °F (36.5 °C)-99.9 °F (37.7 °C)] 99.9 °F (37.7 °C)  Pulse:  [70-87] 83  Resp:  [18-20] 19  SpO2:  [93 %-95 %] 93 %  BP: (110-155)/(63-89) 155/89     Weight: 68 kg (149 lb 14.6 oz)  Body mass index is 22.14 kg/m².  No intake or output data in the 24 hours ending 11/29/22 1049     Physical Exam  Vitals and nursing note reviewed.   Constitutional:       General: He is not in acute distress.     Appearance: He is well-developed. He is ill-appearing. He is not toxic-appearing or diaphoretic.      Comments: Frail in appearance.  Awake and alert. Conversant   HENT:      Head: Normocephalic and atraumatic.      Right Ear: External ear normal.      Left Ear: External ear normal.      Nose: Nose normal.      Mouth/Throat:      Mouth: Mucous membranes are moist.   Eyes:      Extraocular Movements: Extraocular movements intact.      Conjunctiva/sclera: Conjunctivae normal.   Cardiovascular:      Rate and Rhythm: Normal rate and regular rhythm.      Heart sounds: No murmur heard.  Pulmonary:      Effort: Pulmonary effort is normal. No respiratory distress.      Breath sounds: Normal breath sounds. No wheezing or rales.   Abdominal:      General: Bowel sounds are normal. There is no distension.      Palpations: Abdomen is soft.      Tenderness: There is no abdominal tenderness. There is no guarding.   Musculoskeletal:         General: No tenderness. Normal range of motion.      Cervical back: Normal range of motion.      Right lower leg: No edema.      Left lower leg: No edema.   Skin:     General: Skin is warm and dry.      Coloration: Skin is pale.   Neurological:       Mental Status: He is alert.      Motor: Weakness present.      Comments: Appears attentive and alert today.  Significant diffuse weakness   Psychiatric:         Mood and Affect: Mood normal.         Thought Content: Thought content normal.       Significant Labs: All pertinent labs within the past 24 hours have been reviewed.    Significant Imaging: I have reviewed all pertinent imaging results/findings within the past 24 hours.      Assessment/Plan:      * Acute on chronic combined systolic and diastolic congestive heart failure  -Admitted to inpatient status  -BNP elevated at 1441  -Echo 10/22 showed EF 20-30%, grade II diastolic dysfunction  -Known chronic ischemic cardiomyopathy with AICD in place  -Cardiology consulted and input appreciated: plan for [facility] on 11/29  -AICD interogated and noted frequent sustained VT episodes below threshold of treatment.  AICD reprogramed with lower threshold on 11/23  -Strict ins/outs and daily weights  -Have attempted gentle diuresis with iv lasix and started toprol and entresto and no further VT thus far  -Due to lethargy, nausea, vomiting and low blood pressure has not been solidly maintained on GDMT.  Has not received lasix since 11/24.  Has not received toprol since 11/24.  Received 1 dose entresto 11/27.  -11/27 looks better  but still quite weak.    -Continue to hold lasix for now  -Continue toprol at 12.5mg daily and Entresto    Ventricular tachycardia  -AICD interrogated and noted sustained VT episodes below threshold of treatment  -AICD reprogrammed  -Stopped amiodarone 11/24- family and patient feels it causes severe weakness and confusion.  -Resumed toprolol on 11/27 at lower dose.   -During angiogram, pt had sustained Vtach   -Transfer to ICU on amio gtt 11/29  -Cardiology following     Paroxysmal atrial fibrillation  -Patient with Long standing persistent (>12 months) atrial fibrillation which is controlled currently with toprol.   -Patient is currently in  sinus rhythm.[id number] Score: 2.   -Anticoagulation indicated and is on warfarin at home.    -Holding warfarin due to supratheraputic INR on admit as well as need for angiogram  -Plan for DOAC on discharge.    Supratherapeutic INR  -INR >4 on admit and now trending down   -Continue to hold warfarin - has not received during this stay  -INR needs to be less than 1.7 for angiogram - pt given vitamin K 5mg po x1 on (11/27).  -INR 1.4 on 11/28  -Check pt/inr daily.    Chronic anticoagulation  -Treatment as above.  -Will dc warfarin on discharge and start pt on DOAC for anticoagulation     Encephalopathy, metabolic  -On 11/25 more confused and lethargic  -Noted fall 2 overnight late last week - no apparent trauma and head ct at that time without evidence of bleeding.  -No evidence of infection  -Could be secondary to mild dehydration and low blood pressure? - stopped lasix and decreased toprol.  -Ordered delirium precautions, held lasix and held/lowered toprol.  -Checking  for other reversible encephalopathies:  TSH, Ammonia normal.  No UTI.  Folate normal.  B12 is deficient (noted 11/27)  -Repeat head CT 11/26 without any acute findings and no evidence of bleeding.  -Was concerned there could have been a stroke as he now developed dysphagia.  Sent for MRI at [facility] 11/27 which did not reveal any new stroke  -11/27:  Mental status improved but still somewhat lethargic.  SLP evaluated patient and ok for dysphagia diet with nectar thick liquids.  -Await neurology evaluation.  Discussed with Dr. Isaacs - will check EEG given history of seizures. No seizures on EEG on 11/28    -Continue carbemazepime.  -Continue delirium precautions.  -Mental status appears near baseline on 11/28 per daughter at bedside and on 11/29 per son (Juan Carlos). Pt awake and alert and does not appear lethargic     Coronary artery disease involving native coronary artery of native heart without angina pectoris  -History noted  -No chest pain  -Cardiology  consulted: s/p angiogram on 11/29. Occluded LAD,Severe LCx stenosis. Transfer to [facility] to discuss viability eval vs high risk PCI of LAD. Awaiting bed availiability  -Continue ASA/plavix/statin     Hypokalemia  -K normal today  -Replace as needed  -Repeat labs in AM.    Nausea and vomiting  -Significant nausea and vomiting overnight 11/25-26.  -KUB with non-obstructive bowel gas pattern and abdomen is quite soft albeit with diminished bowel sounds  -Noted dysphagia and suspected aspiration around 1PM on 11/26  -SLP consulted and recommend dysphagia puree diet with nectar thick liquids  -Continue anti-emetics, bowel regimen and aspiration precautions  -Resolved    ICD (implantable cardioverter-defibrillator), single, in situ  -Treatment as above.    Pure hypercholesterolemia  -Continue statin     Weakness  -Presenting complaint was weakness x 1 week since starting amiodarone.  -Patient and son felt weakness clearly due to amiodarone, but we suspected weakness was driven by the episodes of VT which were driven by uncontrolled severe chf.  -Noted fall overnight from toilet 11/23-11/24 - no head trauma and CT head without acute findings  -Noted near fall / slip to the floor with assistance of nurse on 11/25 - no trauma at all.  -Fall precautions   -Amiodarone stopped 11/24.    -PT/OT consulted    History of CVA (cerebrovascular accident)  -History noted    B12 deficiency  -continue b12 supplementation     History of seizure disorder  -Neurology following  -EEG completed 11/28  -Continue home carbamazepine   -Seizure precautions     ACP (advance care planning)  -Dr. Banegas discussed with patient's son to clarify goals of care.  Patient a bit too confused to participate.  Son wishes to speak with his sister.  -Consulted palliative care team and discussed with Dr. Paul.  Appreciate input.  -Continue full code for now - looks better today      VTE Risk Mitigation (From admission, onward)         Ordered     Reason for No  Pharmacological VTE Prophylaxis  Once        Question:  Reasons:  Answer:  Already adequately anticoagulated on oral Anticoagulants    11/23/22 0042     IP VTE HIGH RISK PATIENT  Once         11/23/22 0042     Place sequential compression device  Until discontinued         11/23/22 0042                Discharge Planning   MOR: 11/28/2022     Code Status: Full Code   Is the patient medically ready for discharge?:     Reason for patient still in hospital (select all that apply): Patient trending condition, Treatment, Consult recommendations, PT / OT recommendations and Pending disposition  Discharge Plan A: Home Health   Discharge Delays: (!) Post-Acute Set-up        Critical care time spent on the evaluation and treatment of severe organ dysfunction, review of pertinent labs and imaging studies, discussions with consulting providers and discussions with patient/family: 38 minutes.      Rafaela Quinones DO  Department of Hospital Medicine   [facility] - Intensive Care

## 2022-11-29 NOTE — PT/OT/SLP PROGRESS
Physical Therapy      Patient Name:  Balbir Andersonyeison Ruano.   MRN:  [id number]    Patient not seen today secondary to Off the floor for [facility], Bedrest order to 09:17 AM, 11/30/2022. Will follow-up as able later hour/day .

## 2022-11-29 NOTE — NURSING
Pt prepped by night nurse. Fluids running as ordered, pt able to take po aspirin and clopidogrel. Family at the bedside.

## 2022-11-29 NOTE — ASSESSMENT & PLAN NOTE
-On 11/25 more confused and lethargic  -Noted fall 2 overnight late last week - no apparent trauma and head ct at that time without evidence of bleeding.  -No evidence of infection  -Could be secondary to mild dehydration and low blood pressure? - stopped lasix and decreased toprol.  -Ordered delirium precautions, held lasix and held/lowered toprol.  -Checking  for other reversible encephalopathies:  TSH, Ammonia normal.  No UTI.  Folate normal.  B12 is deficient (noted 11/27)  -Repeat head CT 11/26 without any acute findings and no evidence of bleeding.  -Was concerned there could have been a stroke as he now developed dysphagia.  Sent for MRI at [facility] 11/27 which did not reveal any new stroke  -11/27:  Mental status improved but still somewhat lethargic.  SLP evaluated patient and ok for dysphagia diet with nectar thick liquids.  -Await neurology evaluation.  Discussed with Dr. Isaacs - will check EEG given history of seizures. No seizures on EEG on 11/28    -Continue carbemazepime.  -Continue delirium precautions.  -Mental status appears near baseline on 11/28 per daughter at bedside and on 11/29 per son (Juan Carlos). Pt awake and alert and does not appear lethargic    Caller would like to discuss an/a Return call . Writer advised caller of callback within 24-72 hours.    Patient Name: Muna Gonzalez  Caller Name:   Name of Facility:   [facility] Response: N/A  Callback Number:   Telephone Information:   Mobile [phone]     Best Availability: any  Can A Detailed Message Be left? yes  Fax Number:   Additional Info: Patient started a new  birth control pills  Patient is on Tuesday of week 3 and patient has had her period for 2 times each for a week . Patient is wondering if this is normal. Patient states she was previously on birth control and it didn't happen. Patient of Lakeisha Quezada   Did you confirm the message with the caller?: yes    Thank you,  Tamara Michele

## 2022-11-29 NOTE — ASSESSMENT & PLAN NOTE
-Patient with Long standing persistent (>12 months) atrial fibrillation which is controlled currently with toprol.   -Patient is currently in sinus rhythm.[id number] Score: 2.   -Anticoagulation indicated and is on warfarin at home.    -Holding warfarin due to supratheraputic INR on admit as well as need for angiogram  -Plan for DOAC on discharge.

## 2022-11-29 NOTE — PLAN OF CARE
Problem: Fall Injury Risk  Goal: Absence of Fall and Fall-Related Injury  Outcome: Ongoing, Progressing     Problem: Adjustment to Illness (Delirium)  Goal: Optimal Coping  Outcome: Ongoing, Progressing     Problem: Attention and Thought Clarity Impairment (Delirium)  Goal: Improved Attention and Thought Clarity  Outcome: Ongoing, Progressing     Problem: Sleep Disturbance (Delirium)  Goal: Improved Sleep  Outcome: Ongoing, Progressing     Problem: Oral Intake Inadequate  Goal: Improved Oral Intake  Outcome: Ongoing, Progressing     Problem: Fatigue  Goal: Improved Activity Tolerance  Outcome: Ongoing, Progressing   Pt remains fatigued, better po intake but needs improvement. Safety precs in place. Per family, he is more fatigued after receiving medications. Plan of care discussed with MD and family during rounds.

## 2022-11-29 NOTE — PT/OT/SLP PROGRESS
Speech Language Pathology Treatment    Patient Name:  Balbir Rebollar Sr.   MRN:  [id number]  Admitting Diagnosis: Acute on chronic combined systolic and diastolic congestive heart failure    Recommendations:                 General Recommendations:  Follow-up not indicated  Diet recommendations:  puree with thin liquids  Aspiration Precautions: 1 bite/sip at a time   General Precautions: Standard, aspiration, pureed diet, nectar thick  Communication strategies:  none    Subjective   Pt reporting to continue puree diet he would like liquids upgrade.   Patient goals: upgrade liquid     Pain/Comfort:  Pain Rating 1: 0/10    Respiratory Status: Room air    Objective:     Has the patient been evaluated by SLP for swallowing?   Yes  Keep patient NPO? No     Daughter feeding pt in semi reclined position upon  's entrance,  educated family regarding safe feeding positioning she reports she was aware but just forgot. Pt repositioned upright in bed for the rest of his meal. Multiple puree trials were unremarkable. Pt self presented multiple sips of thin liquids across 4oz with notable tongue thrust but no overt s/s of aspiration. Please note silent aspiration cannot be r/o at bedside. Family, patient and nursing informed of 's recommendation for upgrade of liquid consistency to thin liquids.     Assessment:     Balbir Rebollar Sr. is a 72 y.o. male with dx of Acute on chronic combined systolic and diastolic congestive heart failure he presents with mild oral dysphagia c/b tongue thrust negatively impacted by lack of dentition. Swallow has improved with medical status.     Goals:   Multidisciplinary Problems       SLP Goals       Not on file              Multidisciplinary Problems (Resolved)          Problem: SLP    Goal Priority Disciplines Outcome   SLP Goal   (Resolved)     SLP Met   Description: ST. Pt will tolerate PO diet of pureed w/nectar thick liquids w/o overt s/s of aspiration. (Goal met  11/29)  2. Pt will tolerate 4oz thin liquids across multiple trials without overt s/s of aspiration. (Goal met 11/29)                       Plan:     Plan of Care expires:  12/09/22  Plan of Care reviewed with:  patient   SLP Follow-Up:  No     Discharge recommendations:  no further ST is warranted  Barriers to Discharge:  None    Time Tracking:     SLP Treatment Date:   11/29/22  Speech Start Time:  1348  Speech Stop Time:  1358     Speech Total Time (min):  10 min    Billable Minutes: Treatment Swallowing Dysfunction 10    11/29/2022

## 2022-11-30 LAB
ANION GAP SERPL CALC-SCNC: 10 MMOL/L (ref 8–16)
BASOPHILS # BLD AUTO: 0.03 K/UL (ref 0–0.2)
BASOPHILS NFR BLD: 0.8 % (ref 0–1.9)
BUN SERPL-MCNC: 23 MG/DL (ref 8–23)
CALCIUM SERPL-MCNC: 8.6 MG/DL (ref 8.7–10.5)
CHLORIDE SERPL-SCNC: 107 MMOL/L (ref 95–110)
CO2 SERPL-SCNC: 20 MMOL/L (ref 23–29)
CREAT SERPL-MCNC: 0.8 MG/DL (ref 0.5–1.4)
DIFFERENTIAL METHOD: NORMAL
EOSINOPHIL # BLD AUTO: 0 K/UL (ref 0–0.5)
EOSINOPHIL NFR BLD: 0.5 % (ref 0–8)
ERYTHROCYTE [DISTWIDTH] IN BLOOD BY AUTOMATED COUNT: 13.9 % (ref 11.5–14.5)
EST. GFR  (NO RACE VARIABLE): >60 ML/MIN/1.73 M^2
GLUCOSE SERPL-MCNC: 103 MG/DL (ref 70–110)
HCT VFR BLD AUTO: 44.2 % (ref 40–54)
HGB BLD-MCNC: 14.8 G/DL (ref 14–18)
IMM GRANULOCYTES # BLD AUTO: 0.01 K/UL (ref 0–0.04)
IMM GRANULOCYTES NFR BLD AUTO: 0.3 % (ref 0–0.5)
INR PPP: 1 (ref 0.8–1.2)
LYMPHOCYTES # BLD AUTO: 1.1 K/UL (ref 1–4.8)
LYMPHOCYTES NFR BLD: 28.2 % (ref 18–48)
MAGNESIUM SERPL-MCNC: 2 MG/DL (ref 1.6–2.6)
MCH RBC QN AUTO: 30.3 PG (ref 27–31)
MCHC RBC AUTO-ENTMCNC: 33.5 G/DL (ref 32–36)
MCV RBC AUTO: 90 FL (ref 82–98)
MONOCYTES # BLD AUTO: 0.4 K/UL (ref 0.3–1)
MONOCYTES NFR BLD: 10 % (ref 4–15)
NEUTROPHILS # BLD AUTO: 2.4 K/UL (ref 1.8–7.7)
NEUTROPHILS NFR BLD: 60.2 % (ref 38–73)
NRBC BLD-RTO: 0 /100 WBC
PLATELET # BLD AUTO: 195 K/UL (ref 150–450)
PMV BLD AUTO: 9.5 FL (ref 9.2–12.9)
POTASSIUM SERPL-SCNC: 3.9 MMOL/L (ref 3.5–5.1)
PROTHROMBIN TIME: 11.1 SEC (ref 9–12.5)
RBC # BLD AUTO: 4.89 M/UL (ref 4.6–6.2)
SODIUM SERPL-SCNC: 137 MMOL/L (ref 136–145)
WBC # BLD AUTO: 3.9 K/UL (ref 3.9–12.7)

## 2022-11-30 PROCEDURE — 99900035 HC TECH TIME PER 15 MIN (STAT)

## 2022-11-30 PROCEDURE — 25000003 PHARM REV CODE 250: Performed by: HOSPITALIST

## 2022-11-30 PROCEDURE — 83735 ASSAY OF MAGNESIUM: CPT | Performed by: HOSPITALIST

## 2022-11-30 PROCEDURE — 63600175 PHARM REV CODE 636 W HCPCS: Performed by: HOSPITALIST

## 2022-11-30 PROCEDURE — 63600175 PHARM REV CODE 636 W HCPCS: Performed by: INTERNAL MEDICINE

## 2022-11-30 PROCEDURE — 80048 BASIC METABOLIC PNL TOTAL CA: CPT | Performed by: HOSPITALIST

## 2022-11-30 PROCEDURE — 94761 N-INVAS EAR/PLS OXIMETRY MLT: CPT

## 2022-11-30 PROCEDURE — 25000003 PHARM REV CODE 250: Performed by: INTERNAL MEDICINE

## 2022-11-30 PROCEDURE — 25000003 PHARM REV CODE 250

## 2022-11-30 PROCEDURE — 99233 SBSQ HOSP IP/OBS HIGH 50: CPT | Mod: ,,, | Performed by: INTERNAL MEDICINE

## 2022-11-30 PROCEDURE — 99223 1ST HOSP IP/OBS HIGH 75: CPT | Mod: AI,GC,, | Performed by: INTERNAL MEDICINE

## 2022-11-30 PROCEDURE — 99233 SBSQ HOSP IP/OBS HIGH 50: CPT | Mod: ,,, | Performed by: STUDENT IN AN ORGANIZED HEALTH CARE EDUCATION/TRAINING PROGRAM

## 2022-11-30 PROCEDURE — 20000000 HC ICU ROOM

## 2022-11-30 PROCEDURE — 25000003 PHARM REV CODE 250: Performed by: STUDENT IN AN ORGANIZED HEALTH CARE EDUCATION/TRAINING PROGRAM

## 2022-11-30 PROCEDURE — 99223 PR INITIAL HOSPITAL CARE,LEVL III: ICD-10-PCS | Mod: AI,GC,, | Performed by: INTERNAL MEDICINE

## 2022-11-30 PROCEDURE — 94760 N-INVAS EAR/PLS OXIMETRY 1: CPT

## 2022-11-30 PROCEDURE — 85610 PROTHROMBIN TIME: CPT | Performed by: STUDENT IN AN ORGANIZED HEALTH CARE EDUCATION/TRAINING PROGRAM

## 2022-11-30 PROCEDURE — 85025 COMPLETE CBC W/AUTO DIFF WBC: CPT | Performed by: HOSPITALIST

## 2022-11-30 PROCEDURE — 36415 COLL VENOUS BLD VENIPUNCTURE: CPT | Performed by: STUDENT IN AN ORGANIZED HEALTH CARE EDUCATION/TRAINING PROGRAM

## 2022-11-30 PROCEDURE — 99233 PR SUBSEQUENT HOSPITAL CARE,LEVL III: ICD-10-PCS | Mod: ,,, | Performed by: INTERNAL MEDICINE

## 2022-11-30 PROCEDURE — 99233 PR SUBSEQUENT HOSPITAL CARE,LEVL III: ICD-10-PCS | Mod: ,,, | Performed by: STUDENT IN AN ORGANIZED HEALTH CARE EDUCATION/TRAINING PROGRAM

## 2022-11-30 RX ORDER — AMIODARONE HYDROCHLORIDE 200 MG/1
400 TABLET ORAL DAILY
Status: DISCONTINUED | OUTPATIENT
Start: 2022-11-30 | End: 2022-12-02

## 2022-11-30 RX ADMIN — CARBAMAZEPINE 400 MG: 200 TABLET ORAL at 09:11

## 2022-11-30 RX ADMIN — AMIODARONE HYDROCHLORIDE 400 MG: 200 TABLET ORAL at 01:11

## 2022-11-30 RX ADMIN — SACUBITRIL AND VALSARTAN 1 TABLET: 24; 26 TABLET, FILM COATED ORAL at 09:11

## 2022-11-30 RX ADMIN — AMIODARONE HYDROCHLORIDE 0.5 MG/MIN: 1.8 INJECTION, SOLUTION INTRAVENOUS at 02:11

## 2022-11-30 RX ADMIN — MUPIROCIN: 20 OINTMENT TOPICAL at 09:11

## 2022-11-30 RX ADMIN — LACTOBACILLUS ACIDOPHILUS / LACTOBACILLUS BULGARICUS 1 EACH: 100 MILLION CFU STRENGTH GRANULES at 09:11

## 2022-11-30 RX ADMIN — METOPROLOL SUCCINATE 12.5 MG: 25 TABLET, EXTENDED RELEASE ORAL at 09:11

## 2022-11-30 RX ADMIN — CYANOCOBALAMIN 1000 MCG: 1000 INJECTION, SOLUTION INTRAMUSCULAR; SUBCUTANEOUS at 10:11

## 2022-11-30 RX ADMIN — CARBAMAZEPINE 400 MG: 200 TABLET ORAL at 10:11

## 2022-11-30 RX ADMIN — LACTOBACILLUS ACIDOPHILUS / LACTOBACILLUS BULGARICUS 1 EACH: 100 MILLION CFU STRENGTH GRANULES at 10:11

## 2022-11-30 RX ADMIN — PRAVASTATIN SODIUM 20 MG: 20 TABLET ORAL at 09:11

## 2022-11-30 RX ADMIN — ASPIRIN 81 MG CHEWABLE TABLET 81 MG: 81 TABLET CHEWABLE at 09:11

## 2022-11-30 RX ADMIN — CLOPIDOGREL BISULFATE 75 MG: 75 TABLET ORAL at 09:11

## 2022-11-30 NOTE — ASSESSMENT & PLAN NOTE
- s/p cath at [facility] showing significant triple vessel disease  - cards following  - plans for transfer to [facility] for further eval/high risk cath

## 2022-11-30 NOTE — ASSESSMENT & PLAN NOTE
Freq sustained episodes of VT noted on ICD interrogation, below treatment threshold.  ?r/t CHF, ischemia, meds.  ICD reprogrammed 11/23/22.  Cont toprol, titrate as able  Cont entresto 24/26mg bid, titrate as able   Eventual aldact.  Amio stopped.    11/30/22 VT during [facility] 11/29. VT improved on IV amiodarone - change to 400 qd po

## 2022-11-30 NOTE — PROGRESS NOTES
[facility] - Intensive Care  Palliative Medicine  Progress Note    Patient Name: Balbir Rebollar Sr.  MRN: [id number]  Admission Date: 11/22/2022  Hospital Length of Stay: 7 days  Code Status: Full Code   Attending Provider: Jeromy Campos MD  Consulting Provider: Tim Paul MD  Primary Care Physician: Flip Hanna MD  Principal Problem:Acute on chronic combined systolic and diastolic congestive heart failure    Patient information was obtained from patient, relative(s), past medical records and primary team.      Assessment/Plan:     * Acute on chronic combined systolic and diastolic congestive heart failure  - per 10/2022 echo on care everywhere:     -EF is approximately 20-30%     1. Severely decreased left ventricular systolic function.       2. Grade II diastolic dysfunction.       - cardiology following     ACP (advance care planning)  11/30/2022:  - seen at bedside with daughter ana and son present   - patient is much more awake, alert and vibrant today  - in his as usually described, laughing and joking mood  - he denies any symptoms or concerns at this time  - He has limited overall insight into the medical ongoings, but is aware things are happening with his heart. He clearly stated to me he does not feel the need to be involved in all the medical jargon and trusts all his children to make decisions on his behalf.   - Ana acknowledged that is kind of how he has always been  - Ana expressed not fully understanding everything going on, which I updated her regarding to the best of my ability  - They are hoping all goes well with the transfer to the main campus  - He has a lot of darlene in his life and many loved ones, family is hoping for everything to be done within reason so he can continue the joyful live he lives with his family.   - I made them aware regardless of whether things go well as well hope or if for any reason things take a turn for the worse, our team will be there to support  them.   - I let them know I will make our palliative team at [facility] aware of the transfer, which I did soon after my conversation with the family.     11/25/2022:  - patient unable to participate in conversation thus talked with his son Chris and daughter Silvia   - they have a good understanding of his medical ongoings and care for their father a lot  - he has been declining over the past few weeks saying he has been feeling off and weak  - they found out it might have been due to his heart failure and his odd rythyms at times  - I went over this further with them and explained to them what heart failure entails and what to expect longer term  - they understood and felt like they had a better understanding now. Looking forward to the cath on Monday to get more answers.   - Chris had talked some with Dr. Banegas yesterday regarding big picture GOC/code status  - I talked with Chris and Silvia more regarding this and the nature of DNR in a patient who has a defibrillator and what it entails  - they would like to get more answers from the cath Monday, get their father eating and pooping and hopefully feeling more like himself before they talk more about that, but they definitely understood the severity of his condition and the importance of hoping for the best but planning for the worst. They were appreciative of the open and honest conversation.   [] no changes to treatment plan  [] our team will follow up after his cath for further support    Ventricular tachycardia  - seen on pacemaker and during cath done on 11/29/2022  - cardiology following and adjusted some ICD settings   - plan for transfer to [facility] for further eval per cards    ICD (implantable cardioverter-defibrillator), single, in situ  - s/p AICD    Coronary artery disease involving native coronary artery of native heart without angina pectoris  - s/p cath at  showing significant triple vessel disease  - cards following  - plans for transfer  "to [facility] main campus for further eval/high risk cath        I will follow-up with patient. Please contact us if you have any additional questions.    Subjective:     Chief Complaint:   Chief Complaint   Patient presents with    Fatigue     X several days. Was seen at  for same c/o and discharged. Just started amiodarone 11/18       HPI:   Per H&P: "Balbir Rebollar Sr. 72 y.o. male with CAD, HTN, Afib, long term use of anticoagulants, CVA, and ICD, presents to the hospital with a chief complaint of fatigue and weakness with acute onset of one-week. Patient reports he was seen 3 times in a period of a week for the same symptoms and was discharged.  Patient describes his symptoms as feeling tired but I can not sleep. Associated symptoms include difficulty walking, diarrhea, frequent urination, and muscle spasms. Patient's daughter says," he is stumbling going up and down the stairs". Patient reports he was prescribed a new medication that is causing him to have dry mouth and feeling more fatigued (on top of original fatigue complaint). Patient's daughter reports patient is usually very active, but recently has been staying in bed more often this past week. No other exacerbating or alleviating factors. Patient denies dysuria, nausea, vomiting, cough, numbness, slurred speech, black stool , paresthesia, or other associated symptoms. Patient endorses compliance of coumadin. Family reports that the patient seen at  several times recently for symptoms and state they were told nothing was found. Follows with cardiology at  and was recently started on Amiodarone on 11/18/2022.       In the ED patient is afebrile without leukocytosis PT 45.2, INR 4.6, CO2 20, calcium 8.6, BNP 1 441, troponin 0.038, 2nd troponin 0.041 TSH WNL, UA is hazy with trace protein glucose and occult blood, negative for flu and COVID, CXR was negative, EKG showed "Sinus rhythm with Premature supraventricular complexes in a pattern of " "bigeminy, Right bundle branch block, Left anterior fascicular block" without previous EKG for comparison.  Patient was placed in observation for further workup an assessment by Cardiology.  Previous Echo from care everywhere "       Hospital Course:  No notes on file    Interval History/Subjective:    - initially presented with weakness/fatigue  - earlier this week cardiology ended up having a cath done which showed occluded LAD, severe Lcx stenosis, mdoerate RCA CAD and had sustained VT during the cath requiring IV amio which stabilized post op  -  ([facility]) is requesting the transfer to consider viability eval of LAD territory vs high risk PCI  - He is seen at bedside today and is looking much more awake and alert than last week.   - He is being his usual funny self as was initially described to me  - Mr. Rebollar denies any pain or SOB, stating he is feeling well right now without any complaints  - His daughter Laurie and other son are at bedside. I updated them regarding the overall medical ongoings. They understood the plan and why there is consideration for transfer to [facility].   - I made them aware our team at [facility] will be made aware of Mr. Rebollar transfer too    Medications:  Continuous Infusions:   amiodarone in dextrose 5% 0.5 mg/min (11/30/22 0800)     Scheduled Meds:   aspirin  81 mg Oral Daily    carBAMazepine  400 mg Oral BID    clopidogreL  75 mg Oral QHS    cyanocobalamin  1,000 mcg Subcutaneous Daily    Followed by    [START ON 12/4/2022] cyanocobalamin  1,000 mcg Subcutaneous Weekly    docusate sodium  100 mg Oral BID    lactobacillus acidophilus & bulgar  1 packet Oral BID    metoprolol succinate  12.5 mg Oral Daily    mupirocin   Nasal BID    polyethylene glycol  17 g Oral Daily    pravastatin  20 mg Oral QHS    sacubitriL-valsartan  1 tablet Oral BID    scopolamine  1 patch Transdermal Q3 Days     PRN Meds:acetaminophen, acetaminophen, acetaminophen, " albuterol-ipratropium, aluminum-magnesium hydroxide-simethicone, atropine, dextrose 10%, dextrose 10%, glucagon (human recombinant), glucose, glucose, HYDROcodone-acetaminophen, loperamide, morphine, naloxone, ondansetron, ondansetron, sodium chloride 0.9%    Objective:     Vital Signs (Most Recent):  Temp: 97.8 °F (36.6 °C) (11/30/22 0701)  Pulse: (!) 57 (11/30/22 0800)  Resp: 16 (11/30/22 0800)  BP: 128/72 (11/30/22 0800)  SpO2: 95 % (11/30/22 0800)   Vital Signs (24h Range):  Temp:  [97.8 °F (36.6 °C)-98.2 °F (36.8 °C)] 97.8 °F (36.6 °C)  Pulse:  [53-83] 57  Resp:  [6-31] 16  SpO2:  [92 %-100 %] 95 %  BP: (104-156)/(55-84) 128/72     Weight: 68 kg (149 lb 14.6 oz)  Body mass index is 22.14 kg/m².    Physical Exam  Vitals and nursing note reviewed.   Constitutional:       Appearance: Normal appearance.   HENT:      Head: Normocephalic and atraumatic.   Eyes:      Extraocular Movements: Extraocular movements intact.   Pulmonary:      Effort: Pulmonary effort is normal. No respiratory distress.   Abdominal:      General: Abdomen is flat.   Musculoskeletal:      Cervical back: Normal range of motion.   Skin:     General: Skin is warm and dry.   Neurological:      Mental Status: He is alert.      Comments: Awake and alert. Conversant. Funny   Psychiatric:         Mood and Affect: Mood normal.         Behavior: Behavior normal.       Review of Symptoms      Symptom Assessment (ESAS 0-10 Scale)  Pain:  0  Dyspnea:  0  Anxiety:  0  Nausea:  0  Depression:  0  Anorexia:  0  Fatigue:  0  Insomnia:  0  Restlessness:  0  Agitation:  0         Living Arrangements:  Lives with family    Psychosocial/Cultural: - lives with son and daughter and daughters  and grand children at home  - 6 kids  - used to be a  and used to repair typewriters  - enjoys black and white classic movies  - enjoys tv but no particular favorite show   - love ihop usually       Advance Care Planning   Advance Directives:     Decision  Making:  Family answered questions       Significant Labs: Reviewed  CBC:   Recent Labs   Lab 11/30/22  0406   WBC 3.90   HGB 14.8   HCT 44.2   MCV 90        BMP:  Recent Labs   Lab 11/30/22  0406         K 3.9      CO2 20*   BUN 23   CREATININE 0.8   CALCIUM 8.6*   MG 2.0     LFT:  Lab Results   Component Value Date    AST 29 11/22/2022    ALKPHOS 81 11/22/2022    BILITOT 0.3 11/22/2022     Albumin:   Albumin   Date Value Ref Range Status   11/22/2022 3.6 3.5 - 5.2 g/dL Final     Protein:   Total Protein   Date Value Ref Range Status   11/22/2022 7.2 6.0 - 8.4 g/dL Final     Lactic acid:   No results found for: LACTATE    Significant Imaging: Reviewed     > 50% of 35 min visit spent in chart review, face to face discussion of symptom assessment, coordination of care with other specialists, updating family, transfer planning      Tim Paul MD  Palliative Medicine  [facility] - Intensive Care

## 2022-11-30 NOTE — CARE UPDATE
Case d/w Dr. Kramer ([facility]) who has accepted the pt for transfer to [facility].

## 2022-11-30 NOTE — PROGRESS NOTES
[facility] Intensive Care  Cardiology  Progress Note    Patient Name: Balbir Rebollar Sr.  MRN: [id number]  Admission Date: 11/22/2022  Hospital Length of Stay: 7 days  Code Status: Full Code   Attending Physician: Jeromy Campos MD   Primary Care Physician: Flip Hanna MD  Expected Discharge Date: 11/28/2022  Principal Problem:Acute on chronic combined systolic and diastolic congestive heart failure    Subjective:     Hospital Course:   Interval Hx: No cp/sob, looks weak.  Case d/w son (Juan Carlos) at bedside.  Case d/w Dr. Quinones.    Tele: SR 70s, no VT noted (personally reviewed and interpreted)    11/30/22 No further VT since yesterday AM on IV amiodarone. [place] [facility]    [place]cho 10/19/22     MAJOR FINDINGS    EF is approximately 20-30%     1. Severely decreased left ventricular systolic function.       2. Grade II diastolic dysfunction.       3. Wall motion abnormalities imply disease of the left anterior descending coronary artery.     4. Mild nonrheumatic mitral valve regurgitation with no suggestion of left atrial enlargement.     5. Mild nonrheumatic aortic valve regurgitation.       6. Mild-to-moderate aortic root dilation.     7. Although estimation of pulmonary artery systolic pressure is not possible due to incomplete tricuspid regurgitation velocity       profile, pulmonary hypertension is not suspected.     Compared to previous echocardiogram 11/01/2021, no significant changes are seen        [facility] 11/29/22  Dominance: Right  LM: MLI  LAD: ost occluded, faint catherine from RCA via septals  LCx: prox 90%, T3 flow  RCA: diffuse up to 50-70%     Hemostasis:  R Radial band     Impression:  Sustained VT  Severe LV dysfxn  Occluded LAD, collateralized from RCA  Severe LCx stenosis  Mod RCA CAD  Sustained VT during cath successfully treated with ATP and subsequent amio IV.  R rad vasband for hemostasis     Plan:  Cont med rx  Cont ASA/Plavix/statin  Amio gtt/transfer to ICU  Transfer to [facility] to  discuss viability eval vs high risk PCI of LAD +/- LCx (+/- MCS).  Currently on diversion.  Will manage pt at [facility] ICU pending bed availability.          Interval History:     Review of Systems   Constitutional: Negative for decreased appetite.   HENT:  Negative for ear discharge.    Eyes:  Negative for blurred vision.   Endocrine: Negative for polyphagia.   Skin:  Negative for nail changes.   Genitourinary:  Negative for bladder incontinence.   Neurological:  Negative for aphonia.   Psychiatric/Behavioral:  Negative for hallucinations.    Allergic/Immunologic: Negative for hives.   Objective:     Vital Signs (Most Recent):  Temp: 97.6 °F (36.4 °C) (11/30/22 1101)  Pulse: (!) 56 (11/30/22 1200)  Resp: 11 (11/30/22 1200)  BP: (!) 115/58 (11/30/22 1200)  SpO2: 96 % (11/30/22 1200)   Vital Signs (24h Range):  Temp:  [97.6 °F (36.4 °C)-98.2 °F (36.8 °C)] 97.6 °F (36.4 °C)  Pulse:  [53-83] 56  Resp:  [6-31] 11  SpO2:  [91 %-100 %] 96 %  BP: (104-156)/(55-84) 115/58     Weight: 68 kg (149 lb 14.6 oz)  Body mass index is 22.14 kg/m².     SpO2: 96 %  O2 Device (Oxygen Therapy): room air      Intake/Output Summary (Last 24 hours) at 11/30/2022 1213  Last data filed at 11/30/2022 1200  Gross per 24 hour   Intake 998.52 ml   Output 675 ml   Net 323.52 ml       Lines/Drains/Airways       Peripheral Intravenous Line  Duration                  Peripheral IV - Single Lumen 20 G Anterior;Right Forearm -- days         Peripheral IV - Single Lumen 11/26/22 0034 18 G Anterior;Left Forearm 4 days                    Physical Exam  Constitutional:       Appearance: He is well-developed.   HENT:      Head: Normocephalic and atraumatic.   Eyes:      Conjunctiva/sclera: Conjunctivae normal.      Pupils: Pupils are equal, round, and reactive to light.   Cardiovascular:      Rate and Rhythm: Normal rate.      Pulses: Intact distal pulses.      Heart sounds: Normal heart sounds.   Pulmonary:      Effort: Pulmonary effort is normal.       Breath sounds: Normal breath sounds.   Abdominal:      General: Bowel sounds are normal.      Palpations: Abdomen is soft.   Musculoskeletal:         General: Normal range of motion.      Cervical back: Normal range of motion and neck supple.   Skin:     General: Skin is warm and dry.   Neurological:      Mental Status: He is alert and oriented to person, place, and time.       Significant Labs: All pertinent lab results from the last 24 hours have been reviewed.    Significant Imaging: Echocardiogram: 2D echo with color flow doppler: No results found for this or any previous visit.    Assessment and Plan:     Brief HPI:     * Acute on chronic combined systolic and diastolic congestive heart failure  Known severe LV dysfxn/ICM  Not grossly vol overloaded, but optivol crossed on ICD and BNP elevated  Not on GDMT on admission  Med rx as per VT section.  Consider as candidate for CardioMEMS.      Ventricular tachycardia  Freq sustained episodes of VT noted on ICD interrogation, below treatment threshold.  ?r/t CHF, ischemia, meds.  ICD reprogrammed 11/23/22.  Cont toprol, titrate as able  Cont entresto 24/26mg bid, titrate as able   Eventual aldact.  Amio stopped.    11/30/22 VT during [facility] 11/29. VT improved on IV amiodarone - change to 400 qd po    Weakness  ?related to freq ventricular ectopy/sustained VT.  Other dx/mgmt per IM    Pure hypercholesterolemia  Cont statin    ICD (implantable cardioverter-defibrillator), single, in situ  [employer] single chamber ICD  Reprogrammed with lower rate threshold and more ATP on 11/23/22    Coronary artery disease involving native coronary artery of native heart without angina pectoris  Known CAD s/p LAD PCI 2006  Hx ICM, EF <30% by recent echo.  No overt anginal sxs, but freq ventricular tachycardia noted.  Hold coumadin for possible cath (start DOAC on discharge).  May be able to do as outpatient as VT seems to have calmed down with rx of CHF.  Cath planned for Tues  11/29/22.    Risks, benefits and alternatives of the catheterization procedure were discussed with the patient and his son (Juan Carlos) at the bedside, which include but are not limited to: bleeding, infection, death, heart attack, arrhythmia, kidney failure, stroke, need for emergency surgery, etc.  Patient understands and and agrees to proceed.  Consent was placed on the chart.     11/30/22 [facility] with occluded LAD, 90% Cx, moderate RCA disease. [place] [facility] [place]          Chronic anticoagulation  INR now 1.4  Hold coumadin and allow to drift down for cath.  Switch to DOAC on discharge.    Paroxysmal atrial fibrillation  In SR on coumadin  Coumadin stopped (allow INR to drift down for cath), INR 1.4 after Vit K.  Will start DOAC on discharge        VTE Risk Mitigation (From admission, onward)         Ordered     Reason for No Pharmacological VTE Prophylaxis  Once        Question:  Reasons:  Answer:  Already adequately anticoagulated on oral Anticoagulants    11/23/22 0042     IP VTE HIGH RISK PATIENT  Once         11/23/22 0042     Place sequential compression device  Until discontinued         11/23/22 0042                Stephen Dodd MD  Cardiology  [facility] [place] - Intensive Care

## 2022-11-30 NOTE — PROGRESS NOTES
"[facility] Intensive [facility] Medicine  Progress Note    Patient Name: Balbir Rebollar Sr.  MRN: [id number]  Patient Class: IP- Inpatient   Admission Date: 11/22/2022  Length of Stay: 7 days  Attending Physician: Jeromy Campos MD  Primary Care Provider: Flip Hanna MD        Subjective:     Principal Problem:Acute on chronic combined systolic and diastolic congestive heart failure        HPI:  Balbir Rebollar Sr. 72 y.o. male with CAD, HTN, Afib, long term use of anticoagulants, CVA, and ICD, presents to the hospital with a chief complaint of fatigue and weakness with acute onset of one-week. Patient reports he was seen 3 times in a period of a week for the same symptoms and was discharged.  Patient describes his symptoms as feeling tired but I can not sleep. Associated symptoms include difficulty walking, diarrhea, frequent urination, and muscle spasms. Patient's daughter says," he is stumbling going up and down the stairs". Patient reports he was prescribed a new medication that is causing him to have dry mouth and feeling more fatigued (on top of original fatigue complaint). Patient's daughter reports patient is usually very active, but recently has been staying in bed more often this past week. No other exacerbating or alleviating factors. Patient denies dysuria, nausea, vomiting, cough, numbness, slurred speech, black stool , paresthesia, or other associated symptoms. Patient endorses compliance of coumadin. Family reports that the patient seen at  several times recently for symptoms and state they were told nothing was found. Follows with cardiology at  and was recently started on Amiodarone on 11/18/2022.      In the ED patient is afebrile without leukocytosis PT 45.2, INR 4.6, CO2 20, calcium 8.6, BNP 1 441, troponin 0.038, 2nd troponin 0.041 TSH WNL, UA is hazy with trace protein glucose and occult blood, negative for flu and COVID, CXR was negative, EKG showed "Sinus " "rhythm with Premature supraventricular complexes in a pattern of bigeminy, Right bundle branch block, Left anterior fascicular block" without previous EKG for comparison.  Patient was placed in observation for further workup an assessment by Cardiology.  Previous Echo from [facility] everywhere     ECHOCARDIOGRAM   Interpretation Summary  11/01/2021  MRN:  [id number]        FIN:    [id number]      Accession #:[id number]         Order #:[id number]    Indications:    Coronary artery disease involving native coronary artery of native heart without angina pectoris; Paroxysmal                     atrial fibrillation (CMS/HCC); Nonsustained ventricular tachycardia (CMS/HCC)       BP:  128   / 74      HR: 67                       Rhythm:           Sinus                                                      Technical Quality:Very technically difficult study    MEASUREMENTS  (Male / Female) Normal Values   Left Ventricle:    Appears to be normal left ventricular cavity size.  Severely reduced left ventricular systolic function.  Dense hypokinesis and thinning of the anterior wall, septum, and apex with normal contractility elsewhere.   Visually estimated EF is less than 30%.  Grade I diastolic dysfunction (abnormal relaxation filling pattern),    with normal or mildly elevated filling pressures.      Overview/Hospital Course:  72 y.o. 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  He was diagnosed with acute/chronic systolic/diastolic chf exacerbation due to recurrent ventricular tachycardia, supratherapeutic INR, metabolic encephalopathy, dysphagia, b12 deficiency and debility.  Cardiology consulted and his AICD interrogated and reprogrammed.  VT episodes were below threshold of treatment.  Initiated GDMT with toprol, entresto and IV lasix but due to hypotension, vomiting, dysphagia these " have been inconsistently tolerated and not given for several days.  He developed encephalopathy with dysphagia and signs of aspiration worrisome for new stroke, but MRI 11/27 ruled out stroke.  SLP has assessed patient and OK for puree diet with nectar thick liquids - diet advanced 11/27.  Oral amiodarone was stopped due to severe fatigue each time he took this.  Warfarin has been held.  Patient s/p  LHC on Tuesday 11/29, which showed occluded LAD and severe LCx stenosis.  Procedure complicated by sustained Vtach. Required amiodarone gtt and transfer to ICU on 11/29 for close monitoring. Plan for transfer to [facility] when bed available to discuss viability eval vs high risk PCI.  Plan to change warfarin to eliquis at discharge.  Neurology consulted due to his persistent encephalopathy.  EEG from 11/28 rule out seizure activity.  Mental status now at baseline. Started B12 replacement.  PT/OT recommend HH at discharge.  Palliative care consulted and patient remains full code.      Interval History: feeling well.    Review of Systems   HENT:  Negative for ear discharge and ear pain.    Eyes:  Negative for discharge and itching.   Endocrine: Negative for cold intolerance and heat intolerance.   Neurological:  Negative for seizures and syncope.   Objective:     Vital Signs (Most Recent):  Temp: 97.6 °F (36.4 °C) (11/30/22 1101)  Pulse: (!) 56 (11/30/22 1200)  Resp: 11 (11/30/22 1200)  BP: (!) 115/58 (11/30/22 1200)  SpO2: 96 % (11/30/22 1200)   Vital Signs (24h Range):  Temp:  [97.6 °F (36.4 °C)-98.2 °F (36.8 °C)] 97.6 °F (36.4 °C)  Pulse:  [53-83] 56  Resp:  [6-31] 11  SpO2:  [91 %-100 %] 96 %  BP: (104-156)/(55-84) 115/58     Weight: 68 kg (149 lb 14.6 oz)  Body mass index is 22.14 kg/m².    Intake/Output Summary (Last 24 hours) at 11/30/2022 1430  Last data filed at 11/30/2022 1215  Gross per 24 hour   Intake 535.78 ml   Output 300 ml   Net 235.78 ml      Physical Exam  Vitals and nursing note reviewed.    Constitutional:       General: He is not in acute distress.     Appearance: He is well-developed. He is ill-appearing. He is not toxic-appearing or diaphoretic.      Comments: Frail in appearance.  Awake and alert. Conversant   HENT:      Head: Normocephalic and atraumatic.      Right Ear: External ear normal.      Left Ear: External ear normal.      Nose: Nose normal.      Mouth/Throat:      Mouth: Mucous membranes are moist.   Eyes:      Extraocular Movements: Extraocular movements intact.      Conjunctiva/sclera: Conjunctivae normal.   Cardiovascular:      Rate and Rhythm: Normal rate and regular rhythm.      Heart sounds: No murmur heard.  Pulmonary:      Effort: Pulmonary effort is normal. No respiratory distress.      Breath sounds: Normal breath sounds. No wheezing or rales.   Abdominal:      General: Bowel sounds are normal. There is no distension.      Palpations: Abdomen is soft.      Tenderness: There is no abdominal tenderness. There is no guarding.   Musculoskeletal:         General: No tenderness. Normal range of motion.      Cervical back: Normal range of motion.      Right lower leg: No edema.      Left lower leg: No edema.   Skin:     General: Skin is warm and dry.      Coloration: Skin is pale.   Neurological:      Mental Status: He is alert.      Motor: Weakness present.      Comments: Appears attentive and alert today.  Significant diffuse weakness   Psychiatric:         Mood and Affect: Mood normal.         Thought Content: Thought content normal.       Significant Labs: All pertinent labs within the past 24 hours have been reviewed.  BMP:   Recent Labs   Lab 11/30/22  0406         K 3.9      CO2 20*   BUN 23   CREATININE 0.8   CALCIUM 8.6*   MG 2.0     CBC:   Recent Labs   Lab 11/29/22  0427 11/30/22  0406   WBC 4.84 3.90   HGB 16.5 14.8   HCT 48.7 44.2    195       Significant Imaging: I have reviewed all pertinent imaging results/findings within the past 24  hours.      Assessment/Plan:      * Acute on chronic combined systolic and diastolic congestive heart failure  -Admitted to inpatient status  -BNP elevated at 1441  -Echo 10/22 showed EF 20-30%, grade II diastolic dysfunction  -Known chronic ischemic cardiomyopathy with AICD in place  -Cardiology consulted and input appreciated: plan for [facility] on 11/29  -AICD interogated and noted frequent sustained VT episodes below threshold of treatment.  AICD reprogramed with lower threshold on 11/23  -Strict ins/outs and daily weights  -Have attempted gentle diuresis with iv lasix and started toprol and entresto and no further VT thus far  -Due to lethargy, nausea, vomiting and low blood pressure has not been solidly maintained on GDMT.  Has not received lasix since 11/24.  Has not received toprol since 11/24.  Received 1 dose entresto 11/27.  -11/27 looks better  but still quite weak.    -Continue to hold lasix for now  -Continue toprol at 12.5mg daily and Entresto    History of seizure disorder  -Neurology following  -EEG completed 11/28  -Continue home carbamazepine   -Seizure precautions     B12 deficiency  -continue b12 supplementation     Nausea and vomiting  -Significant nausea and vomiting overnight 11/25-26.  -KUB with non-obstructive bowel gas pattern and abdomen is quite soft albeit with diminished bowel sounds  -Noted dysphagia and suspected aspiration around 1PM on 11/26  -SLP consulted and recommend dysphagia puree diet with nectar thick liquids  -Continue anti-emetics, bowel regimen and aspiration precautions  -Resolved    Encephalopathy, metabolic  -On 11/25 more confused and lethargic  -Noted fall 2 overnight late last week - no apparent trauma and head ct at that time without evidence of bleeding.  -No evidence of infection  -Could be secondary to mild dehydration and low blood pressure? - stopped lasix and decreased toprol.  -Ordered delirium precautions, held lasix and held/lowered toprol.  -Checking  for  other reversible encephalopathies:  TSH, Ammonia normal.  No UTI.  Folate normal.  B12 is deficient (noted 11/27)  -Repeat head CT 11/26 without any acute findings and no evidence of bleeding.  -Was concerned there could have been a stroke as he now developed dysphagia.  Sent for MRI at [facility] 11/27 which did not reveal any new stroke  -11/27:  Mental status improved but still somewhat lethargic.  SLP evaluated patient and ok for dysphagia diet with nectar thick liquids.  -Await neurology evaluation.  Discussed with Dr. Isaacs - will check EEG given history of seizures. No seizures on EEG on 11/28    -Continue carbemazepime.  -Continue delirium precautions.  -Mental status appears near baseline on 11/28 per daughter at bedside and on 11/29 per son (Juan Carlos). Pt awake and alert and does not appear lethargic     ACP (advance care planning)  -Dr. Banegas discussed with patient's son to clarify goals of care.  Patient a bit too confused to participate.  Son wishes to speak with his sister.  -Consulted palliative care team and discussed with Dr. Paul.  Appreciate input.  -Continue full code for now - looks better today    Hypokalemia  -K normal today  -Replace as needed  -Repeat labs in AM.    Supratherapeutic INR  -INR >4 on admit and now trending down   -Continue to hold warfarin - has not received during this stay  -INR needs to be less than 1.7 for angiogram - pt given vitamin K 5mg po x1 on (11/27).  -INR 1.4 on 11/28  -Check pt/inr daily.    Ventricular tachycardia  -AICD interrogated and noted sustained VT episodes below threshold of treatment  -AICD reprogrammed  -Stopped amiodarone 11/24- family and patient feels it causes severe weakness and confusion.  -Resumed toprolol on 11/27 at lower dose.   -During angiogram, pt had sustained Vtach   -Transfer to ICU on amio gtt 11/29  -Cardiology following     Weakness  -Presenting complaint was weakness x 1 week since starting amiodarone.  -Patient and son felt weakness  clearly due to amiodarone, but we suspected weakness was driven by the episodes of VT which were driven by uncontrolled severe chf.  -Noted fall overnight from toilet 11/23-11/24 - no head trauma and CT head without acute findings  -Noted near fall / slip to the floor with assistance of nurse on 11/25 - no trauma at all.  -Fall precautions   -Amiodarone stopped 11/24.    -PT/OT consulted    Pure hypercholesterolemia  -Continue statin     ICD (implantable cardioverter-defibrillator), single, in situ  -Treatment as above.    History of CVA (cerebrovascular accident)  -History noted    Coronary artery disease involving native coronary artery of native heart without angina pectoris  -History noted  -No chest pain  -Cardiology consulted: s/p angiogram on 11/29. Occluded LAD,Severe LCx stenosis. Transfer to [facility] to discuss viability eval vs high risk PCI of LAD. Awaiting bed availiability  -Continue ASA/plavix/statin     Chronic anticoagulation  -Treatment as above.  -Will dc warfarin on discharge and start pt on DOAC for anticoagulation     Paroxysmal atrial fibrillation  -Patient with Long standing persistent (>12 months) atrial fibrillation which is controlled currently with toprol.   -Patient is currently in sinus rhythm.[id number] Score: 2.   -Anticoagulation indicated and is on warfarin at home.    -Holding warfarin due to supratheraputic INR on admit as well as need for angiogram  -Plan for DOAC on discharge.      VTE Risk Mitigation (From admission, onward)         Ordered     Reason for No Pharmacological VTE Prophylaxis  Once        Question:  Reasons:  Answer:  Already adequately anticoagulated on oral Anticoagulants    11/23/22 0042     IP VTE HIGH RISK PATIENT  Once         11/23/22 0042     Place sequential compression device  Until discontinued         11/23/22 0042                Discharge Planning   MOR: 11/28/2022     Code Status: Full Code   Is the patient medically ready for discharge?:     Reason for  patient still in hospital (select all that apply): Treatment  Discharge Plan A: Home Health   Discharge Delays: (!) Post-Acute Set-up            Jeromy Campos MD  Department of Hospital Medicine   [facility] - Intensive Care

## 2022-11-30 NOTE — SUBJECTIVE & OBJECTIVE
Interval History/Subjective:    - initially presented with weakness/fatigue  - earlier this week cardiology ended up having a cath done which showed occluded LAD, severe Lcx stenosis, mdoerate RCA CAD and had sustained VT during the cath requiring IV amio which stabilized post op  -  ([facility]) is requesting the transfer to consider viability eval of LAD territory vs high risk PCI  - He is seen at bedside today and is looking much more awake and alert than last week.   - He is being his usual funny self as was initially described to me  - Mr. Rebollar denies any pain or SOB, stating he is feeling well right now without any complaints  - His daughter Laurie and other son are at bedside. I updated them regarding the overall medical ongoings. They understood the plan and why there is consideration for transfer to [facility].   - I made them aware our team at [facility] will be made aware of Mr. Rebollar transfer too    Medications:  Continuous Infusions:   amiodarone in dextrose 5% 0.5 mg/min (11/30/22 0800)     Scheduled Meds:   aspirin  81 mg Oral Daily    carBAMazepine  400 mg Oral BID    clopidogreL  75 mg Oral QHS    cyanocobalamin  1,000 mcg Subcutaneous Daily    Followed by    [START ON 12/4/2022] cyanocobalamin  1,000 mcg Subcutaneous Weekly    docusate sodium  100 mg Oral BID    lactobacillus acidophilus & bulgar  1 packet Oral BID    metoprolol succinate  12.5 mg Oral Daily    mupirocin   Nasal BID    polyethylene glycol  17 g Oral Daily    pravastatin  20 mg Oral QHS    sacubitriL-valsartan  1 tablet Oral BID    scopolamine  1 patch Transdermal Q3 Days     PRN Meds:acetaminophen, acetaminophen, acetaminophen, albuterol-ipratropium, aluminum-magnesium hydroxide-simethicone, atropine, dextrose 10%, dextrose 10%, glucagon (human recombinant), glucose, glucose, HYDROcodone-acetaminophen, loperamide, morphine, naloxone, ondansetron, ondansetron, sodium chloride 0.9%    Objective:     Vital Signs (Most  Recent):  Temp: 97.8 °F (36.6 °C) (11/30/22 0701)  Pulse: (!) 57 (11/30/22 0800)  Resp: 16 (11/30/22 0800)  BP: 128/72 (11/30/22 0800)  SpO2: 95 % (11/30/22 0800)   Vital Signs (24h Range):  Temp:  [97.8 °F (36.6 °C)-98.2 °F (36.8 °C)] 97.8 °F (36.6 °C)  Pulse:  [53-83] 57  Resp:  [6-31] 16  SpO2:  [92 %-100 %] 95 %  BP: (104-156)/(55-84) 128/72     Weight: 68 kg (149 lb 14.6 oz)  Body mass index is 22.14 kg/m².    Physical Exam  Vitals and nursing note reviewed.   Constitutional:       Appearance: Normal appearance.   HENT:      Head: Normocephalic and atraumatic.   Eyes:      Extraocular Movements: Extraocular movements intact.   Pulmonary:      Effort: Pulmonary effort is normal. No respiratory distress.   Abdominal:      General: Abdomen is flat.   Musculoskeletal:      Cervical back: Normal range of motion.   Skin:     General: Skin is warm and dry.   Neurological:      Mental Status: He is alert.      Comments: Awake and alert. Conversant. Funny   Psychiatric:         Mood and Affect: Mood normal.         Behavior: Behavior normal.       Review of Symptoms      Symptom Assessment (ESAS 0-10 Scale)  Pain:  0  Dyspnea:  0  Anxiety:  0  Nausea:  0  Depression:  0  Anorexia:  0  Fatigue:  0  Insomnia:  0  Restlessness:  0  Agitation:  0         Living Arrangements:  Lives with family    Psychosocial/Cultural: - lives with son and daughter and daughters  and grand children at home  - 6 kids  - used to be a  and used to repair typewriters  - enjoys black and white classic movies  - enjoys tv but no particular favorite show   - love ihop usually       Advance Care Planning   Advance Directives:     Decision Making:  Family answered questions       Significant Labs: Reviewed  CBC:   Recent Labs   Lab 11/30/22  0406   WBC 3.90   HGB 14.8   HCT 44.2   MCV 90        BMP:  Recent Labs   Lab 11/30/22  0406         K 3.9      CO2 20*   BUN 23   CREATININE 0.8   CALCIUM 8.6*   MG  2.0     LFT:  Lab Results   Component Value Date    AST 29 11/22/2022    ALKPHOS 81 11/22/2022    BILITOT 0.3 11/22/2022     Albumin:   Albumin   Date Value Ref Range Status   11/22/2022 3.6 3.5 - 5.2 g/dL Final     Protein:   Total Protein   Date Value Ref Range Status   11/22/2022 7.2 6.0 - 8.4 g/dL Final     Lactic acid:   No results found for: LACTATE    Significant Imaging: Reviewed

## 2022-11-30 NOTE — ASSESSMENT & PLAN NOTE
-Patient with Long standing persistent (>12 months) atrial fibrillation which is controlled currently with toprol.   -Patient is currently in sinus rhythm.[id number] Score: 2.   -Anticoagulation indicated and is on warfarin at home.    -Holding warfarin due to supratheraputic INR on admit as well as need for angiogram  -Plan for DOAC on discharge.

## 2022-11-30 NOTE — SUBJECTIVE & OBJECTIVE
Interval History: feeling well.    Review of Systems   HENT:  Negative for ear discharge and ear pain.    Eyes:  Negative for discharge and itching.   Endocrine: Negative for cold intolerance and heat intolerance.   Neurological:  Negative for seizures and syncope.   Objective:     Vital Signs (Most Recent):  Temp: 97.6 °F (36.4 °C) (11/30/22 1101)  Pulse: (!) 56 (11/30/22 1200)  Resp: 11 (11/30/22 1200)  BP: (!) 115/58 (11/30/22 1200)  SpO2: 96 % (11/30/22 1200)   Vital Signs (24h Range):  Temp:  [97.6 °F (36.4 °C)-98.2 °F (36.8 °C)] 97.6 °F (36.4 °C)  Pulse:  [53-83] 56  Resp:  [6-31] 11  SpO2:  [91 %-100 %] 96 %  BP: (104-156)/(55-84) 115/58     Weight: 68 kg (149 lb 14.6 oz)  Body mass index is 22.14 kg/m².    Intake/Output Summary (Last 24 hours) at 11/30/2022 1430  Last data filed at 11/30/2022 1215  Gross per 24 hour   Intake 535.78 ml   Output 300 ml   Net 235.78 ml      Physical Exam  Vitals and nursing note reviewed.   Constitutional:       General: He is not in acute distress.     Appearance: He is well-developed. He is ill-appearing. He is not toxic-appearing or diaphoretic.      Comments: Frail in appearance.  Awake and alert. Conversant   HENT:      Head: Normocephalic and atraumatic.      Right Ear: External ear normal.      Left Ear: External ear normal.      Nose: Nose normal.      Mouth/Throat:      Mouth: Mucous membranes are moist.   Eyes:      Extraocular Movements: Extraocular movements intact.      Conjunctiva/sclera: Conjunctivae normal.   Cardiovascular:      Rate and Rhythm: Normal rate and regular rhythm.      Heart sounds: No murmur heard.  Pulmonary:      Effort: Pulmonary effort is normal. No respiratory distress.      Breath sounds: Normal breath sounds. No wheezing or rales.   Abdominal:      General: Bowel sounds are normal. There is no distension.      Palpations: Abdomen is soft.      Tenderness: There is no abdominal tenderness. There is no guarding.   Musculoskeletal:          General: No tenderness. Normal range of motion.      Cervical back: Normal range of motion.      Right lower leg: No edema.      Left lower leg: No edema.   Skin:     General: Skin is warm and dry.      Coloration: Skin is pale.   Neurological:      Mental Status: He is alert.      Motor: Weakness present.      Comments: Appears attentive and alert today.  Significant diffuse weakness   Psychiatric:         Mood and Affect: Mood normal.         Thought Content: Thought content normal.       Significant Labs: All pertinent labs within the past 24 hours have been reviewed.  BMP:   Recent Labs   Lab 11/30/22  0406         K 3.9      CO2 20*   BUN 23   CREATININE 0.8   CALCIUM 8.6*   MG 2.0     CBC:   Recent Labs   Lab 11/29/22  0427 11/30/22  0406   WBC 4.84 3.90   HGB 16.5 14.8   HCT 48.7 44.2    195       Significant Imaging: I have reviewed all pertinent imaging results/findings within the past 24 hours.

## 2022-11-30 NOTE — ASSESSMENT & PLAN NOTE
Known CAD s/p LAD PCI 2006  Hx ICM, EF <30% by recent echo.  No overt anginal sxs, but freq ventricular tachycardia noted.  Hold coumadin for possible cath (start DOAC on discharge).  May be able to do as outpatient as VT seems to have calmed down with rx of CHF.  Cath planned for Tues 11/29/22.    Risks, benefits and alternatives of the catheterization procedure were discussed with the patient and his son (Juan Carlos) at the bedside, which include but are not limited to: bleeding, infection, death, heart attack, arrhythmia, kidney failure, stroke, need for emergency surgery, etc.  Patient understands and and agrees to proceed.  Consent was placed on the chart.     11/30/22 [facility] with occluded LAD, 90% Cx, moderate RCA disease. Accepted for transfer to [facility] for high risk PCI

## 2022-11-30 NOTE — NURSING
[facility] [place]  Nurses Note -- 4 Eyes      11/30/2022       Skin assessed on: Q Shift      [x] No Pressure Injuries Present    [x]Prevention Measures Documented    [] Yes LDA  for Pressure Injury Previously documented     [] Yes New Pressure Injury Discovered   [] LDA for New Pressure Injury Added      Attending RN:  Justa Morgan RN     Second RN:  OMAR Cuevas

## 2022-11-30 NOTE — ASSESSMENT & PLAN NOTE
- seen on pacemaker and during cath done on 11/29/2022  - cardiology following and adjusted some ICD settings   - plan for transfer to main campus for further eval per cards

## 2022-11-30 NOTE — ASSESSMENT & PLAN NOTE
-AICD interrogated and noted sustained VT episodes below threshold of treatment  -AICD reprogrammed  -Stopped amiodarone 11/24- family and patient feels it causes severe weakness and confusion.  -Resumed toprolol on 11/27 at lower dose.   -During angiogram, pt had sustained Vtach   -Transfer to ICU on amio gtt 11/29  -Cardiology following

## 2022-11-30 NOTE — ASSESSMENT & PLAN NOTE
- per 10/2022 echo on care everywhere:     -EF is approximately 20-30%     1. Severely decreased left ventricular systolic function.       2. Grade II diastolic dysfunction.       - cardiology following

## 2022-11-30 NOTE — ASSESSMENT & PLAN NOTE
11/30/2022:  - seen at bedside with daughter ana and son present   - patient is much more awake, alert and vibrant today  - in his as usually described, laughing and joking mood  - he denies any symptoms or concerns at this time  - He has limited overall insight into the medical ongoings, but is aware things are happening with his heart. He clearly stated to me he does not feel the need to be involved in all the medical jargon and trusts all his children to make decisions on his behalf.   - Ana acknowledged that is kind of how he has always been  - Ana expressed not fully understanding everything going on, which I updated her regarding to the best of my ability  - They are hoping all goes well with the transfer to the [facility]  - He has a lot of darlene in his life and many loved ones, family is hoping for everything to be done within reason so he can continue the joyful live he lives with his family.   - I made them aware regardless of whether things go well as well hope or if for any reason things take a turn for the worse, our team will be there to support them.   - I let them know I will make our palliative team at [facility] aware of the transfer, which I did soon after my conversation with the family.     11/25/2022:  - patient unable to participate in conversation thus talked with his son Chris and daughter Silvia   - they have a good understanding of his medical ongoings and care for their father a lot  - he has been declining over the past few weeks saying he has been feeling off and weak  - they found out it might have been due to his heart failure and his odd rythyms at times  - I went over this further with them and explained to them what heart failure entails and what to expect longer term  - they understood and felt like they had a better understanding now. Looking forward to the cath on Monday to get more answers.   - Chris had talked some with Dr. Banegas yesterday regarding big picture  GOC/code status  - I talked with Chris and Silvia more regarding this and the nature of DNR in a patient who has a defibrillator and what it entails  - they would like to get more answers from the cath Monday, get their father eating and pooping and hopefully feeling more like himself before they talk more about that, but they definitely understood the severity of his condition and the importance of hoping for the best but planning for the worst. They were appreciative of the open and honest conversation.   [] no changes to treatment plan  [] our team will follow up after his cath for further support

## 2022-11-30 NOTE — ASSESSMENT & PLAN NOTE
Freq sustained episodes of VT noted on ICD interrogation, below treatment threshold.  ?r/t CHF, ischemia, meds.  ICD reprogrammed 11/23/22.  Amio oral, toprol, entresto, and statin    11/30/22 VT during [facility] 11/29. VT improved on IV amiodarone - change to 400 qd po

## 2022-11-30 NOTE — H&P
[facility] - Cardiac Intensive Care  Cardiology  History and Physical     Patient Name: Balbir Rebollar Sr.  MRN: [id number]  Admission Date: 11/22/2022  Code Status: Full Code   Attending Provider: Prem Kramer MD   Primary Care Physician: Flip Hanna MD  Principal Problem:Acute on chronic combined systolic and diastolic congestive heart failure    Patient information was obtained from patient, relative(s), past medical records and ER records.     Subjective:     Chief Complaint:  NSTEMI     HPI:  72 y.o. male with CAD with LAD PCI placement in 2006, ischemic cardiomyopathy HTN, Afib on long term use of anticoagulants with warfarin, CVA, and ICD, presents to the OSH with a chief complaint of fatigue and weakness with acute onset of one-week. Patient reports he was seen 3 times in a period of a week for the same symptoms and was discharged. Patient's daughter says he was having difficulty getting around and did not have the same enjoy he normally does. Patient reports he was prescribed a new medication (amiodarone) on 11/18/2022 that is causing him to feel more fatigued.     In the OSH ED patient is afebrile without leukocytosis INR 4.6, BNP 1 441, troponin 0.038, 2nd troponin 0.041 Patient follows with Dr. Fournier seen earlier this month. Pt's device should pt had been having frequent VT which is why he was placed on amio by his cardiologist Dr. Fournier at [facility]. At [facility] pt underwent a LHC on 11/29 that showed an occluded LAD, severe Lcx stenosis. His cath was complicated by Vtach. Pt was requested to be transferred to [facility] in [facility] for high risk PCI. Pt is full code.         Past Medical History:   Diagnosis Date    AICD (automatic cardioverter/defibrillator) present     Anticoagulant long-term use     Coronary artery disease     History of myocardial infarction     Paroxysmal A-fib     Stroke        Past Surgical History:   Procedure Laterality Date     CHOLECYSTECTOMY      LEFT HEART CATHETERIZATION Left 11/29/2022    Procedure: Left heart cath;  Surgeon: Wesley Rico MD;  Location: [facility] CATH LAB;  Service: Cardiology;  Laterality: Left;  1030am start, R rad access    REPLACEMENT OF DUAL CHAMBER IMPLANTABLE CARDIOVERTER-DEFIBRILLATOR         Review of patient's allergies indicates:  No Known Allergies    No current facility-administered medications on file prior to encounter.     Current Outpatient Medications on File Prior to Encounter   Medication Sig    amiodarone (PACERONE) 200 MG Tab Take 200 mg by mouth 2 (two) times daily.    carBAMazepine (TEGRETOL) 200 mg tablet Take 400 mg by mouth 2 (two) times daily.    nitroGLYCERIN (NITROSTAT) 0.4 MG SL tablet Place 0.4 mg under the tongue every 5 (five) minutes as needed.    pravastatin (PRAVACHOL) 20 MG tablet Take 20 mg by mouth.    warfarin (COUMADIN) 6 MG tablet Take 6 mg by mouth. 0.5 (3 mg) Sun, Mon, Tues; 6 mg Wed.; 0.5 (3 mg) Thurs, Fri.; 6 mg Sat.    cetirizine (ZYRTEC) 10 MG tablet Take 1 tablet (10 mg total) by mouth once daily. (Patient not taking: Reported on 11/23/2022)    fluticasone propionate (FLONASE) 50 mcg/actuation nasal spray 1 spray (50 mcg total) by Each Nostril route once daily. (Patient not taking: Reported on 11/23/2022)     Family History    None       Tobacco Use    Smoking status: Former    Smokeless tobacco: Never   Substance and Sexual Activity    Alcohol use: Never    Drug use: Never    Sexual activity: Not on file     Review of Systems   Constitutional: Positive for malaise/fatigue. Negative for chills and fever.   Cardiovascular:  Positive for irregular heartbeat. Negative for chest pain.   Respiratory:  Negative for cough and shortness of breath.    Skin:  Negative for dry skin and rash.   Musculoskeletal:  Negative for neck pain and stiffness.   Gastrointestinal:  Negative for abdominal pain, nausea and vomiting.   Neurological:  Positive for weakness.  Negative for headaches.   Objective:     Vital Signs (Most Recent):  Temp: 97.6 °F (36.4 °C) (11/30/22 1101)  Pulse: 63 (11/30/22 1400)  Resp: 20 (11/30/22 1400)  BP: 131/69 (11/30/22 1400)  SpO2: 96 % (11/30/22 1400) Vital Signs (24h Range):  Temp:  [97.6 °F (36.4 °C)-98.2 °F (36.8 °C)] 97.6 °F (36.4 °C)  Pulse:  [53-83] 63  Resp:  [6-31] 20  SpO2:  [91 %-100 %] 96 %  BP: (104-154)/(55-84) 131/69     Weight: 68 kg (149 lb 14.6 oz)  Body mass index is 22.14 kg/m².    SpO2: 96 %  O2 Device (Oxygen Therapy): room air      Intake/Output Summary (Last 24 hours) at 11/30/2022 1714  Last data filed at 11/30/2022 1215  Gross per 24 hour   Intake 320.58 ml   Output 300 ml   Net 20.58 ml       Lines/Drains/Airways       Peripheral Intravenous Line  Duration                  Peripheral IV - Single Lumen 20 G Anterior;Right Forearm -- days         Peripheral IV - Single Lumen 11/26/22 0034 18 G Anterior;Left Forearm 4 days                    Physical Exam  Vitals reviewed.   Constitutional:       General: He is awake.      Appearance: Normal appearance.   HENT:      Head: Normocephalic and atraumatic.   Eyes:      General: No scleral icterus.     Comments: Pt has lid lag on L side from prior stroke   Cardiovascular:      Rate and Rhythm: Normal rate.   Pulmonary:      Effort: Pulmonary effort is normal. No respiratory distress.      Breath sounds: Normal breath sounds.   Abdominal:      General: Abdomen is flat. There is no distension.      Palpations: Abdomen is soft.   Musculoskeletal:      Right lower leg: No edema.      Left lower leg: No edema.   Skin:     General: Skin is warm and dry.      Coloration: Skin is not jaundiced.   Neurological:      Mental Status: He is alert. Mental status is at baseline.   Psychiatric:         Mood and Affect: Mood normal.         Behavior: Behavior is cooperative.       Significant Labs: All pertinent lab results from the last 24 hours have been reviewed.    Significant Imaging:   Reviewed    Assessment and Plan:     * Acute on chronic combined systolic and diastolic congestive heart failure  Known severe LV dysfxn/ICM  Not grossly vol overloaded,  Placed on entresto and toprol        History of seizure disorder  On carbamazepine      Ventricular tachycardia  Freq sustained episodes of VT noted on ICD interrogation, below treatment threshold.  ?r/t CHF, ischemia, meds.  ICD reprogrammed 11/23/22.  Amio oral, toprol, entresto, and statin    11/30/22 VT during [facility] 11/29. VT improved on IV amiodarone - change to 400 qd po    Weakness  Related to cardiac issues most likely    Pure hypercholesterolemia  Cont statin    ICD (implantable cardioverter-defibrillator), single, in situ  [employer] single chamber ICD  Reprogrammed with lower rate threshold and more ATP on 11/23/22    Coronary artery disease involving native coronary artery of native heart without angina pectoris  Known CAD s/p LAD PCI 2006  Hx ICM, EF <30% by recent echo.  No overt anginal sxs,   Pt experience VT while undergoing other heart cath. Plan to take pt for [facility] with PCI placement on 12/1 (start DOAC after procedure).       11/30/22 [facility] with occluded LAD, 90% Cx, moderate RCA disease at [facility]./          Chronic anticoagulation  INR now 1.4  Hold coumadin for now. Will place on DAPT after cath if stents are placed    Paroxysmal atrial fibrillation  In SR on coumadin  Coumadin stopped            VTE Risk Mitigation (From admission, onward)         Ordered     Reason for No Pharmacological VTE Prophylaxis  Once        Question:  Reasons:  Answer:  Already adequately anticoagulated on oral Anticoagulants    11/23/22 0042     IP VTE HIGH RISK PATIENT  Once         11/23/22 0042     Place sequential compression device  Until discontinued         11/23/22 0042                Shleton Dumas DO  Cardiology   [facility] - Cardiac Intensive Care

## 2022-11-30 NOTE — NURSING TRANSFER
Nursing Transfer Note      11/30/2022     Reason patient is being transferred: Patient requiring more critical care.    Transfer To: [facility] RM # [id number]    Transfer via stretcher    Transfer with cardiac monitoring    Transported by EMS    Medicines sent: No    Any special needs or follow-up needed: None    Chart send with patient: Yes    Notified: daughter    Report called to Latisha RN at 1415.

## 2022-11-30 NOTE — ASSESSMENT & PLAN NOTE
Known CAD s/p LAD PCI 2006  Hx ICM, EF <30% by recent echo.  No overt anginal sxs,   Pt experience VT while undergoing other heart cath. Plan to take pt for [facility] with PCI placement on 12/1 (start DOAC after procedure).       11/30/22 [facility] with occluded LAD, 90% Cx, moderate RCA disease at [facility]./

## 2022-11-30 NOTE — ASSESSMENT & PLAN NOTE
-History noted  -No chest pain  -Cardiology consulted: s/p angiogram on 11/29. Occluded LAD,Severe LCx stenosis. Transfer to [facility] to discuss viability eval vs high risk PCI of LAD. Awaiting bed availiability  -Continue ASA/plavix/statin

## 2022-11-30 NOTE — NURSING
Pt remains in ICU. HR and rhythm remain stable throughout night, pt bradycardic. BP is WNL. Pt occasionally confused but able to reorient. Son remained at bedside for safety. Amio still infusing. Will continue to monitor.

## 2022-11-30 NOTE — SUBJECTIVE & OBJECTIVE
Past Medical History:   Diagnosis Date    AICD (automatic cardioverter/defibrillator) present     Anticoagulant long-term use     Coronary artery disease     History of myocardial infarction     Paroxysmal A-fib     Stroke        Past Surgical History:   Procedure Laterality Date    CHOLECYSTECTOMY      LEFT HEART CATHETERIZATION Left 11/29/2022    Procedure: Left heart cath;  Surgeon: Wesley Rico MD;  Location: [facility] CATH LAB;  Service: Cardiology;  Laterality: Left;  1030am start, R rad access    REPLACEMENT OF DUAL CHAMBER IMPLANTABLE CARDIOVERTER-DEFIBRILLATOR         Review of patient's allergies indicates:  No Known Allergies    No current facility-administered medications on file prior to encounter.     Current Outpatient Medications on File Prior to Encounter   Medication Sig    amiodarone (PACERONE) 200 MG Tab Take 200 mg by mouth 2 (two) times daily.    carBAMazepine (TEGRETOL) 200 mg tablet Take 400 mg by mouth 2 (two) times daily.    nitroGLYCERIN (NITROSTAT) 0.4 MG SL tablet Place 0.4 mg under the tongue every 5 (five) minutes as needed.    pravastatin (PRAVACHOL) 20 MG tablet Take 20 mg by mouth.    warfarin (COUMADIN) 6 MG tablet Take 6 mg by mouth. 0.5 (3 mg) Sun, Mon, Tues; 6 mg Wed.; 0.5 (3 mg) Thurs, Fri.; 6 mg Sat.    cetirizine (ZYRTEC) 10 MG tablet Take 1 tablet (10 mg total) by mouth once daily. (Patient not taking: Reported on 11/23/2022)    fluticasone propionate (FLONASE) 50 mcg/actuation nasal spray 1 spray (50 mcg total) by Each Nostril route once daily. (Patient not taking: Reported on 11/23/2022)     Family History    None       Tobacco Use    Smoking status: Former    Smokeless tobacco: Never   Substance and Sexual Activity    Alcohol use: Never    Drug use: Never    Sexual activity: Not on file     Review of Systems   Constitutional: Positive for malaise/fatigue. Negative for chills and fever.   Cardiovascular:  Positive for irregular heartbeat. Negative for chest pain.    Respiratory:  Negative for cough and shortness of breath.    Skin:  Negative for dry skin and rash.   Musculoskeletal:  Negative for neck pain and stiffness.   Gastrointestinal:  Negative for abdominal pain, nausea and vomiting.   Neurological:  Positive for weakness. Negative for headaches.   Objective:     Vital Signs (Most Recent):  Temp: 97.6 °F (36.4 °C) (11/30/22 1101)  Pulse: 63 (11/30/22 1400)  Resp: 20 (11/30/22 1400)  BP: 131/69 (11/30/22 1400)  SpO2: 96 % (11/30/22 1400) Vital Signs (24h Range):  Temp:  [97.6 °F (36.4 °C)-98.2 °F (36.8 °C)] 97.6 °F (36.4 °C)  Pulse:  [53-83] 63  Resp:  [6-31] 20  SpO2:  [91 %-100 %] 96 %  BP: (104-154)/(55-84) 131/69     Weight: 68 kg (149 lb 14.6 oz)  Body mass index is 22.14 kg/m².    SpO2: 96 %  O2 Device (Oxygen Therapy): room air      Intake/Output Summary (Last 24 hours) at 11/30/2022 1714  Last data filed at 11/30/2022 1215  Gross per 24 hour   Intake 320.58 ml   Output 300 ml   Net 20.58 ml       Lines/Drains/Airways       Peripheral Intravenous Line  Duration                  Peripheral IV - Single Lumen 20 G Anterior;Right Forearm -- days         Peripheral IV - Single Lumen 11/26/22 0034 18 G Anterior;Left Forearm 4 days                    Physical Exam  Vitals reviewed.   Constitutional:       General: He is awake.      Appearance: Normal appearance.   HENT:      Head: Normocephalic and atraumatic.   Eyes:      General: No scleral icterus.     Comments: Pt has lid lag on L side from prior stroke   Cardiovascular:      Rate and Rhythm: Normal rate.   Pulmonary:      Effort: Pulmonary effort is normal. No respiratory distress.      Breath sounds: Normal breath sounds.   Abdominal:      General: Abdomen is flat. There is no distension.      Palpations: Abdomen is soft.   Musculoskeletal:      Right lower leg: No edema.      Left lower leg: No edema.   Skin:     General: Skin is warm and dry.      Coloration: Skin is not jaundiced.   Neurological:      Mental  Status: He is alert. Mental status is at baseline.   Psychiatric:         Mood and Affect: Mood normal.         Behavior: Behavior is cooperative.       Significant Labs: All pertinent lab results from the last 24 hours have been reviewed.    Significant Imaging:  Reviewed

## 2022-11-30 NOTE — ASSESSMENT & PLAN NOTE
-Admitted to inpatient status  -BNP elevated at 1441  -Echo 10/22 showed EF 20-30%, grade II diastolic dysfunction  -Known chronic ischemic cardiomyopathy with AICD in place  -Cardiology consulted and input appreciated: plan for [facility] on 11/29  -AICD interogated and noted frequent sustained VT episodes below threshold of treatment.  AICD reprogramed with lower threshold on 11/23  -Strict ins/outs and daily weights  -Have attempted gentle diuresis with iv lasix and started toprol and entresto and no further VT thus far  -Due to lethargy, nausea, vomiting and low blood pressure has not been solidly maintained on GDMT.  Has not received lasix since 11/24.  Has not received toprol since 11/24.  Received 1 dose entresto 11/27.  -11/27 looks better  but still quite weak.    -Continue to hold lasix for now  -Continue toprol at 12.5mg daily and Entresto

## 2022-11-30 NOTE — SUBJECTIVE & OBJECTIVE
Interval History:     Review of Systems   Constitutional: Negative for decreased appetite.   HENT:  Negative for ear discharge.    Eyes:  Negative for blurred vision.   Endocrine: Negative for polyphagia.   Skin:  Negative for nail changes.   Genitourinary:  Negative for bladder incontinence.   Neurological:  Negative for aphonia.   Psychiatric/Behavioral:  Negative for hallucinations.    Allergic/Immunologic: Negative for hives.   Objective:     Vital Signs (Most Recent):  Temp: 97.6 °F (36.4 °C) (11/30/22 1101)  Pulse: (!) 56 (11/30/22 1200)  Resp: 11 (11/30/22 1200)  BP: (!) 115/58 (11/30/22 1200)  SpO2: 96 % (11/30/22 1200)   Vital Signs (24h Range):  Temp:  [97.6 °F (36.4 °C)-98.2 °F (36.8 °C)] 97.6 °F (36.4 °C)  Pulse:  [53-83] 56  Resp:  [6-31] 11  SpO2:  [91 %-100 %] 96 %  BP: (104-156)/(55-84) 115/58     Weight: 68 kg (149 lb 14.6 oz)  Body mass index is 22.14 kg/m².     SpO2: 96 %  O2 Device (Oxygen Therapy): room air      Intake/Output Summary (Last 24 hours) at 11/30/2022 1213  Last data filed at 11/30/2022 1200  Gross per 24 hour   Intake 998.52 ml   Output 675 ml   Net 323.52 ml       Lines/Drains/Airways       Peripheral Intravenous Line  Duration                  Peripheral IV - Single Lumen 20 G Anterior;Right Forearm -- days         Peripheral IV - Single Lumen 11/26/22 0034 18 G Anterior;Left Forearm 4 days                    Physical Exam  Constitutional:       Appearance: He is well-developed.   HENT:      Head: Normocephalic and atraumatic.   Eyes:      Conjunctiva/sclera: Conjunctivae normal.      Pupils: Pupils are equal, round, and reactive to light.   Cardiovascular:      Rate and Rhythm: Normal rate.      Pulses: Intact distal pulses.      Heart sounds: Normal heart sounds.   Pulmonary:      Effort: Pulmonary effort is normal.      Breath sounds: Normal breath sounds.   Abdominal:      General: Bowel sounds are normal.      Palpations: Abdomen is soft.   Musculoskeletal:         General:  Normal range of motion.      Cervical back: Normal range of motion and neck supple.   Skin:     General: Skin is warm and dry.   Neurological:      Mental Status: He is alert and oriented to person, place, and time.       Significant Labs: All pertinent lab results from the last 24 hours have been reviewed.    Significant Imaging: Echocardiogram: 2D echo with color flow doppler: No results found for this or any previous visit.

## 2022-12-01 LAB
ABO + RH BLD: NORMAL
ALBUMIN SERPL BCP-MCNC: 3.5 G/DL (ref 3.5–5.2)
ALP SERPL-CCNC: 92 U/L (ref 55–135)
ALT SERPL W/O P-5'-P-CCNC: 17 U/L (ref 10–44)
ANION GAP SERPL CALC-SCNC: 11 MMOL/L (ref 8–16)
APTT BLDCRRT: 25.3 SEC (ref 21–32)
AST SERPL-CCNC: 25 U/L (ref 10–40)
BASOPHILS # BLD AUTO: 0.03 K/UL (ref 0–0.2)
BASOPHILS NFR BLD: 0.6 % (ref 0–1.9)
BILIRUB SERPL-MCNC: 0.7 MG/DL (ref 0.1–1)
BLD GP AB SCN CELLS X3 SERPL QL: NORMAL
BUN SERPL-MCNC: 20 MG/DL (ref 8–23)
CALCIUM SERPL-MCNC: 8.8 MG/DL (ref 8.7–10.5)
CHLORIDE SERPL-SCNC: 105 MMOL/L (ref 95–110)
CO2 SERPL-SCNC: 23 MMOL/L (ref 23–29)
CREAT SERPL-MCNC: 0.7 MG/DL (ref 0.5–1.4)
DIFFERENTIAL METHOD: NORMAL
EOSINOPHIL # BLD AUTO: 0.1 K/UL (ref 0–0.5)
EOSINOPHIL NFR BLD: 3 % (ref 0–8)
ERYTHROCYTE [DISTWIDTH] IN BLOOD BY AUTOMATED COUNT: 13.8 % (ref 11.5–14.5)
EST. GFR  (NO RACE VARIABLE): >60 ML/MIN/1.73 M^2
GLUCOSE SERPL-MCNC: 86 MG/DL (ref 70–110)
HCT VFR BLD AUTO: 46.5 % (ref 40–54)
HGB BLD-MCNC: 15.5 G/DL (ref 14–18)
IMM GRANULOCYTES # BLD AUTO: 0.01 K/UL (ref 0–0.04)
IMM GRANULOCYTES NFR BLD AUTO: 0.2 % (ref 0–0.5)
INR PPP: 1.1 (ref 0.8–1.2)
LYMPHOCYTES # BLD AUTO: 1.4 K/UL (ref 1–4.8)
LYMPHOCYTES NFR BLD: 30.6 % (ref 18–48)
MAGNESIUM SERPL-MCNC: 1.9 MG/DL (ref 1.6–2.6)
MAGNESIUM SERPL-MCNC: 2 MG/DL (ref 1.6–2.6)
MCH RBC QN AUTO: 30.8 PG (ref 27–31)
MCHC RBC AUTO-ENTMCNC: 33.3 G/DL (ref 32–36)
MCV RBC AUTO: 92 FL (ref 82–98)
MONOCYTES # BLD AUTO: 0.5 K/UL (ref 0.3–1)
MONOCYTES NFR BLD: 9.6 % (ref 4–15)
NEUTROPHILS # BLD AUTO: 2.6 K/UL (ref 1.8–7.7)
NEUTROPHILS NFR BLD: 56 % (ref 38–73)
NRBC BLD-RTO: 0 /100 WBC
PLATELET # BLD AUTO: 201 K/UL (ref 150–450)
PLATELET RESPONSE PLAVIX: 49 PRU (ref 194–418)
PMV BLD AUTO: 9.6 FL (ref 9.2–12.9)
POTASSIUM SERPL-SCNC: 3.6 MMOL/L (ref 3.5–5.1)
POTASSIUM SERPL-SCNC: 4.1 MMOL/L (ref 3.5–5.1)
PROT SERPL-MCNC: 6.7 G/DL (ref 6–8.4)
PROTHROMBIN TIME: 11.6 SEC (ref 9–12.5)
RBC # BLD AUTO: 5.04 M/UL (ref 4.6–6.2)
SODIUM SERPL-SCNC: 139 MMOL/L (ref 136–145)
WBC # BLD AUTO: 4.71 K/UL (ref 3.9–12.7)

## 2022-12-01 PROCEDURE — 25000003 PHARM REV CODE 250: Performed by: INTERNAL MEDICINE

## 2022-12-01 PROCEDURE — 85576 BLOOD PLATELET AGGREGATION: CPT | Performed by: INTERNAL MEDICINE

## 2022-12-01 PROCEDURE — 92920 PR PTCA: ICD-10-PCS | Mod: 59,LC,GC, | Performed by: INTERNAL MEDICINE

## 2022-12-01 PROCEDURE — 93010 ELECTROCARDIOGRAM REPORT: CPT | Mod: ,,, | Performed by: INTERNAL MEDICINE

## 2022-12-01 PROCEDURE — 25000003 PHARM REV CODE 250

## 2022-12-01 PROCEDURE — 25000003 PHARM REV CODE 250: Performed by: STUDENT IN AN ORGANIZED HEALTH CARE EDUCATION/TRAINING PROGRAM

## 2022-12-01 PROCEDURE — 85730 THROMBOPLASTIN TIME PARTIAL: CPT | Performed by: INTERNAL MEDICINE

## 2022-12-01 PROCEDURE — C1725 CATH, TRANSLUMIN NON-LASER: HCPCS | Performed by: INTERNAL MEDICINE

## 2022-12-01 PROCEDURE — 97116 GAIT TRAINING THERAPY: CPT

## 2022-12-01 PROCEDURE — 92920 PRQ TRLUML C ANGIOP 1ART&/BR: CPT | Mod: 59,LC,GC, | Performed by: INTERNAL MEDICINE

## 2022-12-01 PROCEDURE — 63600175 PHARM REV CODE 636 W HCPCS: Performed by: STUDENT IN AN ORGANIZED HEALTH CARE EDUCATION/TRAINING PROGRAM

## 2022-12-01 PROCEDURE — 84132 ASSAY OF SERUM POTASSIUM: CPT | Performed by: INTERNAL MEDICINE

## 2022-12-01 PROCEDURE — 92928 PR STENT: ICD-10-PCS | Mod: LM,GC,, | Performed by: INTERNAL MEDICINE

## 2022-12-01 PROCEDURE — 85025 COMPLETE CBC W/AUTO DIFF WBC: CPT | Performed by: HOSPITALIST

## 2022-12-01 PROCEDURE — 80053 COMPREHEN METABOLIC PANEL: CPT | Performed by: INTERNAL MEDICINE

## 2022-12-01 PROCEDURE — 63600175 PHARM REV CODE 636 W HCPCS: Performed by: INTERNAL MEDICINE

## 2022-12-01 PROCEDURE — C1760 CLOSURE DEV, VASC: HCPCS | Performed by: INTERNAL MEDICINE

## 2022-12-01 PROCEDURE — 94761 N-INVAS EAR/PLS OXIMETRY MLT: CPT

## 2022-12-01 PROCEDURE — 86850 RBC ANTIBODY SCREEN: CPT | Performed by: INTERNAL MEDICINE

## 2022-12-01 PROCEDURE — 20000000 HC ICU ROOM

## 2022-12-01 PROCEDURE — 63600175 PHARM REV CODE 636 W HCPCS: Performed by: HOSPITALIST

## 2022-12-01 PROCEDURE — 97535 SELF CARE MNGMENT TRAINING: CPT

## 2022-12-01 PROCEDURE — 27201423 OPTIME MED/SURG SUP & DEVICES STERILE SUPPLY: Performed by: INTERNAL MEDICINE

## 2022-12-01 PROCEDURE — 92920 PRQ TRLUML C ANGIOP 1ART&/BR: CPT | Mod: 59,LC | Performed by: INTERNAL MEDICINE

## 2022-12-01 PROCEDURE — C2623 CATH, TRANSLUMIN, DRUG-COAT: HCPCS | Performed by: INTERNAL MEDICINE

## 2022-12-01 PROCEDURE — 83735 ASSAY OF MAGNESIUM: CPT | Mod: 91 | Performed by: INTERNAL MEDICINE

## 2022-12-01 PROCEDURE — 92928 PRQ TCAT PLMT NTRAC ST 1 LES: CPT | Mod: LM,GC,, | Performed by: INTERNAL MEDICINE

## 2022-12-01 PROCEDURE — 99900035 HC TECH TIME PER 15 MIN (STAT)

## 2022-12-01 PROCEDURE — 85610 PROTHROMBIN TIME: CPT | Performed by: STUDENT IN AN ORGANIZED HEALTH CARE EDUCATION/TRAINING PROGRAM

## 2022-12-01 PROCEDURE — 93010 EKG 12-LEAD: ICD-10-PCS | Mod: ,,, | Performed by: INTERNAL MEDICINE

## 2022-12-01 PROCEDURE — 99233 PR SUBSEQUENT HOSPITAL CARE,LEVL III: ICD-10-PCS | Mod: GC,,, | Performed by: INTERNAL MEDICINE

## 2022-12-01 PROCEDURE — 99233 SBSQ HOSP IP/OBS HIGH 50: CPT | Mod: GC,,, | Performed by: INTERNAL MEDICINE

## 2022-12-01 PROCEDURE — 25500020 PHARM REV CODE 255: Performed by: INTERNAL MEDICINE

## 2022-12-01 PROCEDURE — 25000003 PHARM REV CODE 250: Performed by: HOSPITALIST

## 2022-12-01 PROCEDURE — C9600 PERC DRUG-EL COR STENT SING: HCPCS | Mod: LM | Performed by: INTERNAL MEDICINE

## 2022-12-01 PROCEDURE — C1769 GUIDE WIRE: HCPCS | Performed by: INTERNAL MEDICINE

## 2022-12-01 PROCEDURE — 97162 PT EVAL MOD COMPLEX 30 MIN: CPT

## 2022-12-01 PROCEDURE — 93005 ELECTROCARDIOGRAM TRACING: CPT

## 2022-12-01 PROCEDURE — 97165 OT EVAL LOW COMPLEX 30 MIN: CPT

## 2022-12-01 PROCEDURE — C1894 INTRO/SHEATH, NON-LASER: HCPCS | Performed by: INTERNAL MEDICINE

## 2022-12-01 DEVICE — STENT ONYXNG30030UX ONYX 3.00X30RX
Type: IMPLANTABLE DEVICE | Site: CORONARY | Status: FUNCTIONAL
Brand: ONYX FRONTIER™

## 2022-12-01 RX ORDER — HEPARIN SODIUM 1000 [USP'U]/ML
INJECTION, SOLUTION INTRAVENOUS; SUBCUTANEOUS
Status: DISCONTINUED | OUTPATIENT
Start: 2022-12-01 | End: 2022-12-01 | Stop reason: HOSPADM

## 2022-12-01 RX ORDER — POTASSIUM CHLORIDE 20 MEQ/1
40 TABLET, EXTENDED RELEASE ORAL ONCE
Status: COMPLETED | OUTPATIENT
Start: 2022-12-01 | End: 2022-12-01

## 2022-12-01 RX ORDER — PHENYLEPHRINE HYDROCHLORIDE 10 MG/ML
INJECTION INTRAVENOUS
Status: DISCONTINUED | OUTPATIENT
Start: 2022-12-01 | End: 2022-12-01 | Stop reason: HOSPADM

## 2022-12-01 RX ORDER — HEPARIN SOD,PORCINE/0.9 % NACL 1000/500ML
INTRAVENOUS SOLUTION INTRAVENOUS
Status: DISCONTINUED | OUTPATIENT
Start: 2022-12-01 | End: 2022-12-01 | Stop reason: HOSPADM

## 2022-12-01 RX ORDER — WARFARIN SODIUM 5 MG/1
5 TABLET ORAL ONCE
Status: COMPLETED | OUTPATIENT
Start: 2022-12-01 | End: 2022-12-01

## 2022-12-01 RX ORDER — HEPARIN SODIUM,PORCINE/D5W 25000/250
0-40 INTRAVENOUS SOLUTION INTRAVENOUS CONTINUOUS
Status: DISCONTINUED | OUTPATIENT
Start: 2022-12-01 | End: 2022-12-02

## 2022-12-01 RX ORDER — LIDOCAINE HYDROCHLORIDE 10 MG/ML
INJECTION INFILTRATION; PERINEURAL
Status: DISCONTINUED | OUTPATIENT
Start: 2022-12-01 | End: 2022-12-01 | Stop reason: HOSPADM

## 2022-12-01 RX ORDER — ATORVASTATIN CALCIUM 40 MG/1
80 TABLET, FILM COATED ORAL DAILY
Status: DISCONTINUED | OUTPATIENT
Start: 2022-12-01 | End: 2022-12-01

## 2022-12-01 RX ORDER — ONDANSETRON 8 MG/1
8 TABLET, ORALLY DISINTEGRATING ORAL EVERY 8 HOURS PRN
Status: DISCONTINUED | OUTPATIENT
Start: 2022-12-01 | End: 2022-12-01

## 2022-12-01 RX ORDER — MAGNESIUM SULFATE 1 G/100ML
1 INJECTION INTRAVENOUS ONCE
Status: COMPLETED | OUTPATIENT
Start: 2022-12-01 | End: 2022-12-01

## 2022-12-01 RX ORDER — ACETAMINOPHEN 325 MG/1
650 TABLET ORAL EVERY 4 HOURS PRN
Status: DISCONTINUED | OUTPATIENT
Start: 2022-12-01 | End: 2022-12-01

## 2022-12-01 RX ORDER — CEFAZOLIN SODIUM 1 G/3ML
INJECTION, POWDER, FOR SOLUTION INTRAMUSCULAR; INTRAVENOUS
Status: DISCONTINUED | OUTPATIENT
Start: 2022-12-01 | End: 2022-12-01 | Stop reason: HOSPADM

## 2022-12-01 RX ORDER — ROSUVASTATIN CALCIUM 20 MG/1
40 TABLET, COATED ORAL NIGHTLY
Status: DISCONTINUED | OUTPATIENT
Start: 2022-12-02 | End: 2022-12-06 | Stop reason: HOSPADM

## 2022-12-01 RX ORDER — WARFARIN SODIUM 5 MG/1
5 TABLET ORAL
Status: CANCELLED | OUTPATIENT
Start: 2022-12-01 | End: 2023-12-01

## 2022-12-01 RX ORDER — DIPHENHYDRAMINE HCL 50 MG
50 CAPSULE ORAL ONCE
Status: COMPLETED | OUTPATIENT
Start: 2022-12-01 | End: 2022-12-01

## 2022-12-01 RX ORDER — PROTAMINE SULFATE 10 MG/ML
INJECTION, SOLUTION INTRAVENOUS
Status: DISCONTINUED | OUTPATIENT
Start: 2022-12-01 | End: 2022-12-01 | Stop reason: HOSPADM

## 2022-12-01 RX ORDER — POTASSIUM CHLORIDE 20 MEQ/1
20 TABLET, EXTENDED RELEASE ORAL ONCE
Status: DISCONTINUED | OUTPATIENT
Start: 2022-12-01 | End: 2022-12-01

## 2022-12-01 RX ADMIN — CYANOCOBALAMIN 1000 MCG: 1000 INJECTION, SOLUTION INTRAMUSCULAR; SUBCUTANEOUS at 09:12

## 2022-12-01 RX ADMIN — SODIUM CHLORIDE 300 ML: 0.9 INJECTION, SOLUTION INTRAVENOUS at 03:12

## 2022-12-01 RX ADMIN — MAGNESIUM SULFATE HEPTAHYDRATE 1 G: 500 INJECTION, SOLUTION INTRAMUSCULAR; INTRAVENOUS at 10:12

## 2022-12-01 RX ADMIN — POLYETHYLENE GLYCOL 3350 17 G: 17 POWDER, FOR SOLUTION ORAL at 08:12

## 2022-12-01 RX ADMIN — ATORVASTATIN CALCIUM 80 MG: 40 TABLET, FILM COATED ORAL at 08:12

## 2022-12-01 RX ADMIN — POTASSIUM CHLORIDE 40 MEQ: 1500 TABLET, EXTENDED RELEASE ORAL at 09:12

## 2022-12-01 RX ADMIN — SACUBITRIL AND VALSARTAN 1 TABLET: 24; 26 TABLET, FILM COATED ORAL at 09:12

## 2022-12-01 RX ADMIN — DIPHENHYDRAMINE HYDROCHLORIDE 50 MG: 50 CAPSULE ORAL at 10:12

## 2022-12-01 RX ADMIN — CLOPIDOGREL BISULFATE 75 MG: 75 TABLET ORAL at 09:12

## 2022-12-01 RX ADMIN — LACTOBACILLUS ACIDOPHILUS / LACTOBACILLUS BULGARICUS 1 EACH: 100 MILLION CFU STRENGTH GRANULES at 08:12

## 2022-12-01 RX ADMIN — ONDANSETRON 4 MG: 2 INJECTION INTRAMUSCULAR; INTRAVENOUS at 10:12

## 2022-12-01 RX ADMIN — ASPIRIN 81 MG CHEWABLE TABLET 81 MG: 81 TABLET CHEWABLE at 08:12

## 2022-12-01 RX ADMIN — MUPIROCIN: 20 OINTMENT TOPICAL at 09:12

## 2022-12-01 RX ADMIN — METOPROLOL SUCCINATE 12.5 MG: 25 TABLET, EXTENDED RELEASE ORAL at 08:12

## 2022-12-01 RX ADMIN — LACTOBACILLUS ACIDOPHILUS / LACTOBACILLUS BULGARICUS 1 EACH: 100 MILLION CFU STRENGTH GRANULES at 09:12

## 2022-12-01 RX ADMIN — DOCUSATE SODIUM 100 MG: 100 CAPSULE, LIQUID FILLED ORAL at 09:12

## 2022-12-01 RX ADMIN — CARBAMAZEPINE 400 MG: 200 TABLET ORAL at 09:12

## 2022-12-01 RX ADMIN — WARFARIN SODIUM 5 MG: 5 TABLET ORAL at 06:12

## 2022-12-01 RX ADMIN — HEPARIN SODIUM 12 UNITS/KG/HR: 10000 INJECTION, SOLUTION INTRAVENOUS at 09:12

## 2022-12-01 RX ADMIN — AMIODARONE HYDROCHLORIDE 400 MG: 200 TABLET ORAL at 08:12

## 2022-12-01 RX ADMIN — DOCUSATE SODIUM 100 MG: 100 CAPSULE, LIQUID FILLED ORAL at 08:12

## 2022-12-01 NOTE — PLAN OF CARE
Cardiac ICU Care Plan    POC reviewed with Balbir Rebollar Sr. and family. Questions and concerns addressed. Pt progressing toward goals. Will continue to monitor. See below and flowsheets for full assessment and VS info.       Neuro:  [place] Coma Scale  Best Eye Response: 4-->(E4) spontaneous  Best Motor Response: 6-->(M6) obeys commands  Best Verbal Response: 5-->(V5) oriented  [place] Coma Scale Score: 15  Assessment Qualifiers: patient not sedated/intubated  Pupil PERRLA: yes    24 hr Temp:  [97.6 °F (36.4 °C)-98.7 °F (37.1 °C)]      CV:  Rhythm: normal sinus rhythm   DVT prophylaxis: VTE Required Core Measure: (SCDs) Sequential compression device initiated/maintained                            Pulses  Right Radial Pulse: 2+ (normal)  Left Radial Pulse: 2+ (normal)  Right Dorsalis Pedis Pulse: 2+ (normal)  Left Dorsalis Pedis Pulse: 2+ (normal)  Right Posterior Tibial Pulse: 1+ (weak)  Left Posterior Tibial Pulse: 1+ (weak)    Resp:  O2 Device (Oxygen Therapy): room air  Flow (L/min): 2       GI/:  GI prophylaxis: yes  Diet/Nutrition Received: mechanical/dental soft  Last Bowel Movement: 11/30/22      Intake/Output Summary (Last 24 hours) at 12/1/2022 0622  Last data filed at 11/30/2022 1736  Gross per 24 hour   Intake 343.95 ml   Output --   Net 343.95 ml        Nutritional Supplement Intake: Quantity none, Type: Boost    Labs/Accuchecks:  Recent Labs   Lab 11/29/22 0427 11/30/22  0406 12/01/22 0315   WBC 4.84 3.90 4.71   RBC 5.41 4.89 5.04   HGB 16.5 14.8 15.5   HCT 48.7 44.2 46.5    195 201      Recent Labs   Lab 11/29/22 0427 11/30/22  0406 12/01/22 0315   INR 1.1 1.0 1.1      Recent Labs     12/01/22  0315      K 4.1   CO2 23      BUN 20   CREATININE 0.7   ALKPHOS 92   ALT 17   AST 25   BILITOT 0.7     No results for input(s): CPK, CPKMB, MB, TROPONINI in the last 168 hours. No results for input(s): PH, PCO2, PO2, HCO3, POCSATURATED, BE in the last 72  hours.    Electrolytes: N/A - electrolytes WDL  Accuchecks: none    Gtts/LDAs:      Lines/Drains/Airways       Peripheral Intravenous Line  Duration                  Peripheral IV - Single Lumen 20 G Anterior;Right Forearm -- days         Peripheral IV - Single Lumen 11/30/22 1756 18 G Anterior;Distal;Right Forearm <1 day                    Skin/Wounds  Bathing/Skin Care: bath, complete (11/30/22 9600)  Wounds: No  Wound care consulted: No

## 2022-12-01 NOTE — PLAN OF CARE
[facility] - Cardiac Intensive Care  Initial Discharge Assessment       Primary Care Provider: Flip Hanna MD    Admission Diagnosis: Ventricular tachyarrhythmia [I47.20]  Bigeminy [I49.8]  Weakness [R53.1]  Chest pain [R07.9]  Combined systolic and diastolic congestive heart failure, unspecified HF chronicity [I50.40]  CHF (congestive heart failure) [I50.9]    Admission Date: 11/22/2022  Expected Discharge Date: 12/5/2022    Discharge Barriers Identified: None    Payor: [id number] MEDICARE / Plan: [employer] MEDICARE HMO / Product Type: Capitation /     Extended Emergency Contact Information  Primary Emergency Contact: Silvia Braga  Mobile Phone: [phone]  Relation: Daughter  Preferred language: English   needed? No  Secondary Emergency Contact: HILARY JIMENEZ  Mobile Phone: [phone]  Relation: Son  Preferred language: English   needed? No    Discharge Plan A: Skilled Nursing Facility  Discharge Plan B: Home Health      [facility] [address] [place], [place] - [address]  [address] [place] [address]  Phone: [phone] Fax: [phone]      Initial Assessment (most recent)       Adult Discharge Assessment - 12/01/22 1629          Discharge Assessment    Assessment Type Discharge Planning Assessment     Confirmed/corrected address, phone number and insurance Yes     Confirmed Demographics Correct on Facesheet     Source of Information family;health record     Communicated MOR with patient/caregiver Date not available/Unable to determine     Reason For Admission v tach     Lives With child(carlton), adult     Facility Arrived From: [facility]     Do you expect to return to your current living situation? No     Do you have help at home or someone to help you manage your care at home? Yes     Who are your caregiver(s) and their phone number(s)? Silvia Braga (daughter) [phone]     Prior to hospitilization cognitive status: Alert/Oriented     Current cognitive  status: Unable to Assess     Walking or Climbing Stairs Difficulty ambulation difficulty, requires equipment;stair climbing difficulty, assistance 1 person     Dressing/Bathing Difficulty bathing difficulty, requires equipment;dressing difficulty, assistance 1 person     Patient currently being followed by outpatient case management? No     Do you currently have service(s) that help you manage your care at home? No     Do you take prescription medications? Yes     Do you have prescription coverage? Yes     Do you have any problems affording any of your prescribed medications? No     Is the patient taking medications as prescribed? yes     Who is going to help you get home at discharge? Silvia Braga (daughter) [phone]     How do you get to doctors appointments? family or friend will provide     Are you on dialysis? No     Do you take coumadin? Yes     Who monitors your labs? Simon Mane MD [facility]     Discharge Plan A Skilled Nursing Facility     Discharge Plan B Home Health     DME Needed Upon Discharge  none     Discharge Plan discussed with: Adult children     Discharge Barriers Identified None                   Pt transferred from [facility] for high-risk PCI.  LEATHA met with pt's daughter Sivlia and son Chris to discuss discharge planning.  Discussed that OT rec'd SNF (no PT rec yet at the time that SW met with family for assessment) but since all recs before now were for HH it's possible that pt's medical decline contributed to his functional decline and that when his medical situation improves that his function will improve back to  as well.  LEATHA answered all questions.  LEATHA name and ext on whiteboard.  Will continue to follow.    Brooke Lees LMSW  [facility]  [id number]

## 2022-12-01 NOTE — PLAN OF CARE
Problem: Occupational Therapy  Goal: Occupational Therapy Goal  Description: Goals to be met by: 12/15/22     Patient will increase functional independence with ADLs by performing:    Feeding with Modified [place].  UE Dressing with SBA  LE Dressing with Minimal Assistance.  Grooming while standing at sink with Contact Guard Assistance and Assistive Devices as needed.  Toileting from bedside commode with Minimal A and Assistive Devices as needed for hygiene and clothing management.   Toilet transfer to [facility] with Contact Guard Assistance.  Upper extremity exercise program x15 reps per handout, with assistance as needed.    Outcome: Ongoing, Progressing

## 2022-12-01 NOTE — CONSULTS
[facility]  Consultation  Interventional Cardiology      Balbir Rebollar Sr.  YOB: 1950  Medical Record Number:  [id number]  Attending Physician:  Prem Kramer MD   Date of Admission: 11/22/2022       Hospital Day:  8  Current Principal Problem:  Acute on chronic combined systolic and diastolic congestive heart failure    HISTORY:       Cc: VT    HPI:   72 y.o. male with CAD with LAD PCI placement in 2006, ischemic cardiomyopathy HTN, Afib on long term use of anticoagulants with warfarin, CVA, and ICD, presents to the OSH with a chief complaint of fatigue and weakness with acute onset of one-week. Patient reports he was seen 3 times in a period of a week for the same symptoms and was discharged. Patient's daughter says he was having difficulty getting around and did not have the same enjoy he normally does. Patient reports he was prescribed a new medication (amiodarone) on 11/18/2022 that is causing him to feel more fatigued.     In the OSH ED patient is afebrile without leukocytosis INR 4.6, BNP 1 441, troponin 0.038, 2nd troponin 0.041 Patient follows with Dr. Fournier seen earlier this month. Pt's device should pt had been having frequent VT which is why he was placed on amio by his cardiologist Dr. Fournier at [facility]. At [facility] pt underwent a LHC on 11/29 that showed an occluded LAD, severe Lcx stenosis. His cath was complicated by Vtach. Pt was requested to be transferred to [facility] in [facility] for high risk PCI.  Interventional Cardiology consult for high-risk PCI.    Echo 10/19/22 WLETI    MAJOR FINDINGS    EF is approximately 20-30%     1. Severely decreased left ventricular systolic function.       2. Grade II diastolic dysfunction.       3. Wall motion abnormalities imply disease of the left anterior descending coronary artery.     4. Mild nonrheumatic mitral valve regurgitation with no suggestion of left atrial enlargement.     5. Mild  nonrheumatic aortic valve regurgitation.       6. Mild-to-moderate aortic root dilation.     7. Although estimation of pulmonary artery systolic pressure is not possible due to incomplete tricuspid regurgitation velocity       profile, pulmonary hypertension is not suspected.     Compared to previous echocardiogram 11/01/2021, no significant changes are seen         [facility] 11/29/22  Dominance: Right  LM: MLI  LAD: ost occluded, faint catherine from RCA via septals  LCx: prox 90%, T3 flow  RCA: diffuse up to 50-70%    Medications - Outpatient  Prior to Admission medications    Medication Sig Start Date End Date Taking? Authorizing Provider   amiodarone (PACERONE) 200 MG Tab Take 200 mg by mouth 2 (two) times daily.   Yes Historical Provider   carBAMazepine (TEGRETOL) 200 mg tablet Take 400 mg by mouth 2 (two) times daily. 6/22/22  Yes Historical Provider   nitroGLYCERIN (NITROSTAT) 0.4 MG SL tablet Place 0.4 mg under the tongue every 5 (five) minutes as needed. 2/16/22 2/16/23 Yes Historical Provider   pravastatin (PRAVACHOL) 20 MG tablet Take 20 mg by mouth. 9/28/21  Yes Historical Provider   warfarin (COUMADIN) 6 MG tablet Take 6 mg by mouth. 0.5 (3 mg) Sun, Mon, Tues; 6 mg Wed.; 0.5 (3 mg) Thurs, Fri.; 6 mg Sat. 9/28/21  Yes Historical Provider   cetirizine (ZYRTEC) 10 MG tablet Take 1 tablet (10 mg total) by mouth once daily.  Patient not taking: Reported on 11/23/2022 8/10/22 9/9/22  Suly Boss NP   fluticasone propionate (FLONASE) 50 mcg/actuation nasal spray 1 spray (50 mcg total) by Each Nostril route once daily.  Patient not taking: Reported on 11/23/2022 8/10/22   Suly Boss NP         Medications - Current  Scheduled Meds:   amiodarone  400 mg Oral Daily    aspirin  81 mg Oral Daily    atorvastatin  80 mg Oral Daily    carBAMazepine  400 mg Oral BID    clopidogreL  75 mg Oral QHS    cyanocobalamin  1,000 mcg Subcutaneous Daily    Followed by    [START ON 12/4/2022] cyanocobalamin  1,000 mcg  Subcutaneous Weekly    docusate sodium  100 mg Oral BID    heparin (PORCINE)  60 Units/kg (Adjusted) Intravenous Once    lactobacillus acidophilus & bulgar  1 packet Oral BID    metoprolol succinate  12.5 mg Oral Daily    mupirocin   Nasal BID    polyethylene glycol  17 g Oral Daily    sacubitriL-valsartan  1 tablet Oral BID    scopolamine  1 patch Transdermal Q3 Days     Continuous Infusions:   heparin (porcine) in D5W       PRN Meds:.acetaminophen, albuterol-ipratropium, aluminum-magnesium hydroxide-simethicone, atropine, dextrose 10%, dextrose 10%, glucagon (human recombinant), glucose, glucose, heparin (PORCINE), heparin (PORCINE), HYDROcodone-acetaminophen, loperamide, naloxone, ondansetron, ondansetron, sodium chloride 0.9%      Allergies  Review of patient's allergies indicates:  No Known Allergies      Past Medical History  Past Medical History:   Diagnosis Date    AICD (automatic cardioverter/defibrillator) present     Anticoagulant long-term use     Coronary artery disease     History of myocardial infarction     Paroxysmal A-fib     Stroke          Past Surgical History  Past Surgical History:   Procedure Laterality Date    CHOLECYSTECTOMY      LEFT HEART CATHETERIZATION Left 11/29/2022    Procedure: Left heart cath;  Surgeon: Wesley Rico MD;  Location: [facility] CATH LAB;  Service: Cardiology;  Laterality: Left;  1030am start, R rad access    REPLACEMENT OF DUAL CHAMBER IMPLANTABLE CARDIOVERTER-DEFIBRILLATOR           Social History  Social History     Socioeconomic History    Marital status:    Tobacco Use    Smoking status: Former    Smokeless tobacco: Never   Substance and Sexual Activity    Alcohol use: Never    Drug use: Never     Social Determinants of Health     Financial Resource Strain: Low Risk     Difficulty of Paying Living Expenses: Not hard at all   Food Insecurity: No Food Insecurity    Worried About Running Out of Food in the Last Year: Never true    Ran Out of Food in the Last  Year: Never true   Transportation Needs: No Transportation Needs    Lack of Transportation (Medical): No    Lack of Transportation (Non-Medical): No   Physical Activity: Inactive    Days of Exercise per Week: 0 days    Minutes of Exercise per Session: 0 min   Stress: No Stress Concern Present    Feeling of Stress : Not at all   Social Connections: Socially Isolated    Frequency of Communication with Friends and Family: Three times a week    Frequency of Social Gatherings with Friends and Family: Three times a week    Attends Worship Services: Never    Active Member of Clubs or Organizations: No    Attends Club or Organization Meetings: Never    Marital Status:    Housing Stability: Low Risk     Unable to Pay for Housing in the Last Year: No    Number of Places Lived in the Last Year: 1    Unstable Housing in the Last Year: No         ROS:      Admits Denies   Constitutional  Chills, diaphoresis, malaise   Eyes  Visual changes   ENMT  Dysphagia, Epistaxis, nasal congestion, hearing loss   Cardiovascular  Chest pain, palpitations, edema   Respiratory  Cough, dyspnea, wheezing   Gastrointestinal  Nausea, vomiting, constipation, diarrhea, anorexia.     Genitourinary  Dysuria, incontinence   Musculoskeletal  Myalgias, joint pain, joint swelling   Integumentary  Rash, inflammation, burning   Neruo-Psychiatric  Anxiety, insomnia.  Changes in speech, strength, sensation.     Endocrine     Hematologic  Abnormal bruising, bleeding   Immunologic  Inflammation, pain at IV sites.  Pruritis.       PHYSICAL EXAM:     Vital Signs  Vitals  Temp: 98 °F (36.7 °C)  Temp src: Oral  Pulse: 65  Heart Rate Source: Monitor, Continuous  Resp: 16  SpO2: 95 %  Pulse Oximetry Type: Continuous  Flow (L/min): 2  O2 Device (Oxygen Therapy): room air  BP: (!) 140/72  MAP (mmHg): 88  BP Location: Right arm  BP Method: Automatic  Patient Position: Lying            24 Hour VS Range    Temp:  [97.6 °F (36.4 °C)-98.7 °F (37.1 °C)]   Pulse:   [54-67]   Resp:  [10-25]   BP: (106-153)/(58-74)   SpO2:  [91 %-98 %]     Intake/Output Summary (Last 24 hours) at 12/1/2022 0857  Last data filed at 11/30/2022 1736  Gross per 24 hour   Intake 310.67 ml   Output --   Net 310.67 ml         General:  Lying in bed no acute distress  Head: NCAT  Eyes: conjunctivae and lids normal, no scleral icterus, EOMI.  ENMT:  no gingival bleeding, normal oral mucosa without pallor or cyanosis.   Neck:  JVP normal.  Trachea non-displaced.     Chest:  Normal respiratory effort.  Chest clear to auscultation.  No wheezes, rales, or rhonchi.  Heart:  Normal PMI, S1 S2 normal quality, splitting.  No murmurs rubs or gallops.  Peripheral pulses 2+ bilaterally in carotids, radials, DP/PT.  Abdomen:  Non-distended, normal bowel sounds, non-tender.  No hepato-jugular reflux.  Extremities:  No edema. Normal capillary refill.    Skin:  Warm and dry.  No cyanosis or pallor.  No ulcers, stasis.  IV sites without tenderness or inflammation.    Neurological / Psychiatric:  Oriented to person, time, and place.  No facial asymmetry, drift.  Fluent without dysarthria.  Mood euthymic, affect normal.       DATA:       Recent Labs   Lab 11/27/22 0456 11/28/22 0514 11/29/22 0427 11/30/22 0406 12/01/22 0315   WBC 5.34 5.29 4.84 3.90 4.71   HGB 15.7 16.7 16.5 14.8 15.5   HCT 46.6 48.0 48.7 44.2 46.5    218 234 195 201        Recent Labs   Lab 11/27/22 0456 11/28/22 0514 11/29/22 0427 11/30/22 0406 12/01/22  0315   INR 2.4* 1.4* 1.1 1.0 1.1        Recent Labs   Lab 11/27/22 0456 11/28/22 0514 11/29/22 0427 11/30/22 0406 12/01/22 0315    140 141 137 139   K 4.0 4.1 4.0 3.9 4.1    106 107 107 105   CO2 20* 21* 19* 20* 23   BUN 34* 38* 37* 23 20   CREATININE 1.0 1.0 0.9 0.8 0.7   ANIONGAP 15 13 15 10 11   CALCIUM 8.9 9.4 9.3 8.6* 8.8        Recent Labs   Lab 12/01/22  0315   PROT 6.7   ALBUMIN 3.5   BILITOT 0.7   ALKPHOS 92   AST 25   ALT 17        No results for input(s):  TROPONINI in the last 168 hours.     BNP (pg/mL)   Date Value   11/22/2022 1,441 (H)       No results for input(s): LABBLOO in the last 168 hours.       ASSESSMENT/PLAN:     Non ST elevated MI  Ventricular tachycardia  Ischemic cardiomyopathy EF 25%    Plan:  Will review films with staff and plan for PCI today.   --2+/- PCI, patient is a BOZENA candidate  - Anti-platelet Therapy:  Aspirin/Plavix  - Access:  Right CFA with left CFA as backup  - Access closure: TBD  - Creatinine/CrCl:   Estimated Creatinine Clearance: 89 mL/min (based on SCr of 0.7 mg/dL).  - Allergies:   - No shellfish / Iodine allergy  - No Latex allergy   - No Aspirin allergy    - No history of HIT  - Pre-Hydration: NS 3cc/kg x 1 hour   - Pre-Op Med: Bendaryl 50mg pO     - All patient's questions were answered.  -The risks, benefits and alternatives of the procedure were explained to the patient.   -The risks of coronary angiography include but are not limited to: bleeding, infection, heart rhythm abnormalities, allergic reactions, kidney injury and potential need for dialysis, stroke and death.   - Should stenting be indicated, the patient has agreed to dual anti-platelet therapy for 1-consecutive year with a drug-eluting stent and a minimum of 1-month with the use of a bare metal stent  - Additionally, pt is aware that non-compliance is likely to result in stent clotting with heart attack, heart failure, and/or death  -The risks of moderate sedation include hypotension, respiratory depression, arrhythmias, bronchospasm, and death.   - Informed consent was obtained and the  patient is agreeable to proceed with the procedure.          Signed:  Prem Teague M.D.  Page # [phone]  Cardiovascular Fellow  [facility]     Staff:  I have personally taken the history and examined this patient and agree with the fellow's note as stated above and amended it accordingly :-)  This patient has susteained VT with AICD in situ and chronic  LAD  without revascularization and now has acute subtotal occlusion of the OMB and possibly significant RCA disease (supplying collaterals to the LAD).  Will relook at the RCA and stent it if significant and the OMB today.  Patient is confused (since being hospitalized) so we will ask his eldest child present (Daughter) to sign the consents for him.

## 2022-12-01 NOTE — SUBJECTIVE & OBJECTIVE
Interval history: Pt was NPO overnight, not having any chest pain and feeling well. He will undergo a LHC procedure today to open up his blockages.      ROS  Objective:     Vital Signs (Most Recent):  Temp: 98 °F (36.7 °C) (12/01/22 0305)  Pulse: 68 (12/01/22 1200)  Resp: (!) 22 (12/01/22 1200)  BP: 127/62 (12/01/22 1100)  SpO2: 96 % (12/01/22 1200) Vital Signs (24h Range):  Temp:  [98 °F (36.7 °C)-98.7 °F (37.1 °C)] 98 °F (36.7 °C)  Pulse:  [55-68] 68  Resp:  [10-27] 22  SpO2:  [94 %-98 %] 96 %  BP: (106-147)/(58-75) 127/62     Weight: 68 kg (149 lb 14.6 oz)  Body mass index is 22.14 kg/m².    SpO2: 96 %  O2 Device (Oxygen Therapy): room air      Intake/Output Summary (Last 24 hours) at 12/1/2022 1255  Last data filed at 12/1/2022 1200  Gross per 24 hour   Intake 541.83 ml   Output --   Net 541.83 ml         Lines/Drains/Airways       Peripheral Intravenous Line  Duration                  Peripheral IV - Single Lumen 11/30/22 1756 18 G Anterior;Distal;Right Forearm <1 day         Peripheral IV - Single Lumen 12/01/22 1105 20 G Anterior;Left Forearm <1 day                    Physical Exam  Vitals reviewed.   Constitutional:       General: He is awake.      Appearance: Normal appearance. He is not ill-appearing.   HENT:      Head: Normocephalic and atraumatic.   Eyes:      General: No scleral icterus.     Comments: Pt keeps his L eye shut due to experiencing double vision with is vision trouble.   Cardiovascular:      Rate and Rhythm: Normal rate.      Comments: Paced rhythm  Pulmonary:      Effort: Pulmonary effort is normal. No respiratory distress.      Breath sounds: Normal breath sounds.   Abdominal:      General: Abdomen is flat. There is no distension.      Palpations: Abdomen is soft.      Tenderness: There is no abdominal tenderness.   Musculoskeletal:      Right lower leg: No edema.      Left lower leg: No edema.   Skin:     General: Skin is warm and dry.      Coloration: Skin is not jaundiced.    Neurological:      Mental Status: He is alert. Mental status is at baseline.   Psychiatric:         Mood and Affect: Mood normal.         Behavior: Behavior is cooperative.         Thought Content: Thought content normal.       Significant Labs: All pertinent lab results from the last 24 hours have been reviewed.    Significant Imaging:  Reviewed

## 2022-12-01 NOTE — BRIEF OP NOTE
Brief Operative Note:    : Tam Cho MD     Referring Physician: Self,Aaareferral     All Operators: Surgeon(s):  MD Prem Street MD Stephanie Maria Madonis, MD     Preoperative Diagnosis: Chest pain [R07.9]  Ventricular tachyarrhythmia [I47.20]  Coronary artery disease involving native coronary artery of native heart without angina pectoris [I25.10]     Postop Diagnosis: Chest pain [R07.9]  Ventricular tachyarrhythmia [I47.20]  Coronary artery disease involving native coronary artery of native heart without angina pectoris [I25.10]    Treatments/Procedures: Procedure(s) (LRB):  Left heart cath (N/A)  Stent, Drug Eluting, Single Vessel, Coronary    Findings:  - s/p PCI to LM into prx Lcx with 2.5 x30 mm stent  See catheterization report for full details.    Recommendations:  -routine post cath care  -Perclose using right CFA  -no IV fluids  -cardiac rehab consult  -EKG upon arrival to floor  -aspirin and Plavix x1 year  -check stat pru    Estimated Blood loss: 20 cc    Specimens removed: No    Signed:  Prem Teague MD  Page # [phone]  Cardiovascular Fellow  [facility]

## 2022-12-01 NOTE — ASSESSMENT & PLAN NOTE
Known severe LV dysfxn/ICM, Last EF <30  Not grossly vol overloaded,  Placed on entresto and toprol, statin, has ICD in place

## 2022-12-01 NOTE — PT/OT/SLP EVAL
Occupational Therapy   Co-Evaluation    Name: Balbir Rebollar Sr.  MRN: [id number]  Admitting Diagnosis:  Acute on chronic combined systolic and diastolic congestive heart failure  Recent Surgery: Procedure(s) (LRB):  Left heart cath (Left) 2 Days Post-Op    Recommendations:     Discharge Recommendations: nursing facility, skilled  Discharge Equipment Recommendations:   (TBD pending progress)  Barriers to discharge:  None    Assessment:     Balbir Rebollar Sr. is a 72 y.o. male with a medical diagnosis of Acute on chronic combined systolic and diastolic congestive heart failure.  He presents groggy, but agreeable to evaluation. Pt sat EOB and was able to stand and take sidesteps, but became nauseous and began vomiting at end of session. Performance deficits affecting function: impaired balance, decreased safety awareness, weakness, impaired endurance, impaired cardiopulmonary response to activity, impaired self care skills, impaired functional mobility, gait instability.  Pt benefits from co-treatment with PT to accommodate pt's activity tolerance and progression with therapy.      Rehab Prognosis: Good; patient would benefit from acute skilled OT services to address these deficits and reach maximum level of function.       Plan:     Patient to be seen 3 x/week to address the above listed problems via self-care/home management, therapeutic activities, therapeutic exercises  Plan of Care Expires: 12/31/22  Plan of Care Reviewed with: patient, daughter, son    Subjective     Chief Complaint: nausea  Patient/Family Comments/goals: to get better and go home    Occupational Profile:  Living Environment: dtr lives with him in 2 SH with bed/bath upstairs and 5 AINSLEY with B HR; R HR to second floor  Previous level of function: (I) with ADL and ambulation; drives  Roles and Routines: caretaker to self and home  Equipment Used at Home:  none  Assistance upon Discharge: dtr    Pain/Comfort:  Pain Rating 1:  0/10  Pain Rating Post-Intervention 1: 0/10    Patients cultural, spiritual, Gnosticism conflicts given the current situation: no    Objective:     Communicated with: RN prior to session.  Patient found supine with cryotherapy, pulse ox (continuous), telemetry upon OT entry to room.    General Precautions: Standard, fall   Orthopedic Precautions:N/A   Braces:    Respiratory Status: Room air    Occupational Performance:    Bed Mobility:    Patient completed Scooting/Bridging with contact guard assistance  Patient completed Supine to Sit with minimum assistance  Patient completed Sit to Supine with minimum assistance    Functional Mobility/Transfers:  Patient completed Sit <> Stand Transfer with contact guard assistance  with  hand-held assist   Functional Mobility: Min A for sidesteps along EOB with B HHA    Activities of Daily Living:  Grooming: minimum assistance combing hair with min A provided for static standing  Upper Body Dressing: moderate assistance    Lower Body Dressing: minimum assistance      Cognitive/Visual Perceptual:  Oriented to: Person, Place, Time and Situation  Follows Commands/attention: Follows multistep  commands  Communication: clear/fluent    Physical Exam:  Postural examination/scapula alignment:    -       No postural abnormalities identified  Sensation:    -       Intact  Upper Extremity Range of Motion:     -       Right Upper Extremity: WFL  -       Left Upper Extremity: WFL  Upper Extremity Strength:    -       Right Upper Extremity: WFL  -       Left Upper Extremity: WFL   Strength:    -       Right Upper Extremity: WFL  -       Left Upper Extremity: WFL  Fine Motor Coordination:    -       Intact  Gross motor coordination:   WFL      AMPAC 6 Click ADL:  AMPAC Total Score: 14    Treatment & Education:  Pt ed on OT POC  Pt/family ed on importance of activity participation and OOB with staff when feeling well    Patient left HOB elevated with all lines intact, call button in reach,  RN notified, and dtr and son present    GOALS:   Multidisciplinary Problems       Occupational Therapy Goals          Problem: Occupational Therapy    Goal Priority Disciplines Outcome Interventions   Occupational Therapy Goal     OT, PT/OT Ongoing, Progressing    Description: Goals to be met by: 12/15/22     Patient will increase functional independence with ADLs by performing:    Feeding with Modified [place].  UE Dressing with SBA  LE Dressing with Minimal Assistance.  Grooming while standing at sink with Contact Guard Assistance and Assistive Devices as needed.  Toileting from bedside commode with Minimal A and Assistive Devices as needed for hygiene and clothing management.   Toilet transfer to [facility] with Contact Guard Assistance.  Upper extremity exercise program x15 reps per handout, with assistance as needed.                   Multidisciplinary Problems (Resolved)          Problem: Occupational Therapy    Goal Priority Disciplines Outcome Interventions   Occupational Therapy Goal   (Resolved)     OT, PT/OT Met                        History:     Past Medical History:   Diagnosis Date    AICD (automatic cardioverter/defibrillator) present     Anticoagulant long-term use     Coronary artery disease     History of myocardial infarction     Paroxysmal A-fib     Stroke          Past Surgical History:   Procedure Laterality Date    CHOLECYSTECTOMY      LEFT HEART CATHETERIZATION Left 11/29/2022    Procedure: Left heart cath;  Surgeon: Wesley Rico MD;  Location: [facility] CATH LAB;  Service: Cardiology;  Laterality: Left;  1030am start, R rad access    REPLACEMENT OF DUAL CHAMBER IMPLANTABLE CARDIOVERTER-DEFIBRILLATOR         Time Tracking:     OT Date of Treatment: 12/01/22  OT Start Time: 1010  OT Stop Time: 1026  OT Total Time (min): 16 min    Billable Minutes:Evaluation 8  Self Care/Home Management 8    12/1/2022

## 2022-12-01 NOTE — PLAN OF CARE
Problem: Physical Therapy  Goal: Physical Therapy Goal  Description: Goals to be met by: 12/15/2022    Patient will increase functional independence with mobility by performin. Supine to sit with Stand-by Assistance  2. Sit to stand transfer with Stand-by Assistance using LRAD   3. Gait  x 25 feet with Stand-by Assistance using LVAD.   4. Ascend/descend 1 flight of stairs with bilateral Handrails Minimal Assistance   5. Stand for 3 minutes with Stand-by Assistance using LRAD    Outcome: Ongoing, Progressing     Eval completed. Goals appropriate

## 2022-12-01 NOTE — PLAN OF CARE
CICU DAILY GOALS       A: Awake    RASS: Goal -    Actual - RASS (Ng Agitation-Sedation Scale): 0-->alert and calm   Restraint necessity:    B: Breath   SBT: Not intubated   C: Coordinate A & B, analgesics/sedatives   Pain: managed    SAT: Not intubated  D: Delirium   CAM-ICU:    E: Early(intubated/ Progressive (non-intubated) Mobility   MOVE Screen: Pass   Activity: Activity Management: Rolling - L1  FAS: Feeding/Nutrition   Diet order: Diet/Nutrition Received: mechanical/dental soft, Specialty Diet/Nutrition Received: dysphagia pureed Fluid restriction:     Nutritional Supplement Intake: Quantity n/a, Type:  n/a  T: Thrombus   DVT prophylaxis: VTE Required Core Measure: (SCDs) Sequential compression device initiated/maintained  H: HOB Elevation   Head of Bed (HOB) Positioning: HOB at 30-45 degrees  U: Ulcer Prophylaxis   GI: yes  G: Glucose control   managed    S: Skin   Bundle compliance: yes   Bathing/Skin Care: incontinence care Date: n/a    B: Bowel Function   no issues   I: Indwelling Catheters   [place] necessity:     CVC necessity: No   IPAD offered: No  D: De-escalation Antibx   No  Plan for the day   - pt arrived to [facility], acclimated to room. VSS and AAOx4. No c/o pain or acute distress noted.   Family/Goals of care/Code Status   Code Status: Full Code     No acute events throughout day, VS and assessment per flow sheet, patient progressing towards goals as tolerated, plan of care reviewed with Balbir Rebollar Sr. and family, all concerns addressed, will continue to monitor.

## 2022-12-01 NOTE — ASSESSMENT & PLAN NOTE
Known CAD s/p LAD PCI 2006  Hx ICM, EF <30% by recent echo.  No overt anginal sxs,   Pt experience VT while undergoing other heart cath. Plan to take pt for LHC with PCI placement on 12/1 (start DOAC after procedure).       11/30/22 LHC with occluded LAD, 90% Cx, moderate RCA disease at [facility].    Pt is undergoing LHC today, placed on aspirin, clopidogrel, heparin, statin, Entresto, and toprol for his cardiac care    Switched pt's atorvastatin to rosuvastatin due to its interaction with his seizure meds

## 2022-12-01 NOTE — PT/OT/SLP EVAL
Physical Therapy Evaluation    Patient Name:  Balbir Rebollar Sr.   MRN:  [id number]  Admit Date: 11/22/2022  Admitting Diagnosis:  Acute on chronic combined systolic and diastolic congestive heart failure  Length of Stay: 8 days  Recent Surgery: Procedure(s) (LRB):  Left heart cath (N/A)  Stent, Drug Eluting, Single Vessel, Coronary Day of Surgery    Recommendations:     Discharge Recommendations:  nursing facility, skilled   Discharge Equipment Recommendations:  (TBD)     Assessment:     Balbir Rebollar Sr. is a 72 y.o. 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 Daughter reported that one of patient's medications makes him drowsy. Daughter and son and also reported that patient has been having episodes of emesis most mornings and nights (related to medication per family). Pt was limited by drowsiness today. Pt was able to perform bed mobility, stand, and take a few side steps with one to two person assistance and constant verbal stimuli for attention to task. Recommend SNF once pt is medically stable for discharge.      Problem List: weakness, impaired endurance, impaired functional mobility, gait instability, impaired balance, impaired cardiopulmonary response to activity  Rehab Prognosis: Good; patient would benefit from acute skilled PT services to address these deficits and reach maximum level of function.      Plan:     During this hospitalization, patient to be seen 4 x/week to address the identified rehab impairments via gait training, therapeutic activities, therapeutic exercises, neuromuscular re-education and progress towards the established goals.    Plan of Care Expires:  12/25/22    Subjective   Communicated with RN prior to session.  Patient found HOB elevated upon PT entry to room, agreeable to evaluation. Balbir Rebollar Sr.'s daughter and son present during session.    Chief Complaint: Fatigue (X several  Procedure:  EGD    Relevant Problems   ANESTHESIA (within normal limits)      CARDIO   (+) Essential hypertension   (+) Hyperlipidemia      ENDO   (+) Acquired hypothyroidism   (+) Type 2 diabetes mellitus with hyperglycemia, with long-term current use of insulin (HCC)      GI/HEPATIC   (+) Liver cirrhosis secondary to PAUL (nonalcoholic steatohepatitis) (HCC)      HEMATOLOGY   (+) Iron deficiency anemia due to chronic blood loss   (+) Thrombocytopenia (HCC)      NEURO/PSYCH   (+) Diabetic polyneuropathy associated with type 2 diabetes mellitus (HCC)      PULMONARY   (-) Sleep apnea   (-) URI (upper respiratory infection)    BMI 38    Physical Exam    Airway    Mallampati score: II  TM Distance: >3 FB  Neck ROM: full     Dental   Comment: edentulous,     Cardiovascular      Pulmonary      Other Findings       Lab Results   Component Value Date    WBC 3 21 (L) 09/04/2021    HGB 11 2 (L) 09/04/2021    PLT  09/04/2021      Comment:      Not reported due to platelet clumping  Lab Results   Component Value Date    SODIUM 139 07/12/2021    K 4 5 07/12/2021    BUN 37 (H) 07/12/2021    CREATININE 1 33 (H) 07/12/2021    EGFR 39 07/12/2021     Lab Results   Component Value Date    HGBA1C 7 6 07/12/2021         Anesthesia Plan  ASA Score- 3     Anesthesia Type- IV sedation with anesthesia with ASA Monitors  Additional Monitors:   Airway Plan:     Comment: BG 58 this am and symptomatic - given 1/4 amp D50  Plan Factors-Exercise tolerance (METS): >4 METS  Chart reviewed  Existing labs reviewed  Patient summary reviewed  Patient is not a current smoker  Induction- intravenous  Postoperative Plan-     Informed Consent- Anesthetic plan and risks discussed with patient  I personally reviewed this patient with the CRNA  Discussed and agreed on the Anesthesia Plan with the CRNA  Yaneli Mota "days. Was seen at  for same c/o and discharged. Just started amiodarone 11/18)    Patient/Family Comments/goals: to get better and return home   Pain/Comfort:  Pain Rating 1: 0/10  Pain Rating Post-Intervention 1: 0/10    Living Environment:  Pt lives with daughter 2 story house with 5 steps to enter, (B) HR; (R) HR going upstairs.  Prior to admission, patients level of function was independent.  Equipment used at home: none.  DME owned (not currently used): none.  Upon discharge, patient will have assistance from son.    Objective:   Patient found with: telemetry, pulse ox (continuous), blood pressure cuff, peripheral IV     General Precautions: Standard, Cardiac fall   Orthopedic Precautions:N/A   Braces: N/A   Oxygen Device: Room Air  Vitals: BP (!) 114/58 (BP Location: Right arm, Patient Position: Lying)   Pulse 61   Temp 97.7 °F (36.5 °C) (Oral)   Resp 11   Ht 5' 9" (1.753 m)   Wt 68 kg (149 lb 14.6 oz)   SpO2 95%   BMI 22.14 kg/m²     Exams:  Cognition:   Alert and Cooperative  Ox4  Command following: Follows multistep  commands  Fluency: clear/fluent    RLE ROM: WFL  RLE Strength: grossly 4/5  LLE ROM: WFL  LLE Strength: grossly 4/5      Outcome Measures:  AM-PAC 6 CLICK MOBILITY  Turning over in bed (including adjusting bedclothes, sheets and blankets)?: 3  Sitting down on and standing up from a chair with arms (e.g., wheelchair, bedside commode, etc.): 3  Moving from lying on back to sitting on the side of the bed?: 3  Moving to and from a bed to a chair (including a wheelchair)?: 3  Need to walk in hospital room?: 3  Climbing 3-5 steps with a railing?: 2  Basic Mobility Total Score: 17     Functional Mobility:  Additional staff present: OT  Bed Mobility:  Supine to Sit: minimum assistance; HOB elevated  Scooting anteriorly to EOB to have both feet planted on floor: contact guard assistance  Sit to Supine: minimum assistance; HOB flat    Sitting Balance at Edge of Bed:  Assistance Level Required: " Contact Guard Assistance  Time:  8 minutes  Comments:   Worked on activity tolerance sitting EOB, worked on tolerance to positional change, and worked on sitting balance     Transfers:   Sit <> Stand Transfer: contact guard assistance with no assistive device from EOB      Gait:   Patient ambulated: 6 side steps    Patient required: minimal assist  Patient used: hand-held assist  Gait Pattern observed: reciprocal gait  Gait Deviation(s): unsteady gait, decreased step length, and decreased zack  Impairments due to: impaired balance, decreased strength, and decreased endurance  Comments:   Pt generally unsteady today  No SOB  Pt required verbal cuing for attention to task due to drowsiness (VSS)  Facilitation of B weight shifting and upright posture         Therapeutic Activities, Exercises, & Education:   Educated pt on PT role/POC  Educated pt on importance of OOB activity and daily ambulation   Pt verbalized understanding         Patient left HOB elevated with all lines intact, call button in reach, and RN notified and daughter and son present.     GOALS:   Multidisciplinary Problems       Physical Therapy Goals          Problem: Physical Therapy    Goal Priority Disciplines Outcome Goal Variances Interventions   Physical Therapy Goal     PT, PT/OT Ongoing, Progressing     Description: Goals to be met by: 12/15/2022    Patient will increase functional independence with mobility by performin. Supine to sit with Stand-by Assistance  2. Sit to stand transfer with Stand-by Assistance using LRAD   3. Gait  x 25 feet with Stand-by Assistance using LVAD.   4. Ascend/descend 1 flight of stairs with bilateral Handrails Minimal Assistance   5. Stand for 3 minutes with Stand-by Assistance using LRAD                         History:     Past Medical History:   Diagnosis Date    AICD (automatic cardioverter/defibrillator) present     Anticoagulant long-term use     Coronary artery disease     History of myocardial  infarction     Paroxysmal A-fib     Stroke        Past Surgical History:   Procedure Laterality Date    CHOLECYSTECTOMY      LEFT HEART CATHETERIZATION Left 11/29/2022    Procedure: Left heart cath;  Surgeon: Wesley Rico MD;  Location: [facility] CATH LAB;  Service: Cardiology;  Laterality: Left;  1030am start, R rad access    REPLACEMENT OF DUAL CHAMBER IMPLANTABLE CARDIOVERTER-DEFIBRILLATOR         Time Tracking:     PT Received On: 12/01/22  PT Start Time: 1010     PT Stop Time: 1026  PT Total Time (min): 16 min     Billable Minutes: Evaluation 5 and Gait Training 8

## 2022-12-01 NOTE — ASSESSMENT & PLAN NOTE
Hold coumadin for now. Will place on DAPT after cath if stents are placed  Pt is on heparin.  Going for [facility] today

## 2022-12-01 NOTE — PROGRESS NOTES
[facility] - Cath Lab  Cardiology  Progress Note    Patient Name: Balbir Rebollar Sr.  MRN: [id number]  Admission Date: 11/22/2022  Hospital Length of Stay: 8 days  Code Status: Full Code   Attending Physician: Prem Kramer MD   Primary Care Physician: Flip Hanna MD  Expected Discharge Date: 12/5/2022  Principal Problem:Acute on chronic combined systolic and diastolic congestive heart failure    Subjective:     Hospital Course:   Pt is a 72 year old male with PMH of CAD with PCI of LAD in 2006, HFrEF with ICD placed, A fib on chronic warfarin, CVA who presented to OSH with fatigue and weakness for about a weak duration. Pt was found to have elevated INR (4.6), BNP (1441) and troponin 0.041 and having some frequent episodes of VT, his device was adjust and pt underwent a LHC at [facility] that demonstrated LAD and Lcx occulsions. While pt was in the cath he continued to have episodes of VT so patient was transferred to [facility] for a high risk PCI to place stents in his blocked arteries. Pt was having no chest pain or SOB on arrival.                    Interval history: Pt was NPO overnight, not having any chest pain and feeling well. He will undergo a LHC procedure today to open up his blockages.      ROS  Objective:     Vital Signs (Most Recent):  Temp: 98 °F (36.7 °C) (12/01/22 0305)  Pulse: 68 (12/01/22 1200)  Resp: (!) 22 (12/01/22 1200)  BP: 127/62 (12/01/22 1100)  SpO2: 96 % (12/01/22 1200) Vital Signs (24h Range):  Temp:  [98 °F (36.7 °C)-98.7 °F (37.1 °C)] 98 °F (36.7 °C)  Pulse:  [55-68] 68  Resp:  [10-27] 22  SpO2:  [94 %-98 %] 96 %  BP: (106-147)/(58-75) 127/62     Weight: 68 kg (149 lb 14.6 oz)  Body mass index is 22.14 kg/m².    SpO2: 96 %  O2 Device (Oxygen Therapy): room air      Intake/Output Summary (Last 24 hours) at 12/1/2022 1255  Last data filed at 12/1/2022 1200  Gross per 24 hour   Intake 541.83 ml   Output --   Net 541.83 ml         Lines/Drains/Airways        Peripheral Intravenous Line  Duration                  Peripheral IV - Single Lumen 11/30/22 1756 18 G Anterior;Distal;Right Forearm <1 day         Peripheral IV - Single Lumen 12/01/22 1105 20 G Anterior;Left Forearm <1 day                    Physical Exam  Vitals reviewed.   Constitutional:       General: He is awake.      Appearance: Normal appearance. He is not ill-appearing.   HENT:      Head: Normocephalic and atraumatic.   Eyes:      General: No scleral icterus.     Comments: Pt keeps his L eye shut due to experiencing double vision with is vision trouble.   Cardiovascular:      Rate and Rhythm: Normal rate.      Comments: Paced rhythm  Pulmonary:      Effort: Pulmonary effort is normal. No respiratory distress.      Breath sounds: Normal breath sounds.   Abdominal:      General: Abdomen is flat. There is no distension.      Palpations: Abdomen is soft.      Tenderness: There is no abdominal tenderness.   Musculoskeletal:      Right lower leg: No edema.      Left lower leg: No edema.   Skin:     General: Skin is warm and dry.      Coloration: Skin is not jaundiced.   Neurological:      Mental Status: He is alert. Mental status is at baseline.   Psychiatric:         Mood and Affect: Mood normal.         Behavior: Behavior is cooperative.         Thought Content: Thought content normal.       Significant Labs: All pertinent lab results from the last 24 hours have been reviewed.    Significant Imaging:  Reviewed    Assessment and Plan:       * Acute on chronic combined systolic and diastolic congestive heart failure  Known severe LV dysfxn/ICM, Last EF <30  Not grossly vol overloaded,  Placed on entresto and toprol, statin, has ICD in place        History of seizure disorder  On carbamazepine      Ventricular tachycardia  Freq sustained episodes of VT noted on ICD interrogation, below treatment threshold.  ?r/t CHF, ischemia, meds.  ICD reprogrammed 11/23/22.  Amio oral, toprol, entresto, and statin    11/30/22  VT during LHC 11/29. VT improved on IV amiodarone - change to 400 qd po    Weakness  Related to cardiac issues most likely    Pure hypercholesterolemia  Cont statin    ICD (implantable cardioverter-defibrillator), single, in situ  [employer] single chamber ICD  Reprogrammed with lower rate threshold and more ATP on 11/23/22    Coronary artery disease involving native coronary artery of native heart without angina pectoris  Known CAD s/p LAD PCI 2006  Hx ICM, EF <30% by recent echo.  No overt anginal sxs,   Pt experience VT while undergoing other heart cath. Plan to take pt for LHC with PCI placement on 12/1 (start DOAC after procedure).       11/30/22 LHC with occluded LAD, 90% Cx, moderate RCA disease at [facility].    Pt is undergoing LHC today, placed on aspirin, clopidogrel, heparin, statin, Entresto, and toprol for his cardiac care    Switched pt's atorvastatin to rosuvastatin due to its interaction with his seizure meds          Chronic anticoagulation  Hold coumadin for now. Will place on DAPT after cath if stents are placed  Pt is on heparin.  Going for LHC today    Paroxysmal atrial fibrillation  In SR on coumadin  Coumadin stopped and switched to heparin. Pt also on aspirin            VTE Risk Mitigation (From admission, onward)         Ordered     heparin infusion 1,000 units/500 ml in 0.9% NaCl (on sterile field)  As needed (PRN)         12/01/22 1327     heparin 25,000 units in dextrose 5% (100 units/ml) IV bolus from bag - ADDITIONAL PRN BOLUS - 60 units/kg  As needed (PRN)        Question:  Heparin Infusion Adjustment (DO NOT MODIFY ANSWER)  Answer:  [email or web address]\Images\Pharmacy\HeparinInfusions\heparin LOW INTENSITY nomogram for [facility] [id number].pdf    12/01/22 0702     heparin 25,000 units in dextrose 5% (100 units/ml) IV bolus from bag - ADDITIONAL PRN BOLUS - 30 units/kg  As needed (PRN)        Question:  Heparin Infusion Adjustment (DO NOT MODIFY ANSWER)  Answer:   [email or web address]\[email or web address]\Images\Pharmacy\HeparinInfusions\heparin LOW INTENSITY nomogram for [facility] [id number]    12/01/22 0702     heparin 25,000 units in dextrose 5% 250 mL (100 units/mL) infusion LOW INTENSITY nomogram - [facility]  Continuous        Question Answer Comment   Heparin Infusion Adjustment (DO NOT MODIFY ANSWER) [email or web address]\[email or web address]\Images\Pharmacy\HeparinInfusions\heparin LOW INTENSITY nomogram for [facility] [id number]    Begin at (in units/kg/hr) 12        12/01/22 0702     Reason for No Pharmacological VTE Prophylaxis  Once        Question:  Reasons:  Answer:  Already adequately anticoagulated on oral Anticoagulants    11/23/22 0042     IP VTE HIGH RISK PATIENT  Once         11/23/22 0042     Place sequential compression device  Until discontinued         11/23/22 0042                Shelton Dumas,   Cardiology  [facility] - Cath Lab

## 2022-12-02 ENCOUNTER — TELEPHONE (OUTPATIENT)
Dept: CARDIAC REHAB | Facility: CLINIC | Age: 72
End: 2022-12-02
Payer: MEDICARE

## 2022-12-02 DIAGNOSIS — Z98.61 POSTSURGICAL PERCUTANEOUS TRANSLUMINAL CORONARY ANGIOPLASTY STATUS: Primary | ICD-10-CM

## 2022-12-02 DIAGNOSIS — I25.10 CORONARY ARTERY DISEASE, UNSPECIFIED VESSEL OR LESION TYPE, UNSPECIFIED WHETHER ANGINA PRESENT, UNSPECIFIED WHETHER NATIVE OR TRANSPLANTED HEART: ICD-10-CM

## 2022-12-02 LAB
ALBUMIN SERPL BCP-MCNC: 3.4 G/DL (ref 3.5–5.2)
ALP SERPL-CCNC: 94 U/L (ref 55–135)
ALT SERPL W/O P-5'-P-CCNC: 14 U/L (ref 10–44)
ANION GAP SERPL CALC-SCNC: 9 MMOL/L (ref 8–16)
ANION GAP SERPL CALC-SCNC: 9 MMOL/L (ref 8–16)
APTT BLDCRRT: 27.6 SEC (ref 21–32)
APTT BLDCRRT: 36.3 SEC (ref 21–32)
APTT BLDCRRT: 97.4 SEC (ref 21–32)
AST SERPL-CCNC: 29 U/L (ref 10–40)
BASOPHILS # BLD AUTO: 0.05 K/UL (ref 0–0.2)
BASOPHILS NFR BLD: 1.2 % (ref 0–1.9)
BILIRUB SERPL-MCNC: 0.5 MG/DL (ref 0.1–1)
BUN SERPL-MCNC: 16 MG/DL (ref 8–23)
BUN SERPL-MCNC: 16 MG/DL (ref 8–23)
CALCIUM SERPL-MCNC: 8.3 MG/DL (ref 8.7–10.5)
CALCIUM SERPL-MCNC: 8.8 MG/DL (ref 8.7–10.5)
CHLORIDE SERPL-SCNC: 104 MMOL/L (ref 95–110)
CHLORIDE SERPL-SCNC: 105 MMOL/L (ref 95–110)
CO2 SERPL-SCNC: 20 MMOL/L (ref 23–29)
CO2 SERPL-SCNC: 22 MMOL/L (ref 23–29)
CREAT SERPL-MCNC: 0.7 MG/DL (ref 0.5–1.4)
CREAT SERPL-MCNC: 0.7 MG/DL (ref 0.5–1.4)
DIFFERENTIAL METHOD: NORMAL
EOSINOPHIL # BLD AUTO: 0.1 K/UL (ref 0–0.5)
EOSINOPHIL NFR BLD: 3 % (ref 0–8)
ERYTHROCYTE [DISTWIDTH] IN BLOOD BY AUTOMATED COUNT: 13.7 % (ref 11.5–14.5)
EST. GFR  (NO RACE VARIABLE): >60 ML/MIN/1.73 M^2
EST. GFR  (NO RACE VARIABLE): >60 ML/MIN/1.73 M^2
GLUCOSE SERPL-MCNC: 127 MG/DL (ref 70–110)
GLUCOSE SERPL-MCNC: 86 MG/DL (ref 70–110)
HCT VFR BLD AUTO: 43.9 % (ref 40–54)
HGB BLD-MCNC: 14.7 G/DL (ref 14–18)
IMM GRANULOCYTES # BLD AUTO: 0.01 K/UL (ref 0–0.04)
IMM GRANULOCYTES NFR BLD AUTO: 0.2 % (ref 0–0.5)
INR PPP: 1.1 (ref 0.8–1.2)
LYMPHOCYTES # BLD AUTO: 1.5 K/UL (ref 1–4.8)
LYMPHOCYTES NFR BLD: 34 % (ref 18–48)
MAGNESIUM SERPL-MCNC: 2 MG/DL (ref 1.6–2.6)
MAGNESIUM SERPL-MCNC: 2 MG/DL (ref 1.6–2.6)
MCH RBC QN AUTO: 30.8 PG (ref 27–31)
MCHC RBC AUTO-ENTMCNC: 33.5 G/DL (ref 32–36)
MCV RBC AUTO: 92 FL (ref 82–98)
MONOCYTES # BLD AUTO: 0.5 K/UL (ref 0.3–1)
MONOCYTES NFR BLD: 10.4 % (ref 4–15)
NEUTROPHILS # BLD AUTO: 2.2 K/UL (ref 1.8–7.7)
NEUTROPHILS NFR BLD: 51.2 % (ref 38–73)
NRBC BLD-RTO: 0 /100 WBC
PLATELET # BLD AUTO: 178 K/UL (ref 150–450)
PMV BLD AUTO: 9.7 FL (ref 9.2–12.9)
POTASSIUM SERPL-SCNC: 3.7 MMOL/L (ref 3.5–5.1)
POTASSIUM SERPL-SCNC: 4.1 MMOL/L (ref 3.5–5.1)
PROT SERPL-MCNC: 6.6 G/DL (ref 6–8.4)
PROTHROMBIN TIME: 11.5 SEC (ref 9–12.5)
RBC # BLD AUTO: 4.78 M/UL (ref 4.6–6.2)
SODIUM SERPL-SCNC: 133 MMOL/L (ref 136–145)
SODIUM SERPL-SCNC: 136 MMOL/L (ref 136–145)
WBC # BLD AUTO: 4.32 K/UL (ref 3.9–12.7)

## 2022-12-02 PROCEDURE — 85730 THROMBOPLASTIN TIME PARTIAL: CPT | Mod: 91 | Performed by: INTERNAL MEDICINE

## 2022-12-02 PROCEDURE — 25000003 PHARM REV CODE 250

## 2022-12-02 PROCEDURE — 63600175 PHARM REV CODE 636 W HCPCS: Performed by: INTERNAL MEDICINE

## 2022-12-02 PROCEDURE — 20000000 HC ICU ROOM

## 2022-12-02 PROCEDURE — 94761 N-INVAS EAR/PLS OXIMETRY MLT: CPT

## 2022-12-02 PROCEDURE — 25000003 PHARM REV CODE 250: Performed by: INTERNAL MEDICINE

## 2022-12-02 PROCEDURE — 63600175 PHARM REV CODE 636 W HCPCS: Performed by: HOSPITALIST

## 2022-12-02 PROCEDURE — 83735 ASSAY OF MAGNESIUM: CPT | Performed by: INTERNAL MEDICINE

## 2022-12-02 PROCEDURE — 99900035 HC TECH TIME PER 15 MIN (STAT)

## 2022-12-02 PROCEDURE — 25000003 PHARM REV CODE 250: Performed by: HOSPITALIST

## 2022-12-02 PROCEDURE — 80053 COMPREHEN METABOLIC PANEL: CPT | Performed by: INTERNAL MEDICINE

## 2022-12-02 PROCEDURE — 25000003 PHARM REV CODE 250: Performed by: STUDENT IN AN ORGANIZED HEALTH CARE EDUCATION/TRAINING PROGRAM

## 2022-12-02 PROCEDURE — 80048 BASIC METABOLIC PNL TOTAL CA: CPT | Performed by: INTERNAL MEDICINE

## 2022-12-02 PROCEDURE — 97116 GAIT TRAINING THERAPY: CPT

## 2022-12-02 PROCEDURE — 85610 PROTHROMBIN TIME: CPT | Performed by: STUDENT IN AN ORGANIZED HEALTH CARE EDUCATION/TRAINING PROGRAM

## 2022-12-02 PROCEDURE — 63600175 PHARM REV CODE 636 W HCPCS: Performed by: STUDENT IN AN ORGANIZED HEALTH CARE EDUCATION/TRAINING PROGRAM

## 2022-12-02 PROCEDURE — 97530 THERAPEUTIC ACTIVITIES: CPT

## 2022-12-02 PROCEDURE — 83735 ASSAY OF MAGNESIUM: CPT | Mod: 91 | Performed by: INTERNAL MEDICINE

## 2022-12-02 PROCEDURE — 85025 COMPLETE CBC W/AUTO DIFF WBC: CPT | Performed by: HOSPITALIST

## 2022-12-02 PROCEDURE — 85730 THROMBOPLASTIN TIME PARTIAL: CPT | Performed by: INTERNAL MEDICINE

## 2022-12-02 RX ORDER — PANTOPRAZOLE SODIUM 40 MG/1
40 TABLET, DELAYED RELEASE ORAL DAILY
Status: DISCONTINUED | OUTPATIENT
Start: 2022-12-03 | End: 2022-12-06 | Stop reason: HOSPADM

## 2022-12-02 RX ORDER — METOPROLOL TARTRATE 25 MG/1
25 TABLET, FILM COATED ORAL 2 TIMES DAILY
Status: DISCONTINUED | OUTPATIENT
Start: 2022-12-02 | End: 2022-12-03

## 2022-12-02 RX ORDER — HEPARIN SODIUM,PORCINE/D5W 25000/250
0-40 INTRAVENOUS SOLUTION INTRAVENOUS CONTINUOUS
Status: DISCONTINUED | OUTPATIENT
Start: 2022-12-02 | End: 2022-12-02

## 2022-12-02 RX ORDER — ENOXAPARIN SODIUM 100 MG/ML
1 INJECTION SUBCUTANEOUS
Status: DISCONTINUED | OUTPATIENT
Start: 2022-12-02 | End: 2022-12-04

## 2022-12-02 RX ORDER — WARFARIN SODIUM 5 MG/1
5 TABLET ORAL DAILY
Status: DISCONTINUED | OUTPATIENT
Start: 2022-12-02 | End: 2022-12-05

## 2022-12-02 RX ORDER — NAPROXEN SODIUM 220 MG/1
81 TABLET, FILM COATED ORAL DAILY
Qty: 28 TABLET | Refills: 0 | Status: CANCELLED | OUTPATIENT
Start: 2022-12-03 | End: 2022-12-31

## 2022-12-02 RX ORDER — NAPROXEN SODIUM 220 MG/1
81 TABLET, FILM COATED ORAL DAILY
Status: DISCONTINUED | OUTPATIENT
Start: 2022-12-03 | End: 2022-12-06 | Stop reason: HOSPADM

## 2022-12-02 RX ORDER — POTASSIUM CHLORIDE 20 MEQ/1
40 TABLET, EXTENDED RELEASE ORAL ONCE
Status: COMPLETED | OUTPATIENT
Start: 2022-12-02 | End: 2022-12-02

## 2022-12-02 RX ADMIN — CLOPIDOGREL BISULFATE 75 MG: 75 TABLET ORAL at 08:12

## 2022-12-02 RX ADMIN — DOCUSATE SODIUM 100 MG: 100 CAPSULE, LIQUID FILLED ORAL at 08:12

## 2022-12-02 RX ADMIN — AMIODARONE HYDROCHLORIDE 1 MG/MIN: 1.8 INJECTION, SOLUTION INTRAVENOUS at 08:12

## 2022-12-02 RX ADMIN — WARFARIN SODIUM 5 MG: 5 TABLET ORAL at 05:12

## 2022-12-02 RX ADMIN — ENOXAPARIN SODIUM 70 MG: 100 INJECTION SUBCUTANEOUS at 05:12

## 2022-12-02 RX ADMIN — METOPROLOL TARTRATE 25 MG: 25 TABLET, FILM COATED ORAL at 10:12

## 2022-12-02 RX ADMIN — AMIODARONE HYDROCHLORIDE 400 MG: 200 TABLET ORAL at 08:12

## 2022-12-02 RX ADMIN — LACTOBACILLUS ACIDOPHILUS / LACTOBACILLUS BULGARICUS 1 EACH: 100 MILLION CFU STRENGTH GRANULES at 08:12

## 2022-12-02 RX ADMIN — METOPROLOL SUCCINATE 12.5 MG: 25 TABLET, EXTENDED RELEASE ORAL at 09:12

## 2022-12-02 RX ADMIN — CARBAMAZEPINE 400 MG: 200 TABLET ORAL at 08:12

## 2022-12-02 RX ADMIN — SACUBITRIL AND VALSARTAN 1 TABLET: 24; 26 TABLET, FILM COATED ORAL at 09:12

## 2022-12-02 RX ADMIN — MUPIROCIN: 20 OINTMENT TOPICAL at 09:12

## 2022-12-02 RX ADMIN — CYANOCOBALAMIN 1000 MCG: 1000 INJECTION, SOLUTION INTRAMUSCULAR; SUBCUTANEOUS at 09:12

## 2022-12-02 RX ADMIN — ASPIRIN 81 MG CHEWABLE TABLET 81 MG: 81 TABLET CHEWABLE at 08:12

## 2022-12-02 RX ADMIN — CARBAMAZEPINE 400 MG: 200 TABLET ORAL at 09:12

## 2022-12-02 RX ADMIN — POTASSIUM CHLORIDE 40 MEQ: 1500 TABLET, EXTENDED RELEASE ORAL at 08:12

## 2022-12-02 RX ADMIN — AMIODARONE HYDROCHLORIDE 150 MG: 1.5 INJECTION, SOLUTION INTRAVENOUS at 08:12

## 2022-12-02 RX ADMIN — MUPIROCIN: 20 OINTMENT TOPICAL at 08:12

## 2022-12-02 RX ADMIN — SACUBITRIL AND VALSARTAN 1 TABLET: 24; 26 TABLET, FILM COATED ORAL at 08:12

## 2022-12-02 RX ADMIN — ROSUVASTATIN CALCIUM 40 MG: 20 TABLET, FILM COATED ORAL at 09:12

## 2022-12-02 NOTE — PLAN OF CARE
Cardiac ICU Care Plan    POC reviewed with Balbir Rebollar Sr. and family. Questions and concerns addressed. Had LHC today, 1 stent placed.No acute events today. Heparin restarted and Warfarin given as ordered.Pt progressing toward goals. Will continue to monitor. See below and flowsheets for full assessment and VS info.       Neuro:  [place] Coma Scale  Best Eye Response: 4-->(E4) spontaneous  Best Motor Response: 6-->(M6) obeys commands  Best Verbal Response: 5-->(V5) oriented  [place] Coma Scale Score: 15  Assessment Qualifiers: patient not sedated/intubated  Pupil PERRLA: yes    24 hr Temp:  [97.6 °F (36.4 °C)-98.7 °F (37.1 °C)]      CV:  Rhythm: normal sinus rhythm   DVT prophylaxis: VTE Required Core Measure: Pharmacological prophylaxis initiated/maintained                            Pulses  Right Radial Pulse: 2+ (normal)  Left Radial Pulse: 2+ (normal)  Right Dorsalis Pedis Pulse: 2+ (normal)  Left Dorsalis Pedis Pulse: 2+ (normal)  Right Posterior Tibial Pulse: 2+ (normal)  Left Posterior Tibial Pulse: 2+ (normal)    Resp:  O2 Device (Oxygen Therapy): room air  Flow (L/min): 2       GI/:  GI prophylaxis: yes  Diet/Nutrition Received: NPO  Last Bowel Movement: 11/30/22      Intake/Output Summary (Last 24 hours) at 12/1/2022 1854  Last data filed at 12/1/2022 1800  Gross per 24 hour   Intake 1307.99 ml   Output 275 ml   Net 1032.99 ml        Nutritional Supplement Intake: Quantity none  , Type: none      Labs/Accuchecks:  Recent Labs   Lab 11/29/22 0427 11/30/22  0406 12/01/22 0315   WBC 4.84 3.90 4.71   RBC 5.41 4.89 5.04   HGB 16.5 14.8 15.5   HCT 48.7 44.2 46.5    195 201      Recent Labs   Lab 11/29/22 0427 11/30/22  0406 12/01/22  0315 12/01/22  1514   INR 1.1 1.0 1.1  --    APTT  --   --   --  25.3      Recent Labs     12/01/22 0315      K 4.1   CO2 23      BUN 20   CREATININE 0.7   ALKPHOS 92   ALT 17   AST 25   BILITOT 0.7     No results for input(s): CPK, CPKMB, MB,  TROPONINI in the last 168 hours. No results for input(s): PH, PCO2, PO2, HCO3, POCSATURATED, BE in the last 72 hours.    Electrolytes: No replacement orders  Accuchecks: none    Gtts/LDAs:   heparin (porcine) in D5W 12 Units/kg/hr (12/01/22 1821)       Lines/Drains/Airways       Peripheral Intravenous Line  Duration                  Peripheral IV - Single Lumen 11/30/22 1130 20 G Anterior;Right Forearm 1 day         Peripheral IV - Single Lumen 12/01/22 1105 20 G Anterior;Left Forearm <1 day                    Skin/Wounds  Bathing/Skin Care: bath, complete (11/30/22 8106)  Wounds: No  Wound care consulted: No

## 2022-12-02 NOTE — SUBJECTIVE & OBJECTIVE
Interval history: Pt LHC was done, PCI placed in RM, unable to open LAD due to complete occulusion and most likely scar tissue      ROS  Objective:     Vital Signs (Most Recent):  Temp: 97.8 °F (36.6 °C) (12/02/22 0400)  Pulse: 68 (12/02/22 0900)  Resp: (!) 23 (12/02/22 0900)  BP: 120/69 (12/02/22 0900)  SpO2: 99 % (12/02/22 0900) Vital Signs (24h Range):  Temp:  [97.6 °F (36.4 °C)-98.3 °F (36.8 °C)] 97.8 °F (36.6 °C)  Pulse:  [60-69] 68  Resp:  [9-33] 23  SpO2:  [93 %-99 %] 99 %  BP: ()/(52-81) 120/69     Weight: 68 kg (149 lb 14.6 oz)  Body mass index is 22.14 kg/m².    SpO2: 99 %  O2 Device (Oxygen Therapy): room air      Intake/Output Summary (Last 24 hours) at 12/2/2022 1058  Last data filed at 12/2/2022 0700  Gross per 24 hour   Intake 1630.06 ml   Output 775 ml   Net 855.06 ml         Lines/Drains/Airways       Peripheral Intravenous Line  Duration                  Peripheral IV - Single Lumen 11/30/22 1130 20 G Anterior;Right Forearm 1 day         Peripheral IV - Single Lumen 12/01/22 1105 20 G Anterior;Left Forearm <1 day                    Physical Exam  Vitals reviewed.   Constitutional:       General: He is awake.      Appearance: Normal appearance. He is not ill-appearing.   HENT:      Head: Normocephalic and atraumatic.   Eyes:      General: Visual field deficit present. No scleral icterus.     Comments: Pt keeps his L eye shut due to experiencing double vision with is vision trouble.   Cardiovascular:      Rate and Rhythm: Normal rate.      Comments: Paced rhythm  Pulmonary:      Effort: Pulmonary effort is normal. No respiratory distress.      Breath sounds: Normal breath sounds.   Abdominal:      General: Abdomen is flat. There is no distension.      Palpations: Abdomen is soft.      Tenderness: There is no abdominal tenderness.   Musculoskeletal:      Right lower leg: No edema.      Left lower leg: No edema.   Skin:     General: Skin is warm and dry.      Coloration: Skin is not jaundiced.    Neurological:      Mental Status: He is alert. Mental status is at baseline.   Psychiatric:         Mood and Affect: Mood normal.         Behavior: Behavior is cooperative.         Thought Content: Thought content normal.       Significant Labs: All pertinent lab results from the last 24 hours have been reviewed.    Significant Imaging:  Reviewed

## 2022-12-02 NOTE — ASSESSMENT & PLAN NOTE
Known CAD s/p LAD PCI 2006  Hx ICM, EF <30% by recent echo.  No overt anginal sxs,   Pt experience VT while undergoing other heart cath. Plan to take pt for [facility] with PCI placement on 12/1 (start DOAC after procedure).       11/30/22 [facility] with occluded LAD, 90% Cx, moderate RCA disease at [facility].    Pt [facility] 12/1 Left main occluded so stent placed. LAD completely occluded, unable to stent, placed on aspirin, clopidogrel, warfarin (bridging with heparin), statin, Entresto, and toprol for his cardiac care    Switched pt's atorvastatin to rosuvastatin due to its interaction with his seizure meds

## 2022-12-02 NOTE — PLAN OF CARE
12/02/22 1253   Post-Acute Status   Post-Acute Authorization Placement   Post-Acute Placement Status Referrals Sent     Referrals sent to the following in-network SNF providers via [facility]:  [facility], [facility], [facility], [facility], [facility], and [facility].     SW met with pt and daughter Rajiv at bedside who voiced agreement with plan for SNF with preference for [facility] because another family member had been there before.  SW left list of in-network providers with Rajiv along with which facilities have already received referrals.  SW also explained that additional referrals can be sent up until the time pt is medically ready to discharge, at which point they will need to choose from any facilities that have already accepted or pay for any additional days spent in the hospital.  Pt and Rajiv voiced understanding.  SW will continue to follow.    Brooke Lees, JACQUIE  [facility]  [phone]

## 2022-12-02 NOTE — PT/OT/SLP PROGRESS
Physical Therapy Treatment    Patient Name:  Balbir Rebollar Sr.   MRN:  [id number]  Admit Date: 2022  Admitting Diagnosis:  Acute on chronic combined systolic and diastolic congestive heart failure   Length of Stay: 9 days  Recent Surgery: Procedure(s) (LRB):  Left heart cath (N/A)  Stent, Drug Eluting, Single Vessel, Coronary 1 Day Post-Op    Recommendations:     Discharge Recommendations:  nursing facility, skilled   Discharge Equipment Recommendations: walker, rolling, bedside commode, bath bench     Plan:     During this hospitalization, patient to be seen 4 x/week to address the listed problems via gait training, therapeutic activities, therapeutic exercises, neuromuscular re-education  Plan of Care Expires:  22  Plan of Care Reviewed with: patient, son, spouse    Assessment:     Balbir Rebollar Sr. is a 72 y.o. 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 Pt found alert and cooperative. Pt demo'd an improvement in mobility today. Pt able to ambulate in the room using a RW and one person assistance. Pt experienced mild dizziness that was associated with a drop in MAP to high 60s. RN requested pt return to bed after session due to potential short bout of a heart arrhythmia. Pt was ind at baseline. Recommend SNF once pt is medically stable for discharge.     Problem List: weakness, impaired endurance, impaired functional mobility, gait instability, impaired balance, impaired cardiopulmonary response to activity.  Rehab Prognosis: Good     GOALS:   Multidisciplinary Problems       Physical Therapy Goals          Problem: Physical Therapy    Goal Priority Disciplines Outcome Goal Variances Interventions   Physical Therapy Goal     PT, PT/OT Ongoing, Progressing     Description: Goals to be met by: 12/15/2022    Patient will increase functional independence with mobility by performin. Supine to sit with Stand-by  "Assistance  2. Sit to stand transfer with Stand-by Assistance using LRAD   3. Gait  x 25 feet with Stand-by Assistance using LVAD.   4. Ascend/descend 1 flight of stairs with bilateral Handrails Minimal Assistance   5. Stand for 3 minutes with Stand-by Assistance using LRAD                         Subjective   Communicated with RN prior to session.  Patient found HOB elevated upon PT entry to room, agreeable to evaluation. Balbir Rebollar .'s wife and son present during session.    Chief Complaint: none reported   Patient/Family Comments/goals: to get better  Pain/Comfort:  Pain Rating 1: 0/10  Pain Rating Post-Intervention 1: 0/10    Objective:   Patient found with: telemetry, pulse ox (continuous), blood pressure cuff, peripheral IV   General Precautions: Standard, Cardiac fall   Orthopedic Precautions:N/A   Braces: N/A   Oxygen Device: Room Air  Vitals: /67 (BP Location: Right arm, Patient Position: Lying)   Pulse 61   Temp 97.6 °F (36.4 °C) (Oral)   Resp 11   Ht 5' 9" (1.753 m)   Wt 68 kg (149 lb 14.6 oz)   SpO2 (!) 94%   BMI 22.14 kg/m²     Outcome Measures:  AM-PAC 6 CLICK MOBILITY  Turning over in bed (including adjusting bedclothes, sheets and blankets)?: 3  Sitting down on and standing up from a chair with arms (e.g., wheelchair, bedside commode, etc.): 3  Moving from lying on back to sitting on the side of the bed?: 3  Moving to and from a bed to a chair (including a wheelchair)?: 3  Need to walk in hospital room?: 3  Climbing 3-5 steps with a railing?: 2  Basic Mobility Total Score: 17         Functional Mobility:  Additional staff present: N/A  Bed Mobility:   Supine to Sit: contact guard assistance; HOB elevated  Scooting anteriorly to EOB to have both feet planted on floor: contact guard assistance  Sit to Supine: contact guard assistance; HOB flat    Sitting Balance at Edge of Bed:  Assistance Level Required: Stand-by Assistance  Time: 5 minutes  Comments:   Worked on activity " tolerance sitting EOB and worked on tolerance to positional change     Transfers:   Sit <> Stand Transfer: contact guard assistance with rolling walker x 2 trials (EOB, chair)      Gait:  Patient ambulated: 10ft + 10ft (standing ADLs at sink between trials)   Patient required:  CGA-Merline  Patient used: rolling walker  Gait Pattern observed: reciprocal gait  Gait Deviation(s): occasional unsteady gait, decreased step length, narrow base of support, flexed posture, and decreased zack  Impairments due to: impaired balance, decreased strength, and decreased endurance  Comments:   No overt LOB but pt occasionally unsteady  No SOB  Mild dizziness with MAP between high 60s-low 70s  Verbal cuing for RW management and upright posture         Therapeutic Activities, Exercises, and Education:   Educated pt on PT role/POC  Educated pt on importance of OOB activity and daily ambulation   Pt verbalized understanding     Pt stood at the sink to comb hair with CGA using a RW      Patient left HOB elevated with all lines intact, call button in reach, and RN, wife, son present..    Time Tracking:     PT Received On: 12/02/22  PT Start Time: 1045     PT Stop Time: 1108  PT Total Time (min): 23 min       Billable Minutes:   Gait Training 15 and Therapeutic Activity 8    Treatment Type: Treatment  PT/PTA: PT

## 2022-12-02 NOTE — ASSESSMENT & PLAN NOTE
Hold coumadin for now.   [facility] on 12/1, distal RM PCI placed.   Placed on aspirin and clopidogrel   Back on warfarin and bridging with heparin.

## 2022-12-02 NOTE — PLAN OF CARE
12/02/22 1400   Post-Acute Status   Post-Acute Authorization Placement   Post-Acute Placement Status Pending post-acute provider review/more information requested     Under review with [facility].    LEATHA completed LOCET via phone and faxed PASRR to [facility] (fx. [phone]) to obtain the 142 for NH admission.      UPDATE 4:01 PM  SW received call from pt's daughter Silvia whom SW updated on discharge plan.        Brooke Lees LMSW  [facility]  [phone]

## 2022-12-02 NOTE — PROGRESS NOTES
[facility] - Cardiac Intensive Care  Cardiology  Progress Note    Patient Name: Balbir Rebollar Sr.  MRN: [id number]  Admission Date: 11/22/2022  Hospital Length of Stay: 9 days  Code Status: Full Code   Attending Physician: Marichuy Giron MD   Primary Care Physician: Flip Hanna MD  Expected Discharge Date: 12/5/2022  Principal Problem:Acute on chronic combined systolic and diastolic congestive heart failure    Subjective:     Hospital Course:   Pt is a 72 year old male with PMH of CAD with PCI of LAD in 2006, HFrEF with ICD placed, A fib on chronic warfarin, CVA who presented to OSH with fatigue and weakness for about a weak duration. Pt was found to have elevated INR (4.6), BNP (1441) and troponin 0.041 and having some frequent episodes of VT, his device was adjust and pt underwent a LHC at [facility] that demonstrated LAD and Lcx occulsions. While pt was in the cath he continued to have episodes of VT so patient was transferred to [facility] for a high risk PCI to place stents in his blocked arteries. Pt was having no chest pain or SOB on arrival. Pt underwent LHC and had a PCI placed in distal R main. Pt's LAD was completely occluded and was not able to be placed. Pt has been placed on DAPT, heparin due to other thrombosis risk, entresto and BB.                 ROS  Objective:     Vital Signs (Most Recent):  Temp: 99 °F (37.2 °C) (12/03/22 0305)  Pulse: (!) 52 (12/03/22 0656)  Resp: 12 (12/03/22 0656)  BP: (!) 109/58 (12/03/22 0605)  SpO2: 98 % (12/03/22 0656) Vital Signs (24h Range):  Temp:  [97.6 °F (36.4 °C)-99 °F (37.2 °C)] 99 °F (37.2 °C)  Pulse:  [] 52  Resp:  [9-45] 12  SpO2:  [92 %-99 %] 98 %  BP: ()/(53-74) 109/58     Weight: 68.5 kg (151 lb 0.2 oz)  Body mass index is 22.3 kg/m².    SpO2: 98 %  O2 Device (Oxygen Therapy): room air      Intake/Output Summary (Last 24 hours) at 12/3/2022 0728  Last data filed at 12/3/2022 0605  Gross per 24 hour   Intake 521.67 ml    Output 875 ml   Net -353.33 ml         Lines/Drains/Airways       Peripheral Intravenous Line  Duration                  Peripheral IV - Single Lumen 11/30/22 1130 20 G Anterior;Right Forearm 2 days         Peripheral IV - Single Lumen 12/01/22 1105 20 G Anterior;Left Forearm 1 day                    Physical Exam  Vitals reviewed.   Constitutional:       General: He is awake.      Appearance: Normal appearance. He is not ill-appearing.   HENT:      Head: Normocephalic and atraumatic.   Eyes:      General: Visual field deficit present. No scleral icterus.     Comments: Pt keeps his L eye shut due to experiencing double vision with is vision trouble.   Cardiovascular:      Rate and Rhythm: Normal rate and regular rhythm.      Comments: Paced rhythm,  Pulmonary:      Effort: Pulmonary effort is normal. No respiratory distress.      Breath sounds: Normal breath sounds.   Abdominal:      General: Abdomen is flat. There is no distension.      Palpations: Abdomen is soft.      Tenderness: There is no abdominal tenderness.   Musculoskeletal:      Right lower leg: No edema.      Left lower leg: No edema.   Skin:     General: Skin is warm and dry.      Coloration: Skin is not jaundiced.   Neurological:      Mental Status: He is alert. Mental status is at baseline.   Psychiatric:         Mood and Affect: Mood normal.         Behavior: Behavior is cooperative.         Thought Content: Thought content normal.       Significant Labs: All pertinent lab results from the last 24 hours have been reviewed.    Significant Imaging: Reviewed  Assessment and Plan:       * Acute on chronic combined systolic and diastolic congestive heart failure  Known severe LV dysfxn/ICM, Last EF <30  Not grossly vol overloaded,  Placed on entresto and toprol, statin, has ICD in place        History of seizure disorder  On carbamazepine, complicates blood thinner management      Ventricular tachycardia  Freq sustained episodes of VT noted on ICD  interrogation, below treatment threshold.  ?r/t CHF, ischemia, meds.  ICD reprogrammed 11/23/22.  Amio oral, toprol, entresto, and statin    11/30/22 VT during [facility] 11/29. VT improved on IV amiodarone - change to 400 qd po    Weakness  Related to cardiac issues most likely    Pure hypercholesterolemia  Cont statin    ICD (implantable cardioverter-defibrillator), single, in situ  Medtronic single chamber ICD  Reprogrammed with lower rate threshold and more ATP on 11/23/22  Amio for Vtach    Coronary artery disease involving native coronary artery of native heart without angina pectoris  Known CAD s/p LAD PCI 2006  Hx ICM, EF <30% by recent echo.  No overt anginal sxs,   Pt experience VT while undergoing other heart cath. Plan to take pt for [facility] with PCI placement on 12/1 (start DOAC after procedure).       11/30/22 [facility] with occluded LAD, 90% Cx, moderate RCA disease at [facility].    Pt [facility] 12/1 Left main occluded so stent placed. LAD completely occluded, unable to stent, placed on aspirin, clopidogrel, warfarin (bridging with heparin), statin, Entresto, and toprol for his cardiac care    Switched pt's atorvastatin to rosuvastatin due to its interaction with his seizure meds          Chronic anticoagulation  Hold coumadin for now.   [facility] on 12/1, distal LM PCI placed.   Placed on aspirin and clopidogrel   Back on warfarin and bridging with heparin.    Paroxysmal atrial fibrillation  In SR on coumadin  Pt placed on warfarin and bridging with heparin. Pt on DAPT          VTE Risk Mitigation (From admission, onward)           Ordered     heparin 25,000 units in dextrose 5% (100 units/ml) IV bolus from bag - ADDITIONAL PRN BOLUS - 60 units/kg  As needed (PRN)        Question:  Heparin Infusion Adjustment (DO NOT MODIFY ANSWER)  Answer:  [email or web address]\epic\Images\Pharmacy\HeparinInfusions\heparin LOW INTENSITY nomogram for [facility] [id number]    12/01/22 0702     heparin 25,000 units in dextrose 5% (100 units/ml) IV bolus  from bag - ADDITIONAL PRN BOLUS - 30 units/kg  As needed (PRN)        Question:  Heparin Infusion Adjustment (DO NOT MODIFY ANSWER)  Answer:  [email or web address]\epic\Images\Pharmacy\HeparinInfusions\heparin LOW INTENSITY nomogram for [facility] [id number]    12/01/22 0702     heparin 25,000 units in dextrose 5% 250 mL (100 units/mL) infusion LOW INTENSITY nomogram - [facility]  Continuous        Question Answer Comment   Heparin Infusion Adjustment (DO NOT MODIFY ANSWER) [email or web address]\epic\Images\Pharmacy\HeparinInfusions\heparin LOW INTENSITY nomogram for [facility] [id number]    Begin at (in units/kg/hr) 12        12/01/22 0702     Reason for No Pharmacological VTE Prophylaxis  Once        Question:  Reasons:  Answer:  Already adequately anticoagulated on oral Anticoagulants    11/23/22 0042     IP VTE HIGH RISK PATIENT  Once         11/23/22 0042     Place sequential compression device  Until discontinued         11/23/22 0042                    Shelton Dumas DO  Cardiology  [facility] - Cardiac Intensive Care

## 2022-12-02 NOTE — PROGRESS NOTES
[facility] is looking very good today. From an international standpoint hes stable to go home on [place] platelet therapy with aspirin and Plavix.

## 2022-12-02 NOTE — ASSESSMENT & PLAN NOTE
Known CAD s/p LAD PCI 2006  Hx ICM, EF <30% by recent echo.  No overt anginal sxs,   Pt experience VT while undergoing other heart cath. Plan to take pt for [facility] with PCI placement on 12/1 (start DOAC after procedure).       11/30/22 [facility] with occluded LAD, 90% Cx, moderate RCA disease at [facility].    Pt [facility] 12/1 Right main occluded so stent placed. LAD completely occluded, unable to stent, placed on aspirin, clopidogrel, warfarin (bridging with heparin), statin, Entresto, and toprol for his cardiac care    Switched pt's atorvastatin to rosuvastatin due to its interaction with his seizure meds

## 2022-12-02 NOTE — ASSESSMENT & PLAN NOTE
Medtronic single chamber ICD  Reprogrammed with lower rate threshold and more ATP on 11/23/22  Amio for Vtach

## 2022-12-02 NOTE — NURSING
1912-Notified Dr. JUARES of pt experiencing cardiac arrhythmia noted on cardiac monitor. STAT 12 lead EKG performed at bedside without changes to BP. Pt asymptomatic during EKG changes. Reviewed clinical assessment findings with MD. New orders obtained and implemented.

## 2022-12-02 NOTE — ASSESSMENT & PLAN NOTE
[facility] on 12/1, distal LM PCI placed. Unable to stent LAD due to complete occlusion  Placed on aspirin and clopidogrel   Back on warfarin and bridging with heparin.

## 2022-12-02 NOTE — TELEPHONE ENCOUNTER
"Information packet sent to patient, which includes "Your guide to living with heart disease". Letter was also sent to patient.      Werner Padilla RN  Cardiac Rehab Nurse  "

## 2022-12-03 PROBLEM — I50.22 CHRONIC SYSTOLIC CONGESTIVE HEART FAILURE: Status: ACTIVE | Noted: 2022-12-03

## 2022-12-03 PROBLEM — I47.20 VENTRICULAR TACHYARRHYTHMIA: Status: ACTIVE | Noted: 2022-12-03

## 2022-12-03 PROBLEM — R94.31 QT PROLONGATION: Status: ACTIVE | Noted: 2022-12-03

## 2022-12-03 PROBLEM — I25.10 CORONARY ARTERY DISEASE: Status: ACTIVE | Noted: 2022-12-03

## 2022-12-03 LAB
ALBUMIN SERPL BCP-MCNC: 3.2 G/DL (ref 3.5–5.2)
ALP SERPL-CCNC: 89 U/L (ref 55–135)
ALT SERPL W/O P-5'-P-CCNC: 14 U/L (ref 10–44)
ANION GAP SERPL CALC-SCNC: 10 MMOL/L (ref 8–16)
ANION GAP SERPL CALC-SCNC: 8 MMOL/L (ref 8–16)
AST SERPL-CCNC: 26 U/L (ref 10–40)
BASOPHILS # BLD AUTO: 0.03 K/UL (ref 0–0.2)
BASOPHILS NFR BLD: 0.7 % (ref 0–1.9)
BILIRUB SERPL-MCNC: 0.3 MG/DL (ref 0.1–1)
BUN SERPL-MCNC: 16 MG/DL (ref 8–23)
BUN SERPL-MCNC: 17 MG/DL (ref 8–23)
CALCIUM SERPL-MCNC: 8.5 MG/DL (ref 8.7–10.5)
CALCIUM SERPL-MCNC: 8.6 MG/DL (ref 8.7–10.5)
CHLORIDE SERPL-SCNC: 104 MMOL/L (ref 95–110)
CHLORIDE SERPL-SCNC: 104 MMOL/L (ref 95–110)
CO2 SERPL-SCNC: 19 MMOL/L (ref 23–29)
CO2 SERPL-SCNC: 21 MMOL/L (ref 23–29)
CREAT SERPL-MCNC: 0.7 MG/DL (ref 0.5–1.4)
CREAT SERPL-MCNC: 0.8 MG/DL (ref 0.5–1.4)
DIFFERENTIAL METHOD: ABNORMAL
EOSINOPHIL # BLD AUTO: 0.1 K/UL (ref 0–0.5)
EOSINOPHIL NFR BLD: 2.2 % (ref 0–8)
ERYTHROCYTE [DISTWIDTH] IN BLOOD BY AUTOMATED COUNT: 13.6 % (ref 11.5–14.5)
EST. GFR  (NO RACE VARIABLE): >60 ML/MIN/1.73 M^2
EST. GFR  (NO RACE VARIABLE): >60 ML/MIN/1.73 M^2
GLUCOSE SERPL-MCNC: 109 MG/DL (ref 70–110)
GLUCOSE SERPL-MCNC: 130 MG/DL (ref 70–110)
HCT VFR BLD AUTO: 43.4 % (ref 40–54)
HGB BLD-MCNC: 14.7 G/DL (ref 14–18)
IMM GRANULOCYTES # BLD AUTO: 0.01 K/UL (ref 0–0.04)
IMM GRANULOCYTES NFR BLD AUTO: 0.2 % (ref 0–0.5)
INR PPP: 1.6 (ref 0.8–1.2)
LYMPHOCYTES # BLD AUTO: 1.1 K/UL (ref 1–4.8)
LYMPHOCYTES NFR BLD: 25.7 % (ref 18–48)
MAGNESIUM SERPL-MCNC: 2 MG/DL (ref 1.6–2.6)
MCH RBC QN AUTO: 31.1 PG (ref 27–31)
MCHC RBC AUTO-ENTMCNC: 33.9 G/DL (ref 32–36)
MCV RBC AUTO: 92 FL (ref 82–98)
MONOCYTES # BLD AUTO: 0.4 K/UL (ref 0.3–1)
MONOCYTES NFR BLD: 9.4 % (ref 4–15)
NEUTROPHILS # BLD AUTO: 2.6 K/UL (ref 1.8–7.7)
NEUTROPHILS NFR BLD: 61.8 % (ref 38–73)
NRBC BLD-RTO: 0 /100 WBC
PHOSPHATE SERPL-MCNC: 2.3 MG/DL (ref 2.7–4.5)
PLATELET # BLD AUTO: 191 K/UL (ref 150–450)
PMV BLD AUTO: 10.4 FL (ref 9.2–12.9)
POTASSIUM SERPL-SCNC: 3.9 MMOL/L (ref 3.5–5.1)
POTASSIUM SERPL-SCNC: 4.2 MMOL/L (ref 3.5–5.1)
PROT SERPL-MCNC: 6.3 G/DL (ref 6–8.4)
PROTHROMBIN TIME: 16.5 SEC (ref 9–12.5)
RBC # BLD AUTO: 4.73 M/UL (ref 4.6–6.2)
SODIUM SERPL-SCNC: 133 MMOL/L (ref 136–145)
SODIUM SERPL-SCNC: 133 MMOL/L (ref 136–145)
WBC # BLD AUTO: 4.16 K/UL (ref 3.9–12.7)

## 2022-12-03 PROCEDURE — 85610 PROTHROMBIN TIME: CPT | Performed by: STUDENT IN AN ORGANIZED HEALTH CARE EDUCATION/TRAINING PROGRAM

## 2022-12-03 PROCEDURE — 25000003 PHARM REV CODE 250

## 2022-12-03 PROCEDURE — 99233 SBSQ HOSP IP/OBS HIGH 50: CPT | Mod: ,,, | Performed by: INTERNAL MEDICINE

## 2022-12-03 PROCEDURE — 27000221 HC OXYGEN, UP TO 24 HOURS

## 2022-12-03 PROCEDURE — 99233 PR SUBSEQUENT HOSPITAL CARE,LEVL III: ICD-10-PCS | Mod: ,,, | Performed by: INTERNAL MEDICINE

## 2022-12-03 PROCEDURE — 25000003 PHARM REV CODE 250: Performed by: HOSPITALIST

## 2022-12-03 PROCEDURE — 84100 ASSAY OF PHOSPHORUS: CPT | Performed by: INTERNAL MEDICINE

## 2022-12-03 PROCEDURE — 25000003 PHARM REV CODE 250: Performed by: STUDENT IN AN ORGANIZED HEALTH CARE EDUCATION/TRAINING PROGRAM

## 2022-12-03 PROCEDURE — 99900035 HC TECH TIME PER 15 MIN (STAT)

## 2022-12-03 PROCEDURE — 83735 ASSAY OF MAGNESIUM: CPT | Performed by: INTERNAL MEDICINE

## 2022-12-03 PROCEDURE — 63600175 PHARM REV CODE 636 W HCPCS: Performed by: STUDENT IN AN ORGANIZED HEALTH CARE EDUCATION/TRAINING PROGRAM

## 2022-12-03 PROCEDURE — 80048 BASIC METABOLIC PNL TOTAL CA: CPT | Mod: XB | Performed by: INTERNAL MEDICINE

## 2022-12-03 PROCEDURE — 94761 N-INVAS EAR/PLS OXIMETRY MLT: CPT

## 2022-12-03 PROCEDURE — 63600175 PHARM REV CODE 636 W HCPCS: Performed by: INTERNAL MEDICINE

## 2022-12-03 PROCEDURE — 80053 COMPREHEN METABOLIC PANEL: CPT

## 2022-12-03 PROCEDURE — 20000000 HC ICU ROOM

## 2022-12-03 PROCEDURE — 85025 COMPLETE CBC W/AUTO DIFF WBC: CPT | Performed by: HOSPITALIST

## 2022-12-03 PROCEDURE — 63600175 PHARM REV CODE 636 W HCPCS: Performed by: HOSPITALIST

## 2022-12-03 PROCEDURE — 99291 PR CRITICAL CARE, E/M 30-74 MINUTES: ICD-10-PCS | Mod: GC,,, | Performed by: INTERNAL MEDICINE

## 2022-12-03 PROCEDURE — 25000003 PHARM REV CODE 250: Performed by: INTERNAL MEDICINE

## 2022-12-03 PROCEDURE — 99291 CRITICAL CARE FIRST HOUR: CPT | Mod: GC,,, | Performed by: INTERNAL MEDICINE

## 2022-12-03 RX ORDER — LIDOCAINE HYDROCHLORIDE ANHYDROUS AND DEXTROSE MONOHYDRATE .8; 5 G/100ML; G/100ML
1 INJECTION, SOLUTION INTRAVENOUS CONTINUOUS
Status: DISCONTINUED | OUTPATIENT
Start: 2022-12-03 | End: 2022-12-04

## 2022-12-03 RX ORDER — METOPROLOL SUCCINATE 50 MG/1
50 TABLET, EXTENDED RELEASE ORAL NIGHTLY
Status: DISCONTINUED | OUTPATIENT
Start: 2022-12-03 | End: 2022-12-04

## 2022-12-03 RX ORDER — POTASSIUM CHLORIDE 750 MG/1
10 CAPSULE, EXTENDED RELEASE ORAL ONCE
Status: COMPLETED | OUTPATIENT
Start: 2022-12-03 | End: 2022-12-03

## 2022-12-03 RX ORDER — LORAZEPAM 2 MG/ML
0.5 INJECTION INTRAMUSCULAR ONCE
Status: CANCELLED | OUTPATIENT
Start: 2022-12-03

## 2022-12-03 RX ORDER — LORAZEPAM 2 MG/ML
0.5 INJECTION INTRAMUSCULAR ONCE
Status: COMPLETED | OUTPATIENT
Start: 2022-12-03 | End: 2022-12-04

## 2022-12-03 RX ORDER — POTASSIUM CHLORIDE 750 MG/1
30 CAPSULE, EXTENDED RELEASE ORAL ONCE
Status: COMPLETED | OUTPATIENT
Start: 2022-12-03 | End: 2022-12-03

## 2022-12-03 RX ADMIN — CARBAMAZEPINE 400 MG: 200 TABLET ORAL at 09:12

## 2022-12-03 RX ADMIN — PANTOPRAZOLE SODIUM 40 MG: 40 TABLET, DELAYED RELEASE ORAL at 09:12

## 2022-12-03 RX ADMIN — LACTOBACILLUS ACIDOPHILUS / LACTOBACILLUS BULGARICUS 1 EACH: 100 MILLION CFU STRENGTH GRANULES at 08:12

## 2022-12-03 RX ADMIN — SACUBITRIL AND VALSARTAN 1 TABLET: 24; 26 TABLET, FILM COATED ORAL at 08:12

## 2022-12-03 RX ADMIN — LACTOBACILLUS ACIDOPHILUS / LACTOBACILLUS BULGARICUS 1 EACH: 100 MILLION CFU STRENGTH GRANULES at 09:12

## 2022-12-03 RX ADMIN — ASPIRIN 81 MG CHEWABLE TABLET 81 MG: 81 TABLET CHEWABLE at 09:12

## 2022-12-03 RX ADMIN — ONDANSETRON 4 MG: 2 INJECTION INTRAMUSCULAR; INTRAVENOUS at 04:12

## 2022-12-03 RX ADMIN — POTASSIUM CHLORIDE 10 MEQ: 10 CAPSULE, COATED, EXTENDED RELEASE ORAL at 03:12

## 2022-12-03 RX ADMIN — ENOXAPARIN SODIUM 70 MG: 100 INJECTION SUBCUTANEOUS at 05:12

## 2022-12-03 RX ADMIN — WARFARIN SODIUM 5 MG: 5 TABLET ORAL at 05:12

## 2022-12-03 RX ADMIN — POTASSIUM CHLORIDE 30 MEQ: 10 CAPSULE, COATED, EXTENDED RELEASE ORAL at 03:12

## 2022-12-03 RX ADMIN — DOCUSATE SODIUM 100 MG: 100 CAPSULE, LIQUID FILLED ORAL at 08:12

## 2022-12-03 RX ADMIN — MUPIROCIN: 20 OINTMENT TOPICAL at 09:12

## 2022-12-03 RX ADMIN — SACUBITRIL AND VALSARTAN 1 TABLET: 24; 26 TABLET, FILM COATED ORAL at 09:12

## 2022-12-03 RX ADMIN — AMIODARONE HYDROCHLORIDE 1 MG/MIN: 1.8 INJECTION, SOLUTION INTRAVENOUS at 06:12

## 2022-12-03 RX ADMIN — LIDOCAINE HYDROCHLORIDE 2 MG/MIN: 8 INJECTION, SOLUTION INTRAVENOUS at 01:12

## 2022-12-03 RX ADMIN — AMIODARONE HYDROCHLORIDE 1 MG/MIN: 1.8 INJECTION, SOLUTION INTRAVENOUS at 01:12

## 2022-12-03 RX ADMIN — CLOPIDOGREL BISULFATE 75 MG: 75 TABLET ORAL at 08:12

## 2022-12-03 RX ADMIN — DOCUSATE SODIUM 100 MG: 100 CAPSULE, LIQUID FILLED ORAL at 09:12

## 2022-12-03 RX ADMIN — ROSUVASTATIN CALCIUM 40 MG: 20 TABLET, FILM COATED ORAL at 08:12

## 2022-12-03 RX ADMIN — METOPROLOL SUCCINATE 50 MG: 50 TABLET, EXTENDED RELEASE ORAL at 08:12

## 2022-12-03 RX ADMIN — CYANOCOBALAMIN 1000 MCG: 1000 INJECTION, SOLUTION INTRAMUSCULAR; SUBCUTANEOUS at 09:12

## 2022-12-03 NOTE — HPI
Mr. Rebollar is a 72 year old male with PMH of CAD with PCI of LAD in 2006, HFrEF with ICD placed, A fib on chronic warfarin, CVA who presented to OSH with fatigue and weakness for about a weak duration. Pt was found to have elevated INR (4.6), BNP (1441) and troponin 0.041 and having some frequent episodes of VT, his device was adjusted and pt underwent a LHC at [facility] that demonstrated LAD and Lcx occlusions. While pt was in the cath he continued to have episodes of VT so patient was transferred to [facility] for a high risk PCI to place stents in his blocked arteries.     Pt underwent on 12/2 a LHC w/ Dr. Cho: now s/p PCI to LM into prx Lcx with 2.5 x30 mm stent. He was cleared for outpatient f/u until he developed last night several episodes of VT for which EP has been consulted.     As per report and interrogation: Patient developed sustained VT () at approximately 7pm for which his ICD fired. Amio gtt was initiated at 1mg/min. At about 9pm, the patient again went into VT but this time appeared to be slow VT (). VT monitor was noted to be at 130bpm. Device was interrogated that confirmed the above. ATP burst was performed at bedside with RR interval at 82% but without success. ATP was then attempted at 71% with success as the patient then went into NSR. He was then started on Lidocaine drip at 2mg/min. Electrolytes were replaced.      Interval: this AM pt very drowsy w/ AMS. Son present and daughter later came to visit.

## 2022-12-03 NOTE — PLAN OF CARE
Cardiac ICU Care Plan    POC reviewed with Balbir Rebollar Sr. and family. Questions and concerns addressed. Pt progressing toward goals. Will continue to monitor. See below and flowsheets for full assessment and VS info.       Neuro:  [place] Coma Scale  Best Eye Response: 4-->(E4) spontaneous  Best Motor Response: 6-->(M6) obeys commands  Best Verbal Response: 5-->(V5) oriented  [place] Coma Scale Score: 15  Assessment Qualifiers: patient not sedated/intubated  Pupil PERRLA: yes    24 hr Temp:  [97.6 °F (36.4 °C)-99 °F (37.2 °C)]      CV:  Rhythm: normal sinus rhythm   DVT prophylaxis: VTE Required Core Measure: Pharmacological prophylaxis initiated/maintained                            Pulses  Right Radial Pulse: 2+ (normal)  Left Radial Pulse: 2+ (normal)  Right Dorsalis Pedis Pulse: 1+ (weak)  Left Dorsalis Pedis Pulse: 2+ (normal)  Right Posterior Tibial Pulse: 1+ (weak)  Left Posterior Tibial Pulse: 2+ (normal)    Resp:  O2 Device (Oxygen Therapy): room air  Flow (L/min): 2       GI/:  GI prophylaxis: yes  Diet/Nutrition Received: 2 gram sodium, low saturated fat/low cholesterol  Last Bowel Movement: 11/30/22  Voiding Characteristics: voids spontaneously without difficulty   Intake/Output Summary (Last 24 hours) at 12/3/2022 0616  Last data filed at 12/3/2022 0605  Gross per 24 hour   Intake 531.82 ml   Output 875 ml   Net -343.18 ml        Nutritional Supplement Intake: Quantity none, Type: Boost    Labs/Accuchecks:  Recent Labs   Lab 11/30/22  0406 12/01/22  0315 12/02/22  0501   WBC 3.90 4.71 4.32   RBC 4.89 5.04 4.78   HGB 14.8 15.5 14.7   HCT 44.2 46.5 43.9    201 178      Recent Labs   Lab 11/30/22  0406 12/01/22  0315 12/01/22  1514 12/02/22  0121 12/02/22  0501 12/02/22  0859 12/02/22  1301   INR 1.0 1.1  --   --  1.1  --   --    APTT  --   --    < > 27.6  --  97.4* 36.3*    < > = values in this interval not displayed.      Recent Labs     12/02/22  1828 12/03/22  0011   * 133*    K 3.7 3.9   CO2 20* 19*    104   BUN 16 16   CREATININE 0.7 0.7   ALKPHOS 94  --    ALT 14  --    AST 29  --    BILITOT 0.5  --      No results for input(s): CPK, CPKMB, MB, TROPONINI in the last 168 hours. No results for input(s): PH, PCO2, PO2, HCO3, POCSATURATED, BE in the last 72 hours.    Electrolytes: Electrolytes replaced  Accuchecks: none    Gtts/LDAs:   amiodarone in dextrose 5% 1 mg/min (12/03/22 0605)    LIDOcaine 2 mg/min (12/03/22 0605)       Lines/Drains/Airways       Peripheral Intravenous Line  Duration                  Peripheral IV - Single Lumen 11/30/22 1130 20 G Anterior;Right Forearm 2 days         Peripheral IV - Single Lumen 12/01/22 1105 20 G Anterior;Left Forearm 1 day                    Skin/Wounds  Bathing/Skin Care: bath, complete;dressed/undressed;linen changed (12/03/22 030)  Wounds: No  Wound care consulted: No

## 2022-12-03 NOTE — ASSESSMENT & PLAN NOTE
Mr. Rebollar is a 72 year old male with PMH of CAD with PCI of LAD in 2006, HFrEF with ICD placed, A fib on chronic warfarin, CVA who presented to OSH with fatigue and weakness for about a weak duration. Had non-sustained VT at OSH during angiogram. Transferred here for complex PCI.  now s/p PCI to LM into prx Lcx with 2.5 x30 mm stent. EP consulted after several episodes of VT s/p ATP. Now in NSR. On amio and lidocaine drip.    -Device reprogrammed: VT threshold decreased to 120ms  - Continue amiodarone gtt and recommend decreasing lido drip from 2mg/min to 1m/min.  - If not events overnight will like discontinue lidocaine drip tomorrow to avoid toxicity.   - Replete electrolytes as needed.  - We will continue to follow.

## 2022-12-03 NOTE — ASSESSMENT & PLAN NOTE
Pt has experienced N/V while in the hospital.     Pt has an elongated QT. Will hold zofran and any  QT prolonging drugs.

## 2022-12-03 NOTE — SUBJECTIVE & OBJECTIVE
Past Medical History:   Diagnosis Date    AICD (automatic cardioverter/defibrillator) present     Anticoagulant long-term use     Coronary artery disease     History of myocardial infarction     Paroxysmal A-fib     Stroke        Past Surgical History:   Procedure Laterality Date    CHOLECYSTECTOMY      LEFT HEART CATHETERIZATION Left 11/29/2022    Procedure: Left heart cath;  Surgeon: Wesley Rico MD;  Location: [facility] CATH LAB;  Service: Cardiology;  Laterality: Left;  1030am start, R rad access    REPLACEMENT OF DUAL CHAMBER IMPLANTABLE CARDIOVERTER-DEFIBRILLATOR         Review of patient's allergies indicates:  No Known Allergies    No current facility-administered medications on file prior to encounter.     Current Outpatient Medications on File Prior to Encounter   Medication Sig    amiodarone (PACERONE) 200 MG Tab Take 200 mg by mouth 2 (two) times daily.    carBAMazepine (TEGRETOL) 200 mg tablet Take 400 mg by mouth 2 (two) times daily.    nitroGLYCERIN (NITROSTAT) 0.4 MG SL tablet Place 0.4 mg under the tongue every 5 (five) minutes as needed. Up to 3 doses. If chest pain is not relieved or worsens 3 to 5 minutes after 1 dose, call 911 and seek immediate emergency medical attention.    pravastatin (PRAVACHOL) 20 MG tablet Take 20 mg by mouth every evening.    warfarin (COUMADIN) 6 MG tablet Take 6 mg by mouth Daily. Dose from revious outpatient cardiologist office (Dr. Fournier) with [facility] ([phone]) - as of 11/2022 no longer being seen there    cetirizine (ZYRTEC) 10 MG tablet Take 1 tablet (10 mg total) by mouth once daily. (Patient not taking: Reported on 11/23/2022)    fluticasone propionate (FLONASE) 50 mcg/actuation nasal spray 1 spray (50 mcg total) by Each Nostril route once daily. (Patient not taking: Reported on 11/23/2022)     Family History    None       Tobacco Use    Smoking status: Former    Smokeless tobacco: Never   Substance and Sexual Activity    Alcohol use: Never     Drug use: Never    Sexual activity: Not on file     Review of Systems   Reason unable to perform ROS: AMS.   Gastrointestinal:  Positive for nausea and vomiting.   Objective:     Vital Signs (Most Recent):  Temp: 99 °F (37.2 °C) (12/03/22 0305)  Pulse: (!) 53 (12/03/22 1200)  Resp: (!) 8 (12/03/22 1200)  BP: (!) 97/53 (12/03/22 1200)  SpO2: 98 % (12/03/22 1200)   Vital Signs (24h Range):  Temp:  [97.8 °F (36.6 °C)-99 °F (37.2 °C)] 99 °F (37.2 °C)  Pulse:  [] 53  Resp:  [8-45] 8  SpO2:  [93 %-99 %] 98 %  BP: ()/(53-74) 97/53       Weight: 68.5 kg (151 lb 0.2 oz)  Body mass index is 22.3 kg/m².    SpO2: 98 %  O2 Device (Oxygen Therapy): nasal cannula    Physical Exam    Vitals reviewed.   Constitutional:       General: He is drowsy, alert to name      Appearance: ill-appearing.   HENT:      Head: Normocephalic and atraumatic.   Eyes:      General: Visual field deficit present. No scleral icterus.     Comments: Pt keeps his L eye shut due to experiencing double vision with is vision trouble.   Cardiovascular:      Rate and Rhythm: Normal rate and regular rhythm.      Comments:   Pulmonary:      Effort: Pulmonary effort is normal. No respiratory distress.      Breath sounds: Normal breath sounds.   Abdominal:      General: Abdomen is flat. There is no distension.      Palpations: Abdomen is soft.      Tenderness: There is no abdominal tenderness.   Musculoskeletal:      Right lower leg: No edema.      Left lower leg: No edema.   Skin:     General: Skin is warm and dry.      Coloration: Skin is not jaundiced.   Neurological:      Mental Status: He is alert. AMS   Psychiatric:         Unable to assess due to AMS     Significant Labs:   Recent Lab Results         12/03/22  0559   12/03/22  0011   12/02/22  1828        Albumin 3.2     3.4       Alkaline Phosphatase 89     94       ALT 14     14       Anion Gap 8   10   9       AST 26     29       Baso # 0.03           Basophil % 0.7           BILIRUBIN TOTAL  0.3  Comment: For infants and newborns, interpretation of results should be based  on gestational age, weight and in agreement with clinical  observations.    Premature Infant recommended reference ranges:  Up to 24 hours.............<8.0 mg/dL  Up to 48 hours............<12.0 mg/dL  3-5 days..................<15.0 mg/dL  6-29 days.................<15.0 mg/dL       0.5  Comment: For infants and newborns, interpretation of results should be based  on gestational age, weight and in agreement with clinical  observations.    Premature Infant recommended reference ranges:  Up to 24 hours.............<8.0 mg/dL  Up to 48 hours............<12.0 mg/dL  3-5 days..................<15.0 mg/dL  6-29 days.................<15.0 mg/dL         BUN 17   16   16       Calcium 8.6   8.5   8.8       Chloride 104   104   104       CO2 21   19   20       Creatinine 0.8   0.7   0.7       Differential Method Automated           eGFR >60.0   >60.0   >60.0       Eos # 0.1           Eosinophil % 2.2           Glucose 130   109   127       Gran # (ANC) 2.6           Gran % 61.8           Hematocrit 43.4           Hemoglobin 14.7           Immature Grans (Abs) 0.01  Comment: Mild elevation in immature granulocytes is non specific and   can be seen in a variety of conditions including stress response,   acute inflammation, trauma and pregnancy. Correlation with other   laboratory and clinical findings is essential.             Immature Granulocytes 0.2           INR 1.6  Comment: Coumadin Therapy:  2.0 - 3.0 for INR for all indicators except mechanical heart valves  and antiphospholipid syndromes which should use 2.5 - 3.5.             Lymph # 1.1           Lymph % 25.7           Magnesium   2.0   2.0       MCH 31.1           MCHC 33.9           MCV 92           Mono # 0.4           Mono % 9.4           MPV 10.4           nRBC 0           Phosphorus   2.3         Platelets 191           Potassium 4.2   3.9   3.7       PROTEIN TOTAL 6.3     6.6        Protime 16.5           RBC 4.73           RDW 13.6           Sodium 133   133   133       WBC 4.16

## 2022-12-03 NOTE — PROGRESS NOTES
[facility] - Cardiac Intensive Care  Cardiology  Progress Note    Patient Name: Balbir Rebollar Sr.  MRN: [id number]  Admission Date: 11/22/2022  Hospital Length of Stay: 10 days  Code Status: Full Code   Attending Physician: Marichuy Giron MD   Primary Care Physician: Flip Hanna MD  Expected Discharge Date: 12/6/2022  Principal Problem:Acute on chronic combined systolic and diastolic congestive heart failure    Subjective:     Hospital Course:   Pt is a 72 year old male with PMH of CAD with PCI of LAD in 2006, HFrEF with ICD placed, A fib on chronic warfarin, CVA who presented to OSH with fatigue and weakness for about a weak duration. Pt was found to have elevated INR (4.6), BNP (1441) and troponin 0.041 and having some frequent episodes of VT, his device was adjust and pt underwent a LHC at [facility] that demonstrated LAD and Lcx occulsions. While pt was in the cath he continued to have episodes of VT so patient was transferred to [facility] for a high risk PCI to place stents in his blocked arteries. Pt was having no chest pain or SOB on arrival. Pt underwent LHC and had a PCI placed in distal R main. Pt's LAD was completely occluded and was not able to be placed. Pt has been placed on DAPT, heparin due to other thrombosis risk, entresto and BB. PT/OT saw pt and felt he required SNF placement. Pt experienced 2 episodes of VT on the night of 12/2. Pt was shocked by ICD once and placed back on amio and also on lidocaine drip.       Interval history: Pt LHC was done, PCI placed in , unable to open LAD due to complete occulusion and most likely scar tissue. Pt had 2 episodes of Vtach overnight. His ICD shocked him once and pt placed on amio drip and lidocaine. Back in NSR      ROS  Objective:     Vital Signs (Most Recent):  Temp: 99 °F (37.2 °C) (12/03/22 0305)  Pulse: (!) 52 (12/03/22 0656)  Resp: 12 (12/03/22 0656)  BP: (!) 109/58 (12/03/22 0605)  SpO2: 98 % (12/03/22 0656) Vital Signs  (24h Range):  Temp:  [97.6 °F (36.4 °C)-99 °F (37.2 °C)] 99 °F (37.2 °C)  Pulse:  [] 52  Resp:  [9-45] 12  SpO2:  [92 %-99 %] 98 %  BP: ()/(53-74) 109/58     Weight: 68.5 kg (151 lb 0.2 oz)  Body mass index is 22.3 kg/m².    SpO2: 98 %  O2 Device (Oxygen Therapy): room air      Intake/Output Summary (Last 24 hours) at 12/3/2022 0728  Last data filed at 12/3/2022 0605  Gross per 24 hour   Intake 521.67 ml   Output 875 ml   Net -353.33 ml         Lines/Drains/Airways       Peripheral Intravenous Line  Duration                  Peripheral IV - Single Lumen 11/30/22 1130 20 G Anterior;Right Forearm 2 days         Peripheral IV - Single Lumen 12/01/22 1105 20 G Anterior;Left Forearm 1 day                    Physical Exam  Vitals reviewed.   Constitutional:       General: He is awake.      Appearance: Normal appearance. He is not ill-appearing.   HENT:      Head: Normocephalic and atraumatic.   Eyes:      General: Visual field deficit present. No scleral icterus.     Comments: Pt keeps his L eye shut due to experiencing double vision with is vision trouble.   Cardiovascular:      Rate and Rhythm: Normal rate and regular rhythm.      Comments: Paced rhythm, 2 episodes of Vtach overnight. Back in NSR  Pulmonary:      Effort: Pulmonary effort is normal. No respiratory distress.      Breath sounds: Normal breath sounds.   Abdominal:      General: Abdomen is flat. There is no distension.      Palpations: Abdomen is soft.      Tenderness: There is no abdominal tenderness.   Musculoskeletal:      Right lower leg: No edema.      Left lower leg: No edema.   Skin:     General: Skin is warm and dry.      Coloration: Skin is not jaundiced.   Neurological:      Mental Status: He is alert. Mental status is at baseline.   Psychiatric:         Mood and Affect: Mood normal.         Behavior: Behavior is cooperative.         Thought Content: Thought content normal.       Significant Labs: All pertinent lab results from the last  24 hours have been reviewed.    Significant Imaging:  Reviewed    Assessment and Plan:       * Acute on chronic combined systolic and diastolic congestive heart failure  Known severe LV dysfxn/ICM, Last EF <30  Not grossly vol overloaded,  Placed on entresto and toprol, statin, has ICD in place        QT prolongation  Pt's Qtc was 512 on last EKG    Hold all qt prolonging drugs    History of seizure disorder  On carbamazepine, complicates blood thinner management      Nausea and vomiting  Pt has experienced N/V while in the hospital.     Pt has an elongated QT. Will hold zofran and any  QT prolonging drugs.     Ventricular tachycardia  Freq sustained episodes of VT noted on ICD interrogation, below treatment threshold.  ?r/t CHF, ischemia, meds.  ICD reprogrammed 11/23/22.  Amio oral, toprol, entresto, and statin    11/30/22 VT during [facility] 11/29. VT improved on IV amiodarone - change to 400 qd po    Pt experienced 2 episodes of VT overnight on the 12/2. Pt was shocked, and placed on lidocaine and amio drip.     Weakness  Related to cardiac issues most likely    Pure hypercholesterolemia  Cont statin    ICD (implantable cardioverter-defibrillator), single, in situ  [employer] single chamber ICD  Reprogrammed with lower rate threshold and more ATP on 11/23/22  Amio for Vtach    Coronary artery disease involving native coronary artery of native heart without angina pectoris  Known CAD s/p LAD PCI 2006  Hx ICM, EF <30% by recent echo.  No overt anginal sxs,   Pt experience VT while undergoing other heart cath. Plan to take pt for [facility] with PCI placement on 12/1 (start DOAC after procedure).       11/30/22 [facility] with occluded LAD, 90% Cx, moderate RCA disease at [facility].    Pt [facility] 12/1 Left main occluded so stent placed. LAD completely occluded, unable to stent, placed on aspirin, clopidogrel, warfarin (bridging with heparin), statin, Entresto, and toprol for his cardiac care    Switched pt's atorvastatin to  rosuvastatin due to its interaction with his seizure meds          Chronic anticoagulation  [facility] on 12/1, distal LM PCI placed. Unable to stent LAD due to complete occlusion  Placed on aspirin and clopidogrel   Back on warfarin and bridging with heparin.    Paroxysmal atrial fibrillation  In SR on coumadin  Pt placed on warfarin and bridging with heparin. Pt on DAPT            VTE Risk Mitigation (From admission, onward)         Ordered     enoxaparin injection 70 mg  Every 12 hours (non-standard times)         12/02/22 1416     warfarin (COUMADIN) tablet 5 mg  Daily         12/02/22 1416     Reason for No Pharmacological VTE Prophylaxis  Once        Question:  Reasons:  Answer:  Already adequately anticoagulated on oral Anticoagulants    11/23/22 0042     IP VTE HIGH RISK PATIENT  Once         11/23/22 0042     Place sequential compression device  Until discontinued         11/23/22 0042                Shelton Dumas DO  Cardiology  [facility] - Cardiac Intensive Care

## 2022-12-03 NOTE — SUBJECTIVE & OBJECTIVE
Interval history: Pt LHC was done, PCI placed in , unable to open LAD due to complete occulusion and most likely scar tissue. Pt had 2 episodes of Vtach overnight. His ICD shocked him once and pt placed on amio drip and lidocaine. Back in NSR      ROS  Objective:     Vital Signs (Most Recent):  Temp: 99 °F (37.2 °C) (12/03/22 0305)  Pulse: (!) 52 (12/03/22 0656)  Resp: 12 (12/03/22 0656)  BP: (!) 109/58 (12/03/22 0605)  SpO2: 98 % (12/03/22 0656) Vital Signs (24h Range):  Temp:  [97.6 °F (36.4 °C)-99 °F (37.2 °C)] 99 °F (37.2 °C)  Pulse:  [] 52  Resp:  [9-45] 12  SpO2:  [92 %-99 %] 98 %  BP: ()/(53-74) 109/58     Weight: 68.5 kg (151 lb 0.2 oz)  Body mass index is 22.3 kg/m².    SpO2: 98 %  O2 Device (Oxygen Therapy): room air      Intake/Output Summary (Last 24 hours) at 12/3/2022 0728  Last data filed at 12/3/2022 0605  Gross per 24 hour   Intake 521.67 ml   Output 875 ml   Net -353.33 ml         Lines/Drains/Airways       Peripheral Intravenous Line  Duration                  Peripheral IV - Single Lumen 11/30/22 1130 20 G Anterior;Right Forearm 2 days         Peripheral IV - Single Lumen 12/01/22 1105 20 G Anterior;Left Forearm 1 day                    Physical Exam  Vitals reviewed.   Constitutional:       General: He is awake.      Appearance: Normal appearance. He is not ill-appearing.   HENT:      Head: Normocephalic and atraumatic.   Eyes:      General: Visual field deficit present. No scleral icterus.     Comments: Pt keeps his L eye shut due to experiencing double vision with is vision trouble.   Cardiovascular:      Rate and Rhythm: Normal rate and regular rhythm.      Comments: Paced rhythm, 2 episodes of Vtach overnight. Back in NSR  Pulmonary:      Effort: Pulmonary effort is normal. No respiratory distress.      Breath sounds: Normal breath sounds.   Abdominal:      General: Abdomen is flat. There is no distension.      Palpations: Abdomen is soft.      Tenderness: There is no abdominal  tenderness.   Musculoskeletal:      Right lower leg: No edema.      Left lower leg: No edema.   Skin:     General: Skin is warm and dry.      Coloration: Skin is not jaundiced.   Neurological:      Mental Status: He is alert. Mental status is at baseline.   Psychiatric:         Mood and Affect: Mood normal.         Behavior: Behavior is cooperative.         Thought Content: Thought content normal.       Significant Labs: All pertinent lab results from the last 24 hours have been reviewed.    Significant Imaging:  Reviewed

## 2022-12-03 NOTE — CONSULTS
[facility]  Cardiac Electrophysiology  Consult Note    Admission Date: 11/22/2022  Code Status: Full Code   Attending Provider: Marichuy Giron MD  Consulting Provider: Nabor Campos MD  Principal Problem:Acute on chronic combined systolic and diastolic congestive heart failure    Inpatient consult to Electrophysiology  Consult performed by: Nabor Campos MD  Consult ordered by: Eddie Garcia MD  Reason for consult: VT s/p ATP         Subjective:     Chief Complaint:  Unable to assess due to AMS    HPI:   Mr. Rebollar is a 72 year old male with PMH of CAD with PCI of LAD in 2006, HFrEF with ICD placed, A fib on chronic warfarin, CVA who presented to OSH with fatigue and weakness for about a weak duration. Pt was found to have elevated INR (4.6), BNP (1441) and troponin 0.041 and having some frequent episodes of VT, his device was adjusted and pt underwent a LHC at [facility] that demonstrated LAD and Lcx occlusions. While pt was in the cath he continued to have episodes of VT so patient was transferred to [facility] for a high risk PCI to place stents in his blocked arteries.     Pt underwent on 12/2 a LHC w/ Dr. Cho: now s/p PCI to LM into prx Lcx with 2.5 x30 mm stent. He was cleared for outpatient f/u until he developed last night several episodes of VT for which EP has been consulted.     As per report and interrogation: Patient developed sustained VT () at approximately 7pm for which his ICD fired. Amio gtt was initiated at 1mg/min. At about 9pm, the patient again went into VT but this time appeared to be slow VT (). VT monitor was noted to be at 130bpm. Device was interrogated that confirmed the above. ATP burst was performed at bedside with RR interval at 82% but without success. ATP was then attempted at 71% with success as the patient then went into NSR. He was then started on Lidocaine drip at 2mg/min. Electrolytes were replaced.       Interval: this AM pt very drowsy w/ AMS. Son present and daughter later came to visit.       Past Medical History:   Diagnosis Date    AICD (automatic cardioverter/defibrillator) present     Anticoagulant long-term use     Coronary artery disease     History of myocardial infarction     Paroxysmal A-fib     Stroke        Past Surgical History:   Procedure Laterality Date    CHOLECYSTECTOMY      LEFT HEART CATHETERIZATION Left 11/29/2022    Procedure: Left heart cath;  Surgeon: Wesley Rico MD;  Location: [facility] CATH LAB;  Service: Cardiology;  Laterality: Left;  1030am start, R rad access    REPLACEMENT OF DUAL CHAMBER IMPLANTABLE CARDIOVERTER-DEFIBRILLATOR         Review of patient's allergies indicates:  No Known Allergies    No current facility-administered medications on file prior to encounter.     Current Outpatient Medications on File Prior to Encounter   Medication Sig    amiodarone (PACERONE) 200 MG Tab Take 200 mg by mouth 2 (two) times daily.    carBAMazepine (TEGRETOL) 200 mg tablet Take 400 mg by mouth 2 (two) times daily.    nitroGLYCERIN (NITROSTAT) 0.4 MG SL tablet Place 0.4 mg under the tongue every 5 (five) minutes as needed. Up to 3 doses. If chest pain is not relieved or worsens 3 to 5 minutes after 1 dose, call 911 and seek immediate emergency medical attention.    pravastatin (PRAVACHOL) 20 MG tablet Take 20 mg by mouth every evening.    warfarin (COUMADIN) 6 MG tablet Take 6 mg by mouth Daily. Dose from revious outpatient cardiologist office (Dr. Fournier) with [facility] ([phone]) - as of 11/2022 no longer being seen there    cetirizine (ZYRTEC) 10 MG tablet Take 1 tablet (10 mg total) by mouth once daily. (Patient not taking: Reported on 11/23/2022)    fluticasone propionate (FLONASE) 50 mcg/actuation nasal spray 1 spray (50 mcg total) by Each Nostril route once daily. (Patient not taking: Reported on 11/23/2022)     Family History    None       Tobacco  Use    Smoking status: Former    Smokeless tobacco: Never   Substance and Sexual Activity    Alcohol use: Never    Drug use: Never    Sexual activity: Not on file     Review of Systems   Reason unable to perform ROS: AMS.   Gastrointestinal:  Positive for nausea and vomiting.   Objective:     Vital Signs (Most Recent):  Temp: 99 °F (37.2 °C) (12/03/22 0305)  Pulse: (!) 53 (12/03/22 1200)  Resp: (!) 8 (12/03/22 1200)  BP: (!) 97/53 (12/03/22 1200)  SpO2: 98 % (12/03/22 1200)   Vital Signs (24h Range):  Temp:  [97.8 °F (36.6 °C)-99 °F (37.2 °C)] 99 °F (37.2 °C)  Pulse:  [] 53  Resp:  [8-45] 8  SpO2:  [93 %-99 %] 98 %  BP: ()/(53-74) 97/53       Weight: 68.5 kg (151 lb 0.2 oz)  Body mass index is 22.3 kg/m².    SpO2: 98 %  O2 Device (Oxygen Therapy): nasal cannula    Physical Exam    Vitals reviewed.   Constitutional:       General: He is drowsy, alert to name      Appearance: ill-appearing.   HENT:      Head: Normocephalic and atraumatic.   Eyes:      General: Visual field deficit present. No scleral icterus.     Comments: Pt keeps his L eye shut due to experiencing double vision with is vision trouble.   Cardiovascular:      Rate and Rhythm: Normal rate and regular rhythm.      Comments:   Pulmonary:      Effort: Pulmonary effort is normal. No respiratory distress.      Breath sounds: Normal breath sounds.   Abdominal:      General: Abdomen is flat. There is no distension.      Palpations: Abdomen is soft.      Tenderness: There is no abdominal tenderness.   Musculoskeletal:      Right lower leg: No edema.      Left lower leg: No edema.   Skin:     General: Skin is warm and dry.      Coloration: Skin is not jaundiced.   Neurological:      Mental Status: He is alert. AMS   Psychiatric:         Unable to assess due to AMS     Significant Labs:   Recent Lab Results         12/03/22  0559   12/03/22  0011   12/02/22  1828        Albumin 3.2     3.4       Alkaline Phosphatase 89     94       ALT 14      14       Anion Gap 8   10   9       AST 26     29       Baso # 0.03           Basophil % 0.7           BILIRUBIN TOTAL 0.3  Comment: For infants and newborns, interpretation of results should be based  on gestational age, weight and in agreement with clinical  observations.    Premature Infant recommended reference ranges:  Up to 24 hours.............<8.0 mg/dL  Up to 48 hours............<12.0 mg/dL  3-5 days..................<15.0 mg/dL  6-29 days.................<15.0 mg/dL       0.5  Comment: For infants and newborns, interpretation of results should be based  on gestational age, weight and in agreement with clinical  observations.    Premature Infant recommended reference ranges:  Up to 24 hours.............<8.0 mg/dL  Up to 48 hours............<12.0 mg/dL  3-5 days..................<15.0 mg/dL  6-29 days.................<15.0 mg/dL         BUN 17   16   16       Calcium 8.6   8.5   8.8       Chloride 104   104   104       CO2 21   19   20       Creatinine 0.8   0.7   0.7       Differential Method Automated           eGFR >60.0   >60.0   >60.0       Eos # 0.1           Eosinophil % 2.2           Glucose 130   109   127       Gran # (ANC) 2.6           Gran % 61.8           Hematocrit 43.4           Hemoglobin 14.7           Immature Grans (Abs) 0.01  Comment: Mild elevation in immature granulocytes is non specific and   can be seen in a variety of conditions including stress response,   acute inflammation, trauma and pregnancy. Correlation with other   laboratory and clinical findings is essential.             Immature Granulocytes 0.2           INR 1.6  Comment: Coumadin Therapy:  2.0 - 3.0 for INR for all indicators except mechanical heart valves  and antiphospholipid syndromes which should use 2.5 - 3.5.             Lymph # 1.1           Lymph % 25.7           Magnesium   2.0   2.0       MCH 31.1           MCHC 33.9           MCV 92           Mono # 0.4           Mono % 9.4           MPV 10.4           nRBC  0           Phosphorus   2.3         Platelets 191           Potassium 4.2   3.9   3.7       PROTEIN TOTAL 6.3     6.6       Protime 16.5           RBC 4.73           RDW 13.6           Sodium 133   133   133       WBC 4.16                                 Assessment and Plan:     Sustained VT (ventricular tachycardia)  Mr. Rebollar is a 72 year old male with PMH of CAD with PCI of LAD in 2006, HFrEF with ICD placed, A fib on chronic warfarin, CVA who presented to OSH with fatigue and weakness for about a weak duration. Had non-sustained VT at OSH during angiogram. Transferred here for complex PCI.  now s/p PCI to LM into prx Lcx with 2.5 x30 mm stent. EP consulted after several episodes of VT s/p ATP. Now in NSR. On amio and lidocaine drip.    -Device reprogrammed: VT threshold decreased to 120ms  - Continue amiodarone gtt and recommend decreasing lido drip from 2mg/min to 1m/min.  - If not events overnight will like discontinue lidocaine drip tomorrow to avoid toxicity.   - Replete electrolytes as needed.  - We will continue to follow.        Thank you for your consult. I will follow-up with patient. Please contact us if you have any additional questions. Discussed w/ Dr. Nam.    Nabor Campos MD  Cardiac Electrophysiology  [facility] - [facility]

## 2022-12-03 NOTE — CARE UPDATE
Patient developed sustained VT () at approximately 7pm for which his ICD was fired. His BP was stable. He denied any chest pain or lightheadedness at that time. Upon tele review, the patient was in NSR. Amio gtt was initiated. At about 9pm, the patient again went into VT but this time appeared to be slow VT (). VT monitor was noted to be at 130bpm. Device was interrogated that confirmed the above. ATP burst was performed at bedside with RR interval at 82% but without success. ATP was then attempted at 71% with success as the patient then went into NSR.     Approx an hour later, the patient again developed slow VT similar to the aforementioned. The above steps were repeated with successful maintenance of NSR.     - Continue amio at 1mg/min  - Started lidocaine at 2mg/min.   - Electrolytes repleted. (Keep K >4, Mg 2.0)  - Discussed with EP fellow and CCU attending    All questions and concerns were addressed.    Eddie Garcia MD  Cardiovascular Disease PGY4  [facility]

## 2022-12-03 NOTE — ASSESSMENT & PLAN NOTE
Pt has experienced N/V while in the hospital.     Pt has an elongated QT. Will hold zofran and any  QT prolonging drugs.   Given a 0.5mg dose of ativan for the nausea

## 2022-12-03 NOTE — ASSESSMENT & PLAN NOTE
Freq sustained episodes of VT noted on ICD interrogation, below treatment threshold.  ?r/t CHF, ischemia, meds.  ICD reprogrammed 11/23/22.  Amio oral, toprol, entresto, and statin    11/30/22 VT during [facility] 11/29. VT improved on IV amiodarone - change to 400 qd po    Pt experienced 2 episodes of VT overnight on the 12/2. Pt was shocked, and placed on lidocaine and amio drip.

## 2022-12-04 PROBLEM — R13.10 DYSPHAGIA: Status: ACTIVE | Noted: 2022-12-04

## 2022-12-04 LAB
ALBUMIN SERPL BCP-MCNC: 3.2 G/DL (ref 3.5–5.2)
ALP SERPL-CCNC: 83 U/L (ref 55–135)
ALT SERPL W/O P-5'-P-CCNC: 14 U/L (ref 10–44)
ANION GAP SERPL CALC-SCNC: 9 MMOL/L (ref 8–16)
AST SERPL-CCNC: 25 U/L (ref 10–40)
BASOPHILS # BLD AUTO: 0.04 K/UL (ref 0–0.2)
BASOPHILS NFR BLD: 0.7 % (ref 0–1.9)
BILIRUB SERPL-MCNC: 0.3 MG/DL (ref 0.1–1)
BUN SERPL-MCNC: 14 MG/DL (ref 8–23)
CALCIUM SERPL-MCNC: 8.6 MG/DL (ref 8.7–10.5)
CHLORIDE SERPL-SCNC: 104 MMOL/L (ref 95–110)
CO2 SERPL-SCNC: 21 MMOL/L (ref 23–29)
CREAT SERPL-MCNC: 0.8 MG/DL (ref 0.5–1.4)
DIFFERENTIAL METHOD: NORMAL
EOSINOPHIL # BLD AUTO: 0.1 K/UL (ref 0–0.5)
EOSINOPHIL NFR BLD: 1.7 % (ref 0–8)
ERYTHROCYTE [DISTWIDTH] IN BLOOD BY AUTOMATED COUNT: 13.8 % (ref 11.5–14.5)
EST. GFR  (NO RACE VARIABLE): >60 ML/MIN/1.73 M^2
GLUCOSE SERPL-MCNC: 107 MG/DL (ref 70–110)
HCT VFR BLD AUTO: 42.8 % (ref 40–54)
HGB BLD-MCNC: 14.1 G/DL (ref 14–18)
IMM GRANULOCYTES # BLD AUTO: 0.01 K/UL (ref 0–0.04)
IMM GRANULOCYTES NFR BLD AUTO: 0.2 % (ref 0–0.5)
INR PPP: 2.5 (ref 0.8–1.2)
LYMPHOCYTES # BLD AUTO: 1.2 K/UL (ref 1–4.8)
LYMPHOCYTES NFR BLD: 22.3 % (ref 18–48)
MCH RBC QN AUTO: 30.7 PG (ref 27–31)
MCHC RBC AUTO-ENTMCNC: 32.9 G/DL (ref 32–36)
MCV RBC AUTO: 93 FL (ref 82–98)
MONOCYTES # BLD AUTO: 0.4 K/UL (ref 0.3–1)
MONOCYTES NFR BLD: 7.8 % (ref 4–15)
NEUTROPHILS # BLD AUTO: 3.6 K/UL (ref 1.8–7.7)
NEUTROPHILS NFR BLD: 67.3 % (ref 38–73)
NRBC BLD-RTO: 0 /100 WBC
PLATELET # BLD AUTO: 169 K/UL (ref 150–450)
PMV BLD AUTO: 10.3 FL (ref 9.2–12.9)
POTASSIUM SERPL-SCNC: 3.6 MMOL/L (ref 3.5–5.1)
PROT SERPL-MCNC: 6.3 G/DL (ref 6–8.4)
PROTHROMBIN TIME: 24.7 SEC (ref 9–12.5)
RBC # BLD AUTO: 4.6 M/UL (ref 4.6–6.2)
SODIUM SERPL-SCNC: 134 MMOL/L (ref 136–145)
WBC # BLD AUTO: 5.37 K/UL (ref 3.9–12.7)

## 2022-12-04 PROCEDURE — 99291 CRITICAL CARE FIRST HOUR: CPT | Mod: GC,,, | Performed by: INTERNAL MEDICINE

## 2022-12-04 PROCEDURE — 63600175 PHARM REV CODE 636 W HCPCS: Performed by: INTERNAL MEDICINE

## 2022-12-04 PROCEDURE — 20000000 HC ICU ROOM

## 2022-12-04 PROCEDURE — 25000003 PHARM REV CODE 250: Performed by: STUDENT IN AN ORGANIZED HEALTH CARE EDUCATION/TRAINING PROGRAM

## 2022-12-04 PROCEDURE — 25000003 PHARM REV CODE 250: Performed by: HOSPITALIST

## 2022-12-04 PROCEDURE — 85025 COMPLETE CBC W/AUTO DIFF WBC: CPT | Performed by: HOSPITALIST

## 2022-12-04 PROCEDURE — 99233 PR SUBSEQUENT HOSPITAL CARE,LEVL III: ICD-10-PCS | Mod: ,,, | Performed by: INTERNAL MEDICINE

## 2022-12-04 PROCEDURE — 85610 PROTHROMBIN TIME: CPT | Performed by: STUDENT IN AN ORGANIZED HEALTH CARE EDUCATION/TRAINING PROGRAM

## 2022-12-04 PROCEDURE — 63600175 PHARM REV CODE 636 W HCPCS: Performed by: STUDENT IN AN ORGANIZED HEALTH CARE EDUCATION/TRAINING PROGRAM

## 2022-12-04 PROCEDURE — 63600175 PHARM REV CODE 636 W HCPCS

## 2022-12-04 PROCEDURE — 94761 N-INVAS EAR/PLS OXIMETRY MLT: CPT

## 2022-12-04 PROCEDURE — 99233 SBSQ HOSP IP/OBS HIGH 50: CPT | Mod: ,,, | Performed by: INTERNAL MEDICINE

## 2022-12-04 PROCEDURE — 63600175 PHARM REV CODE 636 W HCPCS: Performed by: HOSPITALIST

## 2022-12-04 PROCEDURE — 80053 COMPREHEN METABOLIC PANEL: CPT

## 2022-12-04 PROCEDURE — 25000003 PHARM REV CODE 250

## 2022-12-04 PROCEDURE — 25000003 PHARM REV CODE 250: Performed by: INTERNAL MEDICINE

## 2022-12-04 PROCEDURE — 99291 PR CRITICAL CARE, E/M 30-74 MINUTES: ICD-10-PCS | Mod: GC,,, | Performed by: INTERNAL MEDICINE

## 2022-12-04 PROCEDURE — 92610 EVALUATE SWALLOWING FUNCTION: CPT

## 2022-12-04 RX ORDER — LIDOCAINE HYDROCHLORIDE 20 MG/ML
JELLY TOPICAL
Status: DISCONTINUED | OUTPATIENT
Start: 2022-12-04 | End: 2022-12-06 | Stop reason: HOSPADM

## 2022-12-04 RX ORDER — POTASSIUM CHLORIDE 7.45 MG/ML
20 INJECTION INTRAVENOUS ONCE
Status: COMPLETED | OUTPATIENT
Start: 2022-12-04 | End: 2022-12-04

## 2022-12-04 RX ORDER — LORAZEPAM 2 MG/ML
INJECTION INTRAMUSCULAR
Status: DISPENSED
Start: 2022-12-04 | End: 2022-12-05

## 2022-12-04 RX ORDER — METOPROLOL TARTRATE 50 MG/1
50 TABLET ORAL EVERY EVENING
Status: DISCONTINUED | OUTPATIENT
Start: 2022-12-05 | End: 2022-12-06

## 2022-12-04 RX ADMIN — METOPROLOL TARTRATE 50 MG: 50 TABLET, FILM COATED ORAL at 11:12

## 2022-12-04 RX ADMIN — ROSUVASTATIN CALCIUM 40 MG: 20 TABLET, FILM COATED ORAL at 10:12

## 2022-12-04 RX ADMIN — AMIODARONE HYDROCHLORIDE 1 MG/MIN: 1.8 INJECTION, SOLUTION INTRAVENOUS at 11:12

## 2022-12-04 RX ADMIN — POTASSIUM CHLORIDE 20 MEQ: 7.46 INJECTION, SOLUTION INTRAVENOUS at 08:12

## 2022-12-04 RX ADMIN — CARBAMAZEPINE 400 MG: 200 TABLET ORAL at 10:12

## 2022-12-04 RX ADMIN — HYDROCODONE BITARTRATE AND ACETAMINOPHEN 1 TABLET: 5; 325 TABLET ORAL at 10:12

## 2022-12-04 RX ADMIN — WARFARIN SODIUM 5 MG: 5 TABLET ORAL at 05:12

## 2022-12-04 RX ADMIN — SACUBITRIL AND VALSARTAN 1 TABLET: 24; 26 TABLET, FILM COATED ORAL at 09:12

## 2022-12-04 RX ADMIN — LACTOBACILLUS ACIDOPHILUS / LACTOBACILLUS BULGARICUS 1 EACH: 100 MILLION CFU STRENGTH GRANULES at 09:12

## 2022-12-04 RX ADMIN — CLOPIDOGREL BISULFATE 75 MG: 75 TABLET ORAL at 10:12

## 2022-12-04 RX ADMIN — CYANOCOBALAMIN 1000 MCG: 1000 INJECTION, SOLUTION INTRAMUSCULAR; SUBCUTANEOUS at 09:12

## 2022-12-04 RX ADMIN — LORAZEPAM 0.5 MG: 2 INJECTION INTRAMUSCULAR; INTRAVENOUS at 08:12

## 2022-12-04 RX ADMIN — ENOXAPARIN SODIUM 70 MG: 100 INJECTION SUBCUTANEOUS at 05:12

## 2022-12-04 NOTE — PROGRESS NOTES
[facility] - Card[facility]  Cardiac Electrophysiology  Progress Note    Admission Date: 11/22/2022  Code Status: Full Code   Attending Physician: Marichuy Giron MD   Expected Discharge Date: 12/6/2022  Principal Problem:Acute on chronic combined systolic and diastolic congestive heart failure    Subjective:     Interval History: No acute events overnight. More awake and alert. Continues to be somewhat confused. NSR on telemetry and no concerning events overnight.    Objective:     Vital Signs (Most Recent):  Temp: 98.1 °F (36.7 °C) (12/04/22 1100)  Pulse: 62 (12/04/22 1200)  Resp: (!) 25 (12/04/22 1200)  BP: 123/66 (12/04/22 1200)  SpO2: 95 % (12/04/22 1200)   Vital Signs (24h Range):  Temp:  [97.8 °F (36.6 °C)-98.1 °F (36.7 °C)] 98.1 °F (36.7 °C)  Pulse:  [53-62] 62  Resp:  [11-25] 25  SpO2:  [93 %-100 %] 95 %  BP: (102-138)/(56-66) 123/66     Weight: 68.5 kg (151 lb 0.2 oz)  Body mass index is 22.3 kg/m².     SpO2: 95 %  O2 Device (Oxygen Therapy): room air    Physical Exam  Vitals reviewed.   Constitutional:       General: He is drowsy, alert to name      Appearance: ill-appearing.   HENT:      Head: Normocephalic and atraumatic.   Eyes:      General: Visual field deficit present. No scleral icterus.     Comments: Pt keeps his L eye shut due to experiencing double vision with is vision trouble.   Cardiovascular:      Rate and Rhythm: Normal rate and regular rhythm.      Comments:   Pulmonary:      Effort: Pulmonary effort is normal. No respiratory distress.      Breath sounds: Normal breath sounds.   Abdominal:      General: Abdomen is flat. There is no distension.      Palpations: Abdomen is soft.      Tenderness: There is no abdominal tenderness.   Musculoskeletal:      Right lower leg: No edema.      Left lower leg: No edema.   Skin:     General: Skin is warm and dry.      Coloration: Skin is not jaundiced.   Neurological:      Mental Status: He is alert and awake x 2.  Psychiatric:         Slow  to response, cooperative    Significant Labs:   Recent Lab Results         12/04/22  0618   12/04/22  0511        Albumin   3.2       Alkaline Phosphatase   83       ALT   14       Anion Gap   9       AST   25       Baso #   0.04       Basophil %   0.7       BILIRUBIN TOTAL   0.3  Comment: For infants and newborns, interpretation of results should be based  on gestational age, weight and in agreement with clinical  observations.    Premature Infant recommended reference ranges:  Up to 24 hours.............<8.0 mg/dL  Up to 48 hours............<12.0 mg/dL  3-5 days..................<15.0 mg/dL  6-29 days.................<15.0 mg/dL         BUN   14       Calcium   8.6       Chloride   104       CO2   21       Creatinine   0.8       Differential Method   Automated       eGFR   >60.0       Eos #   0.1       Eosinophil %   1.7       Glucose   107       Gran # (ANC)   3.6       Gran %   67.3       Hematocrit   42.8       Hemoglobin   14.1       Immature Grans (Abs)   0.01  Comment: Mild elevation in immature granulocytes is non specific and   can be seen in a variety of conditions including stress response,   acute inflammation, trauma and pregnancy. Correlation with other   laboratory and clinical findings is essential.         Immature Granulocytes   0.2       INR 2.5  Comment: Coumadin Therapy:  2.0 - 3.0 for INR for all indicators except mechanical heart valves  and antiphospholipid syndromes which should use 2.5 - 3.5.           Lymph #   1.2       Lymph %   22.3       MCH   30.7       MCHC   32.9       MCV   93       Mono #   0.4       Mono %   7.8       MPV   10.3       nRBC   0       Platelets   169       Potassium   3.6       PROTEIN TOTAL   6.3       Protime 24.7         RBC   4.60       RDW   13.8       Sodium   134       WBC   5.37                   Assessment and Plan:     Sustained VT (ventricular tachycardia)  Mr. Rebollar is a 72 year old male with PMH of CAD with PCI of LAD in 2006, HFrEF with ICD  placed, A fib on chronic warfarin, CVA who presented to OSH with fatigue and weakness for about a weak duration. Had non-sustained VT at OSH during angiogram. Transferred here for complex PCI.  now s/p PCI to LM into prx Lcx with 2.5 x30 mm stent. EP consulted after several episodes of VT s/p ATP. Now in NSR. On amio and lidocaine drip.    -Device reprogrammed: VT zone to 120 bpm, with ATP x 3 then HV shock.  - Transition amiodarone gtt to 400mg qd.  - DC lidocaine gtt  - Upon discharge patient can follow up with his regular cardiologist at [facility].   - Replete electrolytes as needed.    We will sign off, please contact us if any questions arise.  Discussed w/ Nabor Cervantes MD  Cardiac Electrophysiology  [facility] - Cardiac Intensive Care

## 2022-12-04 NOTE — PROGRESS NOTES
[facility] - [facility]  Cardiology  Progress Note    Patient Name: Balbir Rebollar Sr.  MRN: [id number]  Admission Date: 11/22/2022  Hospital Length of Stay: 11 days  Code Status: Full Code   Attending Physician: Marichuy Giron MD   Primary Care Physician: Flip Hanna MD  Expected Discharge Date: 12/6/2022  Principal Problem:Acute on chronic combined systolic and diastolic congestive heart failure    Subjective:     Hospital Course:   Pt is a 72 year old male with PMH of CAD with PCI of LAD in 2006, HFrEF with ICD placed, A fib on chronic warfarin, CVA who presented to OSH with fatigue and weakness for about a week duration. Pt was found to have elevated INR (4.6), BNP (1441) and troponin 0.041 and having some frequent episodes of VT, his device was adjust and pt underwent a LHC at [facility] that demonstrated LAD and Lcx occulsions. While pt was in the cath he continued to have episodes of VT so patient was transferred to [facility] for a high risk PCI to place stents in his blocked arteries. Pt was having no chest pain or SOB on arrival. Pt underwent LHC and had a PCI placed in distal R main. Pt's LAD was completely occluded and was not able to be placed. Pt has been placed on DAPT, heparin due to other thrombosis risk, entresto and BB. PT/OT saw pt and felt he required SNF placement. Pt experienced 2 episodes of VT on the night of 12/2. Pt was shocked by ICD once and placed back on amio and also on lidocaine drip.       Interval history: No recurrent VT overnight. More awake today, but having new dysphagia so now NPO. On Amio and Lido gtt. No other complaints other than dry mouth.        Review of Systems   Constitutional: Positive for malaise/fatigue.   HENT: Negative.     Eyes: Negative.    Cardiovascular: Negative.    Respiratory: Negative.     Endocrine: Negative.    Skin: Negative.    Musculoskeletal: Negative.    Gastrointestinal:  Positive for dysphagia.    Genitourinary: Negative.    Neurological:  Positive for weakness.   Psychiatric/Behavioral: Negative.     Allergic/Immunologic: Negative.    Objective:     Vital Signs (Most Recent):  Temp: 98.1 °F (36.7 °C) (12/04/22 1100)  Pulse: 62 (12/04/22 1200)  Resp: (!) 25 (12/04/22 1200)  BP: 123/66 (12/04/22 1200)  SpO2: 95 % (12/04/22 1200) Vital Signs (24h Range):  Temp:  [97.8 °F (36.6 °C)-98.1 °F (36.7 °C)] 98.1 °F (36.7 °C)  Pulse:  [53-62] 62  Resp:  [11-25] 25  SpO2:  [93 %-100 %] 95 %  BP: (102-138)/(56-66) 123/66     Weight: 68.5 kg (151 lb 0.2 oz)  Body mass index is 22.3 kg/m².    SpO2: 95 %  O2 Device (Oxygen Therapy): room air      Intake/Output Summary (Last 24 hours) at 12/4/2022 1411  Last data filed at 12/4/2022 1200  Gross per 24 hour   Intake 1048.11 ml   Output 925 ml   Net 123.11 ml         Lines/Drains/Airways       Peripheral Intravenous Line  Duration                  Peripheral IV - Single Lumen 11/30/22 1130 20 G Anterior;Right Forearm 4 days         Peripheral IV - Single Lumen 12/01/22 1105 20 G Anterior;Left Forearm 3 days                    Physical Exam  Vitals reviewed.   Constitutional:       General: He is awake.      Appearance: Normal appearance. He is not ill-appearing.   HENT:      Head: Normocephalic and atraumatic.      Mouth/Throat:      Mouth: Mucous membranes are dry.   Eyes:      General: Visual field deficit present. No scleral icterus.     Extraocular Movements: Extraocular movements intact.      Comments: Pt keeps his L eye shut due to experiencing double vision with is vision trouble.   Cardiovascular:      Rate and Rhythm: Normal rate and regular rhythm.      Comments: Paced rhythm, 2 episodes of Vtach overnight. Back in NSR  Pulmonary:      Effort: Pulmonary effort is normal. No respiratory distress.      Breath sounds: Normal breath sounds.   Abdominal:      General: Abdomen is flat. There is no distension.      Palpations: Abdomen is soft.      Tenderness: There is no  abdominal tenderness.   Musculoskeletal:      Cervical back: Neck supple.      Right lower leg: No edema.      Left lower leg: No edema.   Skin:     General: Skin is warm and dry.      Coloration: Skin is not jaundiced.   Neurological:      Mental Status: He is alert. Mental status is at baseline.   Psychiatric:         Mood and Affect: Mood normal.         Behavior: Behavior is cooperative.         Thought Content: Thought content normal.       Significant Labs: All pertinent lab results from the last 24 hours have been reviewed.    Significant Imaging:  Reviewed    Assessment and Plan:       * Acute on chronic combined systolic and diastolic congestive heart failure  Known severe LV dysfxn/ICM, Last EF <30  Not grossly vol overloaded,  Placed on entresto and toprol, statin, has ICD in place        Dysphagia  Occurred over the past 24-48 hrs  Stopping Lidocaine gtt as may be contributing to lethargy and no recurrence of VT in past 24 hrs  SLP consult  NPO for now with NG tube    QT prolongation  Pt's Qtc was 512 on last EKG    Hold all qt prolonging drugs    History of seizure disorder  On carbamazepine, complicates blood thinner management      Nausea and vomiting  Pt has experienced N/V while in the hospital.     Pt has an elongated QT. Will hold zofran and any  QT prolonging drugs.   Given a 0.5mg dose of ativan for the nausea    Sustained VT (ventricular tachycardia)  Freq sustained episodes of VT noted on ICD interrogation, below treatment threshold.  ?r/t CHF, ischemia, meds.  ICD reprogrammed 11/23/22.  Amio oral, toprol, entresto, and statin    11/30/22 VT during [facility] 11/29. VT improved on IV amiodarone - change to 400 qd po    Pt experienced 2 episodes of VT overnight on the 12/2.     Continue Amio gtt; transition to PO soon  Stop Lidocaine gtt today    Weakness  PT/OT recommending skilled nursing on DC  Continue daily assessments    Pure hypercholesterolemia  Cont statin    ICD (implantable  cardioverter-defibrillator), single, in situ  [employer] single chamber ICD  Reprogrammed with lower rate threshold and more ATP on 11/23/22  Continue Amio gtt  Stop Lidocaine gtt  Wean to PO Amio soon if no further VT    History of CVA (cerebrovascular accident)  Continue Coumadin  Continue ASA, statin    Coronary artery disease involving native coronary artery of native heart without angina pectoris  Known CAD s/p LAD PCI 2006  Hx ICM, EF <30% by recent echo.  No overt anginal sxs, Pt experience VT while undergoing other heart cath. Plan to take pt for [facility] with PCI placement on 12/1 (start DOAC after procedure).       11/30/22 [facility] with occluded LAD, 90% Cx, moderate RCA disease at [facility].    Pt [facility] 12/1 Left main occluded so stent placed. LAD completely occluded, unable to stent, placed on aspirin, clopidogrel, warfarin (bridging with heparin), statin, Entresto, and toprol for his cardiac care    Switched pt's atorvastatin to rosuvastatin due to its interaction with his seizure meds    Will do triple therapy with ASA, Plavix, Coumadin for 1 month, then drop ASA for 6 months of Plavix/Coumadin, then exchange ASA for Plavix thereafter          Chronic anticoagulation  [facility] on 12/1, distal LM PCI placed. Unable to stent LAD due to complete occlusion  Placed on aspirin and clopidogrel   Continue Coumadin      Paroxysmal atrial fibrillation  Now NSR  Continue Lopressor  Continue Coumadin  INR 2.5 today (goal 2-3), so will DC Lovenox bridge  Monitor on telemetry            VTE Risk Mitigation (From admission, onward)         Ordered     warfarin (COUMADIN) tablet 5 mg  Daily         12/02/22 1416     Reason for No Pharmacological VTE Prophylaxis  Once        Question:  Reasons:  Answer:  Already adequately anticoagulated on oral Anticoagulants    11/23/22 0042     IP VTE HIGH RISK PATIENT  Once         11/23/22 0042     Place sequential compression device  Until discontinued         11/23/22 0042                 Matthew Casas MD  Cardiology  [facility] - [facility]iac [facility]

## 2022-12-04 NOTE — SUBJECTIVE & OBJECTIVE
Interval History: No acute events overnight. More awake and alert. Continues to be somewhat confused. NSR on telemetry and no concerning events overnight.    Objective:     Vital Signs (Most Recent):  Temp: 98.1 °F (36.7 °C) (12/04/22 1100)  Pulse: 62 (12/04/22 1200)  Resp: (!) 25 (12/04/22 1200)  BP: 123/66 (12/04/22 1200)  SpO2: 95 % (12/04/22 1200)   Vital Signs (24h Range):  Temp:  [97.8 °F (36.6 °C)-98.1 °F (36.7 °C)] 98.1 °F (36.7 °C)  Pulse:  [53-62] 62  Resp:  [11-25] 25  SpO2:  [93 %-100 %] 95 %  BP: (102-138)/(56-66) 123/66     Weight: 68.5 kg (151 lb 0.2 oz)  Body mass index is 22.3 kg/m².     SpO2: 95 %  O2 Device (Oxygen Therapy): room air    Physical Exam  Vitals reviewed.   Constitutional:       General: He is drowsy, alert to name      Appearance: ill-appearing.   HENT:      Head: Normocephalic and atraumatic.   Eyes:      General: Visual field deficit present. No scleral icterus.     Comments: Pt keeps his L eye shut due to experiencing double vision with is vision trouble.   Cardiovascular:      Rate and Rhythm: Normal rate and regular rhythm.      Comments:   Pulmonary:      Effort: Pulmonary effort is normal. No respiratory distress.      Breath sounds: Normal breath sounds.   Abdominal:      General: Abdomen is flat. There is no distension.      Palpations: Abdomen is soft.      Tenderness: There is no abdominal tenderness.   Musculoskeletal:      Right lower leg: No edema.      Left lower leg: No edema.   Skin:     General: Skin is warm and dry.      Coloration: Skin is not jaundiced.   Neurological:      Mental Status: He is alert and awake x 2.  Psychiatric:         Slow to response, cooperative    Significant Labs:   Recent Lab Results         12/04/22  0618   12/04/22  0511        Albumin   3.2       Alkaline Phosphatase   83       ALT   14       Anion Gap   9       AST   25       Baso #   0.04       Basophil %   0.7       BILIRUBIN TOTAL   0.3  Comment: For infants and newborns,  interpretation of results should be based  on gestational age, weight and in agreement with clinical  observations.    Premature Infant recommended reference ranges:  Up to 24 hours.............<8.0 mg/dL  Up to 48 hours............<12.0 mg/dL  3-5 days..................<15.0 mg/dL  6-29 days.................<15.0 mg/dL         BUN   14       Calcium   8.6       Chloride   104       CO2   21       Creatinine   0.8       Differential Method   Automated       eGFR   >60.0       Eos #   0.1       Eosinophil %   1.7       Glucose   107       Gran # (ANC)   3.6       Gran %   67.3       Hematocrit   42.8       Hemoglobin   14.1       Immature Grans (Abs)   0.01  Comment: Mild elevation in immature granulocytes is non specific and   can be seen in a variety of conditions including stress response,   acute inflammation, trauma and pregnancy. Correlation with other   laboratory and clinical findings is essential.         Immature Granulocytes   0.2       INR 2.5  Comment: Coumadin Therapy:  2.0 - 3.0 for INR for all indicators except mechanical heart valves  and antiphospholipid syndromes which should use 2.5 - 3.5.           Lymph #   1.2       Lymph %   22.3       MCH   30.7       MCHC   32.9       MCV   93       Mono #   0.4       Mono %   7.8       MPV   10.3       nRBC   0       Platelets   169       Potassium   3.6       PROTEIN TOTAL   6.3       Protime 24.7         RBC   4.60       RDW   13.8       Sodium   134       WBC   5.37

## 2022-12-04 NOTE — SUBJECTIVE & OBJECTIVE
Interval history: No recurrent VT overnight. More awake today, but having new dysphagia so now NPO. On Amio and Lido gtt. No other complaints other than dry mouth.        Review of Systems   Constitutional: Positive for malaise/fatigue.   HENT: Negative.     Eyes: Negative.    Cardiovascular: Negative.    Respiratory: Negative.     Endocrine: Negative.    Skin: Negative.    Musculoskeletal: Negative.    Gastrointestinal:  Positive for dysphagia.   Genitourinary: Negative.    Neurological:  Positive for weakness.   Psychiatric/Behavioral: Negative.     Allergic/Immunologic: Negative.    Objective:     Vital Signs (Most Recent):  Temp: 98.1 °F (36.7 °C) (12/04/22 1100)  Pulse: 62 (12/04/22 1200)  Resp: (!) 25 (12/04/22 1200)  BP: 123/66 (12/04/22 1200)  SpO2: 95 % (12/04/22 1200) Vital Signs (24h Range):  Temp:  [97.8 °F (36.6 °C)-98.1 °F (36.7 °C)] 98.1 °F (36.7 °C)  Pulse:  [53-62] 62  Resp:  [11-25] 25  SpO2:  [93 %-100 %] 95 %  BP: (102-138)/(56-66) 123/66     Weight: 68.5 kg (151 lb 0.2 oz)  Body mass index is 22.3 kg/m².    SpO2: 95 %  O2 Device (Oxygen Therapy): room air      Intake/Output Summary (Last 24 hours) at 12/4/2022 1411  Last data filed at 12/4/2022 1200  Gross per 24 hour   Intake 1048.11 ml   Output 925 ml   Net 123.11 ml         Lines/Drains/Airways       Peripheral Intravenous Line  Duration                  Peripheral IV - Single Lumen 11/30/22 1130 20 G Anterior;Right Forearm 4 days         Peripheral IV - Single Lumen 12/01/22 1105 20 G Anterior;Left Forearm 3 days                    Physical Exam  Vitals reviewed.   Constitutional:       General: He is awake.      Appearance: Normal appearance. He is not ill-appearing.   HENT:      Head: Normocephalic and atraumatic.      Mouth/Throat:      Mouth: Mucous membranes are dry.   Eyes:      General: Visual field deficit present. No scleral icterus.     Extraocular Movements: Extraocular movements intact.      Comments: Pt keeps his L eye shut due  to experiencing double vision with is vision trouble.   Cardiovascular:      Rate and Rhythm: Normal rate and regular rhythm.      Comments: Paced rhythm, 2 episodes of Vtach overnight. Back in NSR  Pulmonary:      Effort: Pulmonary effort is normal. No respiratory distress.      Breath sounds: Normal breath sounds.   Abdominal:      General: Abdomen is flat. There is no distension.      Palpations: Abdomen is soft.      Tenderness: There is no abdominal tenderness.   Musculoskeletal:      Cervical back: Neck supple.      Right lower leg: No edema.      Left lower leg: No edema.   Skin:     General: Skin is warm and dry.      Coloration: Skin is not jaundiced.   Neurological:      Mental Status: He is alert. Mental status is at baseline.   Psychiatric:         Mood and Affect: Mood normal.         Behavior: Behavior is cooperative.         Thought Content: Thought content normal.       Significant Labs: All pertinent lab results from the last 24 hours have been reviewed.    Significant Imaging:  Reviewed

## 2022-12-04 NOTE — ASSESSMENT & PLAN NOTE
Mr. Rebollar is a 72 year old male with PMH of CAD with PCI of LAD in 2006, HFrEF with ICD placed, A fib on chronic warfarin, CVA who presented to OSH with fatigue and weakness for about a weak duration. Had non-sustained VT at OSH during angiogram. Transferred here for complex PCI.  now s/p PCI to LM into prx Lcx with 2.5 x30 mm stent. EP consulted after several episodes of VT s/p ATP. Now in NSR. On amio and lidocaine drip.    -Device reprogrammed: VT zone to 120 bpm, with ATP x 3 then HV shock.  - Transition amiodarone gtt to 400mg qd.  - DC lidocaine gtt  - Upon discharge patient can follow up with his regular cardiologist at [facility].   - Replete electrolytes as needed.

## 2022-12-04 NOTE — PT/OT/SLP EVAL
Speech Language Pathology Evaluation  Bedside Swallow    Patient Name:  Balbir Rebollar .   MRN:  [id number]  Admitting Diagnosis: Acute on chronic combined systolic and diastolic congestive heart failure    Recommendations:                 General Recommendations:  ongoing swallow assessment  Diet recommendations:  NPO, NPO   Aspiration Precautions: Consider Alternate means of nutrition/hydration, Frequent oral care, Ice chips sparingly, and Strict aspiration precautions   General Precautions: Standard, fall, aspiration, NPO      History:     Past Medical History:   Diagnosis Date    AICD (automatic cardioverter/defibrillator) present     Anticoagulant long-term use     Coronary artery disease     History of myocardial infarction     Paroxysmal A-fib     Stroke        Past Surgical History:   Procedure Laterality Date    CHOLECYSTECTOMY      LEFT HEART CATHETERIZATION Left 11/29/2022    Procedure: Left heart cath;  Surgeon: Wesley Rico MD;  Location: [facility] CATH LAB;  Service: Cardiology;  Laterality: Left;  1030am start, R rad access    REPLACEMENT OF DUAL CHAMBER IMPLANTABLE CARDIOVERTER-DEFIBRILLATOR         Chest X-Rays: 12/3 Monitoring leads overlie the chest.  Left subclavian approach AICD, as before.  Septation apparatus limits evaluation of the thorax.     Noting this, no definite acute detrimental change in appearance of the cardiomediastinal contours.  No definite focal airspace consolidation seen.  Question left basilar atelectasis.     No definite pneumothorax or large volume pleural effusion.     No acute findings in the visualized abdomen.     Osseous and soft tissue structures appear without definite acute change.       Prior diet: pt was last seen by ST service on 11/29 at another facility & was on a puree & thin liquids diet    Subjective   Awake & alert. Daughters at bedside. NSG reports that pt with severe coughing/choking episodes when given any PO     Pain/Comfort:  Pain Rating 1:  6/10  Location 1: back  Pain Addressed 2: Reposition  Pain Rating Post-Intervention 2: 6/10    Respiratory Status: Room air    Objective:     Oral Musculature Evaluation  Oral Musculature: WFL  Dentition: upper and lower dentures (daughters report he does not need dentures to eat at baseline)  Secretion Management: adequate  Mucosal Quality: good  Mandibular Strength and Mobility: WFL  Oral Labial Strength and Mobility: WFL  Lingual Strength and Mobility: WFL  Velar Elevation: WFL  Buccal Strength and Mobility: WFL  Volitional Cough: adequate  Volitional Swallow: adequate  Voice Prior to PO Intake: clear  Oral Musculature Comments: grossly intact    Bedside Swallow Eval:   Consistencies Assessed:  Thin liquids ice chips x2, pt deferred sip of thin    Pt deferred small bite of puree and any further PO despite encouragement     Oral Phase:   Slow oral transit time    Pharyngeal Phase:   no overt clinical signs/symptoms of aspiration yet trials limited to small ice chips       Assessment:     Balbir Cortés Smooth Ruano. is a 72 y.o. male with an SLP diagnosis of Dysphagia.  SLP recs NPO with alternative means at this time & ST follow up for ongoing swallow assessment    Goals:   Multidisciplinary Problems       SLP Goals          Problem: SLP    Goal Priority Disciplines Outcome   SLP Goal     SLP Ongoing, Progressing   Description: Speech Language Pathology Goals  Goals expected to be met by   1. Ongoing swallow assessment                  Multidisciplinary Problems (Resolved)          Problem: SLP    Goal Priority Disciplines Outcome   SLP Goal   (Resolved)     SLP Met   Description: ST. Pt will tolerate PO diet of pureed w/nectar thick liquids w/o overt s/s of aspiration. (Goal met )  2. Pt will tolerate 4oz thin liquids across multiple trials without overt s/s of aspiration. (Goal met )                       Plan:     Patient to be seen:  4 x/week   Plan of Care expires:  23  Plan of  Care reviewed with:  patient, daughter   SLP Follow-Up:  Yes       Discharge recommendations:   (tbd)     Time Tracking:     SLP Treatment Date:   12/04/22  Speech Start Time:  1427  Speech Stop Time:  1440     Speech Total Time (min):  13 min    Billable Minutes: Eval Swallow and Oral Function 13    12/04/2022

## 2022-12-04 NOTE — ASSESSMENT & PLAN NOTE
[employer] single chamber ICD  Reprogrammed with lower rate threshold and more ATP on 11/23/22  Continue Amio gtt  Stop Lidocaine gtt  Wean to PO Amio soon if no further VT

## 2022-12-04 NOTE — NURSING
Cardiac ICU Care Plan    POC reviewed with Balbir Rebollar Sr. and family. Questions and concerns addressed. No acute events today. Pt progressing toward goals. Will continue to monitor. See below and flowsheets for full assessment and VS info.     Pt without any episodes of VT overnight, remained NSR/SB. Pt denies chest pain, nausea, or vomiting at this time.  Speech consult placed due to patient coughing continuously after PO intake of any consistency.       Neuro:  Wai Coma Scale  Best Eye Response: 4-->(E4) spontaneous  Best Motor Response: 6-->(M6) obeys commands  Best Verbal Response: 5-->(V5) oriented  Wai Coma Scale Score: 15  Assessment Qualifiers: patient not sedated/intubated  Pupil PERRLA: yes    24 hr Temp:  [97.8 °F (36.6 °C)-98.4 °F (36.9 °C)]      CV:  Rhythm: normal sinus rhythm, sinus bradycardia   DVT prophylaxis: VTE Required Core Measure: Pharmacological prophylaxis initiated/maintained                            Pulses  Right Radial Pulse: 2+ (normal)  Left Radial Pulse: 2+ (normal)  Right Dorsalis Pedis Pulse: 2+ (normal)  Left Dorsalis Pedis Pulse: 2+ (normal)  Right Posterior Tibial Pulse: 2+ (normal)  Left Posterior Tibial Pulse: 2+ (normal)    Resp:  O2 Device (Oxygen Therapy): room air  Flow (L/min): 2       GI/:  GI prophylaxis: yes  Diet/Nutrition Received: 2 gram sodium  Last Bowel Movement: 12/03/22  Voiding Characteristics: voids spontaneously without difficulty   Intake/Output Summary (Last 24 hours) at 12/4/2022 0643  Last data filed at 12/4/2022 0500  Gross per 24 hour   Intake 991.67 ml   Output 925 ml   Net 66.67 ml        Nutritional Supplement Intake: Quantity na, Type: Boost    Labs/Accuchecks:  Recent Labs   Lab 12/02/22  0501 12/03/22  0559 12/04/22  0511   WBC 4.32 4.16 5.37   RBC 4.78 4.73 4.60   HGB 14.7 14.7 14.1   HCT 43.9 43.4 42.8    191 169      Recent Labs   Lab 12/01/22  0315 12/01/22  1514 12/02/22  0121 12/02/22  0501 12/02/22  0859  12/02/22  1301 12/03/22  0559   INR 1.1  --   --  1.1  --   --  1.6*   APTT  --    < > 27.6  --  97.4* 36.3*  --     < > = values in this interval not displayed.      Recent Labs     12/04/22  0511   *   K 3.6   CO2 21*      BUN 14   CREATININE 0.8   ALKPHOS 83   ALT 14   AST 25   BILITOT 0.3     No results for input(s): CPK, CPKMB, MB, TROPONINI in the last 168 hours. No results for input(s): PH, PCO2, PO2, HCO3, POCSATURATED, BE in the last 72 hours.    Electrolytes: No replacement orders  Accuchecks: none    Gtts/LDAs:   amiodarone in dextrose 5% 1 mg/min (12/04/22 0500)    LIDOcaine 1 mg/min (12/04/22 0500)       Lines/Drains/Airways       Peripheral Intravenous Line  Duration                  Peripheral IV - Single Lumen 11/30/22 1130 20 G Anterior;Right Forearm 3 days         Peripheral IV - Single Lumen 12/01/22 1105 20 G Anterior;Left Forearm 2 days                    Skin/Wounds  Bathing/Skin Care: bath, complete (12/03/22 1901)  Wounds: No  Wound care consulted: No

## 2022-12-04 NOTE — ASSESSMENT & PLAN NOTE
Occurred over the past 24-48 hrs  Stopping Lidocaine gtt as may be contributing to lethargy and no recurrence of VT in past 24 hrs  SLP consult  NPO for now with NG tube

## 2022-12-04 NOTE — ASSESSMENT & PLAN NOTE
Known CAD s/p LAD PCI 2006  Hx ICM, EF <30% by recent echo.  No overt anginal sxs, Pt experience VT while undergoing other heart cath. Plan to take pt for [facility] with PCI placement on 12/1 (start DOAC after procedure).       11/30/22 [facility] with occluded LAD, 90% Cx, moderate RCA disease at [facility].    Pt [facility] 12/1 Left main occluded so stent placed. LAD completely occluded, unable to stent, placed on aspirin, clopidogrel, warfarin (bridging with heparin), statin, Entresto, and toprol for his cardiac care    Switched pt's atorvastatin to rosuvastatin due to its interaction with his seizure meds    Will do triple therapy with ASA, Plavix, Coumadin for 1 month, then drop ASA for 6 months of Plavix/Coumadin, then exchange ASA for Plavix thereafter

## 2022-12-04 NOTE — ASSESSMENT & PLAN NOTE
[facility] on 12/1, distal LM PCI placed. Unable to stent LAD due to complete occlusion  Placed on aspirin and clopidogrel   Continue Coumadin

## 2022-12-04 NOTE — PLAN OF CARE
Problem: SLP  Goal: SLP Goal  Description: Speech Language Pathology Goals  Goals expected to be met by 12/11  1. Ongoing swallow assessment   Outcome: Ongoing, Progressing    SLP Clinical Swallow Evaluation completed. See note for details.

## 2022-12-04 NOTE — ASSESSMENT & PLAN NOTE
Freq sustained episodes of VT noted on ICD interrogation, below treatment threshold.  ?r/t CHF, ischemia, meds.  ICD reprogrammed 11/23/22.  Amio oral, toprol, entresto, and statin    11/30/22 VT during [facility] 11/29. VT improved on IV amiodarone - change to 400 qd po    Pt experienced 2 episodes of VT overnight on the 12/2.     Continue Amio gtt; transition to PO soon  Stop Lidocaine gtt today

## 2022-12-04 NOTE — ASSESSMENT & PLAN NOTE
Now NSR  Continue Lopressor  Continue Coumadin  INR 2.5 today (goal 2-3), so will DC Lovenox bridge  Monitor on telemetry

## 2022-12-05 PROBLEM — E53.8 B12 DEFICIENCY: Status: RESOLVED | Noted: 2022-11-28 | Resolved: 2022-12-05

## 2022-12-05 PROBLEM — R11.2 NAUSEA AND VOMITING: Status: RESOLVED | Noted: 2022-11-26 | Resolved: 2022-12-05

## 2022-12-05 LAB
ALBUMIN SERPL BCP-MCNC: 3.4 G/DL (ref 3.5–5.2)
ALP SERPL-CCNC: 90 U/L (ref 55–135)
ALT SERPL W/O P-5'-P-CCNC: 25 U/L (ref 10–44)
ANION GAP SERPL CALC-SCNC: 9 MMOL/L (ref 8–16)
AST SERPL-CCNC: 46 U/L (ref 10–40)
BASOPHILS # BLD AUTO: 0.03 K/UL (ref 0–0.2)
BASOPHILS NFR BLD: 0.4 % (ref 0–1.9)
BILIRUB SERPL-MCNC: 0.4 MG/DL (ref 0.1–1)
BUN SERPL-MCNC: 15 MG/DL (ref 8–23)
CALCIUM SERPL-MCNC: 8.7 MG/DL (ref 8.7–10.5)
CHLORIDE SERPL-SCNC: 105 MMOL/L (ref 95–110)
CO2 SERPL-SCNC: 21 MMOL/L (ref 23–29)
CREAT SERPL-MCNC: 0.9 MG/DL (ref 0.5–1.4)
DIFFERENTIAL METHOD: NORMAL
EOSINOPHIL # BLD AUTO: 0.1 K/UL (ref 0–0.5)
EOSINOPHIL NFR BLD: 1.2 % (ref 0–8)
ERYTHROCYTE [DISTWIDTH] IN BLOOD BY AUTOMATED COUNT: 13.8 % (ref 11.5–14.5)
EST. GFR  (NO RACE VARIABLE): >60 ML/MIN/1.73 M^2
GLUCOSE SERPL-MCNC: 114 MG/DL (ref 70–110)
HCT VFR BLD AUTO: 45.9 % (ref 40–54)
HGB BLD-MCNC: 15.4 G/DL (ref 14–18)
IMM GRANULOCYTES # BLD AUTO: 0.01 K/UL (ref 0–0.04)
IMM GRANULOCYTES NFR BLD AUTO: 0.1 % (ref 0–0.5)
INR PPP: 2.8 (ref 0.8–1.2)
LYMPHOCYTES # BLD AUTO: 1.3 K/UL (ref 1–4.8)
LYMPHOCYTES NFR BLD: 18.4 % (ref 18–48)
MCH RBC QN AUTO: 30.9 PG (ref 27–31)
MCHC RBC AUTO-ENTMCNC: 33.6 G/DL (ref 32–36)
MCV RBC AUTO: 92 FL (ref 82–98)
MONOCYTES # BLD AUTO: 0.7 K/UL (ref 0.3–1)
MONOCYTES NFR BLD: 10.4 % (ref 4–15)
NEUTROPHILS # BLD AUTO: 4.8 K/UL (ref 1.8–7.7)
NEUTROPHILS NFR BLD: 69.5 % (ref 38–73)
NRBC BLD-RTO: 0 /100 WBC
PLATELET # BLD AUTO: 188 K/UL (ref 150–450)
PMV BLD AUTO: 10.7 FL (ref 9.2–12.9)
POTASSIUM SERPL-SCNC: 3.9 MMOL/L (ref 3.5–5.1)
PROT SERPL-MCNC: 6.7 G/DL (ref 6–8.4)
PROTHROMBIN TIME: 27.5 SEC (ref 9–12.5)
RBC # BLD AUTO: 4.98 M/UL (ref 4.6–6.2)
SODIUM SERPL-SCNC: 135 MMOL/L (ref 136–145)
WBC # BLD AUTO: 6.85 K/UL (ref 3.9–12.7)

## 2022-12-05 PROCEDURE — 99233 PR SUBSEQUENT HOSPITAL CARE,LEVL III: ICD-10-PCS | Mod: ,,, | Performed by: INTERNAL MEDICINE

## 2022-12-05 PROCEDURE — 63600175 PHARM REV CODE 636 W HCPCS: Performed by: STUDENT IN AN ORGANIZED HEALTH CARE EDUCATION/TRAINING PROGRAM

## 2022-12-05 PROCEDURE — 85610 PROTHROMBIN TIME: CPT | Performed by: STUDENT IN AN ORGANIZED HEALTH CARE EDUCATION/TRAINING PROGRAM

## 2022-12-05 PROCEDURE — 25000003 PHARM REV CODE 250: Performed by: HOSPITALIST

## 2022-12-05 PROCEDURE — 25000003 PHARM REV CODE 250: Performed by: STUDENT IN AN ORGANIZED HEALTH CARE EDUCATION/TRAINING PROGRAM

## 2022-12-05 PROCEDURE — 97535 SELF CARE MNGMENT TRAINING: CPT

## 2022-12-05 PROCEDURE — 20600001 HC STEP DOWN PRIVATE ROOM

## 2022-12-05 PROCEDURE — 25000003 PHARM REV CODE 250

## 2022-12-05 PROCEDURE — 97116 GAIT TRAINING THERAPY: CPT | Mod: CQ

## 2022-12-05 PROCEDURE — 25000003 PHARM REV CODE 250: Performed by: INTERNAL MEDICINE

## 2022-12-05 PROCEDURE — 97530 THERAPEUTIC ACTIVITIES: CPT | Mod: CQ

## 2022-12-05 PROCEDURE — 92526 ORAL FUNCTION THERAPY: CPT

## 2022-12-05 PROCEDURE — 85025 COMPLETE CBC W/AUTO DIFF WBC: CPT | Performed by: HOSPITALIST

## 2022-12-05 PROCEDURE — 99231 PR SUBSEQUENT HOSPITAL CARE,LEVL I: ICD-10-PCS | Mod: GC,,, | Performed by: INTERNAL MEDICINE

## 2022-12-05 PROCEDURE — 30200315 PPD INTRADERMAL TEST REV CODE 302

## 2022-12-05 PROCEDURE — 99233 SBSQ HOSP IP/OBS HIGH 50: CPT | Mod: ,,, | Performed by: INTERNAL MEDICINE

## 2022-12-05 PROCEDURE — 94761 N-INVAS EAR/PLS OXIMETRY MLT: CPT

## 2022-12-05 PROCEDURE — 86580 TB INTRADERMAL TEST: CPT

## 2022-12-05 PROCEDURE — 99231 SBSQ HOSP IP/OBS SF/LOW 25: CPT | Mod: GC,,, | Performed by: INTERNAL MEDICINE

## 2022-12-05 PROCEDURE — 80053 COMPREHEN METABOLIC PANEL: CPT

## 2022-12-05 PROCEDURE — 97530 THERAPEUTIC ACTIVITIES: CPT

## 2022-12-05 RX ORDER — AMIODARONE HYDROCHLORIDE 200 MG/1
400 TABLET ORAL DAILY
Status: DISCONTINUED | OUTPATIENT
Start: 2022-12-05 | End: 2022-12-06 | Stop reason: HOSPADM

## 2022-12-05 RX ORDER — WARFARIN 2.5 MG/1
2.5 TABLET ORAL DAILY
Status: DISCONTINUED | OUTPATIENT
Start: 2022-12-05 | End: 2022-12-06 | Stop reason: HOSPADM

## 2022-12-05 RX ORDER — ONDANSETRON 2 MG/ML
4 INJECTION INTRAMUSCULAR; INTRAVENOUS EVERY 6 HOURS PRN
Status: DISCONTINUED | OUTPATIENT
Start: 2022-12-05 | End: 2022-12-06 | Stop reason: HOSPADM

## 2022-12-05 RX ADMIN — DOCUSATE SODIUM 100 MG: 100 CAPSULE, LIQUID FILLED ORAL at 08:12

## 2022-12-05 RX ADMIN — TUBERCULIN PURIFIED PROTEIN DERIVATIVE 5 UNITS: 5 INJECTION, SOLUTION INTRADERMAL at 03:12

## 2022-12-05 RX ADMIN — ROSUVASTATIN CALCIUM 40 MG: 20 TABLET, FILM COATED ORAL at 08:12

## 2022-12-05 RX ADMIN — CARBAMAZEPINE 400 MG: 200 TABLET ORAL at 09:12

## 2022-12-05 RX ADMIN — SCOPOLAMINE 1 PATCH: 1 SYSTEM TRANSDERMAL at 12:12

## 2022-12-05 RX ADMIN — METOPROLOL TARTRATE 50 MG: 50 TABLET, FILM COATED ORAL at 05:12

## 2022-12-05 RX ADMIN — WARFARIN SODIUM 2.5 MG: 2.5 TABLET ORAL at 04:12

## 2022-12-05 RX ADMIN — SACUBITRIL AND VALSARTAN 1 TABLET: 24; 26 TABLET, FILM COATED ORAL at 09:12

## 2022-12-05 RX ADMIN — CLOPIDOGREL BISULFATE 75 MG: 75 TABLET ORAL at 08:12

## 2022-12-05 RX ADMIN — LACTOBACILLUS ACIDOPHILUS / LACTOBACILLUS BULGARICUS 1 EACH: 100 MILLION CFU STRENGTH GRANULES at 08:12

## 2022-12-05 RX ADMIN — CARBAMAZEPINE 400 MG: 200 TABLET ORAL at 08:12

## 2022-12-05 RX ADMIN — ASPIRIN 81 MG CHEWABLE TABLET 81 MG: 81 TABLET CHEWABLE at 09:12

## 2022-12-05 RX ADMIN — AMIODARONE HYDROCHLORIDE 400 MG: 200 TABLET ORAL at 09:12

## 2022-12-05 RX ADMIN — SACUBITRIL AND VALSARTAN 1 TABLET: 24; 26 TABLET, FILM COATED ORAL at 08:12

## 2022-12-05 RX ADMIN — AMIODARONE HYDROCHLORIDE 1 MG/MIN: 1.8 INJECTION, SOLUTION INTRAVENOUS at 05:12

## 2022-12-05 NOTE — ASSESSMENT & PLAN NOTE
Patient with AMS, per patient's daughter at bedside this is very unlike him  - Likely side effect of lidocaine gtt. Now discontinued. Continue to montitor

## 2022-12-05 NOTE — PLAN OF CARE
Problem: SLP  Goal: SLP Goal  Description: Speech Language Pathology Goals  Goals expected to be met by 12/11  1. Ongoing swallow assessment   Outcome: Ongoing, Progressing     Patient seen for ongoing assessment. SLP recommending a Full Liquid Diet with strict aspiration precautions at this time. ST to continue to follow.     Jamey Magaña CCC-SLP  Speech-Language Pathology  Pager: [phone]

## 2022-12-05 NOTE — PROGRESS NOTES
[facility] - Cardiology Stepdown  Cardiac Electrophysiology  Progress Note    Admission Date: 11/22/2022  Code Status: Full Code   Attending Physician: Marichuy Giron MD   Expected Discharge Date: 12/6/2022  Principal Problem:Coronary artery disease involving native coronary artery of native heart without angina pectoris    Subjective:     Interval History: No acute events overnight. More quiet today, confused. Continues to be in NSR.     Objective:     Vital Signs (Most Recent):  Temp: 96.1 °F (35.6 °C) (12/05/22 1614)  Pulse: 61 (12/05/22 1614)  Resp: 18 (12/05/22 1614)  BP: (!) 114/56 (12/05/22 1614)  SpO2: 95 % (12/05/22 1614)   Vital Signs (24h Range):  Temp:  [96.1 °F (35.6 °C)-98.6 °F (37 °C)] 96.1 °F (35.6 °C)  Pulse:  [56-85] 61  Resp:  [16-35] 18  SpO2:  [93 %-97 %] 95 %  BP: ()/(56-97) 114/56     Weight: 68.5 kg (151 lb 0.2 oz)  Body mass index is 22.3 kg/m².     SpO2: 95 %  O2 Device (Oxygen Therapy): room air    Physical Exam  Vitals reviewed.   Constitutional:       General: He is drowsy, alert to name      Appearance: ill-appearing.   HENT:      Head: Normocephalic and atraumatic.   Eyes:      General: Visual field deficit present. No scleral icterus.     Comments: Pt keeps his L eye shut due to experiencing double vision with is vision trouble.   Cardiovascular:      Rate and Rhythm: Normal rate and regular rhythm.      Comments:   Pulmonary:      Effort: Pulmonary effort is normal. No respiratory distress.      Breath sounds: Normal breath sounds.   Abdominal:      General: Abdomen is flat. There is no distension.      Palpations: Abdomen is soft.      Tenderness: There is no abdominal tenderness.   Musculoskeletal:      Right lower leg: No edema.      Left lower leg: No edema.   Skin:     General: Skin is warm and dry.      Coloration: Skin is not jaundiced.   Neurological:      Mental Status: He is alert and awake x 2.  Psychiatric:         Slow to response, cooperative    Significant  Labs:   Recent Lab Results         12/05/22  0442        Albumin 3.4       Alkaline Phosphatase 90       ALT 25       Anion Gap 9       AST 46       Baso # 0.03       Basophil % 0.4       BILIRUBIN TOTAL 0.4  Comment: For infants and newborns, interpretation of results should be based  on gestational age, weight and in agreement with clinical  observations.    Premature Infant recommended reference ranges:  Up to 24 hours.............<8.0 mg/dL  Up to 48 hours............<12.0 mg/dL  3-5 days..................<15.0 mg/dL  6-29 days.................<15.0 mg/dL         BUN 15       Calcium 8.7       Chloride 105       CO2 21       Creatinine 0.9       Differential Method Automated       eGFR >60.0       Eos # 0.1       Eosinophil % 1.2       Glucose 114       Gran # (ANC) 4.8       Gran % 69.5       Hematocrit 45.9       Hemoglobin 15.4       Immature Grans (Abs) 0.01  Comment: Mild elevation in immature granulocytes is non specific and   can be seen in a variety of conditions including stress response,   acute inflammation, trauma and pregnancy. Correlation with other   laboratory and clinical findings is essential.         Immature Granulocytes 0.1       INR 2.8  Comment: Coumadin Therapy:  2.0 - 3.0 for INR for all indicators except mechanical heart valves  and antiphospholipid syndromes which should use 2.5 - 3.5.         Lymph # 1.3       Lymph % 18.4       MCH 30.9       MCHC 33.6       MCV 92       Mono # 0.7       Mono % 10.4       MPV 10.7       nRBC 0       Platelets 188       Potassium 3.9       PROTEIN TOTAL 6.7       Protime 27.5       RBC 4.98       RDW 13.8       Sodium 135       WBC 6.85                   Assessment and Plan:     Sustained VT (ventricular tachycardia)  Mr. Rebollar is a 72 year old male with PMH of CAD with PCI of LAD in 2006, HFrEF with ICD placed, A fib on chronic warfarin, CVA who presented to OSH with fatigue and weakness for about a weak duration. Had non-sustained VT at OSH  during angiogram. Transferred here for complex PCI.  now s/p PCI to LM into prx Lcx with 2.5 x30 mm stent. EP consulted after several episodes of VT s/p ATP. Now in NSR. On amio and lidocaine drip.    -Device reprogrammed: VT zone to 120 bpm, with ATP x 3 then HV shock.  - Continue amiodarone gtt to 400mg qd.  - Upon discharge patient can follow up with his regular cardiologist at [facility].   - Replete electrolytes as needed.    We will sign off. Please contact us back if patient has another arrhythmic event.     Discussed with Dr. Nam and EP fellow.        Nabor Campos MD  Cardiac Electrophysiology  [facility] - Cardiology Stepdown

## 2022-12-05 NOTE — PROGRESS NOTES
[facility]  Cardiology  Progress Note    Patient Name: Balbir Rebollar Sr.  MRN: [id number]  Admission Date: 11/22/2022  Hospital Length of Stay: 12 days  Code Status: Full Code   Attending Physician: Marichuy Giron MD   Primary Care Physician: Flip Hanna MD  Expected Discharge Date: 12/6/2022  Principal Problem:Coronary artery disease involving native coronary artery of native heart without angina pectoris    Subjective:     Hospital Course:   Pt is a 72 year old male with PMH of CAD with PCI of LAD in 2006, HFrEF with ICD placed, A fib on chronic warfarin, CVA who presented to OSH with fatigue and weakness for about a week duration. Pt was found to have elevated INR (4.6), BNP (1441) and troponin 0.041 and having some frequent episodes of VT, his device was adjust and pt underwent a LHC at [facility] that demonstrated LAD and Lcx occulsions. While pt was in the cath he continued to have episodes of VT so patient was transferred to [facility] for a high risk PCI to place stents in his blocked arteries. Pt was having no chest pain or SOB on arrival. Pt underwent LHC and had a PCI placed in distal R main. Pt's LAD was completely occluded and was not able to be placed. Pt has been placed on DAPT, heparin due to other thrombosis risk, entresto and BB. PT/OT saw pt and felt he required SNF placement. Pt experienced 2 episodes of VT on the night of 12/2. Pt was shocked by ICD once and placed back on amio and also on lidocaine drip. Subsequently discontinued, amio transitioned to oral home dose. Patient experienced altered mental status, likely due to lidocaine.       Interval History: Amio and lidocaine gtt discontinued. Amio transitioned to home po dose. Rate controlled this morning. Patient with no concerns. Daughter at bedside concerned for AMS. Patient pulled out NG tube overnight. SLP evaluating for swallowing ability.     Review of Systems   Constitutional:  Positive for malaise/fatigue. Negative for decreased appetite, diaphoresis and fever.   HENT: Negative.     Eyes: Negative.    Cardiovascular: Negative.  Negative for chest pain, leg swelling and palpitations.   Respiratory: Negative.  Negative for cough and shortness of breath.    Endocrine: Negative.    Skin: Negative.    Musculoskeletal: Negative.    Gastrointestinal:  Positive for dysphagia.   Genitourinary: Negative.    Neurological:  Positive for weakness.   Psychiatric/Behavioral: Negative.     Allergic/Immunologic: Negative.    Objective:     Vital Signs (Most Recent):  Temp: 98.5 °F (36.9 °C) (12/05/22 1100)  Pulse: 81 (12/05/22 1305)  Resp: (!) 27 (12/05/22 1305)  BP: (!) 143/65 (12/05/22 1200)  SpO2: 96 % (12/05/22 1305)   Vital Signs (24h Range):  Temp:  [98.3 °F (36.8 °C)-98.6 °F (37 °C)] 98.5 °F (36.9 °C)  Pulse:  [56-85] 81  Resp:  [16-35] 27  SpO2:  [93 %-97 %] 96 %  BP: ()/(56-97) 143/65     Weight: 68.5 kg (151 lb 0.2 oz)  Body mass index is 22.3 kg/m².     SpO2: 96 %  O2 Device (Oxygen Therapy): room air      Intake/Output Summary (Last 24 hours) at 12/5/2022 1515  Last data filed at 12/5/2022 1105  Gross per 24 hour   Intake 497.96 ml   Output 450 ml   Net 47.96 ml       Lines/Drains/Airways       Peripheral Intravenous Line  Duration                  Peripheral IV - Single Lumen 11/30/22 1130 20 G Anterior;Right Forearm 5 days         Peripheral IV - Single Lumen 12/01/22 1105 20 G Anterior;Left Forearm 4 days                    Physical Exam  Vitals reviewed.   Constitutional:       General: He is awake.      Appearance: Normal appearance. He is not ill-appearing.   HENT:      Head: Normocephalic and atraumatic.      Mouth/Throat:      Mouth: Mucous membranes are dry.   Eyes:      Extraocular Movements: Extraocular movements intact.   Cardiovascular:      Rate and Rhythm: Normal rate and regular rhythm.      Heart sounds: No murmur heard.    No friction rub.   Pulmonary:      Effort:  Pulmonary effort is normal. No respiratory distress.      Breath sounds: Normal breath sounds. No wheezing.   Abdominal:      General: Abdomen is flat. There is no distension.      Palpations: Abdomen is soft.   Musculoskeletal:      Cervical back: Normal range of motion.      Right lower leg: No edema.      Left lower leg: No edema.   Skin:     General: Skin is warm and dry.      Coloration: Skin is not jaundiced.   Neurological:      General: No focal deficit present.      Mental Status: He is alert. Mental status is at baseline.   Psychiatric:         Mood and Affect: Mood normal.         Behavior: Behavior is cooperative.         Thought Content: Thought content normal.       Significant Labs: BMP:   Recent Labs   Lab 12/04/22  0511 12/05/22  0442    114*   * 135*   K 3.6 3.9    105   CO2 21* 21*   BUN 14 15   CREATININE 0.8 0.9   CALCIUM 8.6* 8.7    and CBC   Recent Labs   Lab 12/04/22  0511 12/05/22  0442   WBC 5.37 6.85   HGB 14.1 15.4   HCT 42.8 45.9    188       Significant Imaging:  none    Assessment and Plan:       * Coronary artery disease involving native coronary artery of native heart without angina pectoris  Known CAD s/p LAD PCI 2006. 11/30/22 [facility] with occluded LAD, 90% Cx, moderate RCA disease at [facility]. Pt experience VT while undergoing other heart cath.  - EF <30% by recent echo  - LHC at [facility] 12/1: The RCA with no significant obstructive disease, The left main had 70% distal stenosis, The LAD was occluded at the ostium (unable to stent), The left circumflex had 99% proximal stenosis. Stent placed in LCx  - Now on ASA/Plavix, Coumadin, rosuvastatin (atorvastatin interacts with seizure medication), entresto, lopressor (changed from Toprol XL d/t NGT)    Plan:  - Will do triple therapy with ASA, Plavix, Coumadin for 1 month, then drop ASA for 6 months of Plavix/Coumadin, then exchange ASA for Plavix thereafter  - When able to tolerate PO medications, will  convert lopressor back to Toprol  - Plan to start SGLT inhibitor at discharge   - Daily BMP for monitoring of K while on entresto           Paroxysmal atrial fibrillation  NSR  - Continue Lopressor 50mg   - Continue Coumadin  - Will need to follow up with PCP for coumadin monitoring       Sustained VT (ventricular tachycardia)  Freq sustained episodes of VT noted on ICD interrogation, below treatment threshold. 11/30/22 VT during C 11/29. Pt experienced 2 episodes of VT overnight on the 12/2. VT improved on IV amiodarone   - ICD reprogrammed 11/23/22.  - Amio IV transitioned to oral 400mg qd po. Concern for interactions with seizure medication and amiodarone  - lidocaine gtt discontinued     Plan to attempt to contact outpatient physicians in order to coordinate carbemazepine, given it's multiple medication interactions. Per family, it is unclear who prescribes this medication, but they think it is his psychiatrist, Dr. Blanchard            Dysphagia  Possibly due to lidocaine. Trial NGT, patient pulled it out   - SLP consult, recommending full liquid diet    QT prolongation  Pt's Qtc was 512 on last EKG    Hold all qt prolonging drugs    History of seizure disorder  On carbamazepine  - This medication complicated blood thinner management, as it interacts with DOACs. Also interacts with amiodarone to increase metabolism, and interacts with atorvastatin      Encephalopathy, metabolic  Patient with AMS, per patient's daughter at bedside this is very unlike him  - Likely side effect of lidocaine gtt. Now discontinued. Continue to montitor    Acute on chronic combined systolic and diastolic congestive heart failure  Known severe LV dysfxn/ICM, Last EF <30  Not grossly vol overloaded,  Placed on entresto and toprol, statin, has ICD in place        Weakness  PT/OT recommending skilled nursing on DC  Continue daily assessments    Pure hypercholesterolemia  Cont statin    ICD (implantable cardioverter-defibrillator), single,  in situ  Medtronic single chamber ICD  Reprogrammed with lower rate threshold and more ATP on 11/23/22  Amio gtt transitioned to oral. Lidocaine gtt discontinued    History of CVA (cerebrovascular accident)  Continue Coumadin  Continue ASA, statin    Chronic anticoagulation  [facility] on 12/1, distal LM PCI placed. Unable to stent LAD due to complete occlusion  - Initiated on aspirin and clopidogrel   - Continue home Coumadin        VTE Risk Mitigation (From admission, onward)         Ordered     warfarin (COUMADIN) tablet 2.5 mg  Daily         12/05/22 1125     Reason for No Pharmacological VTE Prophylaxis  Once        Question:  Reasons:  Answer:  Already adequately anticoagulated on oral Anticoagulants    11/23/22 0042     IP VTE HIGH RISK PATIENT  Once         11/23/22 0042     Place sequential compression device  Until discontinued         11/23/22 0042                Elizabeth Morris DO PGY1  Cardiology  [facility]

## 2022-12-05 NOTE — PT/OT/SLP PROGRESS
"Speech Language Pathology Treatment    Patient Name:  Balbir Rebollar Sr.   MRN:  [id number]  Admitting Diagnosis: Acute on chronic combined systolic and diastolic congestive heart failure    Recommendations:                 General Recommendations:   Ongoing swallow assessment  Diet recommendations:  Full liquids   Aspiration Precautions: 1 bite/sip at a time, Feed only when awake/alert, HOB to 90 degrees, Meds crushed in puree, Monitor for s/s of aspiration, Small bites/sips, and Strict aspiration precautions   General Precautions: Standard, aspiration, fall  Communication strategies:  provide increased time to answer    Subjective     Patient awake but lethargic  "He will cough if he eats or drinks too quickly." Patient's family reporting occasional difficulties with PO intake as baseline  Communicated with nurse prior to session     Pain/Comfort:  Pain Rating 1: 0/10  Pain Rating Post-Intervention 1: 0/10    Respiratory Status: Room air    Objective:     Has the patient been evaluated by SLP for swallowing?   Yes  Keep patient NPO? No     Patient sitting up in bed with his daughter present during session. No NG tube in place patient pulled it this AM. He was lethargic, but appropriately roused for PO trials. He demonstrated an adequate volitional cough/throat clear prior to trials. Patient tolerated thin liquids x6 (single sips via straw) and puree x3 with no overt signs of aspiration. Solid trials deferred 2' to reduced KI, but patient tolerated small of trials well. SLP educated patient and family regarding current recs. Patient left in bed with call light within reach. Recs communicated to team.     Assessment:     Balbir Rebollar Sr. is a 72 y.o. male with an SLP diagnosis of Dysphagia. ST to continue to follow.     Goals:   Multidisciplinary Problems       SLP Goals          Problem: SLP    Goal Priority Disciplines Outcome   SLP Goal     SLP Ongoing, Progressing   Description: Speech Language " Pathology Goals  Goals expected to be met by   1. Ongoing swallow assessment                  Multidisciplinary Problems (Resolved)          Problem: SLP    Goal Priority Disciplines Outcome   SLP Goal   (Resolved)     SLP Met   Description: ST. Pt will tolerate PO diet of pureed w/nectar thick liquids w/o overt s/s of aspiration. (Goal met )  2. Pt will tolerate 4oz thin liquids across multiple trials without overt s/s of aspiration. (Goal met )                       Plan:     Patient to be seen:  4 x/week   Plan of Care expires:  23  Plan of Care reviewed with:  patient, daughter   SLP Follow-Up:  Yes       Discharge recommendations:   (pending)   Barriers to Discharge:  None    Time Tracking:     SLP Treatment Date:   22  Speech Start Time:  1055  Speech Stop Time:  1112     Speech Total Time (min):  17 min    Billable Minutes: Treatment Swallowing Dysfunction 9 and Self Care/Home Management Training 8    Jamey Magaña CCC-SLP  Speech-Language Pathology  Pager: [phone]   2022

## 2022-12-05 NOTE — SUBJECTIVE & OBJECTIVE
Interval History: Amio and lidocaine gtt discontinued. Amio transitioned to home po dose. Rate controlled this morning. Patient with no concerns. Daughter at bedside concerned for AMS. Patient pulled out NG tube overnight. SLP evaluating for swallowing ability.     Review of Systems   Constitutional: Positive for malaise/fatigue. Negative for decreased appetite, diaphoresis and fever.   HENT: Negative.     Eyes: Negative.    Cardiovascular: Negative.  Negative for chest pain, leg swelling and palpitations.   Respiratory: Negative.  Negative for cough and shortness of breath.    Endocrine: Negative.    Skin: Negative.    Musculoskeletal: Negative.    Gastrointestinal:  Positive for dysphagia.   Genitourinary: Negative.    Neurological:  Positive for weakness.   Psychiatric/Behavioral: Negative.     Allergic/Immunologic: Negative.    Objective:     Vital Signs (Most Recent):  Temp: 98.5 °F (36.9 °C) (12/05/22 1100)  Pulse: 81 (12/05/22 1305)  Resp: (!) 27 (12/05/22 1305)  BP: (!) 143/65 (12/05/22 1200)  SpO2: 96 % (12/05/22 1305)   Vital Signs (24h Range):  Temp:  [98.3 °F (36.8 °C)-98.6 °F (37 °C)] 98.5 °F (36.9 °C)  Pulse:  [56-85] 81  Resp:  [16-35] 27  SpO2:  [93 %-97 %] 96 %  BP: ()/(56-97) 143/65     Weight: 68.5 kg (151 lb 0.2 oz)  Body mass index is 22.3 kg/m².     SpO2: 96 %  O2 Device (Oxygen Therapy): room air      Intake/Output Summary (Last 24 hours) at 12/5/2022 1515  Last data filed at 12/5/2022 1105  Gross per 24 hour   Intake 497.96 ml   Output 450 ml   Net 47.96 ml       Lines/Drains/Airways       Peripheral Intravenous Line  Duration                  Peripheral IV - Single Lumen 11/30/22 1130 20 G Anterior;Right Forearm 5 days         Peripheral IV - Single Lumen 12/01/22 1105 20 G Anterior;Left Forearm 4 days                    Physical Exam  Vitals reviewed.   Constitutional:       General: He is awake.      Appearance: Normal appearance. He is not ill-appearing.   HENT:      Head:  Normocephalic and atraumatic.      Mouth/Throat:      Mouth: Mucous membranes are dry.   Eyes:      Extraocular Movements: Extraocular movements intact.   Cardiovascular:      Rate and Rhythm: Normal rate and regular rhythm.      Heart sounds: No murmur heard.    No friction rub.   Pulmonary:      Effort: Pulmonary effort is normal. No respiratory distress.      Breath sounds: Normal breath sounds. No wheezing.   Abdominal:      General: Abdomen is flat. There is no distension.      Palpations: Abdomen is soft.   Musculoskeletal:      Cervical back: Normal range of motion.      Right lower leg: No edema.      Left lower leg: No edema.   Skin:     General: Skin is warm and dry.      Coloration: Skin is not jaundiced.   Neurological:      General: No focal deficit present.      Mental Status: He is alert. Mental status is at baseline.   Psychiatric:         Mood and Affect: Mood normal.         Behavior: Behavior is cooperative.         Thought Content: Thought content normal.       Significant Labs: BMP:   Recent Labs   Lab 12/04/22  0511 12/05/22  0442    114*   * 135*   K 3.6 3.9    105   CO2 21* 21*   BUN 14 15   CREATININE 0.8 0.9   CALCIUM 8.6* 8.7    and CBC   Recent Labs   Lab 12/04/22  0511 12/05/22  0442   WBC 5.37 6.85   HGB 14.1 15.4   HCT 42.8 45.9    188       Significant Imaging:  none

## 2022-12-05 NOTE — ASSESSMENT & PLAN NOTE
Possibly due to lidocaine. Trial NGT, patient pulled it out   - SLP consult, recommending full liquid diet

## 2022-12-05 NOTE — ASSESSMENT & PLAN NOTE
[facility] on 12/1, distal LM PCI placed. Unable to stent LAD due to complete occlusion  - Initiated on aspirin and clopidogrel   - Continue home Coumadin

## 2022-12-05 NOTE — PLAN OF CARE
[facility]  Discharge Reassessment    Primary Care Provider: Flip Hanna MD    Expected Discharge Date: 12/6/2022    Reassessment (most recent)       Discharge Reassessment - 12/05/22 1323          Discharge Reassessment    Assessment Type Discharge Planning Reassessment     Discharge Plan discussed with: Patient;Adult children     Communicated MOR with patient/caregiver Yes     Discharge Plan A Skilled Nursing Facility     Discharge Plan B Home Health     DME Needed Upon Discharge  none     Discharge Barriers Identified None        Post-Acute Status    Post-Acute Authorization Placement     Post-Acute Placement Status Pending payor review/awaiting authorization (if required)     Discharge Delays Post-Acute Set-up                   Pt accepted by [facility].  SW relayed this to pt and daughter Laurie at bedside who voiced agreement with placement there.  LEATHA spoke with Jamey in admissions at [facility] informing them that pt is medically ready to discharge and requesting that they contact family and submit for auth.  SW will follow up.    Brooke Lees LMSW  [facility]  [id number]

## 2022-12-05 NOTE — PT/OT/SLP PROGRESS
Occupational Therapy   Co-Treatment    Name: Balbir Rebollar Sr.  MRN: [id number]  Admitting Diagnosis:  Acute on chronic combined systolic and diastolic congestive heart failure  4 Days Post-Op    Recommendations:     Discharge Recommendations: nursing facility, skilled  Discharge Equipment Recommendations:  bedside commode, bath bench, walker, rolling  Barriers to discharge:  None    Assessment:     Balbir Rebollar Sr. is a 72 y.o. male with a medical diagnosis of Acute on chronic combined systolic and diastolic congestive heart failure.  He presents with progress towards goals as evidenced by increased tolerance for standing activities and vital signs WDL during session. Pt with some N/V sitting EOB, but resolved after several minutes. Performance deficits affecting function are weakness, impaired balance, decreased safety awareness, impaired endurance, impaired cardiopulmonary response to activity, impaired self care skills, impaired functional mobility, gait instability. Pt benefits from co-treatment with PT to accommodate pt's activity tolerance and progression with therapy.      Rehab Prognosis:  Good; patient would benefit from acute skilled OT services to address these deficits and reach maximum level of function.       Plan:     Patient to be seen 3 x/week to address the above listed problems via self-care/home management, therapeutic exercises, therapeutic activities  Plan of Care Expires: 12/31/22  Plan of Care Reviewed with: patient    Subjective     Pain/Comfort:  Pain Rating 1: 0/10  Pain Rating Post-Intervention 1: 0/10    Objective:     Communicated with: RN prior to session.  Patient found supine with blood pressure cuff, telemetry, pulse ox (continuous), peripheral IV upon OT entry to room.    General Precautions: Standard, fall    Orthopedic Precautions:N/A  Braces: N/A  Respiratory Status: Room air     Occupational Performance:     Bed Mobility:    Patient completed  Scooting/Bridging with contact guard assistance  Patient completed Supine to Sit with contact guard assistance     Functional Mobility/Transfers:  Patient completed Sit <> Stand Transfer with contact guard assistance  with  rolling walker from EOB and b/s chair  Functional Mobility: CGA to/from sink and around room with seated rest break    Activities of Daily Living:  Grooming: modified independence with tasks with CGA provided for static standing at sink  Upper Body Dressing: minimum assistance    Lower Body Dressing: minimum assistance        AMPAC 6 Click ADL: 18    Treatment & Education:  Pt ed on OT POC  Pt ed on pacing activities and allowing body to acclimated prior to movement  Pt ed on ROM ex's 3x daily for increased overall strength and endurance      Patient left up in chair with all lines intact, call button in reach, and RN notified    GOALS:   Multidisciplinary Problems       Occupational Therapy Goals          Problem: Occupational Therapy    Goal Priority Disciplines Outcome Interventions   Occupational Therapy Goal     OT, PT/OT Ongoing, Progressing    Description: Goals to be met by: 12/15/22     Patient will increase functional independence with ADLs by performing:    Feeding with Modified [place].  UE Dressing with SBA  LE Dressing with Minimal Assistance.  Grooming while standing at sink with Contact Guard Assistance and Assistive Devices as needed.  Toileting from bedside commode with Minimal A and Assistive Devices as needed for hygiene and clothing management.   Toilet transfer to [facility] with Contact Guard Assistance.  Upper extremity exercise program x15 reps per handout, with assistance as needed.                   Multidisciplinary Problems (Resolved)          Problem: Occupational Therapy    Goal Priority Disciplines Outcome Interventions   Occupational Therapy Goal   (Resolved)     OT, PT/OT Met                        Time Tracking:     OT Date of Treatment: 12/05/22  OT Start Time:  1250  OT Stop Time: 1316  OT Total Time (min): 26 min    Billable Minutes:Self Care/Home Management 15  Therapeutic Activity 8               12/5/2022

## 2022-12-05 NOTE — ASSESSMENT & PLAN NOTE
NSR  - Continue Lopressor 50mg   - Continue Coumadin  - Will need to follow up with PCP for coumadin monitoring

## 2022-12-05 NOTE — ASSESSMENT & PLAN NOTE
Freq sustained episodes of VT noted on ICD interrogation, below treatment threshold. 11/30/22 VT during C 11/29. Pt experienced 2 episodes of VT overnight on the 12/2. VT improved on IV amiodarone   - ICD reprogrammed 11/23/22.  - Amio IV transitioned to oral 400mg qd po. Concern for interactions with seizure medication and amiodarone  - lidocaine gtt discontinued     Plan to attempt to contact outpatient physicians in order to coordinate carbemazepine, given it's multiple medication interactions. Per family, it is unclear who prescribes this medication, but they think it is his psychiatrist, Dr. Blanchard

## 2022-12-05 NOTE — PLAN OF CARE
Problem: Occupational Therapy  Goal: Occupational Therapy Goal  Description: Goals to be met by: 12/15/22     Patient will increase functional independence with ADLs by performing:    Feeding with Modified [place].  UE Dressing with SBA  LE Dressing with Minimal Assistance.  Grooming while standing at sink with Contact Guard Assistance and Assistive Devices as needed.  Toileting from bedside commode with Minimal A and Assistive Devices as needed for hygiene and clothing management.   Toilet transfer to [facility] with Contact Guard Assistance.  Upper extremity exercise program x15 reps per handout, with assistance as needed.    Outcome: Ongoing, Progressing

## 2022-12-05 NOTE — PLAN OF CARE
Problem: Adult Inpatient Plan of Care  Goal: Plan of Care Review  Outcome: Ongoing, Progressing  Flowsheets (Taken 12/5/2022 5684)  Plan of Care Reviewed With:   patient   daughter  Goal: Optimal Comfort and Wellbeing  Outcome: Ongoing, Progressing  Goal: Readiness for Transition of Care  Outcome: Ongoing, Progressing     Problem: Fall Injury Risk  Goal: Absence of Fall and Fall-Related Injury  Outcome: Ongoing, Progressing     Problem: Skin Injury Risk Increased  Goal: Skin Health and Integrity  Outcome: Ongoing, Progressing     Problem: Altered Behavior (Delirium)  Goal: Improved Behavioral Control  Outcome: Ongoing, Progressing     Problem: Attention and Thought Clarity Impairment (Delirium)  Goal: Improved Attention and Thought Clarity  Outcome: Ongoing, Progressing

## 2022-12-05 NOTE — ASSESSMENT & PLAN NOTE
On carbamazepine  - This medication complicated blood thinner management, as it interacts with DOACs. Also interacts with amiodarone to increase metabolism, and interacts with atorvastatin

## 2022-12-05 NOTE — ASSESSMENT & PLAN NOTE
Medtronic single chamber ICD  Reprogrammed with lower rate threshold and more ATP on 11/23/22  Amio gtt transitioned to oral. Lidocaine gtt discontinued

## 2022-12-05 NOTE — ASSESSMENT & PLAN NOTE
Mr. Rebollar is a 72 year old male with PMH of CAD with PCI of LAD in 2006, HFrEF with ICD placed, A fib on chronic warfarin, CVA who presented to OSH with fatigue and weakness for about a weak duration. Had non-sustained VT at OSH during angiogram. Transferred here for complex PCI.  now s/p PCI to LM into prx Lcx with 2.5 x30 mm stent. EP consulted after several episodes of VT s/p ATP. Now in NSR. On amio and lidocaine drip.    -Device reprogrammed: VT zone to 120 bpm, with ATP x 3 then HV shock.  - Continue amiodarone gtt to 400mg qd.  - Upon discharge patient can follow up with his regular cardiologist at [facility].   - Replete electrolytes as needed.    We will sign off. Please contact us back if patient has another arrhythmic event.     Discussed with Dr. Nam and EP fellow.

## 2022-12-05 NOTE — ASSESSMENT & PLAN NOTE
Known CAD s/p LAD PCI 2006. 11/30/22 [facility] with occluded LAD, 90% Cx, moderate RCA disease at [facility]. Pt experience VT while undergoing other heart cath.  - EF <30% by recent echo  - [facility] at [facility] 12[place]1: The RCA with no significant obstructive disease, The left main had 70% distal stenosis, The LAD was occluded at the ostium (unable to stent), The left circumflex had 99% proximal stenosis. Stent placed in LCx  - Now on ASA/Plavix, Coumadin, rosuvastatin (atorvastatin interacts with seizure medication), entresto, lopressor (changed from Toprol XL d/t NGT)    Plan:  - Will do triple therapy with ASA, Plavix, Coumadin for 1 month, then drop ASA for 6 months of Plavix/Coumadin, then exchange ASA for Plavix thereafter  - When able to tolerate PO medications, will convert lopressor back to Toprol  - Plan to start SGLT inhibitor at discharge   - Daily BMP for monitoring of K while on entresto

## 2022-12-05 NOTE — PT/OT/SLP PROGRESS
Physical Therapy Treatment    Patient Name:  Balbir Rebollar Sr.   MRN:  [id number]    Recommendations:     Discharge Recommendations: nursing facility, skilled  Discharge Equipment Recommendations: walker, rolling, bedside commode, bath bench  Barriers to discharge: None    Assessment:     Balbir Rebollar Sr. is a 72 y.o. male admitted with a medical diagnosis of Coronary artery disease involving native coronary artery of native heart without angina pectoris.  He presents with the following impairments/functional limitations: weakness, impaired endurance, impaired self care skills, impaired functional mobility, gait instability, impaired balance, impaired cardiopulmonary response to activity Patient experienced increased nausea following initial supine to sit transfer. Family remotes this is typical and symptoms subside following brief period of time. Patient reports symptoms subsiding and expresses he would like to continue therapy. Nursing notified and cleared pt. Pt then able to progress in his mobility with several gait trials of up to x40ft in room with CGA. Co-treatment with OT this date to further progress patient mobility and function.     Rehab Prognosis: Good; patient would benefit from acute skilled PT services to address these deficits and reach maximum level of function.    Recent Surgery: Procedure(s) (LRB):  Left heart cath (N/A)  Stent, Drug Eluting, Single Vessel, Coronary 4 Days Post-Op    Plan:     During this hospitalization, patient to be seen 4 x/week to address the identified rehab impairments via gait training, therapeutic activities, therapeutic exercises, neuromuscular re-education and progress toward the following goals:    Plan of Care Expires:  12/25/22    Subjective     Chief Complaint: patient with no complaints  Patient/Family Comments/goals: to return home  Pain/Comfort:  Pain Rating 1: 0/10  Pain Rating Post-Intervention 1: 0/10      Objective:     Communicated with RN  prior to session.  Patient found HOB elevated with peripheral IV, telemetry, pulse ox (continuous) upon PT entry to room.     General Precautions: Standard, fall  Orthopedic Precautions: N/A  Braces: N/A  Respiratory Status: Room air     Functional Mobility:  Bed Mobility:     Rolling Left:  contact guard assistance  Supine to Sit: contact guard assistance  Transfers:     Sit to Stand:  contact guard assistance with rolling walker  Gait: x10' + x10' with ADLs performed at sink between trials f/b x10' + x40' with ADLs performed at sink between. Pt required RW and CGA for all gait trials.   Verbal cues for hand placement and proper walker management to improve safety.        AM-PAC 6 CLICK MOBILITY  Turning over in bed (including adjusting bedclothes, sheets and blankets)?: 3  Sitting down on and standing up from a chair with arms (e.g., wheelchair, bedside commode, etc.): 3  Moving from lying on back to sitting on the side of the bed?: 3  Moving to and from a bed to a chair (including a wheelchair)?: 3  Need to walk in hospital room?: 3  Climbing 3-5 steps with a railing?: 2  Basic Mobility Total Score: 17       Treatment & Education:  Educated patient on PTA role in POC.  Educated patient on using call light for mobility needs.   Patient verbalized understanding.      Patient left up in chair with all lines intact, call button in reach, RN notified, and family present..    GOALS:   Multidisciplinary Problems       Physical Therapy Goals          Problem: Physical Therapy    Goal Priority Disciplines Outcome Goal Variances Interventions   Physical Therapy Goal     PT, PT/OT Ongoing, Progressing     Description: Goals to be met by: 12/15/2022    Patient will increase functional independence with mobility by performin. Supine to sit with Stand-by Assistance  2. Sit to stand transfer with Stand-by Assistance using LRAD   3. Gait  x 25 feet with Stand-by Assistance using LVAD.   4. Ascend/descend 1 flight of  stairs with bilateral Handrails Minimal Assistance   5. Stand for 3 minutes with Stand-by Assistance using LRAD                         Time Tracking:     PT Received On: 12/05/22  PT Start Time: 1250     PT Stop Time: 1316  PT Total Time (min): 26 min     Billable Minutes: Gait Training 15 and Therapeutic Activity 8    Treatment Type: Treatment  PT/PTA: PTA     PTA Visit Number: 1     12/05/2022

## 2022-12-05 NOTE — NURSING
Pt tranferred to U-300 via wheelchair, w/ daughter present. No c/o pain, sob, or n/v noted. NAD noted. Remote telemetry monitor placed on pt. OMAR Rangel at bedside w/ pt.

## 2022-12-06 VITALS
HEART RATE: 60 BPM | SYSTOLIC BLOOD PRESSURE: 113 MMHG | OXYGEN SATURATION: 95 % | WEIGHT: 151 LBS | TEMPERATURE: 98 F | RESPIRATION RATE: 18 BRPM | HEIGHT: 69 IN | BODY MASS INDEX: 22.36 KG/M2 | DIASTOLIC BLOOD PRESSURE: 60 MMHG

## 2022-12-06 LAB
ALBUMIN SERPL BCP-MCNC: 3.3 G/DL (ref 3.5–5.2)
ALP SERPL-CCNC: 91 U/L (ref 55–135)
ALT SERPL W/O P-5'-P-CCNC: 45 U/L (ref 10–44)
ANION GAP SERPL CALC-SCNC: 14 MMOL/L (ref 8–16)
AST SERPL-CCNC: 66 U/L (ref 10–40)
BASOPHILS # BLD AUTO: 0.07 K/UL (ref 0–0.2)
BASOPHILS NFR BLD: 1.3 % (ref 0–1.9)
BILIRUB SERPL-MCNC: 0.6 MG/DL (ref 0.1–1)
BUN SERPL-MCNC: 18 MG/DL (ref 8–23)
CALCIUM SERPL-MCNC: 8.9 MG/DL (ref 8.7–10.5)
CHLORIDE SERPL-SCNC: 106 MMOL/L (ref 95–110)
CO2 SERPL-SCNC: 19 MMOL/L (ref 23–29)
CREAT SERPL-MCNC: 0.8 MG/DL (ref 0.5–1.4)
DIFFERENTIAL METHOD: NORMAL
EOSINOPHIL # BLD AUTO: 0.2 K/UL (ref 0–0.5)
EOSINOPHIL NFR BLD: 3.5 % (ref 0–8)
ERYTHROCYTE [DISTWIDTH] IN BLOOD BY AUTOMATED COUNT: 13.8 % (ref 11.5–14.5)
EST. GFR  (NO RACE VARIABLE): >60 ML/MIN/1.73 M^2
GLUCOSE SERPL-MCNC: 87 MG/DL (ref 70–110)
HCT VFR BLD AUTO: 45.4 % (ref 40–54)
HGB BLD-MCNC: 15.4 G/DL (ref 14–18)
IMM GRANULOCYTES # BLD AUTO: 0.01 K/UL (ref 0–0.04)
IMM GRANULOCYTES NFR BLD AUTO: 0.2 % (ref 0–0.5)
INR PPP: 3.8 (ref 0.8–1.2)
LYMPHOCYTES # BLD AUTO: 1.2 K/UL (ref 1–4.8)
LYMPHOCYTES NFR BLD: 22.7 % (ref 18–48)
MCH RBC QN AUTO: 31 PG (ref 27–31)
MCHC RBC AUTO-ENTMCNC: 33.9 G/DL (ref 32–36)
MCV RBC AUTO: 91 FL (ref 82–98)
MONOCYTES # BLD AUTO: 0.6 K/UL (ref 0.3–1)
MONOCYTES NFR BLD: 10.1 % (ref 4–15)
NEUTROPHILS # BLD AUTO: 3.4 K/UL (ref 1.8–7.7)
NEUTROPHILS NFR BLD: 62.2 % (ref 38–73)
NRBC BLD-RTO: 0 /100 WBC
PLATELET # BLD AUTO: 169 K/UL (ref 150–450)
PMV BLD AUTO: 10.8 FL (ref 9.2–12.9)
POTASSIUM SERPL-SCNC: 3.5 MMOL/L (ref 3.5–5.1)
PROT SERPL-MCNC: 6.6 G/DL (ref 6–8.4)
PROTHROMBIN TIME: 36.6 SEC (ref 9–12.5)
RBC # BLD AUTO: 4.97 M/UL (ref 4.6–6.2)
SARS-COV-2 RDRP RESP QL NAA+PROBE: NEGATIVE
SODIUM SERPL-SCNC: 139 MMOL/L (ref 136–145)
WBC # BLD AUTO: 5.47 K/UL (ref 3.9–12.7)

## 2022-12-06 PROCEDURE — 36415 COLL VENOUS BLD VENIPUNCTURE: CPT

## 2022-12-06 PROCEDURE — 85025 COMPLETE CBC W/AUTO DIFF WBC: CPT | Performed by: HOSPITALIST

## 2022-12-06 PROCEDURE — 25000003 PHARM REV CODE 250: Performed by: STUDENT IN AN ORGANIZED HEALTH CARE EDUCATION/TRAINING PROGRAM

## 2022-12-06 PROCEDURE — 25000003 PHARM REV CODE 250: Performed by: INTERNAL MEDICINE

## 2022-12-06 PROCEDURE — 99239 PR HOSPITAL DISCHARGE DAY,>30 MIN: ICD-10-PCS | Mod: GC,,, | Performed by: INTERNAL MEDICINE

## 2022-12-06 PROCEDURE — 25000003 PHARM REV CODE 250: Performed by: HOSPITALIST

## 2022-12-06 PROCEDURE — 25000003 PHARM REV CODE 250

## 2022-12-06 PROCEDURE — 1111F DSCHRG MED/CURRENT MED MERGE: CPT | Mod: CPTII,GC,, | Performed by: INTERNAL MEDICINE

## 2022-12-06 PROCEDURE — 80053 COMPREHEN METABOLIC PANEL: CPT

## 2022-12-06 PROCEDURE — 99239 HOSP IP/OBS DSCHRG MGMT >30: CPT | Mod: GC,,, | Performed by: INTERNAL MEDICINE

## 2022-12-06 PROCEDURE — 1111F PR DISCHARGE MEDS RECONCILED W/ CURRENT OUTPATIENT MED LIST: ICD-10-PCS | Mod: CPTII,GC,, | Performed by: INTERNAL MEDICINE

## 2022-12-06 PROCEDURE — 92526 ORAL FUNCTION THERAPY: CPT

## 2022-12-06 PROCEDURE — 85610 PROTHROMBIN TIME: CPT | Performed by: STUDENT IN AN ORGANIZED HEALTH CARE EDUCATION/TRAINING PROGRAM

## 2022-12-06 PROCEDURE — U0002 COVID-19 LAB TEST NON-CDC: HCPCS | Performed by: INTERNAL MEDICINE

## 2022-12-06 RX ORDER — METOPROLOL SUCCINATE 50 MG/1
50 TABLET, EXTENDED RELEASE ORAL DAILY
Status: DISCONTINUED | OUTPATIENT
Start: 2022-12-06 | End: 2022-12-06 | Stop reason: HOSPADM

## 2022-12-06 RX ORDER — ROSUVASTATIN CALCIUM 40 MG/1
40 TABLET, COATED ORAL NIGHTLY
Qty: 90 TABLET | Refills: 3 | Status: ON HOLD | OUTPATIENT
Start: 2022-12-06 | End: 2022-12-13 | Stop reason: SDUPTHER

## 2022-12-06 RX ORDER — ENOXAPARIN SODIUM 100 MG/ML
70 INJECTION SUBCUTANEOUS 2 TIMES DAILY
Status: ON HOLD
Start: 2022-12-06 | End: 2022-12-13 | Stop reason: HOSPADM

## 2022-12-06 RX ORDER — SPIRONOLACTONE 25 MG/1
12.5 TABLET ORAL DAILY
Qty: 15 TABLET | Refills: 11 | Status: ON HOLD | OUTPATIENT
Start: 2022-12-06 | End: 2022-12-13 | Stop reason: SDUPTHER

## 2022-12-06 RX ORDER — WARFARIN 2.5 MG/1
TABLET ORAL
Qty: 30 TABLET | Refills: 11 | Status: SHIPPED | OUTPATIENT
Start: 2022-12-06 | End: 2022-12-06 | Stop reason: HOSPADM

## 2022-12-06 RX ORDER — PANTOPRAZOLE SODIUM 40 MG/1
40 TABLET, DELAYED RELEASE ORAL DAILY
Qty: 30 TABLET | Refills: 11 | Status: ON HOLD | OUTPATIENT
Start: 2022-12-07 | End: 2022-12-13 | Stop reason: SDUPTHER

## 2022-12-06 RX ORDER — METOPROLOL SUCCINATE 50 MG/1
50 TABLET, EXTENDED RELEASE ORAL DAILY
Qty: 30 TABLET | Refills: 11 | Status: ON HOLD | OUTPATIENT
Start: 2022-12-06 | End: 2022-12-13 | Stop reason: SDUPTHER

## 2022-12-06 RX ORDER — NITROGLYCERIN 0.4 MG/1
0.4 TABLET SUBLINGUAL EVERY 5 MIN PRN
Status: ON HOLD
Start: 2022-12-06 | End: 2022-12-13 | Stop reason: SDUPTHER

## 2022-12-06 RX ORDER — DAPAGLIFLOZIN 10 MG/1
10 TABLET, FILM COATED ORAL DAILY
Qty: 30 TABLET | Refills: 2 | Status: ON HOLD | OUTPATIENT
Start: 2022-12-06 | End: 2022-12-13 | Stop reason: SDUPTHER

## 2022-12-06 RX ORDER — CLOPIDOGREL BISULFATE 75 MG/1
75 TABLET ORAL NIGHTLY
Qty: 30 TABLET | Refills: 11 | Status: ON HOLD | OUTPATIENT
Start: 2022-12-06 | End: 2022-12-13 | Stop reason: SDUPTHER

## 2022-12-06 RX ORDER — NAPROXEN SODIUM 220 MG/1
81 TABLET, FILM COATED ORAL DAILY
Qty: 30 TABLET | Refills: 0 | Status: SHIPPED | OUTPATIENT
Start: 2022-12-07 | End: 2023-05-10

## 2022-12-06 RX ORDER — AMIODARONE HYDROCHLORIDE 400 MG/1
TABLET ORAL
Qty: 30 TABLET | Refills: 11 | Status: ON HOLD
Start: 2022-12-06 | End: 2022-12-13 | Stop reason: SDUPTHER

## 2022-12-06 RX ORDER — AMIODARONE HYDROCHLORIDE 400 MG/1
400 TABLET ORAL DAILY
Qty: 30 TABLET | Refills: 11 | Status: SHIPPED | OUTPATIENT
Start: 2022-12-07 | End: 2022-12-06 | Stop reason: SDUPTHER

## 2022-12-06 RX ADMIN — PANTOPRAZOLE SODIUM 40 MG: 40 TABLET, DELAYED RELEASE ORAL at 08:12

## 2022-12-06 RX ADMIN — LACTOBACILLUS ACIDOPHILUS / LACTOBACILLUS BULGARICUS 1 EACH: 100 MILLION CFU STRENGTH GRANULES at 08:12

## 2022-12-06 RX ADMIN — ASPIRIN 81 MG CHEWABLE TABLET 81 MG: 81 TABLET CHEWABLE at 08:12

## 2022-12-06 RX ADMIN — LACTOBACILLUS ACIDOPHILUS / LACTOBACILLUS BULGARICUS 1 EACH: 100 MILLION CFU STRENGTH GRANULES at 09:12

## 2022-12-06 RX ADMIN — SACUBITRIL AND VALSARTAN 1 TABLET: 24; 26 TABLET, FILM COATED ORAL at 08:12

## 2022-12-06 RX ADMIN — AMIODARONE HYDROCHLORIDE 400 MG: 200 TABLET ORAL at 08:12

## 2022-12-06 RX ADMIN — DOCUSATE SODIUM 100 MG: 100 CAPSULE, LIQUID FILLED ORAL at 08:12

## 2022-12-06 RX ADMIN — CARBAMAZEPINE 400 MG: 200 TABLET ORAL at 08:12

## 2022-12-06 RX ADMIN — CLOPIDOGREL BISULFATE 75 MG: 75 TABLET ORAL at 08:12

## 2022-12-06 RX ADMIN — WARFARIN SODIUM 2.5 MG: 2.5 TABLET ORAL at 05:12

## 2022-12-06 RX ADMIN — ROSUVASTATIN CALCIUM 40 MG: 20 TABLET, FILM COATED ORAL at 08:12

## 2022-12-06 RX ADMIN — SPIRONOLACTONE 12.5 MG: 25 TABLET ORAL at 01:12

## 2022-12-06 RX ADMIN — METOPROLOL SUCCINATE 50 MG: 50 TABLET, EXTENDED RELEASE ORAL at 01:12

## 2022-12-06 NOTE — NURSING
Report given to Suma, receiving patient into room 140B at [facility]. Report given of diagnosis and history with plan of care. Time allowed for questions and concerns.

## 2022-12-06 NOTE — PLAN OF CARE
[facility] - Cardiology Stepdown  Discharge Final Note    Primary Care Provider: Flip Hanna MD    Expected Discharge Date: 12/6/2022    Final Discharge Note (most recent)       Final Note - 12/06/22 1433          Final Note    Assessment Type Final Discharge Note     Anticipated Discharge Disposition Skilled Nursing Facility        Post-Acute Status    Post-Acute Authorization Placement     Post-Acute Placement Status Set-up Complete/Auth obtained                   Transportation scheduled via wheelchair van for 4:00pm to transfer to [facility] (time not guaranteed).  OMAR Rangel to call report to [facility] at [phone], room 140B.  OMAR Rangel was notified of the above information.   also called and notified pt's daughter Silvia of transfer.    UPDATE 3:02 PM  Jamey in admissions at [facility] notified of SNF orders being update in Epic (per Latoya in admissions they have Epic access).      Brooke Lees LMSW  [facility]  [id number]        Important Message from Medicare  Important Message from Medicare regarding Discharge Appeal Rights: Given to patient/caregiver, Explained to patient/caregiver, Signed/date by patient/caregiver     Date IMM was signed: 11/25/22  Time IMM was signed: 1052    Contact Info       Chris Fournier MD Hutchinson, David B., MD    [address] [place] [address].    [address]    [place] [address]    [phone]       Next Steps: Follow up in 1 week(s)    Flip Hanna MD   Specialty: Internal Medicine   Relationship: PCP - General    [address] [place] [address]  [id number]  [facility] [address]   Phone: [phone]       Next Steps: Follow up in 1 week(s)    Instructions: @1:45pm, with Dr. Adiel MD for hospital follow up

## 2022-12-06 NOTE — DISCHARGE SUMMARY
[facility] - Cardiology Stepdown  Cardiology  Discharge Summary      Patient Name: Balbir Rebollar Sr.  MRN: [id number]  Admission Date: 11/22/2022  Hospital Length of Stay: 13 days  Discharge Date and Time:  12/06/2022 1:28 PM  Attending Physician: Marichuy Giron MD    Discharging Provider: Elizabeth Morris DO  Primary Care Physician: Flip Hanna MD    HPI:   72 y.o. male with CAD with LAD PCI placement in 2006, ischemic cardiomyopathy HTN, Afib on long term use of anticoagulants with warfarin, CVA, and ICD, presents to the OSH with a chief complaint of fatigue and weakness with acute onset of one-week. Patient reports he was seen 3 times in a period of a week for the same symptoms and was discharged. Patient's daughter says he was having difficulty getting around and did not have the same enjoy he normally does. Patient reports he was prescribed a new medication (amiodarone) on 11/18/2022 that is causing him to feel more fatigued.     In the OSH ED patient is afebrile without leukocytosis INR 4.6, BNP 1 441, troponin 0.038, 2nd troponin 0.041 Patient follows with Dr. Fournier seen earlier this month. Pt's device should pt had been having frequent VT which is why he was placed on amio by his cardiologist Dr. Fournier at [facility]. At [facility] pt underwent a LHC on 11/29 that showed an occluded LAD, severe Lcx stenosis. His cath was complicated by Vtach. Pt was requested to be transferred to [facility] in [facility] for high risk PCI. Pt is full code.         Procedure(s) (LRB):  Left heart cath (N/A)  Stent, Drug Eluting, Single Vessel, Coronary     Indwelling Lines/Drains at time of discharge:  Lines/Drains/Airways       None                   Hospital Course:  Pt presented to OSH 11/22 with fatigue and weakness for about a week duration. Pt was found to have elevated INR (4.6), BNP (1441) and troponin 0.041 and having some frequent episodes of VT. His device was adjusted and pt underwent a LHC  at [facility] that demonstrated LAD and Lcx occulsions (note that [facility] report has EF 25%). While pt was in the procedure, he continued to have episodes of VT, patient was transferred to [facility] for a high risk PCI to place stents. Pt was having no chest pain or SOB on arrival. Pt underwent LHC 12/1 and had a PCI placed in distal R main. Pt's LAD was completely occluded and stent was not able to be placed. Pt has placed on DAPT, heparin, entresto and BB. Pt experienced 2 episodes of VT on the night of 12/2. Pt was shocked by ICD once and placed back on amio gtt and also on lidocaine gtt. Subsequently discontinued, amio transitioned to oral home dose. Remained rate controlled for remainder of admission. Patient experienced altered mental status, likely due to lidocaine. PT/OT recommending SNF placement.     Patient taking carbamazepine for mood stabilization, a medication which induces amiodarone metabolism and therefore possibly decreases efficacy. Also affects metabolism of DAOCs. Contacted prescriber, patient's physchiatrist Dr. Blanchard with [facility] and discussed. Plan to discontinue carbamazepine with follow up with Dr. Blanchard. Will continue po amiodarone. Due carbamazepine's enzyme induction (affecting Eliquis metabolism) even after discontinuation, will give one week of Lovenox upon resolution of INR, and then transition to Eliquis. This allows adequate time for carbamazepine to fully clear. Initiated on GDMT with Toprol XL, Entresto, aldactone, and Farxiga. Also discharged with DAPT with asa/plavix and high intensity statin. He will need follow up with PCP. Discharge to SNF 12/6.    Constitutional:       General: sleepy, no apparent distress  HENT:      Head: Normocephalic and atraumatic.   Cardiovascular:      Rate and Rhythm: Normal rate and regular rhythm.      Heart sounds: Normal heart sounds.   Pulmonary:      Effort: Pulmonary effort is normal.      Breath sounds: Normal breath sounds.    Abdominal:      General: Abdomen is flat.      Palpations: Abdomen is soft.   Skin:     General: Skin is warm and dry   Neurological:      Mental Status: alert, but sleepy. Engaging in conversation. Generalized weakness.  Psychiatric:         Mood and Affect: Mood normal.         Somewhat inappropriate thought processes         Goals of Care Treatment Preferences:  Code Status: Full Code      Consults:   Consults (From admission, onward)          Status Ordering Provider     Inpatient consult to Electrophysiology  Once        Provider:  (Not yet assigned)    Completed AYAKA SMILEY     Inpatient consult to Interventional Cardiology  Once        Provider:  (Not yet assigned)    Completed ROGER PAREDES     Inpatient consult to Neurology  Once        Provider:  Charanjit Mena MD    Completed LONI LAYTON     Inpatient consult to Palliative Care  Once        Provider:  Tiffany Bar NP    Completed LONI LAYTON     Inpatient consult to Social Work  Once        Provider:  (Not yet assigned)    Completed WESLEY STEVENS     Inpatient consult to Cardiology  Once        Provider:  Wesley Stevens MD    Completed HARSHA SEQUEIRA            Significant Diagnostic Studies: Labs:   BMP:   Recent Labs   Lab 12/05/22  0442 12/06/22  0912   * 87   * 139   K 3.9 3.5    106   CO2 21* 19*   BUN 15 18   CREATININE 0.9 0.8   CALCIUM 8.7 8.9    and CBC   Recent Labs   Lab 12/05/22  0442 12/06/22  0912   WBC 6.85 5.47   HGB 15.4 15.4   HCT 45.9 45.4    169       Pending Diagnostic Studies:       None            Final Active Diagnoses:    Diagnosis Date Noted POA    PRINCIPAL PROBLEM:  Coronary artery disease involving native coronary artery of native heart without angina pectoris [I25.10] 03/06/2019 Yes    Paroxysmal atrial fibrillation [I48.0] 03/06/2019 Yes    Sustained VT (ventricular tachycardia) [I47.20] 11/23/2022 Yes    Dysphagia [R13.10] 12/04/2022 No    QT prolongation [R94.31]  12/03/2022 Unknown    Chronic systolic congestive heart failure [I50.22] 12/03/2022 Yes    Coronary artery disease [I25.10] 12/03/2022 Yes    Ventricular tachyarrhythmia [I47.20] 12/03/2022 Yes    History of seizure disorder [Z86.69] 11/28/2022 Not Applicable    Encephalopathy, metabolic [G93.41] 11/25/2022 Yes    Supratherapeutic INR [R79.1] 11/24/2022 Yes    Hypokalemia [E87.6] 11/24/2022 Yes    ACP (advance care planning) [Z71.89] 11/24/2022 Not Applicable    Weakness [R53.1] 11/23/2022 Yes    Acute on chronic combined systolic and diastolic congestive heart failure [I50.43] 11/23/2022 Yes    ICD (implantable cardioverter-defibrillator), single, in situ [Z95.810] 07/09/2020 Yes    Chronic anticoagulation [Z79.01] 06/23/2020 Not Applicable    History of CVA (cerebrovascular accident) [Z86.73] 03/06/2019 Not Applicable    Pure hypercholesterolemia [E78.00] 03/06/2019 Yes      Problems Resolved During this Admission:    Diagnosis Date Noted Date Resolved POA    B12 deficiency [E53.8] 11/28/2022 12/05/2022 Yes    Nausea and vomiting [R11.2] 11/26/2022 12/05/2022 No    Ischemic cardiomyopathy [I25.5] 02/16/2022 11/24/2022 Yes     No new Assessment & Plan notes have been filed under this hospital service since the last note was generated.  Service: Cardiology      Discharged Condition: stable    Disposition: Skilled Nursing Facility    Follow Up:   Follow-up Information       Chris Fournier MD Follow up in 1 week(s).    Contact information:  Chris Fournier MD    [address] [place] [address].    [id number]    [place] [address] [phone]             Flip Hanna MD Follow up in 1 week(s).    Specialty: Internal Medicine  Why: @1:45pm, with Dr. Adiel MD for hospital follow up  Contact information:  [address] [place] [address]  [id number]  [place] [address] [phone]                           Patient Instructions:      BASIC METABOLIC PANEL   Standing Status: Future Standing Exp. Date: 01/06/23      Medications:  Reconciled Home Medications:      Medication List        START taking these medications      apixaban 5 mg Tab  Commonly known as: ELIQUIS  Take 1 tablet (5 mg total) by mouth 2 (two) times daily. Once INR < 2, start Lovenox for one week. Then stop Lovenox and start Eliquis.     aspirin 81 MG Chew  Take 1 tablet (81 mg total) by mouth once daily. Take for 30 days then please discontinue  Start taking on: December 7, 2022     clopidogreL 75 mg tablet  Commonly known as: PLAVIX  Take 1 tablet (75 mg total) by mouth every evening.     dapagliflozin 10 mg tablet  Commonly known as: FARXIGA  Take 1 tablet (10 mg total) by mouth once daily.     enoxaparin 80 mg/0.8 mL Syrg  Commonly known as: LOVENOX  Inject 0.7 mLs (70 mg total) into the skin 2 (two) times a day. Daily INR. Once INR < 2, start Lovenox for one week. Then stop Lovenox and start Eliquis.     metoprolol succinate 50 MG 24 hr tablet  Commonly known as: TOPROL-XL  Take 1 tablet (50 mg total) by mouth once daily.     pantoprazole 40 MG tablet  Commonly known as: PROTONIX  Take 1 tablet (40 mg total) by mouth once daily.  Start taking on: December 7, 2022     rosuvastatin 40 MG Tab  Commonly known as: CRESTOR  Take 1 tablet (40 mg total) by mouth every evening.     sacubitriL-valsartan 24-26 mg per tablet  Commonly known as: ENTRESTO  Take 1 tablet by mouth 2 (two) times daily.     spironolactone 25 MG tablet  Commonly known as: ALDACTONE  Take 0.5 tablets (12.5 mg total) by mouth once daily.            CHANGE how you take these medications      amiodarone 400 MG tablet  Commonly known as: PACERONE  Take 1 tablet (400 mg total) by mouth twice daily from 12/6 - 12/9, then take 1 tablet by mouth (400mg total) by mouth once daily.  Start taking on: December 7, 2022  What changed:   medication strength  how much to take  when to take this     nitroGLYCERIN 0.4 MG SL tablet  Commonly known as: NITROSTAT  Place 1 tablet (0.4 mg total) under the  tongue every 5 (five) minutes as needed for Chest pain. Up to 3 doses. If chest pain is not relieved or worsens 3 to 5 minutes after 1 dose, call 911 and seek immediate emergency medical attention.  What changed: reasons to take this            STOP taking these medications      carBAMazepine 200 mg tablet  Commonly known as: TEGRETOL     cetirizine 10 MG tablet  Commonly known as: ZYRTEC     fluticasone propionate 50 mcg/actuation nasal spray  Commonly known as: FLONASE     pravastatin 20 MG tablet  Commonly known as: PRAVACHOL     warfarin 6 MG tablet  Commonly known as: COUMADIN              Time spent on the discharge of patient: 50 minutes    Elizabeth Morris, DO PGY1  Cardiology  [facility] - Cardiology Stepdown

## 2022-12-06 NOTE — PLAN OF CARE
Recommendations  Continue full liquids diet-encourage adequate intake; ADAT per speech recommendations  2. Continue Boost plus TID for optimization of protein and calorie intake  3. RD following      Goals: 1) PO inakes > 50% of meals/supplements at f/u  Nutrition Goal Status: new  Communication of RD Recs:  (POC, sticky note)

## 2022-12-06 NOTE — PLAN OF CARE
NURSING HOME ORDERS    12/06/2022  [facility]  [place] - CARDIOLOGY STEPDOWN  [address]  [facility] [address]  Dept: [phone]  Loc: [phone]     Admit to Nursing Home:  Skilled Nursing    Diagnoses:  Active Hospital Problems    Diagnosis  POA    *Coronary artery disease involving native coronary artery of native heart without angina pectoris [I25.10]  Yes     Priority: 1 - High    Paroxysmal atrial fibrillation [I48.0]  Yes     Priority: 2     Sustained VT (ventricular tachycardia) [I47.20]  Yes     Priority: 3     Dysphagia [R13.10]  No    QT prolongation [R94.31]  Unknown    Chronic systolic congestive heart failure [I50.22]  Yes    Coronary artery disease [I25.10]  Yes    Ventricular tachyarrhythmia [I47.20]  Yes    History of seizure disorder [Z86.69]  Not Applicable    Encephalopathy, metabolic [G93.41]  Yes    Supratherapeutic INR [R79.1]  Yes    Hypokalemia [E87.6]  Yes    ACP (advance care planning) [Z71.89]  Not Applicable    Weakness [R53.1]  Yes    Acute on chronic combined systolic and diastolic congestive heart failure [I50.43]  Yes    ICD (implantable cardioverter-defibrillator), single, in situ [Z95.810]  Yes    Chronic anticoagulation [Z79.01]  Not Applicable    History of CVA (cerebrovascular accident) [Z86.73]  Not Applicable    Pure hypercholesterolemia [E78.00]  Yes      Resolved Hospital Problems    Diagnosis Date Resolved POA    B12 deficiency [E53.8] 12/05/2022 Yes    Nausea and vomiting [R11.2] 12/05/2022 No    Ischemic cardiomyopathy [I25.5] 11/24/2022 Yes       Patient is homebound due to:  Coronary artery disease involving native coronary artery of native heart without angina pectoris    Allergies:Review of patient's allergies indicates:  No Known Allergies    Vitals:  Routine    Diet:  full liquid diet ; Boost with meals. May advance diet as tolerated    Activities:   Activity as tolerated    Goals of Care Treatment Preferences:  Code Status: Full  Code      Labs:    - BMP 3 days after discharge, then 1 week after discharge, and then weekly for 6 weeks (monitor for changes in K)  - INR 12/6 3.8. Warfarin has been stopped. Daily INR. Once INR < 2, start Lovenox 70mg q12 hr for one week. Then stop Lovenox and start Eliquis 5mg BID.    Nursing Precautions:  Aspiration  and Fall    Consults:   PT to evaluate and treat and OT to evaluate and treat    Miscellaneous Care: CHF Care: Daily Weight with notification of MD/NP of 2lb or > increase in 24 hours    v/s and O2 sat every shift    Oxygen as needed for sats <90%    Report abnormal breath sounds to MD/NP    Edema checks q shift- notify MD/NP of increased edema    Task segmentation by nursing for daily care to decrease exertion    CHF education to include diet ,medication, and CHF flags for MD notification                Medications: Discontinue all previous medication orders, if any. See new list below.     Medication List        START taking these medications      apixaban 5 mg Tab DO NOT START RIGHT AWAY, please read instructions below  Commonly known as: ELIQUIS  Take 1 tablet (5 mg total) by mouth 2 (two) times daily. INR 12/6 3.8. Warfarin has been stopped. Daily INR. Once INR < 2, start Lovenox 70mg q12 hr for one week. Then stop Lovenox and start Eliquis 5mg BID.     Enoxaparin 70mg Subcutaneous q12h DO NOT START RIGHT AWAY    INR 12/6 3.8. Warfarin has been stopped. Daily INR. Once INR < 2, start Lovenox 70mg q12 hr for one week. Then stop Lovenox and start Eliquis 5mg BID.   aspirin 81 MG Chew  Take 1 tablet (81 mg total) by mouth once daily. Take for 30 days then please discontinue  Start taking on: December 7, 2022     clopidogreL 75 mg tablet  Commonly known as: PLAVIX  Take 1 tablet (75 mg total) by mouth every evening.     dapagliflozin 10 mg tablet  Commonly known as: FARXIGA  Take 1 tablet (10 mg total) by mouth once daily.     metoprolol succinate 50 MG 24 hr tablet  Commonly known as:  TOPROL-XL  Take 1 tablet (50 mg total) by mouth once daily.     pantoprazole 40 MG tablet  Commonly known as: PROTONIX  Take 1 tablet (40 mg total) by mouth once daily.  Start taking on: December 7, 2022     rosuvastatin 40 MG Tab  Commonly known as: CRESTOR  Take 1 tablet (40 mg total) by mouth every evening.     sacubitriL-valsartan 24-26 mg per tablet  Commonly known as: ENTRESTO  Take 1 tablet by mouth 2 (two) times daily.     spironolactone 25 MG tablet  Commonly known as: ALDACTONE  Take 0.5 tablets (12.5 mg total) by mouth once daily.            CHANGE how you take these medications      amiodarone 400 MG tablet  Commonly known as: PACERONE  Take 1 tablet (400 mg total) by mouth twice daily from 12/6 - 12/9, then take 1 tablet by mouth (400mg total) by mouth once daily.  Start taking on: December 7, 2022  What changed:   medication strength  how much to take  when to take this     nitroGLYCERIN 0.4 MG SL tablet  Commonly known as: NITROSTAT  Place 1 tablet (0.4 mg total) under the tongue every 5 (five) minutes as needed (prn) for Chest pain. Up to 3 doses. If chest pain is not relieved or worsens 3 to 5 minutes after 1 dose, call 911 and seek immediate emergency medical attention.  What changed: reasons to take this            STOP taking these medications      carBAMazepine 200 mg tablet  Commonly known as: TEGRETOL     cetirizine 10 MG tablet  Commonly known as: ZYRTEC     fluticasone propionate 50 mcg/actuation nasal spray  Commonly known as: FLONASE     pravastatin 20 MG tablet  Commonly known as: PRAVACHOL     warfarin 6 MG tablet  Commonly known as: COUMADIN                Immunizations Administered as of 12/6/2022       Name Date Dose VIS Date Route Exp Date    COVID-19 MRNA, LN-S, PF (Pfizer) (Purple Cap) 5/11/2021 0.3 mL -- -- --    Lot: [id number]     External: Auto Reconciled From Outside Source     COVID-19, MRNA, LN-S, PF (Pfizer) (Purple Cap) 4/20/2021 0.3 mL -- -- --    Lot: [id number]     External:  Auto Reconciled From Outside Source             This patient has had both covid vaccinations    Some patients may experience side effects after vaccination.  These may include fever, headache, muscle or joint aches.  Most symptoms resolve with 24-48 hours and do not require urgent medical evaluation unless they persist for more than 72 hours or symptoms are concerning for an unrelated medical condition.          _________________________________  Elizabeth Morris DO PGMERT  12/06/2022

## 2022-12-06 NOTE — PROGRESS NOTES
[facility] - Cardiology Stepdown  Adult Nutrition  Progress Note    SUMMARY       Recommendations  Continue full liquids diet-encourage adequate intake; ADAT per speech recommendations  2. Continue Boost plus TID for optimization of protein and calorie intake  3. RD following     Goals: 1) PO inakes > 50% of meals/supplements at f/u  Nutrition Goal Status: new  Communication of RD Recs:  (POC, sticky note)    Assessment and Plan    Nutrition Problem  Increased protein/energy needs     Related to (etiology):   Physiological needs    Signs and Symptoms (as evidenced by):   CAD    Interventions (treatment strategy):  Collaboration of nutrition care w/ other providers     Nutrition Diagnosis Status:   New     Malnutrition Assessment     Skin (Micronutrient):  (Bg = 14)  Teeth (Micronutrient):  (missing some)   Micronutrient Evaluation: suspected deficiency   Energy Intake (Malnutrition): less than or equal to 50% for greater than or equal to 5 days           Edema (Fluid Accumulation):  (none noted)             Reason for Assessment    Reason For Assessment: RD follow-up  Diagnosis:  (acute on chronic heart failure)  Relevant Medical History: CAD, HTN, AFIB, CVA, MI  Interdisciplinary Rounds: did not attend (remote)  General Information Comments: Spoke w/ pt at bedside, reports tolerating a full liquid diet-consuming small bites. Pt reports consuming pudding. Boost being provided- RD encouraged adequate intake.NFPE completed, 12/6/22, age related wasting identified, pt is at risk for malnutrition at this time. LBM noted-12/5/22  Nutrition Discharge Planning: To be determined- Cardiac diet + Boost plus as needed    Nutrition Risk Screen    Nutrition Risk Screen: no indicators present    Nutrition/Diet History    Spiritual, Cultural Beliefs, Shinto Practices, Values that Affect Care: no  Food Allergies: NKFA  Factors Affecting Nutritional Intake: decreased appetite, nausea/vomiting,  "diarrhea    Anthropometrics    Temp: 97.1 °F (36.2 °C)  Height Method: Stated  Height: 5' 9" (175.3 cm)  Height (inches): 69 in  Weight Method: Bed Scale  Weight: 68.5 kg (151 lb 0.2 oz)  Weight (lb): 151.02 lb  Ideal Body Weight (IBW), Male: 160 lb  % Ideal Body Weight, Male (lb): 93.69 %  BMI (Calculated): 22.3  BMI Grade: 18.5-24.9 - normal  Usual Body Weight (UBW), k.3 kg (21)  % Usual Body Weight: 96.93  % Weight Change From Usual Weight: -3.27 %       Lab/Procedures/Meds    Pertinent Labs Reviewed: reviewed  Pertinent Medications Reviewed: reviewed  Pertinent Medications Comments: lasix, probiotic, senna, zofran      Estimated/Assessed Needs    Weight Used For Calorie Calculations: 68 kg (149 lb 14.6 oz)  Energy Calorie Requirements (kcal):  (30kcal/kg)  Energy Need Method: Kcal/kg  Protein Requirements: 82-95g/day (1.2-1.4g/kg (recovery fro surgery))  Weight Used For Protein Calculations: 68 kg (149 lb 14.6 oz)  Fluid Requirements (mL): 1700 ml or per MD     RDA Method (mL):   CHO Requirement: 255 g      Nutrition Prescription Ordered    Current Diet Order:full liquids    Evaluation of Received Nutrient/Fluid Intake    I/O: + 1.5 L since admit  Energy Calories Required: not meeting needs  Protein Required: not meeting needs  Fluid Required: not meeting needs  Comments: LBM   Tolerance: tolerating  % Intake of Estimated Energy Needs: 0 - 25 %  % Meal Intake: 0 - 25 %    Nutrition Risk    Level of Risk/Frequency of Follow-up: moderate     Monitor and Evaluation    Food and Nutrient Intake: energy intake, food and beverage intake  Food and Nutrient Adminstration: diet order  Knowledge/Beliefs/Attitudes: food and nutrition knowledge/skill  Physical Activity and Function: nutrition-related ADLs and IADLs  Anthropometric Measurements: weight  Biochemical Data, Medical Tests and Procedures: electrolyte and renal panel, gastrointestinal profile  Nutrition-Focused Physical Findings: overall " appearance     Nutrition Follow-Up    RD Follow-up?: Yes

## 2022-12-06 NOTE — PLAN OF CARE
12/06/22 1125   Post-Acute Status   Post-Acute Authorization Placement   Post-Acute Placement Status Pending medical clearance/testing  (Covid test)     Per Latoya in admissions at [facility] they have auth and can take pt today once they get orders and Covid test.  Treatment team notified.  Covid test escalated to  leadership.    Brooke Lees LMSW  [facility]  [phone]

## 2022-12-06 NOTE — PT/OT/SLP PROGRESS
Speech Language Pathology Treatment    Patient Name:  Balbir Rebollar Sr.   MRN:  [id number]  Admitting Diagnosis: Coronary artery disease involving native coronary artery of native heart without angina pectoris    Recommendations:                 General Recommendations:  Dysphagia therapy  Diet recommendations:  Mechanical soft, Liquid Diet Level: Thin   Aspiration Precautions: 1 bite/sip at a time, Alternating bites/sips, Check for pocketing/oral residue, HOB to 90 degrees, Small bites/sips, and Standard aspiration precautions   General Precautions: Standard, aspiration, fall  Communication strategies:  none    Subjective     Pt awake/alert and agreeable to ST.    Pain/Comfort:  Pain Rating 1: 0/10  Pain Rating Post-Intervention 1: 0/10    Respiratory Status: Room air    Objective:     Has the patient been evaluated by SLP for swallowing?   Yes  Keep patient NPO? No     Pt seen for ongoing dysphagia therapy. Worked on intake of diet advancement trials with soft solids (cracker crushed in applesauce x2 bites) and thin liquids (2 oz water). He demonstrated adequate self presentation and acceptance, though continues to have prolonged/reduced yet functional mastication. He demonstrated a timely swallow trigger and no overt s/sx of airway compromise. He only tolerated 2 bites of cracker before refusing additional intake. Recommend he be advanced to level 5 minced and moist solids and thin liquids at this time. Education provided re: role of SLP, diet recs, swallow precs, s/s aspiration and POC.  Pt verbalized understanding and agreement.     Assessment:     Balbir Rebollar Sr. is a 72 y.o. male with an SLP diagnosis of Dysphagia.     Goals:   Multidisciplinary Problems       SLP Goals          Problem: SLP    Goal Priority Disciplines Outcome   SLP Goal     SLP Ongoing, Progressing   Description: Speech Language Pathology Goals  Goals expected to be met by 12/11  1. Ongoing swallow assessment                   Multidisciplinary Problems (Resolved)          Problem: SLP    Goal Priority Disciplines Outcome   SLP Goal   (Resolved)     SLP Met   Description: ST. Pt will tolerate PO diet of pureed w/nectar thick liquids w/o overt s/s of aspiration. (Goal met )  2. Pt will tolerate 4oz thin liquids across multiple trials without overt s/s of aspiration. (Goal met )                       Plan:     Patient to be seen:  4 x/week   Plan of Care expires:  23  Plan of Care reviewed with:  patient   SLP Follow-Up:  Yes       Discharge recommendations:  nursing facility, skilled     Time Tracking:     SLP Treatment Date:   22  Speech Start Time:  1019  Speech Stop Time:  1030     Speech Total Time (min):  11 min    Billable Minutes: Treatment Swallowing Dysfunction 11    2022

## 2022-12-12 ENCOUNTER — HOSPITAL ENCOUNTER (OUTPATIENT)
Facility: HOSPITAL | Age: 72
LOS: 1 days | Discharge: HOME HEALTH | End: 2022-12-13
Attending: EMERGENCY MEDICINE | Admitting: INTERNAL MEDICINE
Payer: MEDICARE

## 2022-12-12 DIAGNOSIS — R79.89 ELEVATED TROPONIN: ICD-10-CM

## 2022-12-12 DIAGNOSIS — I50.9 CHF (CONGESTIVE HEART FAILURE): ICD-10-CM
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
## 2022-12-12 DIAGNOSIS — I47.20 V-TACH: ICD-10-CM

## 2022-12-12 DIAGNOSIS — R07.9 CHEST PAIN: ICD-10-CM

## 2022-12-12 DIAGNOSIS — I25.10 CORONARY ARTERY DISEASE INVOLVING NATIVE CORONARY ARTERY OF NATIVE HEART WITHOUT ANGINA PECTORIS: Primary | ICD-10-CM

## 2022-12-12 DIAGNOSIS — I45.2 RIGHT BUNDLE BRANCH BLOCK (RBBB) WITH LEFT ANTERIOR FASCICULAR BLOCK (LAFB): ICD-10-CM

## 2022-12-12 DIAGNOSIS — I47.20 V TACH: ICD-10-CM

## 2022-12-12 DIAGNOSIS — G40.909 SEIZURE DISORDER: ICD-10-CM

## 2022-12-12 DIAGNOSIS — R53.1 WEAKNESS: ICD-10-CM

## 2022-12-12 DIAGNOSIS — I50.22 CHRONIC SYSTOLIC CONGESTIVE HEART FAILURE: ICD-10-CM

## 2022-12-12 PROBLEM — I50.42 CHRONIC COMBINED SYSTOLIC AND DIASTOLIC CONGESTIVE HEART FAILURE: Status: ACTIVE | Noted: 2022-11-23

## 2022-12-12 LAB
ALBUMIN SERPL BCP-MCNC: 3.8 G/DL (ref 3.5–5.2)
ALP SERPL-CCNC: 96 U/L (ref 55–135)
ALT SERPL W/O P-5'-P-CCNC: 33 U/L (ref 10–44)
AMMONIA PLAS-SCNC: 37 UMOL/L (ref 10–50)
ANION GAP SERPL CALC-SCNC: 19 MMOL/L (ref 8–16)
APTT BLDCRRT: 45.8 SEC (ref 21–32)
ASCENDING AORTA: 3.47 CM
AST SERPL-CCNC: 37 U/L (ref 10–40)
AV PEAK GRADIENT: 2 MMHG
AV REGURGITATION PRESSURE HALF TIME: 411.51 MS
AV VELOCITY RATIO: 1.01
BACTERIA #/AREA URNS HPF: ABNORMAL /HPF
BASOPHILS # BLD AUTO: 0.07 K/UL (ref 0–0.2)
BASOPHILS NFR BLD: 1 % (ref 0–1.9)
BILIRUB SERPL-MCNC: 0.4 MG/DL (ref 0.1–1)
BILIRUB UR QL STRIP: NEGATIVE
BNP SERPL-MCNC: 198 PG/ML (ref 0–99)
BSA FOR ECHO PROCEDURE: 1.76 M2
BUN SERPL-MCNC: 26 MG/DL (ref 8–23)
CALCIUM SERPL-MCNC: 9.7 MG/DL (ref 8.7–10.5)
CHLORIDE SERPL-SCNC: 106 MMOL/L (ref 95–110)
CLARITY UR: ABNORMAL
CO2 SERPL-SCNC: 18 MMOL/L (ref 23–29)
COLOR UR: YELLOW
CREAT SERPL-MCNC: 1.4 MG/DL (ref 0.5–1.4)
CTP QC/QA: YES
CTP QC/QA: YES
CV ECHO LV RWT: 0.44 CM
DIFFERENTIAL METHOD: ABNORMAL
DOP CALC AO PEAK VEL: 0.79 M/S
DOP CALC LVOT AREA: 3.2 CM2
DOP CALC LVOT DIAMETER: 2.01 CM
DOP CALC LVOT PEAK VEL: 0.8 M/S
DOP CALC LVOT STROKE VOLUME: 39.96 CM3
DOP CALCLVOT PEAK VEL VTI: 12.6 CM
E WAVE DECELERATION TIME: 228.04 MSEC
E/A RATIO: 0.65
E/E' RATIO: 9.11 M/S
ECHO LV POSTERIOR WALL: 1.2 CM (ref 0.6–1.1)
EJECTION FRACTION: 20 %
EOSINOPHIL # BLD AUTO: 0.2 K/UL (ref 0–0.5)
EOSINOPHIL NFR BLD: 2.1 % (ref 0–8)
ERYTHROCYTE [DISTWIDTH] IN BLOOD BY AUTOMATED COUNT: 13.5 % (ref 11.5–14.5)
EST. GFR  (NO RACE VARIABLE): 53 ML/MIN/1.73 M^2
FRACTIONAL SHORTENING: 21 % (ref 28–44)
GLUCOSE SERPL-MCNC: 138 MG/DL (ref 70–110)
GLUCOSE UR QL STRIP: ABNORMAL
GRAN CASTS #/AREA URNS LPF: 20 /LPF
HCT VFR BLD AUTO: 43.4 % (ref 40–54)
HGB BLD-MCNC: 15.1 G/DL (ref 14–18)
HGB UR QL STRIP: ABNORMAL
HYALINE CASTS #/AREA URNS LPF: 10 /LPF
IMM GRANULOCYTES # BLD AUTO: 0.02 K/UL (ref 0–0.04)
IMM GRANULOCYTES NFR BLD AUTO: 0.3 % (ref 0–0.5)
INR PPP: 3.8 (ref 0.8–1.2)
INTERVENTRICULAR SEPTUM: 0.77 CM (ref 0.6–1.1)
IVC DIAMETER: 1.41 CM
KETONES UR QL STRIP: ABNORMAL
LA MAJOR: 4.55 CM
LA MINOR: 4.15 CM
LA WIDTH: 3.4 CM
LACTATE SERPL-SCNC: 2.3 MMOL/L (ref 0.5–2.2)
LEFT ATRIUM SIZE: 3.54 CM
LEFT ATRIUM VOLUME INDEX: 24.9 ML/M2
LEFT ATRIUM VOLUME: 44.41 CM3
LEFT INTERNAL DIMENSION IN SYSTOLE: 4.35 CM (ref 2.1–4)
LEFT VENTRICLE DIASTOLIC VOLUME INDEX: 82.9 ML/M2
LEFT VENTRICLE DIASTOLIC VOLUME: 147.56 ML
LEFT VENTRICLE MASS INDEX: 117 G/M2
LEFT VENTRICLE SYSTOLIC VOLUME INDEX: 48 ML/M2
LEFT VENTRICLE SYSTOLIC VOLUME: 85.43 ML
LEFT VENTRICULAR INTERNAL DIMENSION IN DIASTOLE: 5.5 CM (ref 3.5–6)
LEFT VENTRICULAR MASS: 208.98 G
LEUKOCYTE ESTERASE UR QL STRIP: NEGATIVE
LV LATERAL E/E' RATIO: 6.83 M/S
LV SEPTAL E/E' RATIO: 13.67 M/S
LVOT MG: 1.29 MMHG
LVOT MV: 0.53 CM/S
LYMPHOCYTES # BLD AUTO: 1.5 K/UL (ref 1–4.8)
LYMPHOCYTES NFR BLD: 21.3 % (ref 18–48)
MAGNESIUM SERPL-MCNC: 2.3 MG/DL (ref 1.6–2.6)
MCH RBC QN AUTO: 31.2 PG (ref 27–31)
MCHC RBC AUTO-ENTMCNC: 34.8 G/DL (ref 32–36)
MCV RBC AUTO: 90 FL (ref 82–98)
MICROSCOPIC COMMENT: ABNORMAL
MONOCYTES # BLD AUTO: 0.8 K/UL (ref 0.3–1)
MONOCYTES NFR BLD: 10.7 % (ref 4–15)
MV PEAK A VEL: 0.63 M/S
MV PEAK E VEL: 0.41 M/S
MV STENOSIS PRESSURE HALF TIME: 66.13 MS
MV VALVE AREA P 1/2 METHOD: 3.33 CM2
NEUTROPHILS # BLD AUTO: 4.6 K/UL (ref 1.8–7.7)
NEUTROPHILS NFR BLD: 64.6 % (ref 38–73)
NITRITE UR QL STRIP: NEGATIVE
NRBC BLD-RTO: 0 /100 WBC
PH UR STRIP: 6 [PH] (ref 5–8)
PISA AR MAX VEL: 2.58 M/S
PISA TR MAX VEL: 1.86 M/S
PLATELET # BLD AUTO: 204 K/UL (ref 150–450)
PMV BLD AUTO: 9.9 FL (ref 9.2–12.9)
POC MOLECULAR INFLUENZA A AGN: NEGATIVE
POC MOLECULAR INFLUENZA B AGN: NEGATIVE
POTASSIUM SERPL-SCNC: 3.3 MMOL/L (ref 3.5–5.1)
PROCALCITONIN SERPL IA-MCNC: 0.05 NG/ML
PROT SERPL-MCNC: 7.5 G/DL (ref 6–8.4)
PROT UR QL STRIP: ABNORMAL
PROTHROMBIN TIME: 38.3 SEC (ref 9–12.5)
PV PEAK VELOCITY: 0.85 CM/S
RA MAJOR: 4.5 CM
RA PRESSURE: 3 MMHG
RA WIDTH: 3.3 CM
RBC # BLD AUTO: 4.84 M/UL (ref 4.6–6.2)
RBC #/AREA URNS HPF: 3 /HPF (ref 0–4)
RIGHT VENTRICULAR END-DIASTOLIC DIMENSION: 3.34 CM
SARS-COV-2 RDRP RESP QL NAA+PROBE: NEGATIVE
SINUS: 4.5 CM
SODIUM SERPL-SCNC: 143 MMOL/L (ref 136–145)
SP GR UR STRIP: >1.03 (ref 1–1.03)
STJ: 3.11 CM
TDI LATERAL: 0.06 M/S
TDI SEPTAL: 0.03 M/S
TDI: 0.05 M/S
TR MAX PG: 14 MMHG
TRICUSPID ANNULAR PLANE SYSTOLIC EXCURSION: 1.9 CM
TROPONIN I SERPL DL<=0.01 NG/ML-MCNC: 0.55 NG/ML (ref 0–0.03)
TROPONIN I SERPL DL<=0.01 NG/ML-MCNC: 0.99 NG/ML (ref 0–0.03)
TSH SERPL DL<=0.005 MIU/L-ACNC: 1.98 UIU/ML (ref 0.4–4)
TV REST PULMONARY ARTERY PRESSURE: 17 MMHG
URN SPEC COLLECT METH UR: ABNORMAL
UROBILINOGEN UR STRIP-ACNC: NEGATIVE EU/DL
WBC # BLD AUTO: 7.17 K/UL (ref 3.9–12.7)
WBC #/AREA URNS HPF: 15 /HPF (ref 0–5)
YEAST URNS QL MICRO: ABNORMAL

## 2022-12-12 PROCEDURE — 81000 URINALYSIS NONAUTO W/SCOPE: CPT | Performed by: EMERGENCY MEDICINE

## 2022-12-12 PROCEDURE — 83735 ASSAY OF MAGNESIUM: CPT | Performed by: EMERGENCY MEDICINE

## 2022-12-12 PROCEDURE — 85610 PROTHROMBIN TIME: CPT | Performed by: EMERGENCY MEDICINE

## 2022-12-12 PROCEDURE — 84484 ASSAY OF TROPONIN QUANT: CPT | Mod: 91 | Performed by: INTERNAL MEDICINE

## 2022-12-12 PROCEDURE — 99285 EMERGENCY DEPT VISIT HI MDM: CPT | Mod: 25

## 2022-12-12 PROCEDURE — 84443 ASSAY THYROID STIM HORMONE: CPT | Performed by: EMERGENCY MEDICINE

## 2022-12-12 PROCEDURE — 82140 ASSAY OF AMMONIA: CPT | Performed by: EMERGENCY MEDICINE

## 2022-12-12 PROCEDURE — 87502 INFLUENZA DNA AMP PROBE: CPT

## 2022-12-12 PROCEDURE — 84145 PROCALCITONIN (PCT): CPT | Performed by: EMERGENCY MEDICINE

## 2022-12-12 PROCEDURE — 25000003 PHARM REV CODE 250: Performed by: EMERGENCY MEDICINE

## 2022-12-12 PROCEDURE — 85025 COMPLETE CBC W/AUTO DIFF WBC: CPT | Performed by: EMERGENCY MEDICINE

## 2022-12-12 PROCEDURE — 36415 COLL VENOUS BLD VENIPUNCTURE: CPT | Performed by: INTERNAL MEDICINE

## 2022-12-12 PROCEDURE — 85730 THROMBOPLASTIN TIME PARTIAL: CPT | Performed by: EMERGENCY MEDICINE

## 2022-12-12 PROCEDURE — 80053 COMPREHEN METABOLIC PANEL: CPT | Performed by: EMERGENCY MEDICINE

## 2022-12-12 PROCEDURE — 87040 BLOOD CULTURE FOR BACTERIA: CPT | Performed by: EMERGENCY MEDICINE

## 2022-12-12 PROCEDURE — 87077 CULTURE AEROBIC IDENTIFY: CPT | Performed by: EMERGENCY MEDICINE

## 2022-12-12 PROCEDURE — 87088 URINE BACTERIA CULTURE: CPT | Performed by: EMERGENCY MEDICINE

## 2022-12-12 PROCEDURE — 99223 1ST HOSP IP/OBS HIGH 75: CPT | Mod: ,,, | Performed by: INTERNAL MEDICINE

## 2022-12-12 PROCEDURE — 84484 ASSAY OF TROPONIN QUANT: CPT | Performed by: EMERGENCY MEDICINE

## 2022-12-12 PROCEDURE — 87186 SC STD MICRODIL/AGAR DIL: CPT | Performed by: EMERGENCY MEDICINE

## 2022-12-12 PROCEDURE — 83880 ASSAY OF NATRIURETIC PEPTIDE: CPT | Performed by: EMERGENCY MEDICINE

## 2022-12-12 PROCEDURE — 87635 SARS-COV-2 COVID-19 AMP PRB: CPT | Performed by: EMERGENCY MEDICINE

## 2022-12-12 PROCEDURE — 25000003 PHARM REV CODE 250: Performed by: INTERNAL MEDICINE

## 2022-12-12 PROCEDURE — 99223 PR INITIAL HOSPITAL CARE,LEVL III: ICD-10-PCS | Mod: ,,, | Performed by: INTERNAL MEDICINE

## 2022-12-12 PROCEDURE — 21400001 HC TELEMETRY ROOM

## 2022-12-12 PROCEDURE — 87086 URINE CULTURE/COLONY COUNT: CPT | Performed by: EMERGENCY MEDICINE

## 2022-12-12 PROCEDURE — 83605 ASSAY OF LACTIC ACID: CPT | Performed by: EMERGENCY MEDICINE

## 2022-12-12 RX ORDER — PANTOPRAZOLE SODIUM 40 MG/1
40 TABLET, DELAYED RELEASE ORAL DAILY
Status: DISCONTINUED | OUTPATIENT
Start: 2022-12-12 | End: 2022-12-13 | Stop reason: HOSPADM

## 2022-12-12 RX ORDER — IBUPROFEN 200 MG
24 TABLET ORAL
Status: DISCONTINUED | OUTPATIENT
Start: 2022-12-12 | End: 2022-12-13 | Stop reason: HOSPADM

## 2022-12-12 RX ORDER — NITROGLYCERIN 0.4 MG/1
0.4 TABLET SUBLINGUAL EVERY 5 MIN PRN
Status: DISCONTINUED | OUTPATIENT
Start: 2022-12-12 | End: 2022-12-13 | Stop reason: HOSPADM

## 2022-12-12 RX ORDER — TALC
6 POWDER (GRAM) TOPICAL NIGHTLY PRN
Status: DISCONTINUED | OUTPATIENT
Start: 2022-12-12 | End: 2022-12-13 | Stop reason: HOSPADM

## 2022-12-12 RX ORDER — SIMETHICONE 80 MG
1 TABLET,CHEWABLE ORAL 4 TIMES DAILY PRN
Status: DISCONTINUED | OUTPATIENT
Start: 2022-12-12 | End: 2022-12-13 | Stop reason: HOSPADM

## 2022-12-12 RX ORDER — AMIODARONE HYDROCHLORIDE 200 MG/1
400 TABLET ORAL DAILY
Status: DISCONTINUED | OUTPATIENT
Start: 2022-12-12 | End: 2022-12-12

## 2022-12-12 RX ORDER — SODIUM CHLORIDE 0.9 % (FLUSH) 0.9 %
10 SYRINGE (ML) INJECTION EVERY 12 HOURS PRN
Status: DISCONTINUED | OUTPATIENT
Start: 2022-12-12 | End: 2022-12-13 | Stop reason: HOSPADM

## 2022-12-12 RX ORDER — AMOXICILLIN 250 MG
1 CAPSULE ORAL 2 TIMES DAILY PRN
Status: DISCONTINUED | OUTPATIENT
Start: 2022-12-12 | End: 2022-12-13 | Stop reason: HOSPADM

## 2022-12-12 RX ORDER — NALOXONE HCL 0.4 MG/ML
0.02 VIAL (ML) INJECTION
Status: DISCONTINUED | OUTPATIENT
Start: 2022-12-12 | End: 2022-12-13 | Stop reason: HOSPADM

## 2022-12-12 RX ORDER — AMIODARONE HYDROCHLORIDE 200 MG/1
400 TABLET ORAL DAILY
Status: DISCONTINUED | OUTPATIENT
Start: 2022-12-13 | End: 2022-12-13

## 2022-12-12 RX ORDER — CLOPIDOGREL BISULFATE 75 MG/1
75 TABLET ORAL NIGHTLY
Status: DISCONTINUED | OUTPATIENT
Start: 2022-12-12 | End: 2022-12-13 | Stop reason: HOSPADM

## 2022-12-12 RX ORDER — ATORVASTATIN CALCIUM 40 MG/1
40 TABLET, FILM COATED ORAL NIGHTLY
Status: DISCONTINUED | OUTPATIENT
Start: 2022-12-12 | End: 2022-12-13 | Stop reason: HOSPADM

## 2022-12-12 RX ORDER — ACETAMINOPHEN 325 MG/1
650 TABLET ORAL EVERY 4 HOURS PRN
Status: DISCONTINUED | OUTPATIENT
Start: 2022-12-12 | End: 2022-12-13 | Stop reason: HOSPADM

## 2022-12-12 RX ORDER — NAPROXEN SODIUM 220 MG/1
81 TABLET, FILM COATED ORAL DAILY
Status: DISCONTINUED | OUTPATIENT
Start: 2022-12-12 | End: 2022-12-13 | Stop reason: HOSPADM

## 2022-12-12 RX ORDER — PROCHLORPERAZINE EDISYLATE 5 MG/ML
5 INJECTION INTRAMUSCULAR; INTRAVENOUS EVERY 6 HOURS PRN
Status: DISCONTINUED | OUTPATIENT
Start: 2022-12-12 | End: 2022-12-13 | Stop reason: HOSPADM

## 2022-12-12 RX ORDER — AMIODARONE HYDROCHLORIDE 200 MG/1
400 TABLET ORAL 2 TIMES DAILY
Status: DISCONTINUED | OUTPATIENT
Start: 2022-12-12 | End: 2022-12-12

## 2022-12-12 RX ORDER — OXYCODONE HYDROCHLORIDE 5 MG/1
5 TABLET ORAL EVERY 6 HOURS PRN
Status: DISCONTINUED | OUTPATIENT
Start: 2022-12-12 | End: 2022-12-13 | Stop reason: HOSPADM

## 2022-12-12 RX ORDER — IBUPROFEN 200 MG
16 TABLET ORAL
Status: DISCONTINUED | OUTPATIENT
Start: 2022-12-12 | End: 2022-12-13 | Stop reason: HOSPADM

## 2022-12-12 RX ORDER — ONDANSETRON 2 MG/ML
4 INJECTION INTRAMUSCULAR; INTRAVENOUS EVERY 8 HOURS PRN
Status: DISCONTINUED | OUTPATIENT
Start: 2022-12-12 | End: 2022-12-13 | Stop reason: HOSPADM

## 2022-12-12 RX ORDER — ASPIRIN 325 MG
325 TABLET ORAL
Status: COMPLETED | OUTPATIENT
Start: 2022-12-12 | End: 2022-12-12

## 2022-12-12 RX ORDER — GLUCAGON 1 MG
1 KIT INJECTION
Status: DISCONTINUED | OUTPATIENT
Start: 2022-12-12 | End: 2022-12-13 | Stop reason: HOSPADM

## 2022-12-12 RX ORDER — METOPROLOL SUCCINATE 50 MG/1
50 TABLET, EXTENDED RELEASE ORAL DAILY
Status: DISCONTINUED | OUTPATIENT
Start: 2022-12-12 | End: 2022-12-13 | Stop reason: HOSPADM

## 2022-12-12 RX ORDER — MAG HYDROX/ALUMINUM HYD/SIMETH 200-200-20
30 SUSPENSION, ORAL (FINAL DOSE FORM) ORAL 4 TIMES DAILY PRN
Status: DISCONTINUED | OUTPATIENT
Start: 2022-12-12 | End: 2022-12-13 | Stop reason: HOSPADM

## 2022-12-12 RX ADMIN — POTASSIUM BICARBONATE 20 MEQ: 391 TABLET, EFFERVESCENT ORAL at 07:12

## 2022-12-12 RX ADMIN — SPIRONOLACTONE 12.5 MG: 25 TABLET ORAL at 09:12

## 2022-12-12 RX ADMIN — PANTOPRAZOLE SODIUM 40 MG: 40 TABLET, DELAYED RELEASE ORAL at 09:12

## 2022-12-12 RX ADMIN — SACUBITRIL AND VALSARTAN 1 TABLET: 24; 26 TABLET, FILM COATED ORAL at 08:12

## 2022-12-12 RX ADMIN — ASPIRIN 325 MG ORAL TABLET 325 MG: 325 PILL ORAL at 03:12

## 2022-12-12 RX ADMIN — APIXABAN 5 MG: 5 TABLET, FILM COATED ORAL at 08:12

## 2022-12-12 RX ADMIN — CLOPIDOGREL BISULFATE 75 MG: 75 TABLET ORAL at 08:12

## 2022-12-12 RX ADMIN — APIXABAN 5 MG: 5 TABLET, FILM COATED ORAL at 09:12

## 2022-12-12 RX ADMIN — METOPROLOL SUCCINATE 50 MG: 50 TABLET, EXTENDED RELEASE ORAL at 07:12

## 2022-12-12 RX ADMIN — AMIODARONE HYDROCHLORIDE 400 MG: 200 TABLET ORAL at 07:12

## 2022-12-12 RX ADMIN — ATORVASTATIN CALCIUM 40 MG: 40 TABLET, FILM COATED ORAL at 08:12

## 2022-12-12 RX ADMIN — SACUBITRIL AND VALSARTAN 1 TABLET: 24; 26 TABLET, FILM COATED ORAL at 09:12

## 2022-12-12 NOTE — PLAN OF CARE
Recommendations    1) SLP consult to assess texture appropriateness and PO consumption safety per previous recent stay and dysphagia issues.  2) Continue Cardiac diet as tolerated - Texture per SLP,encourage PO as tolerated.  3) Monitor PO intake - Addition of Boost (+) TID to assist in meeting EEN/EPN if PO < 50%  4) Monitor weekly weights, Nutrition related Labs     Goals:   1) Pt to have meet > 50% EEN/EPN via PO/ONS intake by RD follow up  Nutrition Goal Status: new  Communication of RD Recs:  (POC)

## 2022-12-12 NOTE — ASSESSMENT & PLAN NOTE
Patient with Paroxysmal (<7 days) atrial fibrillation which is controlled currently with Beta Blocker and Amiodarone. Patient is currently in sinus rhythm.[id number] Score: 2. . Anticoagulation indicated. Anticoagulation done with Eliquis.

## 2022-12-12 NOTE — HPI
72 y.o. male with CAD, HTN, Afib, CVA, and ICD presents from SNF after his ICD has fired daily for past 3 days.  Patient was just admitted, initially here and then transferred to [facility], 11/22/22-12/6/22 for fatigue and weakness.  Patient had multiple episodes of Vtach during that hospital stay.  He had a L heart cath 11/29/22 which showed LAD occlusion and Left Circumflex stenosis.  He was transferred to [facility] for complicated PCI and underwent another cath 12/1/22; distal R Main was stented but other arteries were not.  Patient discharged to SNF on oral Amiodarone.  He denies any chest pain.  Main complaint is persistent generalized weakness.  No alleviating or aggravating factors.  He denies any other complaints.

## 2022-12-12 NOTE — ED PROVIDER NOTES
Encounter Date: 12/12/2022       History     Chief Complaint   Patient presents with    Pacemaker Problem     Reports multiple fires from pacemaker. Per EMS 2 shocks delivered, hx of afib. Nitro given     73 yo male presents via EMS from [facility] (nursing [place]) with AICD firing.  Patient reports AICD fired once 2 days ago, once yesterday, and once today (Sunday 12/11/22).  He denies chest pain at present.  He does feel weak.  No fevers/sweats.    Patient was just admitted, initially here and then to [facility], 11/22/22-12/6/22 for fatigue and weakness, as well as frequent Vtach.  He had a L heart cath 11/29/22 which showed LAD occlusion and Left Circumflex stenosis.  He was transferred to [facility] and underwent another cath 12/1/22; distal R Main was stented but other arteries were not.  He had multiple episodes of Vtach.  His carbamazepine was stopped after this admission.    [place] [facility] [place]INDINGS   Left Ventricle: Normal left ventricular cavity size. Normal left ventricular wall thickness. Severely decreased left ventricular systolic function. Left ventricular ejection fraction is estimated at 15-20% and measured to be 25-33%.   Grade II/IV diastolic dysfunction, moderately elevated filling pressures. Contrast was administered and successfully enabled adequate endocardial definition. Regional wall motion abnormalities were present, as shown in the wall motion graphics.   Right Ventricle:  Normal right ventricular size measuring 3.7cm. Decreased right ventricular systolic function. RVSP could not be calculated due to incomplete tricuspid regurgitation velocity profile. Tapse=14mm, S wave=9cm/s. Defibrillator lead is seen coursing through the right ventricle.   Right Atrium:  Normal right atrial size.  Defibrillator lead is seen coursing through the right atrial cavity.   Left Atrium:  Normal left atrial size. Left atrial volume index is normal:  27.1 ml/m².   Mitral Valve:  Mild mitral annular  calcification. Mild mitral valve regurgitation.   Aortic Valve:  Structurally normal trileaflet aortic valve. Mild aortic valve regurgitation without stenosis.   Tricuspid Valve:  Structurally normal tricuspid valve. Trace tricuspid valve regurgitation.   Pulmonic Valve:   Structurally normal pulmonic valve. Mild pulmonary valve regurgitation.   Pericardium:  No pericardial effusion.   Aorta:  Aortic root is dilated measuring 4.1cm   CONCLUSIONS     MAJOR FINDINGS    EF is approximately 20-30%     1. Severely decreased left ventricular systolic function.       2. Grade II diastolic dysfunction.       3. Wall motion abnormalities imply disease of the left anterior descending coronary artery.     4. Mild nonrheumatic mitral valve regurgitation with no suggestion of left atrial enlargement.     5. Mild nonrheumatic aortic valve regurgitation.       6. Mild-to-moderate aortic root dilation.     7. Although estimation of pulmonary artery systolic pressure is not possible due to incomplete tricuspid regurgitation velocity   profile, pulmonary hypertension is not suspected.     Compared to previous echocardiogram 11/01/2021, no significant changes are seen          Review of patient's allergies indicates:  No Known Allergies  Past Medical History:   Diagnosis Date    AICD (automatic cardioverter/defibrillator) present     Anticoagulant long-term use     Chronic combined systolic and diastolic congestive heart failure     Left ventricular ejection fraction is estimated at 15-20% and measured to be 25-33%.    Coronary artery disease     History of myocardial infarction     Paroxysmal A-fib     Stroke      Past Surgical History:   Procedure Laterality Date    CHOLECYSTECTOMY      LEFT HEART CATHETERIZATION Left 11/29/2022    Procedure: Left heart cath;  Surgeon: Wesley Rico MD;  Location: [facility] CATH LAB;  Service: Cardiology;  Laterality: Left;  1030am start, R rad access    LEFT HEART CATHETERIZATION N/A 12/1/2022     Procedure: Left heart cath;  Surgeon: Tam Cho MD;  Location: [facility] CATH LAB;  Service: Cardiology;  Laterality: N/A;    REPLACEMENT OF DUAL CHAMBER IMPLANTABLE CARDIOVERTER-DEFIBRILLATOR       No family history on file.  Social History     Tobacco Use    Smoking status: Former    Smokeless tobacco: Never   Substance Use Topics    Alcohol use: Never    Drug use: Never     Review of Systems   Constitutional:  Positive for activity change and fatigue.   HENT:  Negative for sore throat.    Eyes:  Negative for photophobia.   Respiratory:  Negative for shortness of breath.    Cardiovascular:  Positive for chest pain (with AICD).   Gastrointestinal:  Negative for nausea and vomiting.   Genitourinary:  Negative for dysuria.   Musculoskeletal:  Negative for neck pain.   Skin:  Negative for rash.   Neurological:  Negative for syncope and weakness.   Hematological:  Bruises/bleeds easily (on Eliquis).     Physical Exam     Initial Vitals [12/12/22 0120]   BP Pulse Resp Temp SpO2   (!) 136/106 87 20 98 °F (36.7 °C) 99 %      MAP       --         Physical Exam    Nursing note and vitals reviewed.  Constitutional: He appears well-developed and well-nourished. He is not diaphoretic.   Awake, alert older male, appears fatigued but nontoxic. Slightly warm to touch.   HENT:   Head: Normocephalic and atraumatic.   Dry oropharynx.   Eyes: Conjunctivae and EOM are normal. Pupils are equal, round, and reactive to light.   Neck: Neck supple.   Normal range of motion.  Cardiovascular:  Normal rate, regular rhythm and intact distal pulses.           Murmur heard.  Pulmonary/Chest: No respiratory distress. He has no wheezes. He has no rhonchi. He has no rales.   Abdominal: Abdomen is soft. There is no abdominal tenderness.   Well-healed cholecystectomy scar.   Musculoskeletal:         General: Edema present. No tenderness. Normal range of motion.      Cervical back: Normal range of motion and neck supple.     Neurological: He is  alert. He has normal strength.   Moving all extremities   Skin: Skin is warm and dry. There is pallor.   Psychiatric: He has a normal mood and affect.       ED Course   Procedures  Labs Reviewed   COMPREHENSIVE METABOLIC PANEL - Abnormal; Notable for the following components:       Result Value    Potassium 3.3 (*)     CO2 18 (*)     Glucose 138 (*)     BUN 26 (*)     Anion Gap 19 (*)     eGFR 53 (*)     All other components within normal limits   B-TYPE NATRIURETIC PEPTIDE - Abnormal; Notable for the following components:     (*)     All other components within normal limits   CBC W/ AUTO DIFFERENTIAL - Abnormal; Notable for the following components:    MCH 31.2 (*)     All other components within normal limits   APTT - Abnormal; Notable for the following components:    aPTT 45.8 (*)     All other components within normal limits   TROPONIN I - Abnormal; Notable for the following components:    Troponin I 0.546 (*)     All other components within normal limits   URINALYSIS, REFLEX TO URINE CULTURE - Abnormal; Notable for the following components:    Appearance, UA Hazy (*)     Specific Gravity, UA >1.030 (*)     Protein, UA 2+ (*)     Glucose, UA 4+ (*)     Ketones, UA 2+ (*)     Occult Blood UA 2+ (*)     All other components within normal limits    Narrative:     Specimen Source->Urine   PROTIME-INR - Abnormal; Notable for the following components:    Prothrombin Time 38.3 (*)     INR 3.8 (*)     All other components within normal limits   LACTIC ACID, PLASMA - Abnormal; Notable for the following components:    Lactate (Lactic Acid) 2.3 (*)     All other components within normal limits   URINALYSIS MICROSCOPIC - Abnormal; Notable for the following components:    WBC, UA 15 (*)     Hyaline Casts, UA 10 (*)     Granular Casts, UA 20 (*)     All other components within normal limits    Narrative:     Specimen Source->Urine   CULTURE, URINE   TSH   MAGNESIUM   PROCALCITONIN   AMMONIA   TROPONIN I   SARS-COV-2  "RDRP GENE   POCT INFLUENZA A/B MOLECULAR     EKG Readings: (Independently Interpreted)   00:59: NSR, HR 77. L axis. RBBB. No STEMI.   01:09: NSR, HR 78. L axis. RBBB. No STEMI.   01:12: NSR, HR 83. L axis. RBBB. No STEMI.   01:19: NSR, HR 89. L axis. RBBB. No STEMI.   ED 01:25: NSR, HR 86. L axis. RBBB. No STEMI.  Morphology c/w 12/2//.       Imaging Results              X-Ray Chest AP Portable (Final result)  Result time 12/12/22 02:42:16      Final result by Sunday Rosario MD (12/12/22 02:42:16)                   Impression:      No acute cardiopulmonary finding identified on this single view.  No detrimental change when compared with 12/03/2022.      Electronically signed by: Sunday Rosario MD  Date:    12/12/2022  Time:    02:42               Narrative:    EXAMINATION:  XR CHEST AP PORTABLE    CLINICAL HISTORY:  Provided history is "aicd fired;  ".    TECHNIQUE:  One view of the chest.    COMPARISON:  12/03/2022.    FINDINGS:  Cardiac wires overlie the chest.  Cardiomediastinal silhouette is stable in size and not significantly enlarged.  Similar position of left chest wall AICD with transvenous lead overlying the heart.  No confluent area of consolidation.  Probable left basilar subsegmental atelectasis versus scarring.  No large pleural effusion.  No pneumothorax.                                    X-Rays:   Independently Interpreted Readings:   Other Readings:  CXR NAD  Medications   apixaban tablet 5 mg (5 mg Oral Given 12/12/22 2034)   aspirin chewable tablet 81 mg (0 mg Oral Hold 12/12/22 0900)   clopidogreL tablet 75 mg (75 mg Oral Given 12/12/22 2034)   metoprolol succinate (TOPROL-XL) 24 hr tablet 50 mg (50 mg Oral Given 12/12/22 0707)   nitroGLYCERIN SL tablet 0.4 mg (has no administration in time range)   pantoprazole EC tablet 40 mg (40 mg Oral Given 12/12/22 0937)   atorvastatin tablet 40 mg (40 mg Oral Given 12/12/22 2034)   sacubitriL-valsartan 24-26 mg per tablet 1 tablet (1 tablet Oral " Given 12/12/22 2034)   spironolactone split tablet 12.5 mg (12.5 mg Oral Given 12/12/22 0936)   sodium chloride 0.9% flush 10 mL (has no administration in time range)   naloxone 0.4 mg/mL injection 0.02 mg (has no administration in time range)   glucose chewable tablet 16 g (has no administration in time range)   glucose chewable tablet 24 g (has no administration in time range)   glucagon (human recombinant) injection 1 mg (has no administration in time range)   dextrose 10% bolus 125 mL (has no administration in time range)   dextrose 10% bolus 250 mL (has no administration in time range)   acetaminophen tablet 650 mg (has no administration in time range)   oxyCODONE immediate release tablet 5 mg (has no administration in time range)   senna-docusate 8.6-50 mg per tablet 1 tablet (has no administration in time range)   ondansetron injection 4 mg (has no administration in time range)   prochlorperazine injection Soln 5 mg (has no administration in time range)   melatonin tablet 6 mg (has no administration in time range)   aluminum-magnesium hydroxide-simethicone 200-200-20 mg/5 mL suspension 30 mL (has no administration in time range)   simethicone chewable tablet 80 mg (has no administration in time range)   amiodarone tablet 400 mg (has no administration in time range)   aspirin tablet 325 mg (325 mg Oral Given 12/12/22 0333)   potassium bicarbonate disintegrating tablet 20 mEq (20 mEq Oral Given 12/12/22 0707)     Medical Decision Making:   History:   I obtained history from: EMS provider.  Old Medical Records: I decided to obtain old medical records.  Old Records Summarized: records from clinic visits and records from previous admission(s).  Initial Assessment:   72 y.o. male with fatigue, reporting multiple shocks from AICD.   Differential Diagnosis:   Ddx includes dysrhythmia, ACS, CHF, occult infection, device malfunction, other.  Independently Interpreted Test(s):   I have ordered and independently  "interpreted X-rays - see prior notes.  I have ordered and independently interpreted EKG Reading(s) - see prior notes  Clinical Tests:   Lab Tests: Reviewed and Ordered  Radiological Study: Reviewed and Ordered  Medical Tests: Reviewed and Ordered  ED Management:  EKG: no STEMI. NSR.    CXR NAD.    Labs: Troponin 0.546. INR 3.8 (therapeutic).    [employer] report (see Media tab): "2 VT episodes, one on 12/9 and one tonight, both successfully terminated. Tonight's episode required 3 shocks."    It is unclear whether patient's elevated troponin is from shock delivered by defibrillator, from recent angiogram (12/1), or from ACS.  Patient received PO ASA 325mg in ER.  He is already anticoagulated on Plavix and Coumadin with a therapeutic INR.    Patient requires admission for telemetry monitoring, trending troponins, and further cardiology evaluation.  I discussed this case with Dr. Raghu Briones, nocturnist for hospital medicine.    Other:   I have discussed this case with another health care provider.                        Clinical Impression:   Final diagnoses:  [R77.8] Elevated troponin        ED Disposition Condition    Admit                 eLda Chun MD  12/12/22 2051    "

## 2022-12-12 NOTE — SUBJECTIVE & OBJECTIVE
Past Medical History:   Diagnosis Date    AICD (automatic cardioverter/defibrillator) present     Anticoagulant long-term use     Chronic combined systolic and diastolic congestive heart failure     Left ventricular ejection fraction is estimated at 15-20% and measured to be 25-33%.    Coronary artery disease     History of myocardial infarction     Paroxysmal A-fib     Stroke        Past Surgical History:   Procedure Laterality Date    CHOLECYSTECTOMY      LEFT HEART CATHETERIZATION Left 11/29/2022    Procedure: Left heart cath;  Surgeon: Wesley Rico MD;  Location: [facility] CATH LAB;  Service: Cardiology;  Laterality: Left;  1030am start, R rad access    LEFT HEART CATHETERIZATION N/A 12/1/2022    Procedure: Left heart cath;  Surgeon: Tam Cho MD;  Location: [facility] CATH LAB;  Service: Cardiology;  Laterality: N/A;    REPLACEMENT OF DUAL CHAMBER IMPLANTABLE CARDIOVERTER-DEFIBRILLATOR         Review of patient's allergies indicates:  No Known Allergies    No current facility-administered medications on file prior to encounter.     Current Outpatient Medications on File Prior to Encounter   Medication Sig    amiodarone (PACERONE) 400 MG tablet Take 400mg twice daily (800mg total) from 12/6 - 12/9, then take 400mg once daily    aspirin 81 MG Chew Take 1 tablet (81 mg total) by mouth once daily. Take for 30 days then please discontinue    clopidogreL (PLAVIX) 75 mg tablet Take 1 tablet (75 mg total) by mouth every evening.    dapagliflozin (FARXIGA) 10 mg tablet Take 1 tablet (10 mg total) by mouth once daily.    metoprolol succinate (TOPROL-XL) 50 MG 24 hr tablet Take 1 tablet (50 mg total) by mouth once daily.    sacubitriL-valsartan (ENTRESTO) 24-26 mg per tablet Take 1 tablet by mouth 2 (two) times daily.    apixaban (ELIQUIS) 5 mg Tab Take 1 tablet (5 mg total) by mouth 2 (two) times daily.    enoxaparin (LOVENOX) 80 mg/0.8 mL Syrg Inject 0.7 mLs (70 mg total) into the skin 2 (two) times a day. Daily  INR. Once INR < 2, start Lovenox for one week. Then stop Lovenox and start Eliquis.    nitroGLYCERIN (NITROSTAT) 0.4 MG SL tablet Place 1 tablet (0.4 mg total) under the tongue every 5 (five) minutes as needed for Chest pain. Up to 3 doses. If chest pain is not relieved or worsens 3 to 5 minutes after 1 dose, call 911 and seek immediate emergency medical attention.    pantoprazole (PROTONIX) 40 MG tablet Take 1 tablet (40 mg total) by mouth once daily.    rosuvastatin (CRESTOR) 40 MG Tab Take 1 tablet (40 mg total) by mouth every evening.    spironolactone (ALDACTONE) 25 MG tablet Take 0.5 tablets (12.5 mg total) by mouth once daily.     Family History    None       Tobacco Use    Smoking status: Former    Smokeless tobacco: Never   Substance and Sexual Activity    Alcohol use: Never    Drug use: Never    Sexual activity: Not on file     Review of Systems   Constitutional:  Positive for fatigue. Negative for fever.   HENT:  Negative for ear discharge and ear pain.    Eyes:  Negative for discharge and itching.   Respiratory:  Negative for cough and shortness of breath.    Cardiovascular:  Negative for chest pain and palpitations.   Gastrointestinal:  Negative for abdominal distention and abdominal pain.   Endocrine: Negative for cold intolerance and heat intolerance.   Genitourinary:  Negative for difficulty urinating and dysuria.   Musculoskeletal:  Negative for neck pain and neck stiffness.   Skin:  Negative for rash and wound.   Neurological:  Negative for seizures and syncope.   Psychiatric/Behavioral:  Negative for agitation and hallucinations.    Objective:     Vital Signs (Most Recent):  Temp: 97.8 °F (36.6 °C) (12/12/22 1106)  Pulse: 65 (12/12/22 1106)  Resp: 18 (12/12/22 1106)  BP: (!) 90/54 (12/12/22 1106)  SpO2: 97 % (12/12/22 1106)   Vital Signs (24h Range):  Temp:  [97.4 °F (36.3 °C)-100.1 °F (37.8 °C)] 97.8 °F (36.6 °C)  Pulse:  [57-87] 65  Resp:  [18-22] 18  SpO2:  [97 %-99 %] 97 %  BP:  ()/() 90/54     Weight: 64 kg (141 lb)  Body mass index is 20.81 kg/m².    Physical Exam  Constitutional:       Appearance: He is ill-appearing. He is not diaphoretic.   HENT:      Head: Normocephalic and atraumatic.      Mouth/Throat:      Pharynx: Oropharynx is clear. No oropharyngeal exudate or posterior oropharyngeal erythema.   Cardiovascular:      Rate and Rhythm: Normal rate and regular rhythm.   Pulmonary:      Effort: No respiratory distress.      Breath sounds: Normal breath sounds.   Abdominal:      General: Bowel sounds are normal.      Palpations: Abdomen is soft.   Musculoskeletal:         General: No deformity or signs of injury.   Skin:     General: Skin is warm and dry.   Neurological:      Mental Status: He is oriented to person, place, and time.      Cranial Nerves: No cranial nerve deficit.           Significant Labs: All pertinent labs within the past 24 hours have been reviewed.  BMP:   Recent Labs   Lab 12/12/22  0209   *      K 3.3*      CO2 18*   BUN 26*   CREATININE 1.4   CALCIUM 9.7   MG 2.3     CBC:   Recent Labs   Lab 12/12/22  0209   WBC 7.17   HGB 15.1   HCT 43.4          Significant Imaging: I have reviewed all pertinent imaging results/findings within the past 24 hours.

## 2022-12-12 NOTE — HPI
"Patient is a pleasant 72-year-old man.  Here from nursing home.  History of cardiac stenting.  Left main into circumflex recently on 1st December 2022.  Done by Dr. Cho.  No significant stenosis was seen in RCA.  Lad was chronically occluded.  States that his AICD fired and shocked him.  Hence was brought to the ER.  Denies any anginal sounding chest pains, orthopnea, PND.  AICD was interrogated and demonstrated to VTE episodes.  "One on the 9th and 1 one yesterday.  Both episodes were successfully terminated. Recent episode required 3 shocks.  Normal single-chamber ICD device function" .  Tele monitoring shows 1 episode of wide complex nonsustained V-tach about 8-9 beats long.  Main complaint is malaise and fatigue    Hpi:  71 yo male presents via EMS from [facility]) with AICD firing.  Patient reports AICD fired once 2 days ago, once yesterday, and once today (Sunday 12/11/22).  He denies chest pain at present.  He does feel weak.  No fevers/sweats.     Patient was just admitted, initially here and then to [facility], 11/22/22-12/6/22 for fatigue and weakness, as well as frequent Vtach.  He had a L heart cath 11/29/22 which showed LAD occlusion and Left Circumflex stenosis.  He was transferred to [facility] and underwent another cath 12/1/22; distal R Main was stented but other arteries were not.  He had multiple episodes of Vtach.  His carbamazepine was stopped after this admission.     [place] [facility] [place]INGS   Left Ventricle: Normal left ventricular cavity size. Normal left ventricular wall thickness. Severely decreased left ventricular systolic function. Left ventricular ejection fraction is estimated at 15-20% and measured to be 25-33%.   Grade II/IV diastolic dysfunction, moderately elevated filling pressures. Contrast was administered and successfully enabled adequate endocardial definition. Regional wall motion abnormalities were present, as shown in the wall motion graphics. "   Right Ventricle:  Normal right ventricular size measuring 3.7cm. Decreased right ventricular systolic function. RVSP could not be calculated due to incomplete tricuspid regurgitation velocity profile. Tapse=14mm, S wave=9cm/s. Defibrillator lead is seen coursing through the right ventricle.   Right Atrium:  Normal right atrial size.  Defibrillator lead is seen coursing through the right atrial cavity.   Left Atrium:  Normal left atrial size. Left atrial volume index is normal:  27.1 ml/m².   Mitral Valve:  Mild mitral annular calcification. Mild mitral valve regurgitation.   Aortic Valve:  Structurally normal trileaflet aortic valve. Mild aortic valve regurgitation without stenosis.   Tricuspid Valve:  Structurally normal tricuspid valve. Trace tricuspid valve regurgitation.   Pulmonic Valve:   Structurally normal pulmonic valve. Mild pulmonary valve regurgitation.   Pericardium:  No pericardial effusion.   Aorta:  Aortic root is dilated measuring 4.1cm   CONCLUSIONS     MAJOR FINDINGS    EF is approximately 20-30%     1. Severely decreased left ventricular systolic function.       2. Grade II diastolic dysfunction.       3. Wall motion abnormalities imply disease of the left anterior descending coronary artery.     4. Mild nonrheumatic mitral valve regurgitation with no suggestion of left atrial enlargement.     5. Mild nonrheumatic aortic valve regurgitation.       6. Mild-to-moderate aortic root dilation.     7. Although estimation of pulmonary artery systolic pressure is not possible due to incomplete tricuspid regurgitation velocity   profile, pulmonary hypertension is not suspected.     Compared to previous echocardiogram 11/01/2021, no significant changes are seen               12/1/22:    Findings:     The RCA had luminal irregularity but no significant obstructive disease.  The left main had 70% distal stenosis.  The LAD was occluded at the ostium  The left circumflex had 99% proximal  stenosis.  Intervention:  A 2.5 balloon was used to dilate the left main into the left circumflex at high pressure (20 atmospheres).  3.0 x 32 stent was then deployed at high pressure (18 atmospheres).    Summary:     Severe two-vessel disease status post  of the LAD with scar tissue and V-tach with AICD in Situ.  Successful left main into left circumflex PCI with drug-eluting stents today.     Recommendations:     Postop care  Dual antiplatelet therapy for least a year  EP follow-up for his VT and AICD  Follow-up with Dr. Rico and follow-up with me as needed          10/19/22:          CONCLUSIONS     MAJOR FINDINGS    EF is approximately 20-30%     1. Severely decreased left ventricular systolic function.       2. Grade II diastolic dysfunction.       3. Wall motion abnormalities imply disease of the left anterior descending coronary artery.     4. Mild nonrheumatic mitral valve regurgitation with no suggestion of left atrial enlargement.     5. Mild nonrheumatic aortic valve regurgitation.       6. Mild-to-moderate aortic root dilation.     7. Although estimation of pulmonary artery systolic pressure is not possible due to incomplete tricuspid regurgitation velocity       profile, pulmonary hypertension is not suspected.     Compared to previous echocardiogram 11/01/2021, no significant changes are seen

## 2022-12-12 NOTE — ASSESSMENT & PLAN NOTE
Severe disease on recent LHC with complicated PCI at [facility] [place].  Denies any chest pain.  Continue ASA, Plavix and Statin.  Elevated Troponin as above.

## 2022-12-12 NOTE — PLAN OF CARE
[facility] - Telemetry  Initial Discharge Assessment       Primary Care Provider: Flip Hanna MD    Admission Diagnosis: V tach [I47.20]  Elevated troponin [R77.8]  Chest pain [R07.9]    Admission Date: 12/12/2022  Expected Discharge Date:     Discharge Barriers Identified: None    Payor: HU[employer] MANAGED MEDICARE / Plan: HU[employer] MEDICARE HMO / Product Type: Capitation /     Extended Emergency Contact Information  Primary Emergency Contact: Silvia Braga  Mobile Phone: [phone]  Relation: Daughter  Preferred language: English   needed? No  Secondary Emergency Contact: HILARY JIMENEZ  Mobile Phone: [phone]  Relation: Son  Preferred language: English   needed? No    Discharge Plan A: Home Health  Discharge Plan B: Home with family      [facility] [address] [place] [address] [place] - [address]  [address] [place] [address]  Phone: [phone] Fax: [phone]      Initial Assessment (most recent)       Adult Discharge Assessment - 12/12/22 1138          Discharge Assessment    Assessment Type Discharge Planning Assessment     Confirmed/corrected address, phone number and insurance Yes     Confirmed Demographics Correct on Facesheet     Source of Information family     If unable to respond/provide information was family/caregiver contacted? Yes     Contact Name/Number Silvia Braga (Daughter)   [phone] (Mobile)     When was your last doctors appointment? --   Several months    Communicated MOR with patient/caregiver Date not available/Unable to determine     People in Home child(carlton), adult     Do you expect to return to your current living situation? Yes     Do you have help at home or someone to help you manage your care at home? Yes     Who are your caregiver(s) and their phone number(s)? Silvia Braga (Daughter)   [phone] (Mobile)     Prior to hospitilization cognitive status: Alert/Oriented     Current cognitive status: Alert/Oriented     Equipment Currently Used at  Home none     Readmission within 30 days? Yes     Patient currently being followed by outpatient case management? No     Do you currently have service(s) that help you manage your care at home? No     Do you take prescription medications? Yes     Do you have prescription coverage? Yes     Coverage [employer]     Do you have any problems affording any of your prescribed medications? No     Is the patient taking medications as prescribed? yes     Who is going to help you get home at discharge? Silvia Braga (Daughter)   [phone] (Mobile)     How do you get to doctors appointments? family or friend will provide     Are you on dialysis? No     Do you take coumadin? No     Discharge Plan A Home Health     Discharge Plan B Home with family     DME Needed Upon Discharge  none     Discharge Plan discussed with: Adult children     Discharge Barriers Identified None        OTHER    Name(s) of People in Home Silvia Braga (Daughter)   [phone] (Mobile)

## 2022-12-12 NOTE — ASSESSMENT & PLAN NOTE
1st set of troponin elevated.  Second send awaited.  Further course of action to be determined by trend.  Likely elevated secondary to AICD shock.  No anginal sounding chest pains

## 2022-12-12 NOTE — CONSULTS
"[facility] [place] - Tele[place]  Cardiology  Consult Note    Patient Name: Balbir Rebollar Sr.  MRN: [id number]  Admission Date: 12/12/2022  Hospital Length of Stay: 0 days  Code Status: Full Code   Attending Provider: Jeromy Campos MD   Consulting Provider: Deanne Carmichael MD  Primary Care Physician: Flip Hanna MD  Principal Problem:<principal problem not specified>    Patient information was obtained from patient and ER records.     Inpatient consult to Cardiology  Consult performed by: Deanne Carmichael MD  Consult ordered by: JD Briones MD        Subjective:     Chief Complaint:  vtac     HPI:   Patient is a pleasant 72-year-old man.  Here from nursing home.  History of cardiac stenting.  Left main into circumflex recently on 1st December 2022.  Done by Dr. Cho.  No significant stenosis was seen in RCA.  Lad was chronically occluded.  States that his AICD fired and shocked him.  Hence was brought to the ER.  Denies any anginal sounding chest pains, orthopnea, PND.  AICD was interrogated and demonstrated to VTE episodes.  "One on the 9th and 1 one yesterday.  Both episodes were successfully terminated. Recent episode required 3 shocks.  Normal single-chamber ICD device function" .  Tele monitoring shows 1 episode of wide complex nonsustained V-tach about 8-9 beats long.  Main complaint is malaise and fatigue    Hpi:  71 yo male presents via EMS from [facility]) with AICD firing.  Patient reports AICD fired once 2 days ago, once yesterday, and once today (Sunday 12/11/22).  He denies chest pain at present.  He does feel weak.  No fevers/sweats.     Patient was just admitted, initially here and then to [facility], 11/22/22-12/6/22 for fatigue and weakness, as well as frequent Vtach.  He had a L heart cath 11/29/22 which showed LAD occlusion and Left Circumflex stenosis.  He was transferred to [facility] and underwent another cath 12/1/22; distal R Main was stented but other arteries were " not.  He had multiple episodes of Vtach.  His carbamazepine was stopped after this admission.     TTE 10/19/22 [facility]   FINDINGS   Left Ventricle: Normal left ventricular cavity size. Normal left ventricular wall thickness. Severely decreased left ventricular systolic function. Left ventricular ejection fraction is estimated at 15-20% and measured to be 25-33%.   Grade II/IV diastolic dysfunction, moderately elevated filling pressures. Contrast was administered and successfully enabled adequate endocardial definition. Regional wall motion abnormalities were present, as shown in the wall motion graphics.   Right Ventricle:  Normal right ventricular size measuring 3.7cm. Decreased right ventricular systolic function. RVSP could not be calculated due to incomplete tricuspid regurgitation velocity profile. Tapse=14mm, S wave=9cm/s. Defibrillator lead is seen coursing through the right ventricle.   Right Atrium:  Normal right atrial size.  Defibrillator lead is seen coursing through the right atrial cavity.   Left Atrium:  Normal left atrial size. Left atrial volume index is normal:  27.1 ml/m².   Mitral Valve:  Mild mitral annular calcification. Mild mitral valve regurgitation.   Aortic Valve:  Structurally normal trileaflet aortic valve. Mild aortic valve regurgitation without stenosis.   Tricuspid Valve:  Structurally normal tricuspid valve. Trace tricuspid valve regurgitation.   Pulmonic Valve:   Structurally normal pulmonic valve. Mild pulmonary valve regurgitation.   Pericardium:  No pericardial effusion.   Aorta:  Aortic root is dilated measuring 4.1cm   CONCLUSIONS     MAJOR FINDINGS    EF is approximately 20-30%     1. Severely decreased left ventricular systolic function.       2. Grade II diastolic dysfunction.       3. Wall motion abnormalities imply disease of the left anterior descending coronary artery.     4. Mild nonrheumatic mitral valve regurgitation with no suggestion of left atrial enlargement.     5.  Mild nonrheumatic aortic valve regurgitation.       6. Mild-to-moderate aortic root dilation.     7. Although estimation of pulmonary artery systolic pressure is not possible due to incomplete tricuspid regurgitation velocity   profile, pulmonary hypertension is not suspected.     Compared to previous echocardiogram 11/01/2021, no significant changes are seen               12/1/22:    Findings:     1. The RCA had luminal irregularity but no significant obstructive disease.  2. The left main had 70% distal stenosis.  3. The LAD was occluded at the ostium  4. The left circumflex had 99% proximal stenosis.  5. Intervention:  A 2.5 balloon was used to dilate the left main into the left circumflex at high pressure (20 atmospheres).  3.0 x 32 stent was then deployed at high pressure (18 atmospheres).    Summary:     1. Severe two-vessel disease status post  of the LAD with scar tissue and V-tach with AICD in Situ.  2. Successful left main into left circumflex PCI with drug-eluting stents today.     Recommendations:     1. Postop care  2. Dual antiplatelet therapy for least a year  3. EP follow-up for his VT and AICD  4. Follow-up with Dr. Rico and follow-up with me as needed          10/19/22:          CONCLUSIONS     MAJOR FINDINGS    EF is approximately 20-30%     1. Severely decreased left ventricular systolic function.       2. Grade II diastolic dysfunction.       3. Wall motion abnormalities imply disease of the left anterior descending coronary artery.     4. Mild nonrheumatic mitral valve regurgitation with no suggestion of left atrial enlargement.     5. Mild nonrheumatic aortic valve regurgitation.       6. Mild-to-moderate aortic root dilation.     7. Although estimation of pulmonary artery systolic pressure is not possible due to incomplete tricuspid regurgitation velocity       profile, pulmonary hypertension is not suspected.     Compared to previous echocardiogram 11/01/2021, no significant changes  are seen          Past Medical History:   Diagnosis Date    AICD (automatic cardioverter/defibrillator) present     Anticoagulant long-term use     Chronic combined systolic and diastolic congestive heart failure     Left ventricular ejection fraction is estimated at 15-20% and measured to be 25-33%.    Coronary artery disease     History of myocardial infarction     Paroxysmal A-fib     Stroke        Past Surgical History:   Procedure Laterality Date    CHOLECYSTECTOMY      LEFT HEART CATHETERIZATION Left 11/29/2022    Procedure: Left heart cath;  Surgeon: Wesley Rico MD;  Location: [facility] C[facility] LAB;  Service: Cardiology;  Laterality: Left;  1030am start, R rad access    LEFT HEART CATHETERIZATION N/A 12/1/2022    Procedure: Left heart cath;  Surgeon: Tam Cho MD;  Location: [facility] CATH LAB;  Service: Cardiology;  Laterality: N/A;    REPLACEMENT OF DUAL CHAMBER IMPLANTABLE CARDIOVERTER-DEFIBRILLATOR         Review of patient's allergies indicates:  No Known Allergies    No current facility-administered medications on file prior to encounter.     Current Outpatient Medications on File Prior to Encounter   Medication Sig    amiodarone (PACERONE) 400 MG tablet Take 400mg twice daily (800mg total) from 12/6 - 12/9, then take 400mg once daily    aspirin 81 MG Chew Take 1 tablet (81 mg total) by mouth once daily. Take for 30 days then please discontinue    clopidogreL (PLAVIX) 75 mg tablet Take 1 tablet (75 mg total) by mouth every evening.    dapagliflozin (FARXIGA) 10 mg tablet Take 1 tablet (10 mg total) by mouth once daily.    metoprolol succinate (TOPROL-XL) 50 MG 24 hr tablet Take 1 tablet (50 mg total) by mouth once daily.    sacubitriL-valsartan (ENTRESTO) 24-26 mg per tablet Take 1 tablet by mouth 2 (two) times daily.    apixaban (ELIQUIS) 5 mg Tab Take 1 tablet (5 mg total) by mouth 2 (two) times daily.    enoxaparin (LOVENOX) 80 mg/0.8 mL Syrg Inject 0.7 mLs (70 mg total) into  the skin 2 (two) times a day. Daily INR. Once INR < 2, start Lovenox for one week. Then stop Lovenox and start Eliquis.    nitroGLYCERIN (NITROSTAT) 0.4 MG SL tablet Place 1 tablet (0.4 mg total) under the tongue every 5 (five) minutes as needed for Chest pain. Up to 3 doses. If chest pain is not relieved or worsens 3 to 5 minutes after 1 dose, call 911 and seek immediate emergency medical attention.    pantoprazole (PROTONIX) 40 MG tablet Take 1 tablet (40 mg total) by mouth once daily.    rosuvastatin (CRESTOR) 40 MG Tab Take 1 tablet (40 mg total) by mouth every evening.    spironolactone (ALDACTONE) 25 MG tablet Take 0.5 tablets (12.5 mg total) by mouth once daily.     Family History    None       Tobacco Use    Smoking status: Former    Smokeless tobacco: Never   Substance and Sexual Activity    Alcohol use: Never    Drug use: Never    Sexual activity: Not on file     Review of Systems   Constitutional: Positive for malaise/fatigue.   HENT: Negative.     Eyes: Negative.    Cardiovascular: Negative.    Respiratory: Negative.     Endocrine: Negative.    Hematologic/Lymphatic: Negative.    Skin: Negative.    Musculoskeletal: Negative.    Gastrointestinal: Negative.    Genitourinary: Negative.    Neurological: Negative.    Psychiatric/Behavioral: Negative.     Allergic/Immunologic: Negative.    Objective:     Vital Signs (Most Recent):  Temp: 97.8 °F (36.6 °C) (12/12/22 1106)  Pulse: 65 (12/12/22 1106)  Resp: 18 (12/12/22 1106)  BP: (!) 90/54 (12/12/22 1106)  SpO2: 97 % (12/12/22 1106)   Vital Signs (24h Range):  Temp:  [97.4 °F (36.3 °C)-100.1 °F (37.8 °C)] 97.8 °F (36.6 °C)  Pulse:  [57-87] 65  Resp:  [18-22] 18  SpO2:  [97 %-99 %] 97 %  BP: ()/() 90/54     Weight: 64 kg (141 lb)  Body mass index is 20.82 kg/m².    SpO2: 97 %       No intake or output data in the 24 hours ending 12/12/22 1159    Lines/Drains/Airways       Peripheral Intravenous Line  Duration                  Peripheral IV -  Single Lumen 12/12/22 18 G Anterior;Left Upper Arm <1 day                    Physical Exam  Vitals reviewed.   Constitutional:       Appearance: He is cachectic. He is ill-appearing.   HENT:      Head: Normocephalic.   Eyes:      Conjunctiva/sclera: Conjunctivae normal.      Pupils: Pupils are equal, round, and reactive to light.   Cardiovascular:      Rate and Rhythm: Normal rate and regular rhythm.      Heart sounds: Normal heart sounds.   Pulmonary:      Effort: Pulmonary effort is normal.      Breath sounds: Normal breath sounds.   Abdominal:      General: Bowel sounds are normal.      Palpations: Abdomen is soft.   Musculoskeletal:      Cervical back: Normal range of motion and neck supple.   Skin:     General: Skin is warm.   Neurological:      Mental Status: He is alert and oriented to person, place, and time.       Significant Labs: BMP:   Recent Labs   Lab 12/12/22 0209   *      K 3.3*      CO2 18*   BUN 26*   CREATININE 1.4   CALCIUM 9.7   MG 2.3   , CMP   Recent Labs   Lab 12/12/22 0209      K 3.3*      CO2 18*   *   BUN 26*   CREATININE 1.4   CALCIUM 9.7   PROT 7.5   ALBUMIN 3.8   BILITOT 0.4   ALKPHOS 96   AST 37   ALT 33   ANIONGAP 19*   , CBC   Recent Labs   Lab 12/12/22 0209   WBC 7.17   HGB 15.1   HCT 43.4      , INR   Recent Labs   Lab 12/12/22 0209   INR 3.8*   , Lipid Panel No results for input(s): CHOL, HDL, LDLCALC, TRIG, CHOLHDL in the last 48 hours., Troponin   Recent Labs   Lab 12/12/22 0209   TROPONINI 0.546*   , and All pertinent lab results from the last 24 hours have been reviewed.    Significant Imaging: Echocardiogram: Transthoracic echo (TTE) complete (Cupid Only): No results found for this or any previous visit.    Assessment and Plan:     Troponin level elevated  1st set of troponin elevated.  Second send awaited.  Further course of action to be determined by trend.  Likely elevated secondary to AICD shock.  No anginal sounding chest  pains    V-tach  As above    QT prolongation        Acute on chronic combined systolic and diastolic congestive heart failure  Known severe ischemic cardiomyopathy.  Continue guideline directed medical therapy.  On Entresto, long-acting beta blockers and spironolactone.  At maximally tolerated dose.  Currently euvolemic on exam  Recent Labs   Lab 12/12/22  0209   *   .      ICD (implantable cardioverter-defibrillator), single, in situ  Underwent successful cardioversion yesterday and on the 9th.  Was on amiodarone 400 mg daily.  However QTC prolonged on EKG.  I have sent a message to Dr. Richter, electrophysiology on-call to determine further course of action regarding his ventricular tachycardia    Coronary artery disease involving native coronary artery of native heart without angina pectoris  PCI from left main into circumflex recently.  Known LAD .  RCA luminal irregularities.  Continue aspirin and Plavix    Chronic anticoagulation        Paroxysmal atrial fibrillation  On Eliquis        VTE Risk Mitigation (From admission, onward)         Ordered     apixaban tablet 5 mg  2 times daily         12/12/22 9280                Thank you for your consult. I will follow-up with patient. Please contact us if you have any additional questions.    Deanne Carmichael MD  Cardiology   [facility] [place] - Telemetry

## 2022-12-12 NOTE — ASSESSMENT & PLAN NOTE
Known severe ischemic cardiomyopathy.  Continue guideline directed medical therapy.  On Entresto, long-acting beta blockers and spironolactone.  At maximally tolerated dose.  Currently euvolemic on exam  Recent Labs   Lab 12/12/22  0209   *   .

## 2022-12-12 NOTE — ASSESSMENT & PLAN NOTE
Underwent successful cardioversion yesterday and on the 9th.  Was on amiodarone 400 mg daily.  However QTC prolonged on EKG.  I have sent a message to Dr. Richter, electrophysiology on-call to determine further course of action regarding his ventricular tachycardia

## 2022-12-12 NOTE — ASSESSMENT & PLAN NOTE
Patient is identified as having Combined Systolic and Diastolic heart failure that is Chronic. CHF is currently controlled. Latest ECHO performed and demonstrates- No results found for this or any previous visit.  . Continue Beta Blocker, ACE/ARB and Aldactone and monitor clinical status closely. Monitor on telemetry. Patient is off CHF pathway.  Monitor strict Is&Os and daily weights.  Place on fluid restriction of 2 L. Continue to stress to patient importance of self efficacy and  on diet for CHF. Last BNP reviewed- and noted below   Recent Labs   Lab 12/12/22  0209   *   .

## 2022-12-12 NOTE — CONSULTS
"  [facility] - Telemetry  Adult Nutrition  Consult Note    SUMMARY     Recommendations    1) SLP consult to assess texture appropriateness and PO consumption safety per previous recent stay and dysphagia issues.  2) Continue Cardiac diet as tolerated - Texture per SLP,encourage PO as tolerated.  3) Monitor PO intake - Addition of Boost (+) TID to assist in meeting EEN/EPN if PO < 50%  4) Monitor weekly weights, Nutrition related Labs     Goals:   1) Pt to have meet > 50% EEN/EPN via PO/ONS intake by RD follow up  Nutrition Goal Status: new  Communication of RD Recs:  (POC)    Assessment and Plan  Nutrition Problem  Inadequate Oral Intake    Related to (etiology):   NPO status PTAdvancement    Signs and Symptoms (as evidenced by):   No Oral intake allowed - no other means of nutrition   - Pt diet not advanced    Interventions/Recommendations (treatment strategy)  Fat and mineral Modified diet  [employer]  Collaboration of care with other providers    Nutrition Diagnosis Status:   New      Reason for Assessment    Reason For Assessment: consult (pressure injury)  Diagnosis:  (V-Tach)  Relevant Medical History: .rpob  General Information Comments: .  Nutrition Discharge Planning: Pending Medical Course    Nutrition Risk Screen    Nutrition Risk Screen: large or nonhealing wound, burn or pressure injury    Nutrition/Diet History    Spiritual, Cultural Beliefs, Hinduism Practices, Values that Affect Care: no  Food Allergies: NKFA  Factors Affecting Nutritional Intake: None identified at this time    Anthropometrics    Temp: 97.8 °F (36.6 °C)  Height: 5' 9.02" (175.3 cm)  Height (inches): 69.02 in  Weight Method: Standard Scale  Weight: 64 kg (141 lb)  Weight (lb): 141 lb  Ideal Body Weight (IBW), Male: 160.12 lb  % Ideal Body Weight, Male (lb): 88.06 %  % Ideal Body Weight Malnutrition: 80-90% - mild deficit  BMI (Calculated): 20.8  BMI Grade: 18.5-24.9 - normal  Weight Loss: unintentional  Usual Body Weight " (UBW), k.5 kg  Weight Change Amount: 14 lb (x 3 mos)  % Usual Body Weight: 90.91  % Weight Change From Usual Weight: -9.28 %       Lab/Procedures/Meds    Pertinent Labs Reviewed: reviewed  CBC:  Recent Labs   Lab 22  0209   WBC 7.17   HGB 15.1   HCT 43.4        CMP:  Recent Labs   Lab 22  020   CALCIUM 9.7   ALBUMIN 3.8   PROT 7.5      K 3.3*   CO2 18*      BUN 26*   CREATININE 1.4   ALKPHOS 96   ALT 33   AST 37   BILITOT 0.4     Pertinent Medications Reviewed: reviewed  Scheduled Meds:   [START ON 2022] amiodarone  400 mg Oral Daily    apixaban  5 mg Oral BID    aspirin  81 mg Oral Daily    atorvastatin  40 mg Oral QHS    clopidogreL  75 mg Oral QHS    metoprolol succinate  50 mg Oral Daily    pantoprazole  40 mg Oral Daily    sacubitriL-valsartan  1 tablet Oral BID    spironolactone  12.5 mg Oral Daily     Continuous Infusions:  PRN Meds:.acetaminophen, aluminum-magnesium hydroxide-simethicone, dextrose 10%, dextrose 10%, glucagon (human recombinant), glucose, glucose, melatonin, naloxone, nitroGLYCERIN, ondansetron, oxyCODONE, prochlorperazine, senna-docusate 8.6-50 mg, simethicone, sodium chloride 0.9%      Physical Findings/Assessment  Consulted for altered skin integrity buttocks/coccyx.  According to nursing, patient admitted with Stage 1 pressure injuries on bilateral heels and perineum     Estimated/Assessed Needs    Weight Used For Calorie Calculations: 64 kg (141 lb 1.5 oz)  Energy Calorie Requirements (kcal): 2240  Energy Need Method: Kcal/kg (35)  Protein Requirements: 77 - 96g (1.2 - 1.5k/kg)  Weight Used For Protein Calculations: 64 kg (141 lb 1.5 oz)  Fluid Requirements (mL): 1 mL/kcal  Estimated Fluid Requirement Method:  (or per MD)  RDA Method (mL): 2240  CHO Requirement: 280g      Nutrition Prescription Ordered    Current Diet Order: Cardiac    Evaluation of Received Nutrient/Fluid Intake    I/O: None recorded at this time  Energy Calories Required: not  meeting needs  Protein Required: not meeting needs  Fluid Required: not meeting needs  Comments: LBM 12/6  % Intake of Estimated Energy Needs: 0 - 25 %  % Meal Intake: 0 - 25 %    Nutrition Risk    Level of Risk/Frequency of Follow-up:  (1-2 x /week)       Monitor and Evaluation    Food and Nutrient Intake: energy intake, food and beverage intake  Food and Nutrient Adminstration: diet order  Knowledge/Beliefs/Attitudes: beliefs and attitudes  Physical Activity and Function: nutrition-related ADLs and IADLs  Anthropometric Measurements: weight, weight change  Biochemical Data, Medical Tests and Procedures: electrolyte and renal panel, gastrointestinal profile, glucose/endocrine profile, inflammatory profile, lipid profile  Nutrition-Focused Physical Findings: overall appearance       Nutrition Follow-Up    RD Follow-up?: Yes  Sun [place], BS, RDN, LDN

## 2022-12-12 NOTE — H&P
[facility]  History & Physical    Patient Name: Balbir Rebollar Sr.  MRN: [id number]  Patient Class: IP- Inpatient  Admission Date: 12/12/2022  Attending Physician: Jeromy Campos MD   Primary Care Provider: Flip Hanna MD         Patient information was obtained from patient and ER records.     Subjective:     Principal Problem:V-tach    Chief Complaint:   Chief Complaint   Patient presents with    Pacemaker Problem     Reports multiple fires from pacemaker. Per EMS 2 shocks delivered, hx of afib. Nitro given        HPI: 72 y.o. male with CAD, HTN, Afib, CVA, and ICD presents from SNF after his ICD has fired daily for past 3 days.  Patient was just admitted, initially here and then transferred to [facility], 11/22/22-12/6/22 for fatigue and weakness.  Patient had multiple episodes of Vtach during that hospital stay.  He had a L heart cath 11/29/22 which showed LAD occlusion and Left Circumflex stenosis.  He was transferred to [facility] for complicated PCI and underwent another cath 12/1/22; distal R Main was stented but other arteries were not.  Patient discharged to SNF on oral Amiodarone.  He denies any chest pain.  Main complaint is persistent generalized weakness.  No alleviating or aggravating factors.  He denies any other complaints.      Past Medical History:   Diagnosis Date    AICD (automatic cardioverter/defibrillator) present     Anticoagulant long-term use     Chronic combined systolic and diastolic congestive heart failure     Left ventricular ejection fraction is estimated at 15-20% and measured to be 25-33%.    Coronary artery disease     History of myocardial infarction     Paroxysmal A-fib     Stroke        Past Surgical History:   Procedure Laterality Date    CHOLECYSTECTOMY      LEFT HEART CATHETERIZATION Left 11/29/2022    Procedure: Left heart cath;  Surgeon: Wesley Rico MD;  Location: [facility] CATH LAB;  Service: Cardiology;  Laterality:  Left;  1030am start, R rad access    LEFT HEART CATHETERIZATION N/A 12/1/2022    Procedure: Left heart cath;  Surgeon: Tam Cho MD;  Location: [facility] CATH LAB;  Service: Cardiology;  Laterality: N/A;    REPLACEMENT OF DUAL CHAMBER IMPLANTABLE CARDIOVERTER-DEFIBRILLATOR         Review of patient's allergies indicates:  No Known Allergies    No current facility-administered medications on file prior to encounter.     Current Outpatient Medications on File Prior to Encounter   Medication Sig    amiodarone (PACERONE) 400 MG tablet Take 400mg twice daily (800mg total) from 12/6 - 12/9, then take 400mg once daily    aspirin 81 MG Chew Take 1 tablet (81 mg total) by mouth once daily. Take for 30 days then please discontinue    clopidogreL (PLAVIX) 75 mg tablet Take 1 tablet (75 mg total) by mouth every evening.    dapagliflozin (FARXIGA) 10 mg tablet Take 1 tablet (10 mg total) by mouth once daily.    metoprolol succinate (TOPROL-XL) 50 MG 24 hr tablet Take 1 tablet (50 mg total) by mouth once daily.    sacubitriL-valsartan (ENTRESTO) 24-26 mg per tablet Take 1 tablet by mouth 2 (two) times daily.    apixaban (ELIQUIS) 5 mg Tab Take 1 tablet (5 mg total) by mouth 2 (two) times daily.    enoxaparin (LOVENOX) 80 mg/0.8 mL Syrg Inject 0.7 mLs (70 mg total) into the skin 2 (two) times a day. Daily INR. Once INR < 2, start Lovenox for one week. Then stop Lovenox and start Eliquis.    nitroGLYCERIN (NITROSTAT) 0.4 MG SL tablet Place 1 tablet (0.4 mg total) under the tongue every 5 (five) minutes as needed for Chest pain. Up to 3 doses. If chest pain is not relieved or worsens 3 to 5 minutes after 1 dose, call 911 and seek immediate emergency medical attention.    pantoprazole (PROTONIX) 40 MG tablet Take 1 tablet (40 mg total) by mouth once daily.    rosuvastatin (CRESTOR) 40 MG Tab Take 1 tablet (40 mg total) by mouth every evening.    spironolactone (ALDACTONE) 25 MG tablet Take 0.5 tablets (12.5 mg total)  by mouth once daily.     Family History    None       Tobacco Use    Smoking status: Former    Smokeless tobacco: Never   Substance and Sexual Activity    Alcohol use: Never    Drug use: Never    Sexual activity: Not on file     Review of Systems   Constitutional:  Positive for fatigue. Negative for fever.   HENT:  Negative for ear discharge and ear pain.    Eyes:  Negative for discharge and itching.   Respiratory:  Negative for cough and shortness of breath.    Cardiovascular:  Negative for chest pain and palpitations.   Gastrointestinal:  Negative for abdominal distention and abdominal pain.   Endocrine: Negative for cold intolerance and heat intolerance.   Genitourinary:  Negative for difficulty urinating and dysuria.   Musculoskeletal:  Negative for neck pain and neck stiffness.   Skin:  Negative for rash and wound.   Neurological:  Negative for seizures and syncope.   Psychiatric/Behavioral:  Negative for agitation and hallucinations.    Objective:     Vital Signs (Most Recent):  Temp: 97.8 °F (36.6 °C) (12/12/22 1106)  Pulse: 65 (12/12/22 1106)  Resp: 18 (12/12/22 1106)  BP: (!) 90/54 (12/12/22 1106)  SpO2: 97 % (12/12/22 1106)   Vital Signs (24h Range):  Temp:  [97.4 °F (36.3 °C)-100.1 °F (37.8 °C)] 97.8 °F (36.6 °C)  Pulse:  [57-87] 65  Resp:  [18-22] 18  SpO2:  [97 %-99 %] 97 %  BP: ()/() 90/54     Weight: 64 kg (141 lb)  Body mass index is 20.81 kg/m².    Physical Exam  Constitutional:       Appearance: He is ill-appearing. He is not diaphoretic.   HENT:      Head: Normocephalic and atraumatic.      Mouth/Throat:      Pharynx: Oropharynx is clear. No oropharyngeal exudate or posterior oropharyngeal erythema.   Cardiovascular:      Rate and Rhythm: Normal rate and regular rhythm.   Pulmonary:      Effort: No respiratory distress.      Breath sounds: Normal breath sounds.   Abdominal:      General: Bowel sounds are normal.      Palpations: Abdomen is soft.   Musculoskeletal:         General:  No deformity or signs of injury.   Skin:     General: Skin is warm and dry.   Neurological:      Mental Status: He is oriented to person, place, and time.      Cranial Nerves: No cranial nerve deficit.           Significant Labs: All pertinent labs within the past 24 hours have been reviewed.  BMP:   Recent Labs   Lab 12/12/22 0209   *      K 3.3*      CO2 18*   BUN 26*   CREATININE 1.4   CALCIUM 9.7   MG 2.3     CBC:   Recent Labs   Lab 12/12/22 0209   WBC 7.17   HGB 15.1   HCT 43.4          Significant Imaging: I have reviewed all pertinent imaging results/findings within the past 24 hours.    Assessment/Plan:     * V-tach  Patient presents after his ICD has fired daily for past 3 days.  Admitted to telemetry.  Cardiology consulted.  Continued on oral Amiodarone.  Elevated troponin.  Significant hx of CAD with recent stenting and severe disease, but elevated troponin could also be related to ICD firing.  Will follow Cardiology recommendations.      Chronic combined systolic and diastolic congestive heart failure  Patient is identified as having Combined Systolic and Diastolic heart failure that is Chronic. CHF is currently controlled. Latest ECHO performed and demonstrates- No results found for this or any previous visit.  . Continue Beta Blocker, ACE/ARB and Aldactone and monitor clinical status closely. Monitor on telemetry. Patient is off CHF pathway.  Monitor strict Is&Os and daily weights.  Place on fluid restriction of 2 L. Continue to stress to patient importance of self efficacy and  on diet for CHF. Last BNP reviewed- and noted below   Recent Labs   Lab 12/12/22 0209   *   .      Troponin level elevated  As above.      ICD (implantable cardioverter-defibrillator), single, in situ        Coronary artery disease involving native coronary artery of native heart without angina pectoris  Severe disease on recent [facility] with complicated PCI at [facility].  Denies any  chest pain.  Continue ASA, Plavix and Statin.  Elevated Troponin as above.      Chronic anticoagulation  Noted elevated INR.  Patient was apparently recently taken off coumadin and transitioned to Eliquis.      Paroxysmal atrial fibrillation  Patient with Paroxysmal (<7 days) atrial fibrillation which is controlled currently with Beta Blocker and Amiodarone. Patient is currently in sinus rhythm.[id number] Score: 2. . Anticoagulation indicated. Anticoagulation done with Eliquis.          VTE Risk Mitigation (From admission, onward)         Ordered     apixaban tablet 5 mg  2 times daily         12/12/22 0517                   Jeromy Campos MD  Department of Hospital Medicine   [facility]

## 2022-12-12 NOTE — ASSESSMENT & PLAN NOTE
Patient presents after his ICD has fired daily for past 3 days.  Admitted to telemetry.  Cardiology consulted.  Continued on oral Amiodarone.  Elevated troponin.  Significant hx of CAD with recent stenting and severe disease, but elevated troponin could also be related to ICD firing.  Will follow Cardiology recommendations.

## 2022-12-12 NOTE — CONSULTS
[facility] - Telemetry  Wound Care    Patient Name:  Balbir Rebollar Sr.   MRN:  [id number]  Date: 12/12/2022  Diagnosis: <principal problem not specified>    History:     Past Medical History:   Diagnosis Date    AICD (automatic cardioverter/defibrillator) present     Anticoagulant long-term use     Chronic combined systolic and diastolic congestive heart failure     Left ventricular ejection fraction is estimated at 15-20% and measured to be 25-33%.    Coronary artery disease     History of myocardial infarction     Paroxysmal A-fib     Stroke        Social History     Socioeconomic History    Marital status:    Tobacco Use    Smoking status: Former    Smokeless tobacco: Never   Substance and Sexual Activity    Alcohol use: Never    Drug use: Never     Social Determinants of Health     Financial Resource Strain: Low Risk     Difficulty of Paying Living Expenses: Not hard at all   Food Insecurity: No Food Insecurity    Worried About Running Out of Food in the Last Year: Never true    Ran Out of Food in the Last Year: Never true   Transportation Needs: No Transportation Needs    Lack of Transportation (Medical): No    Lack of Transportation (Non-Medical): No   Physical Activity: Inactive    Days of Exercise per Week: 0 days    Minutes of Exercise per Session: 0 min   Stress: No Stress Concern Present    Feeling of Stress : Not at all   Social Connections: Socially Isolated    Frequency of Communication with Friends and Family: Three times a week    Frequency of Social Gatherings with Friends and Family: Three times a week    Attends Restoration Services: Never    Active Member of Clubs or Organizations: No    Attends Club or Organization Meetings: Never    Marital Status:    Housing Stability: Low Risk     Unable to Pay for Housing in the Last Year: No    Number of Places Lived in the Last Year: 1    Unstable Housing in the Last Year: No       Precautions:     Allergies as of 12/12/2022    (No  Known Allergies)       [facility] Assessment Details/Treatment   Consulted for altered skin integrity buttocks/coccyx  A 72 year old male admitted today from [facility] with  V-tach; troponin level elevated; acute on chronic combined systolic and diastolic congestive heart failure; ICD; CAD; chronic anticoagulation; paroxysmal Afib  12/12 WBC 7.17 Hgb 15.1 Hct 43.4 INR 3.8  Alb 3.8  On Isoflex mattress; Bg score 18; independent bed mobility  According to nursing, patient admitted with Stage 1 pressure injuries on bilateral heels and perineum; Written Order Guidelines instituted  Assessment:  Patient turning in bed independently. Heels without skin breakdown. Left heel dark pink in color, but doesn't present as a pressure injury.   Perineal skin dark red, but patient denies any discomfort.   Coccyx- No pressure injury  Treatment:  Continue Pressure Injury Prevention Interventions.  Orders placed.     12/12/2022

## 2022-12-12 NOTE — SUBJECTIVE & OBJECTIVE
Past Medical History:   Diagnosis Date    AICD (automatic cardioverter/defibrillator) present     Anticoagulant long-term use     Chronic combined systolic and diastolic congestive heart failure     Left ventricular ejection fraction is estimated at 15-20% and measured to be 25-33%.    Coronary artery disease     History of myocardial infarction     Paroxysmal A-fib     Stroke        Past Surgical History:   Procedure Laterality Date    CHOLECYSTECTOMY      LEFT HEART CATHETERIZATION Left 11/29/2022    Procedure: Left heart cath;  Surgeon: Wesley Rico MD;  Location: [facility] CATH LAB;  Service: Cardiology;  Laterality: Left;  1030am start, R rad access    LEFT HEART CATHETERIZATION N/A 12/1/2022    Procedure: Left heart cath;  Surgeon: Tam Cho MD;  Location: [facility] CATH LAB;  Service: Cardiology;  Laterality: N/A;    REPLACEMENT OF DUAL CHAMBER IMPLANTABLE CARDIOVERTER-DEFIBRILLATOR         Review of patient's allergies indicates:  No Known Allergies    No current facility-administered medications on file prior to encounter.     Current Outpatient Medications on File Prior to Encounter   Medication Sig    amiodarone (PACERONE) 400 MG tablet Take 400mg twice daily (800mg total) from 12/6 - 12/9, then take 400mg once daily    aspirin 81 MG Chew Take 1 tablet (81 mg total) by mouth once daily. Take for 30 days then please discontinue    clopidogreL (PLAVIX) 75 mg tablet Take 1 tablet (75 mg total) by mouth every evening.    dapagliflozin (FARXIGA) 10 mg tablet Take 1 tablet (10 mg total) by mouth once daily.    metoprolol succinate (TOPROL-XL) 50 MG 24 hr tablet Take 1 tablet (50 mg total) by mouth once daily.    sacubitriL-valsartan (ENTRESTO) 24-26 mg per tablet Take 1 tablet by mouth 2 (two) times daily.    apixaban (ELIQUIS) 5 mg Tab Take 1 tablet (5 mg total) by mouth 2 (two) times daily.    enoxaparin (LOVENOX) 80 mg/0.8 mL Syrg Inject 0.7 mLs (70 mg total) into the skin 2 (two) times a day. Daily  INR. Once INR < 2, start Lovenox for one week. Then stop Lovenox and start Eliquis.    nitroGLYCERIN (NITROSTAT) 0.4 MG SL tablet Place 1 tablet (0.4 mg total) under the tongue every 5 (five) minutes as needed for Chest pain. Up to 3 doses. If chest pain is not relieved or worsens 3 to 5 minutes after 1 dose, call 911 and seek immediate emergency medical attention.    pantoprazole (PROTONIX) 40 MG tablet Take 1 tablet (40 mg total) by mouth once daily.    rosuvastatin (CRESTOR) 40 MG Tab Take 1 tablet (40 mg total) by mouth every evening.    spironolactone (ALDACTONE) 25 MG tablet Take 0.5 tablets (12.5 mg total) by mouth once daily.     Family History    None       Tobacco Use    Smoking status: Former    Smokeless tobacco: Never   Substance and Sexual Activity    Alcohol use: Never    Drug use: Never    Sexual activity: Not on file     Review of Systems   Constitutional: Positive for malaise/fatigue.   HENT: Negative.     Eyes: Negative.    Cardiovascular: Negative.    Respiratory: Negative.     Endocrine: Negative.    Hematologic/Lymphatic: Negative.    Skin: Negative.    Musculoskeletal: Negative.    Gastrointestinal: Negative.    Genitourinary: Negative.    Neurological: Negative.    Psychiatric/Behavioral: Negative.     Allergic/Immunologic: Negative.    Objective:     Vital Signs (Most Recent):  Temp: 97.8 °F (36.6 °C) (12/12/22 1106)  Pulse: 65 (12/12/22 1106)  Resp: 18 (12/12/22 1106)  BP: (!) 90/54 (12/12/22 1106)  SpO2: 97 % (12/12/22 1106)   Vital Signs (24h Range):  Temp:  [97.4 °F (36.3 °C)-100.1 °F (37.8 °C)] 97.8 °F (36.6 °C)  Pulse:  [57-87] 65  Resp:  [18-22] 18  SpO2:  [97 %-99 %] 97 %  BP: ()/() 90/54     Weight: 64 kg (141 lb)  Body mass index is 20.82 kg/m².    SpO2: 97 %       No intake or output data in the 24 hours ending 12/12/22 1159    Lines/Drains/Airways       Peripheral Intravenous Line  Duration                  Peripheral IV - Single Lumen 12/12/22 18 G Anterior;Left  Upper Arm <1 day                    Physical Exam  Vitals reviewed.   Constitutional:       Appearance: He is cachectic. He is ill-appearing.   HENT:      Head: Normocephalic.   Eyes:      Conjunctiva/sclera: Conjunctivae normal.      Pupils: Pupils are equal, round, and reactive to light.   Cardiovascular:      Rate and Rhythm: Normal rate and regular rhythm.      Heart sounds: Normal heart sounds.   Pulmonary:      Effort: Pulmonary effort is normal.      Breath sounds: Normal breath sounds.   Abdominal:      General: Bowel sounds are normal.      Palpations: Abdomen is soft.   Musculoskeletal:      Cervical back: Normal range of motion and neck supple.   Skin:     General: Skin is warm.   Neurological:      Mental Status: He is alert and oriented to person, place, and time.       Significant Labs: BMP:   Recent Labs   Lab 12/12/22 0209   *      K 3.3*      CO2 18*   BUN 26*   CREATININE 1.4   CALCIUM 9.7   MG 2.3   , CMP   Recent Labs   Lab 12/12/22 0209      K 3.3*      CO2 18*   *   BUN 26*   CREATININE 1.4   CALCIUM 9.7   PROT 7.5   ALBUMIN 3.8   BILITOT 0.4   ALKPHOS 96   AST 37   ALT 33   ANIONGAP 19*   , CBC   Recent Labs   Lab 12/12/22 0209   WBC 7.17   HGB 15.1   HCT 43.4      , INR   Recent Labs   Lab 12/12/22 0209   INR 3.8*   , Lipid Panel No results for input(s): CHOL, HDL, LDLCALC, TRIG, CHOLHDL in the last 48 hours., Troponin   Recent Labs   Lab 12/12/22 0209   TROPONINI 0.546*   , and All pertinent lab results from the last 24 hours have been reviewed.    Significant Imaging: Echocardiogram: Transthoracic echo (TTE) complete (Cupid Only): No results found for this or any previous visit.

## 2022-12-12 NOTE — NURSING
Nurses Note -- 4 Eyes      12/12/2022   5:49 AM      Skin assessed during: Admit      [] No Pressure Injuries Present    []Prevention Measures Documented      [x] Yes- Altered Skin Integrity Present or Discovered   [] LDA Added if Not in Epic (Describe Wound)   [x] New Altered Skin Integrity was Present on Admit and Documented in LDA   [] Wound Image Taken    Wound Care Consulted? No    Attending Nurse:  Yu Ortiz RN     Second RN/Staff Member:  Sue Ortiz RN

## 2022-12-13 VITALS
HEIGHT: 69 IN | DIASTOLIC BLOOD PRESSURE: 61 MMHG | OXYGEN SATURATION: 96 % | HEART RATE: 63 BPM | RESPIRATION RATE: 19 BRPM | TEMPERATURE: 98 F | WEIGHT: 131.81 LBS | SYSTOLIC BLOOD PRESSURE: 104 MMHG | BODY MASS INDEX: 19.52 KG/M2

## 2022-12-13 PROBLEM — I47.20 V-TACH: Status: RESOLVED | Noted: 2022-12-12 | Resolved: 2022-12-13

## 2022-12-13 PROBLEM — R79.89 TROPONIN LEVEL ELEVATED: Status: RESOLVED | Noted: 2022-12-12 | Resolved: 2022-12-13

## 2022-12-13 LAB
ALBUMIN SERPL BCP-MCNC: 3.5 G/DL (ref 3.5–5.2)
ALP SERPL-CCNC: 91 U/L (ref 55–135)
ALT SERPL W/O P-5'-P-CCNC: 28 U/L (ref 10–44)
ANION GAP SERPL CALC-SCNC: 16 MMOL/L (ref 8–16)
AST SERPL-CCNC: 34 U/L (ref 10–40)
BASOPHILS # BLD AUTO: 0.1 K/UL (ref 0–0.2)
BASOPHILS NFR BLD: 1.4 % (ref 0–1.9)
BILIRUB SERPL-MCNC: 0.5 MG/DL (ref 0.1–1)
BUN SERPL-MCNC: 27 MG/DL (ref 8–23)
CALCIUM SERPL-MCNC: 9 MG/DL (ref 8.7–10.5)
CHLORIDE SERPL-SCNC: 105 MMOL/L (ref 95–110)
CO2 SERPL-SCNC: 22 MMOL/L (ref 23–29)
CREAT SERPL-MCNC: 1.2 MG/DL (ref 0.5–1.4)
DIFFERENTIAL METHOD: NORMAL
EOSINOPHIL # BLD AUTO: 0.4 K/UL (ref 0–0.5)
EOSINOPHIL NFR BLD: 6 % (ref 0–8)
ERYTHROCYTE [DISTWIDTH] IN BLOOD BY AUTOMATED COUNT: 13.9 % (ref 11.5–14.5)
EST. GFR  (NO RACE VARIABLE): >60 ML/MIN/1.73 M^2
GLUCOSE SERPL-MCNC: 100 MG/DL (ref 70–110)
HCT VFR BLD AUTO: 45 % (ref 40–54)
HGB BLD-MCNC: 15 G/DL (ref 14–18)
IMM GRANULOCYTES # BLD AUTO: 0.01 K/UL (ref 0–0.04)
IMM GRANULOCYTES NFR BLD AUTO: 0.1 % (ref 0–0.5)
LYMPHOCYTES # BLD AUTO: 1.5 K/UL (ref 1–4.8)
LYMPHOCYTES NFR BLD: 21.7 % (ref 18–48)
MAGNESIUM SERPL-MCNC: 2.3 MG/DL (ref 1.6–2.6)
MCH RBC QN AUTO: 29.9 PG (ref 27–31)
MCHC RBC AUTO-ENTMCNC: 33.3 G/DL (ref 32–36)
MCV RBC AUTO: 90 FL (ref 82–98)
MONOCYTES # BLD AUTO: 0.8 K/UL (ref 0.3–1)
MONOCYTES NFR BLD: 10.9 % (ref 4–15)
NEUTROPHILS # BLD AUTO: 4.2 K/UL (ref 1.8–7.7)
NEUTROPHILS NFR BLD: 59.9 % (ref 38–73)
NRBC BLD-RTO: 0 /100 WBC
PHOSPHATE SERPL-MCNC: 3.4 MG/DL (ref 2.7–4.5)
PLATELET # BLD AUTO: 205 K/UL (ref 150–450)
PMV BLD AUTO: 10.1 FL (ref 9.2–12.9)
POTASSIUM SERPL-SCNC: 3.1 MMOL/L (ref 3.5–5.1)
PROT SERPL-MCNC: 7 G/DL (ref 6–8.4)
RBC # BLD AUTO: 5.01 M/UL (ref 4.6–6.2)
SODIUM SERPL-SCNC: 143 MMOL/L (ref 136–145)
WBC # BLD AUTO: 7 K/UL (ref 3.9–12.7)

## 2022-12-13 PROCEDURE — 25000003 PHARM REV CODE 250: Performed by: INTERNAL MEDICINE

## 2022-12-13 PROCEDURE — 80053 COMPREHEN METABOLIC PANEL: CPT | Performed by: INTERNAL MEDICINE

## 2022-12-13 PROCEDURE — 84100 ASSAY OF PHOSPHORUS: CPT | Performed by: INTERNAL MEDICINE

## 2022-12-13 PROCEDURE — G0378 HOSPITAL OBSERVATION PER HR: HCPCS

## 2022-12-13 PROCEDURE — 99233 SBSQ HOSP IP/OBS HIGH 50: CPT | Mod: ,,, | Performed by: INTERNAL MEDICINE

## 2022-12-13 PROCEDURE — 97161 PT EVAL LOW COMPLEX 20 MIN: CPT

## 2022-12-13 PROCEDURE — 99223 1ST HOSP IP/OBS HIGH 75: CPT | Mod: ,,, | Performed by: NURSE PRACTITIONER

## 2022-12-13 PROCEDURE — 36415 COLL VENOUS BLD VENIPUNCTURE: CPT | Performed by: INTERNAL MEDICINE

## 2022-12-13 PROCEDURE — 94761 N-INVAS EAR/PLS OXIMETRY MLT: CPT

## 2022-12-13 PROCEDURE — 99223 PR INITIAL HOSPITAL CARE,LEVL III: ICD-10-PCS | Mod: ,,, | Performed by: NURSE PRACTITIONER

## 2022-12-13 PROCEDURE — 83735 ASSAY OF MAGNESIUM: CPT | Performed by: INTERNAL MEDICINE

## 2022-12-13 PROCEDURE — 85025 COMPLETE CBC W/AUTO DIFF WBC: CPT | Performed by: INTERNAL MEDICINE

## 2022-12-13 PROCEDURE — 25000003 PHARM REV CODE 250: Performed by: HOSPITALIST

## 2022-12-13 PROCEDURE — 99233 PR SUBSEQUENT HOSPITAL CARE,LEVL III: ICD-10-PCS | Mod: ,,, | Performed by: INTERNAL MEDICINE

## 2022-12-13 RX ORDER — PANTOPRAZOLE SODIUM 40 MG/1
40 TABLET, DELAYED RELEASE ORAL DAILY
Qty: 30 TABLET | Refills: 11 | Status: SHIPPED | OUTPATIENT
Start: 2022-12-13 | End: 2023-01-19 | Stop reason: SDUPTHER

## 2022-12-13 RX ORDER — AMIODARONE HYDROCHLORIDE 400 MG/1
TABLET ORAL
Qty: 30 TABLET | Refills: 11
Start: 2022-12-13 | End: 2023-01-06 | Stop reason: SDUPTHER

## 2022-12-13 RX ORDER — SPIRONOLACTONE 25 MG/1
12.5 TABLET ORAL DAILY
Qty: 15 TABLET | Refills: 11 | Status: SHIPPED | OUTPATIENT
Start: 2022-12-13 | End: 2023-01-06 | Stop reason: SDUPTHER

## 2022-12-13 RX ORDER — NITROGLYCERIN 0.4 MG/1
0.4 TABLET SUBLINGUAL EVERY 5 MIN PRN
Qty: 20 TABLET | Refills: 3 | Status: SHIPPED | OUTPATIENT
Start: 2022-12-13 | End: 2023-12-13

## 2022-12-13 RX ORDER — METOPROLOL SUCCINATE 50 MG/1
50 TABLET, EXTENDED RELEASE ORAL DAILY
Qty: 30 TABLET | Refills: 11 | Status: SHIPPED | OUTPATIENT
Start: 2022-12-13 | End: 2023-01-06 | Stop reason: SDUPTHER

## 2022-12-13 RX ORDER — CLOPIDOGREL BISULFATE 75 MG/1
75 TABLET ORAL NIGHTLY
Qty: 30 TABLET | Refills: 11 | Status: SHIPPED | OUTPATIENT
Start: 2022-12-13 | End: 2023-01-06 | Stop reason: SDUPTHER

## 2022-12-13 RX ORDER — ROSUVASTATIN CALCIUM 40 MG/1
40 TABLET, COATED ORAL NIGHTLY
Qty: 90 TABLET | Refills: 3 | Status: SHIPPED | OUTPATIENT
Start: 2022-12-13 | End: 2023-01-19 | Stop reason: SDUPTHER

## 2022-12-13 RX ORDER — AMIODARONE HYDROCHLORIDE 200 MG/1
400 TABLET ORAL 2 TIMES DAILY
Status: DISCONTINUED | OUTPATIENT
Start: 2022-12-13 | End: 2022-12-13 | Stop reason: HOSPADM

## 2022-12-13 RX ORDER — POTASSIUM CHLORIDE 20 MEQ/1
40 TABLET, EXTENDED RELEASE ORAL ONCE
Status: COMPLETED | OUTPATIENT
Start: 2022-12-13 | End: 2022-12-13

## 2022-12-13 RX ORDER — DAPAGLIFLOZIN 10 MG/1
10 TABLET, FILM COATED ORAL DAILY
Qty: 30 TABLET | Refills: 11 | Status: SHIPPED | OUTPATIENT
Start: 2022-12-13 | End: 2023-01-06 | Stop reason: SDUPTHER

## 2022-12-13 RX ADMIN — PANTOPRAZOLE SODIUM 40 MG: 40 TABLET, DELAYED RELEASE ORAL at 09:12

## 2022-12-13 RX ADMIN — AMIODARONE HYDROCHLORIDE 400 MG: 200 TABLET ORAL at 09:12

## 2022-12-13 RX ADMIN — APIXABAN 5 MG: 5 TABLET, FILM COATED ORAL at 09:12

## 2022-12-13 RX ADMIN — METOPROLOL SUCCINATE 50 MG: 50 TABLET, EXTENDED RELEASE ORAL at 09:12

## 2022-12-13 RX ADMIN — ASPIRIN 81 MG CHEWABLE TABLET 81 MG: 81 TABLET CHEWABLE at 09:12

## 2022-12-13 RX ADMIN — POTASSIUM CHLORIDE 40 MEQ: 1500 TABLET, EXTENDED RELEASE ORAL at 11:12

## 2022-12-13 NOTE — PLAN OF CARE
[facility] [place] - Telemetry  Discharge Final Note    Primary Care Provider: Flip Hanna MD    Expected Discharge Date: 12/13/2022    All needs met. Appointments scheduled. Patient to receive home health services from [facility]. Equipment is provided by [facility]. SW notified nurse Bri that patient is ready for discharge from case management standpoint.     Final Discharge Note (most recent)       Final Note - 12/13/22 1309          Final Note    Assessment Type Final Discharge Note     Anticipated Discharge Disposition Home or Self Care     What phone number can be called within the next 1-3 days to see how you are doing after discharge? [phone]     Hospital Resources/Appts/Education Provided Appointments scheduled by Navigator/Coordinator;Appointments scheduled and added to AVS        Post-Acute Status    Post-Acute Authorization HME;Home Health     HME Status Set-up Complete/Auth obtained     Home Health Status Set-up Complete/Auth obtained     Coverage [employer]     Discharge Delays None known at this time                     Important Message from Medicare

## 2022-12-13 NOTE — DISCHARGE SUMMARY
[facility] Medicine  Discharge Summary      Patient Name: Balbir Rebollar Sr.  MRN: [id number]  ANTHONY: [id number]  Patient Class: OP- Observation  Admission Date: 12/12/2022  Hospital Length of Stay: 1 days  Discharge Date and Time:  12/13/2022 12:19 PM  Attending Physician: Jeromy Campos MD   Discharging Provider: Jeromy Campos MD  Primary Care Provider: Flip Hanna MD    Primary Care Team: Networked reference to record PCT     HPI:   72 y.o. male with CAD, HTN, Afib, CVA, and ICD presents from SNF after his ICD has fired daily for past 3 days.  Patient was just admitted, initially here and then transferred to [facility], 11/22/22-12/6/22 for fatigue and weakness.  Patient had multiple episodes of Vtach during that hospital stay.  He had a L heart cath 11/29/22 which showed LAD occlusion and Left Circumflex stenosis.  He was transferred to [facility] for complicated PCI and underwent another cath 12/1/22; distal R Main was stented but other arteries were not.  Patient discharged to SNF on oral Amiodarone.  He denies any chest pain.  Main complaint is persistent generalized weakness.  No alleviating or aggravating factors.  He denies any other complaints.      * No surgery found *      Hospital Course:   72 y.o. male with CAD, HTN, Afib, CVA, and ICD presents from SNF after his ICD has fired daily for past 3 days.  Patient was just admitted, initially here and then transferred to [facility], 11/22/22-12/6/22 for fatigue and weakness.  Patient had multiple episodes of Vtach during that hospital stay.  He had a L heart cath 11/29/22 which showed LAD occlusion and Left Circumflex stenosis.  He was transferred to [facility] for complicated PCI and underwent another cath 12/1/22; distal R Main was stented but other arteries were not.  Patient discharged to SNF on oral Amiodarone.  Patient admitted to [facility] where he did not have any further episodes of Vtach or ICD firing.  Cardiology  consulted.  Discussed case with EP at [facility].  Patient's Amiodarone will be increased to 400 mg bid for 12 days then return to 400 mg daily.  Patient will follow up with EP.  He has remained afebrile and hemodynamically stable.  Patient and family do not want to return to SNF.  He will be discharged home with .  Patient will also follow up with PCP.       Goals of Care Treatment Preferences:  Code Status: Full Code      Consults:   Consults (From admission, onward)        Status Ordering Provider     Inpatient consult to Palliative Care  Once        Provider:  (Not yet assigned)    Acknowledged KYRIE LEYVA     Inpatient consult to Cardiology  Once        Provider:  Deanne Carmichael MD    Completed JD RODRIGUEZ          No new Assessment & Plan notes have been filed under this hospital service since the last note was generated.  Service: Hospital Medicine    Final Active Diagnoses:    Diagnosis Date Noted POA    Chronic combined systolic and diastolic congestive heart failure [I50.42] 11/23/2022 Yes    QT prolongation [R94.31] 12/03/2022 Yes    ICD (implantable cardioverter-defibrillator), single, in situ [Z95.810] 07/09/2020 Yes    Chronic anticoagulation [Z79.01] 06/23/2020 Not Applicable    Paroxysmal atrial fibrillation [I48.0] 03/06/2019 Yes    Coronary artery disease involving native coronary artery of native heart without angina pectoris [I25.10] 03/06/2019 Yes      Problems Resolved During this Admission:    Diagnosis Date Noted Date Resolved POA    PRINCIPAL PROBLEM:  V-tach [I47.20] 12/12/2022 12/13/2022 Yes    Troponin level elevated [R77.8] 12/12/2022 12/13/2022 Yes       Discharged Condition: stable    [place] [facility] Up:   Follow-up Information     Flip Hanna MD Follow up in 1 week(s).    Specialty: Internal Medicine  Contact information:  [address] [place] [address]  [id number]  [place] [address] [phone]             Du Richter MD Follow up  in 2 week(s).    Specialties: Electrophysiology, Cardiovascular Disease, Cardiology  Contact information:  [place]  [facility] [address]  [phone]                       Patient Instructions:      Diet Cardiac     Notify your health care provider if you experience any of the following:  temperature >100.4     Notify your health care provider if you experience any of the following:  persistent nausea and vomiting or diarrhea     Notify your health care provider if you experience any of the following:  severe uncontrolled pain     Notify your health care provider if you experience any of the following:  difficulty breathing or increased cough     Notify your health care provider if you experience any of the following:  persistent dizziness, light-headedness, or visual disturbances     Notify your health care provider if you experience any of the following:  increased confusion or weakness     Activity as tolerated           Pending Diagnostic Studies:     None         Medications:  Reconciled Home Medications:      Medication List      CHANGE how you take these medications    amiodarone 400 MG tablet  Commonly known as: PACERONE  Take 1 tab po bid for 12 days, then 1 tab po daily.  What changed: additional instructions        CONTINUE taking these medications    apixaban 5 mg Tab  Commonly known as: ELIQUIS  Take 1 tablet (5 mg total) by mouth 2 (two) times daily.     aspirin 81 MG Chew  Take 1 tablet (81 mg total) by mouth once daily. Take for 30 days then please discontinue     clopidogreL 75 mg tablet  Commonly known as: PLAVIX  Take 1 tablet (75 mg total) by mouth every evening.     dapagliflozin 10 mg tablet  Commonly known as: FARXIGA  Take 1 tablet (10 mg total) by mouth once daily.     metoprolol succinate 50 MG 24 hr tablet  Commonly known as: TOPROL-XL  Take 1 tablet (50 mg total) by mouth once daily.     nitroGLYCERIN 0.4 MG SL tablet  Commonly known as: NITROSTAT  Place 1 tablet (0.4 mg  total) under the tongue every 5 (five) minutes as needed for Chest pain. Up to 3 doses. If chest pain is not relieved or worsens 3 to 5 minutes after 1 dose, call 911 and seek immediate emergency medical attention.     pantoprazole 40 MG tablet  Commonly known as: PROTONIX  Take 1 tablet (40 mg total) by mouth once daily.     rosuvastatin 40 MG Tab  Commonly known as: CRESTOR  Take 1 tablet (40 mg total) by mouth every evening.     sacubitriL-valsartan 24-26 mg per tablet  Commonly known as: ENTRESTO  Take 1 tablet by mouth 2 (two) times daily.     spironolactone 25 MG tablet  Commonly known as: ALDACTONE  Take 0.5 tablets (12.5 mg total) by mouth once daily.        STOP taking these medications    enoxaparin 80 mg/0.8 mL Syrg  Commonly known as: LOVENOX            Indwelling Lines/Drains at time of discharge:   Lines/Drains/Airways     None                 Time spent on the discharge of patient: >30 minutes         Jeromy Campos MD  Department of Hospital Medicine  [facility]

## 2022-12-13 NOTE — PLAN OF CARE
Problem: Physical Therapy  Goal: Physical Therapy Goal  Outcome: Adequate for Care Transition     Eval and discharge to home.  PT recommends home health PT and RW for gait for safety.

## 2022-12-13 NOTE — PT/OT/SLP EVAL
"Physical Therapy Evaluation    Patient Name:  Balbir Rebollar Sr.   MRN:  [id number]    Recommendations:     Discharge Recommendations: home health PT   Discharge Equipment Recommendations: walker, rolling, bedside commode   Barriers to discharge: None    Assessment:     Balbir Rebollar Sr. is a 72 y.o. male admitted with a medical diagnosis of V-tach.  He presents with the following impairments/functional limitations: impaired endurance, impaired functional mobility, gait instability, impaired balance .    Rehab Prognosis: Good; patient would benefit from acute skilled PT services to address these deficits and reach maximum level of function.    Recent Surgery: * No surgery found *      Plan:     During this hospitalization, patient to be seen 1x to address the identified rehab impairments via initial evaluation.     Plan of Care Expires:  12/13/22    Subjective     Chief Complaint: "I've been in this bed..."  Patient/Family Comments/goals: To go home.  Pain/Comfort:  Pain Rating 1: 0/10    Patients cultural, spiritual, Orthodox conflicts given the current situation: no    Living Environment:  PTA pt lived with his daughter in a 2 story house (R) HR.  Prior to admission, patients level of function was independent.  Equipment used at home: cane, straight.  DME owned (not currently used): none.  Upon discharge, patient will have assistance from family.    Objective:     Communicated with nurseBri prior to session.  Patient found supine with telemetry  upon PT entry to room.    General Precautions: Standard, fall  Orthopedic Precautions:N/A   Braces: N/A  Respiratory Status: Room air    Exams:  Cognitive Exam:  Patient is oriented to Person, Place, and Situation  RLE ROM: WFL  RLE Strength: WFL  LLE ROM: WFL  LLE Strength: WFL    Functional Mobility:  Bed Mobility:     Supine to Sit: supervision  Transfers:     Sit to Stand:  contact guard assistance with no AD  Gait: 50' /s AD CGA; 70' w/RW " CGA.  Balance: Fair+ static/Fair dynamic standing      AM-PAC 6 CLICK MOBILITY  Total Score:20       Treatment & Education:  Educated with role of PT and POC.  Pt ambulate /c and /s AD.  PT recommends RW for safety.    Patient left up in chair with call button in reach and daughter present.    GOALS:   Multidisciplinary Problems       Physical Therapy Goals          Problem: Physical Therapy    Goal Priority Disciplines Outcome Goal Variances Interventions   Physical Therapy Goal     PT, PT/OT Adequate for Care Transition                         History:     Past Medical History:   Diagnosis Date    AICD (automatic cardioverter/defibrillator) present     Anticoagulant long-term use     Chronic combined systolic and diastolic congestive heart failure     Left ventricular ejection fraction is estimated at 15-20% and measured to be 25-33%.    Coronary artery disease     History of myocardial infarction     Paroxysmal A-fib     Stroke        Past Surgical History:   Procedure Laterality Date    CHOLECYSTECTOMY      LEFT HEART CATHETERIZATION Left 11/29/2022    Procedure: Left heart cath;  Surgeon: Wesley Rico MD;  Location: [facility] CATH LAB;  Service: Cardiology;  Laterality: Left;  1030am start, R rad access    LEFT HEART CATHETERIZATION N/A 12/1/2022    Procedure: Left heart cath;  Surgeon: Tam Cho MD;  Location: [facility] CATH LAB;  Service: Cardiology;  Laterality: N/A;    REPLACEMENT OF DUAL CHAMBER IMPLANTABLE CARDIOVERTER-DEFIBRILLATOR         Time Tracking:     PT Received On: 12/13/22  PT Start Time: 1319     PT Stop Time: 1332  PT Total Time (min): 13 min     Billable Minutes: Evaluation 13      12/13/2022

## 2022-12-13 NOTE — CODE 44
"MEDICARE CONDITION 44    (Reference: Transmittal 200 of the Medicare Manual)    A Medicare "Inpatient Admission" may be changed to an "Outpatient" (includes  Outpatient Observation) status, if the following conditions exist:  The change in patient status from inpatient to outpatient is made prior to        discharge or release, while the patient is still a patient in the hospital.   The hospital has not submitted a claim for the inpatient admission.  A physician concurs with the Utilization Committee decision.   Physician concurrence with the Utilization Committee's decision is documented         in the patient's records.         IMPLEMENTATION OF CONDITION CODE 44    Inpatient status has been reviewed prior to discharge. Change to Outpatient Observation status, in accordance with Condition Code 44.     By entering this in the medical record, I verify that I have reviewed and concur with the findings of the Utilization Review Committee and the Utilization Review Physician, and that the identified Patient does not meet medical necessity for Inpatient status. This patient is appropriate for the downgrade in status because Per Payor Guidelines and Policy they have determined Observation to be the correct level of care . Outpatient Observation is the identified level of care appropriate for the Patient, and all of the above conditions for this status change have been met.   "

## 2022-12-13 NOTE — PROGRESS NOTES
[facility] [place] Telemetry  Cardiology  Progress Note    Patient Name: Balbir Rebollar Sr.  MRN: [id number]  Admission Date: 12/12/2022  Hospital Length of Stay: 1 days  Code Status: Full Code   Attending Physician: Jeromy Campos MD   Primary Care Physician: Flip Hanna MD  Expected Discharge Date:   Principal Problem:V-tach    Subjective:       Interval History:  Overall malaise.  No further episodes of nonsustained V-tach were noted on tele monitoring.    Review of Systems   Constitutional: Positive for malaise/fatigue.   HENT: Negative.     Eyes: Negative.    Cardiovascular: Negative.    Respiratory: Negative.     Endocrine: Negative.    Hematologic/Lymphatic: Negative.    Skin: Negative.    Musculoskeletal: Negative.    Gastrointestinal: Negative.    Genitourinary: Negative.    Neurological: Negative.    Psychiatric/Behavioral: Negative.     Allergic/Immunologic: Negative.    Objective:     Vital Signs (Most Recent):  Temp: 97.5 °F (36.4 °C) (12/13/22 1121)  Pulse: 63 (12/13/22 1121)  Resp: 19 (12/13/22 1121)  BP: 104/61 (12/13/22 1121)  SpO2: 96 % (12/13/22 1121) Vital Signs (24h Range):  Temp:  [97.5 °F (36.4 °C)-98.8 °F (37.1 °C)] 97.5 °F (36.4 °C)  Pulse:  [60-73] 63  Resp:  [14-19] 19  SpO2:  [94 %-98 %] 96 %  BP: ()/(56-64) 104/61     Weight: 59.8 kg (131 lb 13.4 oz)  Body mass index is 19.46 kg/m².     SpO2: 96 %         Intake/Output Summary (Last 24 hours) at 12/13/2022 1131  Last data filed at 12/13/2022 0800  Gross per 24 hour   Intake 720 ml   Output --   Net 720 ml       Lines/Drains/Airways       Peripheral Intravenous Line  Duration                  Peripheral IV - Single Lumen 12/12/22 18 G Anterior;Left Upper Arm 1 day                    Physical Exam  Vitals reviewed.   Constitutional:       Appearance: He is well-developed. He is cachectic. He is ill-appearing.   HENT:      Head: Normocephalic.   Eyes:      Conjunctiva/sclera: Conjunctivae normal.      Pupils: Pupils are equal,  round, and reactive to light.   Cardiovascular:      Rate and Rhythm: Normal rate and regular rhythm.      Heart sounds: Normal heart sounds.   Pulmonary:      Effort: Pulmonary effort is normal.      Breath sounds: Normal breath sounds.   Abdominal:      General: Bowel sounds are normal.      Palpations: Abdomen is soft.   Musculoskeletal:      Cervical back: Normal range of motion and neck supple.   Skin:     General: Skin is warm.   Neurological:      Mental Status: He is alert and oriented to person, place, and time.       Significant Labs: BMP:   Recent Labs   Lab 12/12/22 0209 12/13/22 0438   * 100    143   K 3.3* 3.1*    105   CO2 18* 22*   BUN 26* 27*   CREATININE 1.4 1.2   CALCIUM 9.7 9.0   MG 2.3 2.3   , CMP   Recent Labs   Lab 12/12/22 0209 12/13/22 0438    143   K 3.3* 3.1*    105   CO2 18* 22*   * 100   BUN 26* 27*   CREATININE 1.4 1.2   CALCIUM 9.7 9.0   PROT 7.5 7.0   ALBUMIN 3.8 3.5   BILITOT 0.4 0.5   ALKPHOS 96 91   AST 37 34   ALT 33 28   ANIONGAP 19* 16   , CBC   Recent Labs   Lab 12/12/22 0209 12/13/22 0438   WBC 7.17 7.00   HGB 15.1 15.0   HCT 43.4 45.0    205   , INR   Recent Labs   Lab 12/12/22 0209   INR 3.8*   , Lipid Panel No results for input(s): CHOL, HDL, LDLCALC, TRIG, CHOLHDL in the last 48 hours., Troponin   Recent Labs   Lab 12/12/22 0209 12/12/22  1157   TROPONINI 0.546* 0.992*   , and All pertinent lab results from the last 24 hours have been reviewed.    Significant Imaging: Echocardiogram: Transthoracic echo (TTE) complete (Cupid Only):   Results for orders placed or performed during the hospital encounter of 12/12/22   Echo Saline Bubble? No   Result Value Ref Range    BSA 1.76 m2    IVC diameter 1.41 cm    Left Ventricular Outflow Tract Mean Velocity 0.53 cm/s    Left Ventricular Outflow Tract Mean Gradient 1.29 mmHg    PV PEAK VELOCITY 0.85 cm/s    LVIDd 5.50 3.5 - 6.0 cm    IVS 0.77 0.6 - 1.1 cm    Posterior Wall 1.20 (A)  0.6 - 1.1 cm    LVIDs 4.35 (A) 2.1 - 4.0 cm    FS 21 28 - 44 %    STJ 3.11 cm    Ascending aorta 3.47 cm    LV mass 208.98 g    LA size 3.54 cm    RVDD 3.34 cm    Left Ventricle Relative Wall Thickness 0.44 cm    AV regurgitation pressure 1/2 time [id number] ms    AV Velocity Ratio 1.01     MV valve area p 1/2 method 3.33 cm2    E/A ratio 0.65     E wave deceleration time 228.04 msec    LVOT diameter 2.01 cm    LVOT area 3.2 cm2    LVOT peak teofilo 0.80 m/s    LVOT peak VTI 12.60 cm    Ao peak teofilo 0.79 m/s    LVOT stroke volume 39.96 cm3    AV peak gradient 2 mmHg    MV Peak E Teofilo 0.41 m/s    AR Max Teofilo 2.58 m/s    TR Max Teofilo 1.86 m/s    MV stenosis pressure 1/2 time 66.13 ms    MV Peak A Teofilo 0.63 m/s    LV Systolic Volume 85.43 mL    LV Systolic Volume Index 48.0 mL/m2    LV Diastolic Volume 147.56 mL    LV Diastolic Volume Index 82.90 mL/m2    LV Mass Index 117 g/m2    RA Major Axis 4.50 cm    Left Atrium Minor Axis 4.15 cm    Left Atrium Major Axis 4.55 cm    Triscuspid Valve Regurgitation Peak Gradient 14 mmHg    TDI SEPTAL 0.03 m/s    LV LATERAL E/E' RATIO 6.83 m/s    LV SEPTAL E/E' RATIO 13.67 m/s    LA WIDTH 3.40 cm    TDI LATERAL 0.06 m/s    LA volume 44.41 cm3    Sinus 4.50 cm    TAPSE 1.90 cm    Mean e' 0.05 m/s    E/E' ratio 9.11 m/s    LA Volume Index 24.9 mL/m2    RA Width 3.30 cm    Right Atrial Pressure (from IVC) 3 mmHg    EF 20 %    TV rest pulmonary artery pressure 17 mmHg    Narrative    · Concentric hypertrophy and severely decreased systolic function.  · The estimated ejection fraction is 20-25%.  · Grade I left ventricular diastolic dysfunction.  · Normal right ventricular size with normal right ventricular systolic   function.  · Mild to moderate left atrial enlargement.  · Mild right atrial enlargement.  · Mild aortic regurgitation.  · Normal central venous pressure (3 mmHg).  · The estimated PA systolic pressure is 17 mmHg.  · Anterior wall is thinned out and akinetic        Assessment  and Plan:     Brief HPI:     * V-tach  As above    Troponin level elevated  1st set of troponin elevated.  Second send awaited.  Further course of action to be determined by trend.  Likely elevated secondary to AICD shock.  No anginal sounding chest pains    QT prolongation        Chronic combined systolic and diastolic congestive heart failure  Known severe ischemic cardiomyopathy.  Continue guideline directed medical therapy.  On Entresto, long-acting beta blockers and spironolactone.  At maximally tolerated dose.  Currently euvolemic on exam  Recent Labs   Lab 12/12/22  0209   *   .      ICD (implantable cardioverter-defibrillator), single, in situ  Underwent successful cardioversion yesterday and on the 9th.  Was on amiodarone 400 mg daily.  However QTC prolonged on EKG.  I have sent a message to Dr. Richter, electrophysiology on-call to determine further course of action regarding his ventricular tachycardia    12/13:  Discussed case with Dr. Richter yesterday.  From electrophysiology at [facility].  Recommendation is to increase amiodarone 400 mg b.i.d. for 12 days followed by 400 mg daily.  Patient has had no further episodes of V-tach on tele monitoring overnight.  Follow-up with Dr. Richter as an outpatient.    Coronary artery disease involving native coronary artery of native heart without angina pectoris  PCI from left main into circumflex recently.  Known LAD .  RCA luminal irregularities    Chronic anticoagulation        Paroxysmal atrial fibrillation  On Eliquis        VTE Risk Mitigation (From admission, onward)         Ordered     apixaban tablet 5 mg  2 times daily         12/12/22 0581                Deanne Carmichael MD  Cardiology  [facility] - Telemetry    Will sign off.  Follow-up in Cardiology Clinic with his primary cardiologist.  And with Dr. Richter in electrophysiology

## 2022-12-13 NOTE — HOSPITAL COURSE
72 y.o. male with CAD, HTN, Afib, CVA, and ICD presents from SNF after his ICD has fired daily for past 3 days.  Patient was just admitted, initially here and then transferred to [facility], 11/22/22-12/6/22 for fatigue and weakness.  Patient had multiple episodes of Vtach during that hospital stay.  He had a L heart cath 11/29/22 which showed LAD occlusion and Left Circumflex stenosis.  He was transferred to [facility] for complicated PCI and underwent another cath 12/1/22; distal R Main was stented but other arteries were not.  Patient discharged to SNF on oral Amiodarone.  Patient admitted to Telemetry where he did not have any further episodes of Vtach or ICD firing.  Cardiology consulted.  Discussed case with EP at [facility].  Patient's Amiodarone will be increased to 400 mg bid for 12 days then return to 400 mg daily.  Patient will follow up with EP.  He has remained afebrile and hemodynamically stable.  Patient and family do not want to return to SNF.  He will be discharged home with .  Patient will also follow up with PCP.

## 2022-12-13 NOTE — SUBJECTIVE & OBJECTIVE
Past Medical History:   Diagnosis Date    AICD (automatic cardioverter/defibrillator) present     Anticoagulant long-term use     Chronic combined systolic and diastolic congestive heart failure     Left ventricular ejection fraction is estimated at 15-20% and measured to be 25-33%.    Coronary artery disease     History of myocardial infarction     Paroxysmal A-fib     Stroke        Past Surgical History:   Procedure Laterality Date    CHOLECYSTECTOMY      LEFT HEART CATHETERIZATION Left 11/29/2022    Procedure: Left heart cath;  Surgeon: Wesley Rico MD;  Location: [facility] CATH LAB;  Service: Cardiology;  Laterality: Left;  1030am start, R rad access    LEFT HEART CATHETERIZATION N/A 12/1/2022    Procedure: Left heart cath;  Surgeon: Tam Cho MD;  Location: [facility] CATH LAB;  Service: Cardiology;  Laterality: N/A;    REPLACEMENT OF DUAL CHAMBER IMPLANTABLE CARDIOVERTER-DEFIBRILLATOR         Review of patient's allergies indicates:  No Known Allergies    Medications:  Continuous Infusions:  Scheduled Meds:   amiodarone  400 mg Oral BID    apixaban  5 mg Oral BID    aspirin  81 mg Oral Daily    atorvastatin  40 mg Oral QHS    clopidogreL  75 mg Oral QHS    metoprolol succinate  50 mg Oral Daily    pantoprazole  40 mg Oral Daily    sacubitriL-valsartan  1 tablet Oral BID    spironolactone  12.5 mg Oral Daily     PRN Meds:acetaminophen, aluminum-magnesium hydroxide-simethicone, dextrose 10%, dextrose 10%, glucagon (human recombinant), glucose, glucose, melatonin, naloxone, nitroGLYCERIN, ondansetron, oxyCODONE, prochlorperazine, senna-docusate 8.6-50 mg, simethicone, sodium chloride 0.9%    Family History    None       Tobacco Use    Smoking status: Former    Smokeless tobacco: Never   Substance and Sexual Activity    Alcohol use: Never    Drug use: Never    Sexual activity: Not on file       Review of Systems   Constitutional:  Positive for activity change, appetite change, fatigue and unexpected weight  change.   Respiratory:  Negative for chest tightness and shortness of breath.    Cardiovascular:  Negative for chest pain.   Neurological:  Positive for weakness.   Objective:     Vital Signs (Most Recent):  Temp: 97.5 °F (36.4 °C) (12/13/22 1121)  Pulse: 63 (12/13/22 1121)  Resp: 19 (12/13/22 1121)  BP: 104/61 (12/13/22 1121)  SpO2: 96 % (12/13/22 1121) Vital Signs (24h Range):  Temp:  [97.5 °F (36.4 °C)-98.8 °F (37.1 °C)] 97.5 °F (36.4 °C)  Pulse:  [60-73] 63  Resp:  [14-19] 19  SpO2:  [94 %-98 %] 96 %  BP: ()/(56-64) 104/61     Weight: 59.8 kg (131 lb 13.4 oz)  Body mass index is 19.46 kg/m².    Physical Exam  Vitals and nursing note reviewed.   Constitutional:       General: He is not in acute distress.     Appearance: He is ill-appearing. He is not toxic-appearing or diaphoretic.   HENT:      Head: Normocephalic and atraumatic.      Right Ear: External ear normal.      Left Ear: External ear normal.      Nose: Nose normal.   Cardiovascular:      Rate and Rhythm: Normal rate and regular rhythm.   Pulmonary:      Effort: Pulmonary effort is normal.   Abdominal:      General: Abdomen is flat.      Palpations: Abdomen is soft.   Musculoskeletal:      Right lower leg: No edema.      Left lower leg: No edema.   Skin:     General: Skin is warm and dry.   Neurological:      Mental Status: He is alert and oriented to person, place, and time.   Psychiatric:         Mood and Affect: Mood normal.         Behavior: Behavior normal.       Review of Symptoms      Symptom Assessment (ESAS 0-10 Scale)  Pain:  0  Dyspnea:  0  Anxiety:  0  Nausea:  0  Depression:  0  Anorexia:  0  Fatigue:  0  Insomnia:  0  Restlessness:  0  Agitation:  0           Advance Care Planning   Advance Directives:     Decision Making:  Patient answered questions  Goals of Care: The patient endorses that what is most important right now is to focus on improvement in condition but with limits to invasive therapies    Accordingly, we have decided  that the best plan to meet the patient's goals includes continuing with treatment       Significant Labs: All pertinent labs within the past 24 hours have been reviewed.  CBC:   Recent Labs   Lab 12/13/22 0438   WBC 7.00   HGB 15.0   HCT 45.0   MCV 90        BMP:  Recent Labs   Lab 12/13/22 0438         K 3.1*      CO2 22*   BUN 27*   CREATININE 1.2   CALCIUM 9.0   MG 2.3     LFT:  Lab Results   Component Value Date    AST 34 12/13/2022    ALKPHOS 91 12/13/2022    BILITOT 0.5 12/13/2022     Albumin:   Albumin   Date Value Ref Range Status   12/13/2022 3.5 3.5 - 5.2 g/dL Final     Protein:   Total Protein   Date Value Ref Range Status   12/13/2022 7.0 6.0 - 8.4 g/dL Final     Lactic acid:   Lab Results   Component Value Date    LACTATE 2.3 (H) 12/12/2022       Significant Imaging: I have reviewed all pertinent imaging results/findings within the past 24 hours.  CXR, echo

## 2022-12-13 NOTE — PLAN OF CARE
[facility]    HOME HEALTH ORDERS  FACE TO FACE ENCOUNTER    Patient Name: Balbir Rebollar Sr.  YOB: 1950    PCP: Flip Hanna MD   PCP Address: [address] [place] [address] [address] / [place] [address]  PCP Phone Number: [phone]  PCP Fax: [phone]       Encounter Date: 12/13/2022    Admit to Home Health    Diagnoses:  Active Hospital Problems    Diagnosis  POA    Chronic combined systolic and diastolic congestive heart failure [I50.42]  Yes     Priority: 2     QT prolongation [R94.31]  Yes    ICD (implantable cardioverter-defibrillator), single, in situ [Z95.810]  Yes    Chronic anticoagulation [Z79.01]  Not Applicable    Paroxysmal atrial fibrillation [I48.0]  Yes    Coronary artery disease involving native coronary artery of native heart without angina pectoris [I25.10]  Yes      Resolved Hospital Problems    Diagnosis Date Resolved POA    *V-tach [I47.20] 12/13/2022 Yes     Priority: 1 - High    Troponin level elevated [R77.8] 12/13/2022 Yes       Future Appointments   Date Time Provider Department Center   1/6/2023  2:40 PM Wesley Rico MD [facility] CARDIO [facility]      Follow-up Information       Flip Hanna MD Follow up in 1 week(s).    Specialty: Internal Medicine  Contact information:  [address] [place] [address]  [address]  [place] [address]  [phone]               Du Richter MD Follow up in 2 week(s).    Specialties: Electrophysiology, Cardiovascular Disease, Cardiology  Contact information:  [address] [place] [id number]  [phone]                               I have seen and examined this patient face to face today. My clinical findings that support the need for the home health skilled services and home bound status are the following:  Weakness/numbness causing balance and gait disturbance due to Coronary Heart Disease and Weakness/Debility making it taxing to leave home.    Allergies:Review of patient's allergies  indicates:  No Known Allergies    Diet: cardiac diet and diabetic diet: 2000 calorie    Activities: activity as tolerated    Nursing:   SN to complete comprehensive assessment including routine vital signs. Instruct on disease process and s/s of complications to report to MD. Review/verify medication list sent home with the patient at time of discharge  and instruct patient/caregiver as needed. Frequency may be adjusted depending on start of care date.    Notify MD if SBP > 160 or < 90; DBP > 90 or < 50; HR > 120 or < 50; Temp > 101      CONSULTS:    Physical Therapy to evaluate and treat. Evaluate for home safety and equipment needs; Establish/upgrade home exercise program. Perform / instruct on therapeutic exercises, gait training, transfer training, and Range of Motion.  Occupational Therapy to evaluate and treat. Evaluate home environment for safety and equipment needs. Perform/Instruct on transfers, ADL training, ROM, and therapeutic exercises.    MISCELLANEOUS CARE:  Heart Failure:      SN to instruct on the following:    Instruct on the definition of CHF.   Instruct on the signs/sympoms of CHF to be reported.   Instruct on and monitor daily weights.   Instruct on factors that cause exacerbation.   Instruct on action, dose, schedule, and side effects of medications.   Instruct on diet as prescribed.   Instruct on activity allowed.   Instruct on life-style modifications for life long management of CHF   SN to assess compliance with daily weights, diet, medications, fluid retention,    safety precautions, activities permitted and life-style modifications.   Additional 1-2 SN visits per week as needed for signs and symptoms     of CHF exacerbation.        Medications: Review discharge medications with patient and family and provide education.      Current Discharge Medication List        CONTINUE these medications which have CHANGED    Details   amiodarone (PACERONE) 400 MG tablet Take 1 tab po bid for 12 days,  then 1 tab po daily.  Qty: 30 tablet, Refills: 11           CONTINUE these medications which have NOT CHANGED    Details   aspirin 81 MG Chew Take 1 tablet (81 mg total) by mouth once daily. Take for 30 days then please discontinue  Qty: 30 tablet, Refills: 0      clopidogreL (PLAVIX) 75 mg tablet Take 1 tablet (75 mg total) by mouth every evening.  Qty: 30 tablet, Refills: 11      dapagliflozin (FARXIGA) 10 mg tablet Take 1 tablet (10 mg total) by mouth once daily.  Qty: 30 tablet, Refills: 2      metoprolol succinate (TOPROL-XL) 50 MG 24 hr tablet Take 1 tablet (50 mg total) by mouth once daily.  Qty: 30 tablet, Refills: 11    Comments: .      sacubitriL-valsartan (ENTRESTO) 24-26 mg per tablet Take 1 tablet by mouth 2 (two) times daily.  Qty: 60 tablet, Refills: 11    Associated Diagnoses: CHF (congestive heart failure)      apixaban (ELIQUIS) 5 mg Tab Take 1 tablet (5 mg total) by mouth 2 (two) times daily.  Qty: 60 tablet, Refills: 6      nitroGLYCERIN (NITROSTAT) 0.4 MG SL tablet Place 1 tablet (0.4 mg total) under the tongue every 5 (five) minutes as needed for Chest pain. Up to 3 doses. If chest pain is not relieved or worsens 3 to 5 minutes after 1 dose, call 911 and seek immediate emergency medical attention.      pantoprazole (PROTONIX) 40 MG tablet Take 1 tablet (40 mg total) by mouth once daily.  Qty: 30 tablet, Refills: 11      rosuvastatin (CRESTOR) 40 MG Tab Take 1 tablet (40 mg total) by mouth every evening.  Qty: 90 tablet, Refills: 3      spironolactone (ALDACTONE) 25 MG tablet Take 0.5 tablets (12.5 mg total) by mouth once daily.  Qty: 15 tablet, Refills: 11    Comments: .           STOP taking these medications       enoxaparin (LOVENOX) 80 mg/0.8 mL Syrg Comments:   Reason for Stopping:               I certify that this patient is confined to his home and needs intermittent skilled nursing care, physical therapy, and occupational therapy.

## 2022-12-13 NOTE — NURSING
Echo completed. No complaint of pain. Patient has multiple bruises to arms and legs. Present on admit.

## 2022-12-13 NOTE — ASSESSMENT & PLAN NOTE
Underwent successful cardioversion yesterday and on the 9th.  Was on amiodarone 400 mg daily.  However QTC prolonged on EKG.  I have sent a message to Dr. Richter, electrophysiology on-call to determine further course of action regarding his ventricular tachycardia    12/13:  Discussed case with Dr. Richter yesterday.  From electrophysiology at [facility].  Recommendation is to increase amiodarone 400 mg b.i.d. for 12 days followed by 400 mg daily.  Patient has had no further episodes of V-tach on tele monitoring overnight.  Follow-up with Dr. Richter as an outpatient.

## 2022-12-13 NOTE — PLAN OF CARE
Patient approved for WC and BSC by Damaris with [facility]. Equipment to be delivered to patient's home on request of daughter.        12/13/22 9297   Post-Acute Status   Post-Acute Authorization [facility] Status Set-up Complete/Auth obtained   Coverage [employer]   Hospital Resources/Appts/Education Provided Appointments scheduled and added to AVS;Appointments scheduled by Navigator/Coordinator   Discharge Delays None known at this time   Discharge Plan   Discharge Plan A Home Health   Discharge Plan B Home with family

## 2022-12-13 NOTE — SUBJECTIVE & OBJECTIVE
Interval History:  Overall malaise.  No further episodes of nonsustained V-tach were noted on tele monitoring.    Review of Systems   Constitutional: Positive for malaise/fatigue.   HENT: Negative.     Eyes: Negative.    Cardiovascular: Negative.    Respiratory: Negative.     Endocrine: Negative.    Hematologic/Lymphatic: Negative.    Skin: Negative.    Musculoskeletal: Negative.    Gastrointestinal: Negative.    Genitourinary: Negative.    Neurological: Negative.    Psychiatric/Behavioral: Negative.     Allergic/Immunologic: Negative.    Objective:     Vital Signs (Most Recent):  Temp: 97.5 °F (36.4 °C) (12/13/22 1121)  Pulse: 63 (12/13/22 1121)  Resp: 19 (12/13/22 1121)  BP: 104/61 (12/13/22 1121)  SpO2: 96 % (12/13/22 1121) Vital Signs (24h Range):  Temp:  [97.5 °F (36.4 °C)-98.8 °F (37.1 °C)] 97.5 °F (36.4 °C)  Pulse:  [60-73] 63  Resp:  [14-19] 19  SpO2:  [94 %-98 %] 96 %  BP: ()/(56-64) 104/61     Weight: 59.8 kg (131 lb 13.4 oz)  Body mass index is 19.46 kg/m².     SpO2: 96 %         Intake/Output Summary (Last 24 hours) at 12/13/2022 1131  Last data filed at 12/13/2022 0800  Gross per 24 hour   Intake 720 ml   Output --   Net 720 ml       Lines/Drains/Airways       Peripheral Intravenous Line  Duration                  Peripheral IV - Single Lumen 12/12/22 18 G Anterior;Left Upper Arm 1 day                    Physical Exam  Vitals reviewed.   Constitutional:       Appearance: He is well-developed. He is cachectic. He is ill-appearing.   HENT:      Head: Normocephalic.   Eyes:      Conjunctiva/sclera: Conjunctivae normal.      Pupils: Pupils are equal, round, and reactive to light.   Cardiovascular:      Rate and Rhythm: Normal rate and regular rhythm.      Heart sounds: Normal heart sounds.   Pulmonary:      Effort: Pulmonary effort is normal.      Breath sounds: Normal breath sounds.   Abdominal:      General: Bowel sounds are normal.      Palpations: Abdomen is soft.   Musculoskeletal:      Cervical  back: Normal range of motion and neck supple.   Skin:     General: Skin is warm.   Neurological:      Mental Status: He is alert and oriented to person, place, and time.       Significant Labs: BMP:   Recent Labs   Lab 12/12/22  0209 12/13/22  0438   * 100    143   K 3.3* 3.1*    105   CO2 18* 22*   BUN 26* 27*   CREATININE 1.4 1.2   CALCIUM 9.7 9.0   MG 2.3 2.3   , CMP   Recent Labs   Lab 12/12/22  0209 12/13/22  0438    143   K 3.3* 3.1*    105   CO2 18* 22*   * 100   BUN 26* 27*   CREATININE 1.4 1.2   CALCIUM 9.7 9.0   PROT 7.5 7.0   ALBUMIN 3.8 3.5   BILITOT 0.4 0.5   ALKPHOS 96 91   AST 37 34   ALT 33 28   ANIONGAP 19* 16   , CBC   Recent Labs   Lab 12/12/22 0209 12/13/22  0438   WBC 7.17 7.00   HGB 15.1 15.0   HCT 43.4 45.0    205   , INR   Recent Labs   Lab 12/12/22 0209   INR 3.8*   , Lipid Panel No results for input(s): CHOL, HDL, LDLCALC, TRIG, CHOLHDL in the last 48 hours., Troponin   Recent Labs   Lab 12/12/22  0209 12/12/22  1157   TROPONINI 0.546* 0.992*   , and All pertinent lab results from the last 24 hours have been reviewed.    Significant Imaging: Echocardiogram: Transthoracic echo (TTE) complete (Cupid Only):   Results for orders placed or performed during the hospital encounter of 12/12/22   Echo Saline Bubble? No   Result Value Ref Range    BSA 1.76 m2    IVC diameter 1.41 cm    Left Ventricular Outflow Tract Mean Velocity 0.53 cm/s    Left Ventricular Outflow Tract Mean Gradient 1.29 mmHg    PV PEAK VELOCITY 0.85 cm/s    LVIDd 5.50 3.5 - 6.0 cm    IVS 0.77 0.6 - 1.1 cm    Posterior Wall 1.20 (A) 0.6 - 1.1 cm    LVIDs 4.35 (A) 2.1 - 4.0 cm    FS 21 28 - 44 %    STJ 3.11 cm    Ascending aorta 3.47 cm    LV mass 208.98 g    LA size 3.54 cm    RVDD 3.34 cm    Left Ventricle Relative Wall Thickness 0.44 cm    AV regurgitation pressure 1/2 time [id number] ms    AV Velocity Ratio 1.01     MV valve area p 1/2 method 3.33 cm2    E/A ratio 0.65      E wave deceleration time 228.04 msec    LVOT diameter 2.01 cm    LVOT area 3.2 cm2    LVOT peak teofilo 0.80 m/s    LVOT peak VTI 12.60 cm    Ao peak teofilo 0.79 m/s    LVOT stroke volume 39.96 cm3    AV peak gradient 2 mmHg    MV Peak E Teofilo 0.41 m/s    AR Max Teofilo 2.58 m/s    TR Max Teofilo 1.86 m/s    MV stenosis pressure 1/2 time 66.13 ms    MV Peak A Teofilo 0.63 m/s    LV Systolic Volume 85.43 mL    LV Systolic Volume Index 48.0 mL/m2    LV Diastolic Volume 147.56 mL    LV Diastolic Volume Index 82.90 mL/m2    LV Mass Index 117 g/m2    RA Major Axis 4.50 cm    Left Atrium Minor Axis 4.15 cm    Left Atrium Major Axis 4.55 cm    Triscuspid Valve Regurgitation Peak Gradient 14 mmHg    TDI SEPTAL 0.03 m/s    LV LATERAL E/E' RATIO 6.83 m/s    LV SEPTAL E/E' RATIO 13.67 m/s    LA WIDTH 3.40 cm    TDI LATERAL 0.06 m/s    LA volume 44.41 cm3    Sinus 4.50 cm    TAPSE 1.90 cm    Mean e' 0.05 m/s    E/E' ratio 9.11 m/s    LA Volume Index 24.9 mL/m2    RA Width 3.30 cm    Right Atrial Pressure (from IVC) 3 mmHg    EF 20 %    TV rest pulmonary artery pressure 17 mmHg    Narrative    · Concentric hypertrophy and severely decreased systolic function.  · The estimated ejection fraction is 20-25%.  · Grade I left ventricular diastolic dysfunction.  · Normal right ventricular size with normal right ventricular systolic   function.  · Mild to moderate left atrial enlargement.  · Mild right atrial enlargement.  · Mild aortic regurgitation.  · Normal central venous pressure (3 mmHg).  · The estimated PA systolic pressure is 17 mmHg.  · Anterior wall is thinned out and akinetic

## 2022-12-13 NOTE — PLAN OF CARE
Problem: Adult Inpatient Plan of Care  Goal: Readiness for Transition of Care  Intervention: Mutually Develop Transition Plan  Flowsheets (Taken 12/13/2022 0525)  Communicated MOR with patient/caregiver: Yes  Do you expect to return to your current living situation?: Yes  Do you have help at home or someone to help you manage your care at home?: Yes  Readmission within 30 days?: Yes  Do you currently have service(s) that help you manage your care at home?: No     Problem: Skin Injury Risk Increased  Goal: Skin Health and Integrity  Intervention: Optimize Skin Protection  Flowsheets (Taken 12/13/2022 0525)  Pressure Reduction Techniques: frequent weight shift encouraged  Pressure Reduction Devices: pressure-redistributing mattress utilized  Skin Protection: adhesive use limited  Head of Bed (HOB) Positioning: HOB at 15 degrees

## 2022-12-13 NOTE — PLAN OF CARE
Problem: Adult Inpatient Plan of Care  Goal: Plan of Care Review  Outcome: Met  Goal: Patient-Specific Goal (Individualized)  Outcome: Met  Goal: Absence of Hospital-Acquired Illness or Injury  Outcome: Met  Goal: Optimal Comfort and Wellbeing  Outcome: Met  Goal: Readiness for Transition of Care  Outcome: Met     Problem: Infection  Goal: Absence of Infection Signs and Symptoms  Outcome: Met     Problem: Impaired Wound Healing  Goal: Optimal Wound Healing  Outcome: Met     Problem: Skin Injury Risk Increased  Goal: Skin Health and Integrity  Outcome: Met     Problem: Coping Ineffective  Goal: Effective Coping  Outcome: Met     Problem: Physical Therapy  Goal: Physical Therapy Goal  Outcome: Met

## 2022-12-13 NOTE — HPI
HPI: 72 y.o. male with CAD, HTN, Afib, CVA, and ICD presents from SNF after his ICD has fired daily for past 3 days.  Patient was just admitted, initially here and then transferred to [facility], 11/22/22-12/6/22 for fatigue and weakness.  Patient had multiple episodes of Vtach during that hospital stay.  He had a L heart cath 11/29/22 which showed LAD occlusion and Left Circumflex stenosis.  He was transferred to [facility] for complicated PCI and underwent another cath 12/1/22; distal R Main was stented but other arteries were not.  Patient discharged to SNF on oral Amiodarone.  He denies any chest pain.  Main complaint is persistent generalized weakness.  No alleviating or aggravating factors.  He denies any other complaints.

## 2022-12-13 NOTE — NURSING
Pt received AVS, daughter at bedside. All questions answered.  Per pt's daughter pt does not have any medications at home.     1424: New medication sent to patient's pharmacy in chart. Pt and pt's daughter updated.

## 2022-12-13 NOTE — PLAN OF CARE
Problem: Adult Inpatient Plan of Care  Goal: Plan of Care Review  Outcome: Ongoing, Progressing  Goal: Patient-Specific Goal (Individualized)  Outcome: Ongoing, Progressing  Goal: Absence of Hospital-Acquired Illness or Injury  Outcome: Ongoing, Progressing  Goal: Optimal Comfort and Wellbeing  Outcome: Ongoing, Progressing  Goal: Readiness for Transition of Care  Outcome: Ongoing, Progressing     Problem: Infection  Goal: Absence of Infection Signs and Symptoms  Outcome: Ongoing, Progressing     Problem: Impaired Wound Healing  Goal: Optimal Wound Healing  Outcome: Ongoing, Progressing     Problem: Coping Ineffective  Goal: Effective Coping  Outcome: Ongoing, Progressing

## 2022-12-13 NOTE — CONSULTS
"[place] - Telemetry  Palliative Medicine  Consult Note    Patient Name: Balbir Rebollar Sr.  MRN: [id number]  Admission Date: 12/12/2022  Hospital Length of Stay: 1 days  Code Status: Full Code   Attending Provider: Jeromy Campos MD  Consulting Provider: Tiffany Bar NP  Primary Care Physician: Flip Hanna MD  Principal Problem:V-tach    Patient information was obtained from patient, relative(s), past medical records, ER records and primary team.      Inpatient consult to Palliative Care  Consult performed by: Tiffany Bar NP  Consult ordered by: Jeromy Campos MD  Reason for consult: GO        Assessment/Plan:   Palliative encounter:  Advance Care Planning   -palliative consult for GOC on patient with HF and EF 15% who presented after AICD fired several times  -chart reviewed in detail  -patient is known to Palliative from last hospitalization  -met with patient and one of his sons and one of his daughters at bedside.    -Patient and family had questions about his diet, his echo results, heart failure (children stated they did not know he had heart failure); discussed disease trajectory; progression, EF15 % and what this means, weight loss of > 20 lbs since last hospitalization in November, cardiac diet, fluid restriction.  -addressed all questions and concerns  -emotional support provided  -patient wants to remain a full code at this time      V-tach  -Patient presents after his ICD has fired daily for past 3 days.  -Cardiology consulted.  -follows with cardiology outpatient      Chronic combined systolic and diastolic congestive heart failure  -EF 15%  -hospice is an option but after discussion with patient this is not in line with his current [facility]      ICD (implantable cardioverter-defibrillator), single, in situ      Coronary artery disease involving native coronary artery of native heart without angina pectoris  "Severe disease on recent [facility] with complicated PCI at [facility] " [facility]".     Chronic anticoagulation  On Eliquis.        Paroxysmal atrial fibrillation  On med  Follows with cardiology outpatient    Thank you for your consult    Subjective:     HPI: 72 y.o. male with CAD, HTN, Afib, CVA, and ICD presents from SNF after his ICD has fired daily for past 3 days.  Patient was just admitted, initially here and then transferred to [facility], 11/22/22-12/6/22 for fatigue and weakness.  Patient had multiple episodes of Vtach during that hospital stay.  He had a L heart cath 11/29/22 which showed LAD occlusion and Left Circumflex stenosis.  He was transferred to [facility] for complicated PCI and underwent another cath 12/1/22; distal R Main was stented but other arteries were not.  Patient discharged to SNF on oral Amiodarone.  He denies any chest pain.  Main complaint is persistent generalized weakness.  No alleviating or aggravating factors.  He denies any other complaints.      Hospital Course:  No notes on file        Past Medical History:   Diagnosis Date    AICD (automatic cardioverter/defibrillator) present     Anticoagulant long-term use     Chronic combined systolic and diastolic congestive heart failure     Left ventricular ejection fraction is estimated at 15-20% and measured to be 25-33%.    Coronary artery disease     History of myocardial infarction     Paroxysmal A-fib     Stroke        Past Surgical History:   Procedure Laterality Date    CHOLECYSTECTOMY      LEFT HEART CATHETERIZATION Left 11/29/2022    Procedure: Left heart cath;  Surgeon: Wesley Rico MD;  Location: [facility] CATH LAB;  Service: Cardiology;  Laterality: Left;  1030am start, R rad access    LEFT HEART CATHETERIZATION N/A 12/1/2022    Procedure: Left heart cath;  Surgeon: Tam Cho MD;  Location: [facility] C[facility] LAB;  Service: Cardiology;  Laterality: N/A;    REPLACEMENT OF DUAL CHAMBER IMPLANTABLE CARDIOVERTER-DEFIBRILLATOR         Review of patient's allergies indicates:  No Known " Allergies    Medications:  Continuous Infusions:  Scheduled Meds:   amiodarone  400 mg Oral BID    apixaban  5 mg Oral BID    aspirin  81 mg Oral Daily    atorvastatin  40 mg Oral QHS    clopidogreL  75 mg Oral QHS    metoprolol succinate  50 mg Oral Daily    pantoprazole  40 mg Oral Daily    sacubitriL-valsartan  1 tablet Oral BID    spironolactone  12.5 mg Oral Daily     PRN Meds:acetaminophen, aluminum-magnesium hydroxide-simethicone, dextrose 10%, dextrose 10%, glucagon (human recombinant), glucose, glucose, melatonin, naloxone, nitroGLYCERIN, ondansetron, oxyCODONE, prochlorperazine, senna-docusate 8.6-50 mg, simethicone, sodium chloride 0.9%    Family History    None       Tobacco Use    Smoking status: Former    Smokeless tobacco: Never   Substance and Sexual Activity    Alcohol use: Never    Drug use: Never    Sexual activity: Not on file       Review of Systems   Constitutional:  Positive for activity change, appetite change, fatigue and unexpected weight change.   Respiratory:  Negative for chest tightness and shortness of breath.    Cardiovascular:  Negative for chest pain.   Neurological:  Positive for weakness.   Objective:     Vital Signs (Most Recent):  Temp: 97.5 °F (36.4 °C) (12/13/22 1121)  Pulse: 63 (12/13/22 1121)  Resp: 19 (12/13/22 1121)  BP: 104/61 (12/13/22 1121)  SpO2: 96 % (12/13/22 1121) Vital Signs (24h Range):  Temp:  [97.5 °F (36.4 °C)-98.8 °F (37.1 °C)] 97.5 °F (36.4 °C)  Pulse:  [60-73] 63  Resp:  [14-19] 19  SpO2:  [94 %-98 %] 96 %  BP: ()/(56-64) 104/61     Weight: 59.8 kg (131 lb 13.4 oz)  Body mass index is 19.46 kg/m².    Physical Exam  Vitals and nursing note reviewed.   Constitutional:       General: He is not in acute distress.     Appearance: He is ill-appearing. He is not toxic-appearing or diaphoretic.   HENT:      Head: Normocephalic and atraumatic.      Right Ear: External ear normal.      Left Ear: External ear normal.      Nose: Nose normal.    Cardiovascular:      Rate and Rhythm: Normal rate and regular rhythm.   Pulmonary:      Effort: Pulmonary effort is normal.   Abdominal:      General: Abdomen is flat.      Palpations: Abdomen is soft.   Musculoskeletal:      Right lower leg: No edema.      Left lower leg: No edema.   Skin:     General: Skin is warm and dry.   Neurological:      Mental Status: He is alert and oriented to person, place, and time.   Psychiatric:         Mood and Affect: Mood normal.         Behavior: Behavior normal.       Review of Symptoms      Symptom Assessment (ESAS 0-10 Scale)  Pain:  0  Dyspnea:  0  Anxiety:  0  Nausea:  0  Depression:  0  Anorexia:  0  Fatigue:  0  Insomnia:  0  Restlessness:  0  Agitation:  0           Advance Care Planning   Advance Directives:     Decision Making:  Patient answered questions  Goals of Care: The patient endorses that what is most important right now is to focus on improvement in condition but with limits to invasive therapies    Accordingly, we have decided that the best plan to meet the patient's goals includes continuing with treatment       Significant Labs: All pertinent labs within the past 24 hours have been reviewed.  CBC:   Recent Labs   Lab 12/13/22 0438   WBC 7.00   HGB 15.0   HCT 45.0   MCV 90        BMP:  Recent Labs   Lab 12/13/22 0438         K 3.1*      CO2 22*   BUN 27*   CREATININE 1.2   CALCIUM 9.0   MG 2.3     LFT:  Lab Results   Component Value Date    AST 34 12/13/2022    ALKPHOS 91 12/13/2022    BILITOT 0.5 12/13/2022     Albumin:   Albumin   Date Value Ref Range Status   12/13/2022 3.5 3.5 - 5.2 g/dL Final     Protein:   Total Protein   Date Value Ref Range Status   12/13/2022 7.0 6.0 - 8.4 g/dL Final     Lactic acid:   Lab Results   Component Value Date    LACTATE 2.3 (H) 12/12/2022       Significant Imaging: I have reviewed all pertinent imaging results/findings within the past 24 hours.  CXR, echo          > 50% of 70 min visit spent in  chart review, face to face discussion of goals of care,  symptom assessment, coordination of care and emotional support.    Tiffany Bar, NP  Palliative Medicine  [facility] [place] [facility]

## 2022-12-14 LAB — BACTERIA UR CULT: ABNORMAL

## 2022-12-16 ENCOUNTER — TELEPHONE (OUTPATIENT)
Dept: CARDIAC REHAB | Facility: CLINIC | Age: 72
End: 2022-12-16
Payer: MEDICARE

## 2022-12-16 LAB
BACTERIA BLD CULT: NORMAL
BACTERIA BLD CULT: NORMAL

## 2022-12-19 NOTE — PHYSICIAN QUERY
PT Name: Balbir Rebollar .  MR #: [id number]     DOCUMENTATION CLARIFICATION      CDS/: Lisa Lott RN              Contact information: [email or web address]  This form is a permanent document in the medical record.    Query Date: December 19, 2022    By submitting this query, we are merely seeking further clarification of documentation to reflect the severity of illness of your patient. Please utilize your independent clinical judgment when addressing the question(s) below.     The Medical Record contains the following:   Indicators   Supporting Clinical Findings Location in Medical Record   x Chest Pain, Angina Negative for chest pain, palpitations and leg swelling. H&P 11/23       Hospital Medicine     x Coronary Artery Disease 72 y.o. male with CAD, HTN, Afib, long term use of anticoagulants, CVA, and ICD   H&P 11/23       Hospital Medicine   x EKG Sinus rhythm with Premature supraventricular complexes in a pattern of  bigeminy  Right bundle branch block  Left anterior fascicular block          Bifascicular block   Anteroseptal infarct ,age undetermined   EKG 11/22   x Troponin Troponin I 0.038 (H) 0.041 (H) 0.038 (H)    Lab 11/22, 11/23, 11/23    Echo Results     x Angiography Indications    Ventricular tachyarrhythmia [I47.20 (ICD-10-CM)]  Ventricular tachycardia [I47.20 (ICD-10-CM)]  Acute on chronic combined systolic and diastolic congestive heart failure [I50.43 (ICD-10-CM)]  Ischemic cardiomyopathy [I25.5 (ICD-10-CM)]  History of percutaneous coronary intervention [Z98.61 (ICD-10-CM)]    Summary    Description of the findings of the procedure: uneventful LHC/cor angio via R radial.  MV CAD with occluded LAD.     Findings/Key Components:  LVEDP: 1mmHg  LVEF: 25% by echo  Wall Motion: LAD WMA by echo            Intervention:    A 2.5 balloon was used to dilate the left main into the left circumflex at high pressure (20 atmospheres).3.0 x 32 stent was then deployed at high pressure (18  atmospheres).    Summary:    Severe two-vessel disease status post  of the LAD with scar tissue and V-tach with AICD in Situ.  Successful left main into left circumflex PCI with drug-eluting stents today.   Cardiac Cath 11/29                                              Cardiac Cath 12/1   x Documentation of acute cardiac condition History of MI (myocardial infarction)  chronically elevated troponin, continue to monitor      Non ST elevated MI  Ventricular tachycardia  Ischemic cardiomyopathy EF 25% H&P 11/23       Hospital Medicine      Consult 12/1       Interventional Cardiology    Medication/Treatment     x Other Chief Complaint :  Patient presents with   Fatigue      X several days. Was seen at  for same c/o and discharged. Just started amiodarone 11/18 H&P 11/23       Hospital Medicine      Provider, please clarify conflicting documentation regarding the Cardiac Condition  diagnosis related to the above documentation:  [ x  ] NSTEMI     [   ] Chronically elevated troponin ( without corresponding diagnosis)     [   ] Other Cardiac Diagnosis (please specify): _______________     [   ] Clinically Undetermined         Please document in your progress notes daily for the duration of treatment until resolved, and include in your discharge summary.  Form No. [id number]

## 2022-12-25 ENCOUNTER — PATIENT MESSAGE (OUTPATIENT)
Dept: CARDIOLOGY | Facility: CLINIC | Age: 72
End: 2022-12-25
Payer: MEDICARE

## 2022-12-28 ENCOUNTER — OFFICE VISIT (OUTPATIENT)
Dept: FAMILY MEDICINE | Facility: CLINIC | Age: 72
End: 2022-12-28
Payer: MEDICARE

## 2022-12-28 VITALS
OXYGEN SATURATION: 99 % | BODY MASS INDEX: 20.26 KG/M2 | HEART RATE: 54 BPM | DIASTOLIC BLOOD PRESSURE: 72 MMHG | HEIGHT: 69 IN | WEIGHT: 136.81 LBS | SYSTOLIC BLOOD PRESSURE: 82 MMHG

## 2022-12-28 DIAGNOSIS — I95.9 HYPOTENSION, UNSPECIFIED HYPOTENSION TYPE: ICD-10-CM

## 2022-12-28 DIAGNOSIS — Z09 HOSPITAL DISCHARGE FOLLOW-UP: ICD-10-CM

## 2022-12-28 DIAGNOSIS — Z95.810 ICD (IMPLANTABLE CARDIOVERTER-DEFIBRILLATOR), SINGLE, IN SITU: ICD-10-CM

## 2022-12-28 DIAGNOSIS — I48.0 PAROXYSMAL ATRIAL FIBRILLATION: Primary | ICD-10-CM

## 2022-12-28 DIAGNOSIS — I25.10 CORONARY ARTERY DISEASE INVOLVING NATIVE CORONARY ARTERY OF NATIVE HEART WITHOUT ANGINA PECTORIS: ICD-10-CM

## 2022-12-28 PROCEDURE — 3074F SYST BP LT 130 MM HG: CPT | Mod: CPTII,S$GLB,, | Performed by: STUDENT IN AN ORGANIZED HEALTH CARE EDUCATION/TRAINING PROGRAM

## 2022-12-28 PROCEDURE — 3288F PR FALLS RISK ASSESSMENT DOCUMENTED: ICD-10-PCS | Mod: CPTII,S$GLB,, | Performed by: STUDENT IN AN ORGANIZED HEALTH CARE EDUCATION/TRAINING PROGRAM

## 2022-12-28 PROCEDURE — 99214 PR OFFICE/OUTPT VISIT, EST, LEVL IV, 30-39 MIN: ICD-10-PCS | Mod: S$GLB,,, | Performed by: STUDENT IN AN ORGANIZED HEALTH CARE EDUCATION/TRAINING PROGRAM

## 2022-12-28 PROCEDURE — 1159F PR MEDICATION LIST DOCUMENTED IN MEDICAL RECORD: ICD-10-PCS | Mod: CPTII,S$GLB,, | Performed by: STUDENT IN AN ORGANIZED HEALTH CARE EDUCATION/TRAINING PROGRAM

## 2022-12-28 PROCEDURE — 3078F DIAST BP <80 MM HG: CPT | Mod: CPTII,S$GLB,, | Performed by: STUDENT IN AN ORGANIZED HEALTH CARE EDUCATION/TRAINING PROGRAM

## 2022-12-28 PROCEDURE — 3078F PR MOST RECENT DIASTOLIC BLOOD PRESSURE < 80 MM HG: ICD-10-PCS | Mod: CPTII,S$GLB,, | Performed by: STUDENT IN AN ORGANIZED HEALTH CARE EDUCATION/TRAINING PROGRAM

## 2022-12-28 PROCEDURE — 1126F PR PAIN SEVERITY QUANTIFIED, NO PAIN PRESENT: ICD-10-PCS | Mod: CPTII,S$GLB,, | Performed by: STUDENT IN AN ORGANIZED HEALTH CARE EDUCATION/TRAINING PROGRAM

## 2022-12-28 PROCEDURE — 99214 OFFICE O/P EST MOD 30 MIN: CPT | Mod: S$GLB,,, | Performed by: STUDENT IN AN ORGANIZED HEALTH CARE EDUCATION/TRAINING PROGRAM

## 2022-12-28 PROCEDURE — 99999 PR PBB SHADOW E&M-EST. PATIENT-LVL IV: CPT | Mod: PBBFAC,,, | Performed by: STUDENT IN AN ORGANIZED HEALTH CARE EDUCATION/TRAINING PROGRAM

## 2022-12-28 PROCEDURE — 1111F DSCHRG MED/CURRENT MED MERGE: CPT | Mod: CPTII,S$GLB,, | Performed by: STUDENT IN AN ORGANIZED HEALTH CARE EDUCATION/TRAINING PROGRAM

## 2022-12-28 PROCEDURE — 4010F PR ACE/ARB THEARPY RXD/TAKEN: ICD-10-PCS | Mod: CPTII,S$GLB,, | Performed by: STUDENT IN AN ORGANIZED HEALTH CARE EDUCATION/TRAINING PROGRAM

## 2022-12-28 PROCEDURE — 3008F BODY MASS INDEX DOCD: CPT | Mod: CPTII,S$GLB,, | Performed by: STUDENT IN AN ORGANIZED HEALTH CARE EDUCATION/TRAINING PROGRAM

## 2022-12-28 PROCEDURE — 1126F AMNT PAIN NOTED NONE PRSNT: CPT | Mod: CPTII,S$GLB,, | Performed by: STUDENT IN AN ORGANIZED HEALTH CARE EDUCATION/TRAINING PROGRAM

## 2022-12-28 PROCEDURE — 99999 PR PBB SHADOW E&M-EST. PATIENT-LVL IV: ICD-10-PCS | Mod: PBBFAC,,, | Performed by: STUDENT IN AN ORGANIZED HEALTH CARE EDUCATION/TRAINING PROGRAM

## 2022-12-28 PROCEDURE — 4010F ACE/ARB THERAPY RXD/TAKEN: CPT | Mod: CPTII,S$GLB,, | Performed by: STUDENT IN AN ORGANIZED HEALTH CARE EDUCATION/TRAINING PROGRAM

## 2022-12-28 PROCEDURE — 1101F PT FALLS ASSESS-DOCD LE1/YR: CPT | Mod: CPTII,S$GLB,, | Performed by: STUDENT IN AN ORGANIZED HEALTH CARE EDUCATION/TRAINING PROGRAM

## 2022-12-28 PROCEDURE — 3074F PR MOST RECENT SYSTOLIC BLOOD PRESSURE < 130 MM HG: ICD-10-PCS | Mod: CPTII,S$GLB,, | Performed by: STUDENT IN AN ORGANIZED HEALTH CARE EDUCATION/TRAINING PROGRAM

## 2022-12-28 PROCEDURE — 1101F PR PT FALLS ASSESS DOC 0-1 FALLS W/OUT INJ PAST YR: ICD-10-PCS | Mod: CPTII,S$GLB,, | Performed by: STUDENT IN AN ORGANIZED HEALTH CARE EDUCATION/TRAINING PROGRAM

## 2022-12-28 PROCEDURE — 1111F PR DISCHARGE MEDS RECONCILED W/ CURRENT OUTPATIENT MED LIST: ICD-10-PCS | Mod: CPTII,S$GLB,, | Performed by: STUDENT IN AN ORGANIZED HEALTH CARE EDUCATION/TRAINING PROGRAM

## 2022-12-28 PROCEDURE — 3288F FALL RISK ASSESSMENT DOCD: CPT | Mod: CPTII,S$GLB,, | Performed by: STUDENT IN AN ORGANIZED HEALTH CARE EDUCATION/TRAINING PROGRAM

## 2022-12-28 PROCEDURE — 1159F MED LIST DOCD IN RCRD: CPT | Mod: CPTII,S$GLB,, | Performed by: STUDENT IN AN ORGANIZED HEALTH CARE EDUCATION/TRAINING PROGRAM

## 2022-12-28 PROCEDURE — 3008F PR BODY MASS INDEX (BMI) DOCUMENTED: ICD-10-PCS | Mod: CPTII,S$GLB,, | Performed by: STUDENT IN AN ORGANIZED HEALTH CARE EDUCATION/TRAINING PROGRAM

## 2022-12-28 NOTE — PATIENT INSTRUCTIONS
Low sodium diet= less than 2000 mg/ 2 grams    Mucinex and flonase are ok for sinus symptoms    Hypotension: Try decreasing metoprolol to half tab    Tongue: avoid irritating foods.

## 2022-12-28 NOTE — PROGRESS NOTES
12/28/2022    Balbir Rebollar .  [id number]    Chief Complaint   Patient presents with    Hospital Follow Up       HPI    Hospital Course   72 y.o. male with CAD, HTN, Afib, CVA, and ICD presents from SNF after his ICD has fired daily for past 3 days.  Patient was just admitted, initially here and then transferred to [facility], 11/22/22-12/6/22 for fatigue and weakness.  Patient had multiple episodes of Vtach during that hospital stay.  He had a L heart cath 11/29/22 which showed LAD occlusion and Left Circumflex stenosis.  He was transferred to [facility] for complicated PCI and underwent another cath 12/1/22; distal R Main was stented but other arteries were not.  Patient discharged to SNF on oral Amiodarone.  Patient admitted to Telemetry where he did not have any further episodes of Vtach or ICD firing.  Cardiology consulted.  Discussed case with EP at [facility].  Patient's Amiodarone will be increased to 400 mg bid for 12 days then return to 400 mg daily.  Patient will follow up with EP.  He has remained afebrile and hemodynamically stable.  Patient and family do not want to return to SNF.  He will be discharged home with HH.  Patient will also follow up with PCP.     Since discharge  Patient is present with daughter today who provides most of hx. Since d/c from hospital pt was doing fair, but with increased MORRELL and weakness since decrease in amiodarone. For the last 2 days with amiodarone 400 mg daily per instructions. Thinks that this is linked to increased fatigue but not sure. Has appointment with cardiology on next Friday. Currently is tolerating heart healthy diet and has help from home health. Pt was evaled by  PT, but was told it was needed b/c at the time he wasn't using a walker or SOB.      Negative 10 point ROS outside of HPI    Social History     Socioeconomic History    Marital status:    Tobacco Use    Smoking status: Former    Smokeless tobacco: Never   Substance and Sexual  Activity    Alcohol use: Never    Drug use: Never     Social Determinants of Health     Financial Resource Strain: Low Risk     Difficulty of Paying Living Expenses: Not hard at all   Food Insecurity: No Food Insecurity    Worried About Running Out of Food in the Last Year: Never true    Ran Out of Food in the Last Year: Never true   Transportation Needs: No Transportation Needs    Lack of Transportation (Medical): No    Lack of Transportation (Non-Medical): No   Physical Activity: Inactive    Days of Exercise per Week: 0 days    Minutes of Exercise per Session: 0 min   Stress: No Stress Concern Present    Feeling of Stress : Not at all   Social Connections: Socially Isolated    Frequency of Communication with Friends and Family: Three times a week    Frequency of Social Gatherings with Friends and Family: Three times a week    Attends Muslim Services: Never    Active Member of Clubs or Organizations: No    Attends Club or Organization Meetings: Never    Marital Status:    Housing Stability: Low Risk     Unable to Pay for Housing in the Last Year: No    Number of Places Lived in the Last Year: 1    Unstable Housing in the Last Year: No           Current Outpatient Medications:     amiodarone (PACERONE) 400 MG tablet, Take 1 tab po bid for 12 days, then 1 tab po daily., Disp: 30 tablet, Rfl: 11    apixaban (ELIQUIS) 5 mg Tab, Take 1 tablet (5 mg total) by mouth 2 (two) times daily., Disp: 60 tablet, Rfl: 11    aspirin 81 MG Chew, Take 1 tablet (81 mg total) by mouth once daily. Take for 30 days then please discontinue, Disp: 30 tablet, Rfl: 0    clopidogreL (PLAVIX) 75 mg tablet, Take 1 tablet (75 mg total) by mouth every evening., Disp: 30 tablet, Rfl: 11    dapagliflozin (FARXIGA) 10 mg tablet, Take 1 tablet (10 mg total) by mouth once daily., Disp: 30 tablet, Rfl: 11    metoprolol succinate (TOPROL-XL) 50 MG 24 hr tablet, Take 1 tablet (50 mg total) by mouth once daily., Disp: 30 tablet, Rfl: 11     nitroGLYCERIN (NITROSTAT) 0.4 MG SL tablet, Place 1 tablet (0.4 mg total) under the tongue every 5 (five) minutes as needed for Chest pain. Up to 3 doses. If chest pain is not relieved or worsens 3 to 5 minutes after 1 dose, call 911 and seek immediate emergency medical attention., Disp: 20 tablet, Rfl: 3    pantoprazole (PROTONIX) 40 MG tablet, Take 1 tablet (40 mg total) by mouth once daily., Disp: 30 tablet, Rfl: 11    rosuvastatin (CRESTOR) 40 MG Tab, Take 1 tablet (40 mg total) by mouth every evening., Disp: 90 tablet, Rfl: 3    sacubitriL-valsartan (ENTRESTO) 24-26 mg per tablet, Take 1 tablet by mouth 2 (two) times daily., Disp: 60 tablet, Rfl: 11    spironolactone (ALDACTONE) 25 MG tablet, Take 0.5 tablets (12.5 mg total) by mouth once daily., Disp: 15 tablet, Rfl: 11      Physical Exam  Vitals:    12/28/22 1036   BP: (!) 78/57   Pulse: (!) 54       Gen: well appearing, NAD, walker in place  Resp: non labored breathing, no crackles, no wheezes, CTAB  CV: RRR no murmur, gallops, rubs, no LE edema  Abd: soft nontender BS present no organomegaly    1. Paroxysmal atrial fibrillation  Management per cardiology; rate controlled    2. ICD (implantable cardioverter-defibrillator), single, in situ  No triggers since d/c; management per cardiology    3. Coronary artery disease involving native coronary artery of native heart without angina pectoris  management per cardiology    4. Hypotension, unspecified hypotension type; MAP >60  Decrease metoprolol to half tab daily; discussed signs /symptoms to present to the ED.  Has cardiology apt next week          Kat Canas MD  Family Medicine

## 2022-12-29 ENCOUNTER — EXTERNAL HOME HEALTH (OUTPATIENT)
Dept: HOME HEALTH SERVICES | Facility: HOSPITAL | Age: 72
End: 2022-12-29
Payer: MEDICARE

## 2022-12-30 ENCOUNTER — TELEPHONE (OUTPATIENT)
Dept: FAMILY MEDICINE | Facility: CLINIC | Age: 72
End: 2022-12-30
Payer: MEDICARE

## 2022-12-30 NOTE — TELEPHONE ENCOUNTER
Spoke with daughter and she informed me that home health came today and patient blood pressure was running the same as when he came in for a blood pressure check. Patient is without any signs are symptoms of Hypotension crisis. Ate well and has been ambulating with steady gate. Patient is currently still taking the Metoprolol 50 mg. She said that she forgot that patient was suppose to be taking half of the 50 mg and will start this on tomorrow. She is also requesting order for blood pressure machine. She said that she was told by the home health nurse that the provider can order this for him. She has nothing in the home to monitor his blood pressure.

## 2022-12-30 NOTE — TELEPHONE ENCOUNTER
----- Message from Ellie Hernandez sent at 12/30/2022 10:12 AM CST -----  Regarding: Daughter [phone]  Type: Patient Call Back    Who called: Holly (daughter)    What is the request in detail: BP is 90/52 and was told by the nurse that comes in asked for them to call office to receive an RX for a bp monitor so the pt can monitor his own BP. Per daughter nurse said pulse was ok (55). He is not having any dizzy spells or feeling weak.     Can the clinic reply by [facility]? no    Would the patient rather a call back or a response via [facility]? Call back     Best call back number: [phone]    [id number] - [place], [place] - [address] [place] [address]  Phone: [phone] Fax: [phone]

## 2022-12-31 DIAGNOSIS — I95.9 HYPOTENSION, UNSPECIFIED HYPOTENSION TYPE: Primary | ICD-10-CM

## 2022-12-31 RX ORDER — ACETAMINOPHEN 500 MG
TABLET ORAL
Qty: 1 EACH | Refills: 0 | Status: SHIPPED | OUTPATIENT
Start: 2022-12-31 | End: 2023-01-06

## 2023-01-03 ENCOUNTER — TELEPHONE (OUTPATIENT)
Dept: FAMILY MEDICINE | Facility: CLINIC | Age: 73
End: 2023-01-03
Payer: MEDICARE

## 2023-01-03 NOTE — TELEPHONE ENCOUNTER
----- Message from Keshia Luong MA sent at 1/3/2023 12:53 PM CST -----  Type:  Patient Returning Call    Who Called: Daughter Anni Barrera     Who Left Message for Patient: MU LOCK    Does the patient know what this is regarding?:no    Would the patient rather a call back or a response via [facility]? yes    Best Call Back Number: [phone]

## 2023-01-06 ENCOUNTER — OFFICE VISIT (OUTPATIENT)
Dept: CARDIOLOGY | Facility: CLINIC | Age: 73
End: 2023-01-06
Payer: MEDICARE

## 2023-01-06 VITALS
DIASTOLIC BLOOD PRESSURE: 60 MMHG | SYSTOLIC BLOOD PRESSURE: 108 MMHG | HEIGHT: 69 IN | WEIGHT: 136.69 LBS | RESPIRATION RATE: 16 BRPM | HEART RATE: 64 BPM | BODY MASS INDEX: 20.24 KG/M2 | OXYGEN SATURATION: 95 %

## 2023-01-06 DIAGNOSIS — E87.6 HYPOKALEMIA: ICD-10-CM

## 2023-01-06 DIAGNOSIS — I47.29 NONSUSTAINED VENTRICULAR TACHYCARDIA: ICD-10-CM

## 2023-01-06 DIAGNOSIS — Z79.01 CHRONIC ANTICOAGULATION: ICD-10-CM

## 2023-01-06 DIAGNOSIS — I50.42 CHRONIC COMBINED SYSTOLIC AND DIASTOLIC CONGESTIVE HEART FAILURE: ICD-10-CM

## 2023-01-06 DIAGNOSIS — I25.10 CORONARY ARTERY DISEASE INVOLVING NATIVE CORONARY ARTERY OF NATIVE HEART WITHOUT ANGINA PECTORIS: ICD-10-CM

## 2023-01-06 DIAGNOSIS — E78.00 PURE HYPERCHOLESTEROLEMIA: ICD-10-CM

## 2023-01-06 DIAGNOSIS — Z87.891 FORMER SMOKER: ICD-10-CM

## 2023-01-06 DIAGNOSIS — I25.5 ISCHEMIC CARDIOMYOPATHY: Primary | ICD-10-CM

## 2023-01-06 DIAGNOSIS — I47.20 SUSTAINED VT (VENTRICULAR TACHYCARDIA): ICD-10-CM

## 2023-01-06 DIAGNOSIS — Z95.810 ICD (IMPLANTABLE CARDIOVERTER-DEFIBRILLATOR), SINGLE, IN SITU: ICD-10-CM

## 2023-01-06 DIAGNOSIS — I48.0 PAROXYSMAL ATRIAL FIBRILLATION: ICD-10-CM

## 2023-01-06 PROCEDURE — 1159F MED LIST DOCD IN RCRD: CPT | Mod: CPTII,S$GLB,, | Performed by: INTERNAL MEDICINE

## 2023-01-06 PROCEDURE — 1160F RVW MEDS BY RX/DR IN RCRD: CPT | Mod: CPTII,S$GLB,, | Performed by: INTERNAL MEDICINE

## 2023-01-06 PROCEDURE — 3008F PR BODY MASS INDEX (BMI) DOCUMENTED: ICD-10-PCS | Mod: CPTII,S$GLB,, | Performed by: INTERNAL MEDICINE

## 2023-01-06 PROCEDURE — 99214 OFFICE O/P EST MOD 30 MIN: CPT | Mod: S$GLB,,, | Performed by: INTERNAL MEDICINE

## 2023-01-06 PROCEDURE — 3074F PR MOST RECENT SYSTOLIC BLOOD PRESSURE < 130 MM HG: ICD-10-PCS | Mod: CPTII,S$GLB,, | Performed by: INTERNAL MEDICINE

## 2023-01-06 PROCEDURE — 1160F PR REVIEW ALL MEDS BY PRESCRIBER/CLIN PHARMACIST DOCUMENTED: ICD-10-PCS | Mod: CPTII,S$GLB,, | Performed by: INTERNAL MEDICINE

## 2023-01-06 PROCEDURE — 1126F AMNT PAIN NOTED NONE PRSNT: CPT | Mod: CPTII,S$GLB,, | Performed by: INTERNAL MEDICINE

## 2023-01-06 PROCEDURE — 4010F ACE/ARB THERAPY RXD/TAKEN: CPT | Mod: CPTII,S$GLB,, | Performed by: INTERNAL MEDICINE

## 2023-01-06 PROCEDURE — 1126F PR PAIN SEVERITY QUANTIFIED, NO PAIN PRESENT: ICD-10-PCS | Mod: CPTII,S$GLB,, | Performed by: INTERNAL MEDICINE

## 2023-01-06 PROCEDURE — 3288F FALL RISK ASSESSMENT DOCD: CPT | Mod: CPTII,S$GLB,, | Performed by: INTERNAL MEDICINE

## 2023-01-06 PROCEDURE — 99999 PR PBB SHADOW E&M-EST. PATIENT-LVL III: ICD-10-PCS | Mod: PBBFAC,,, | Performed by: INTERNAL MEDICINE

## 2023-01-06 PROCEDURE — 1159F PR MEDICATION LIST DOCUMENTED IN MEDICAL RECORD: ICD-10-PCS | Mod: CPTII,S$GLB,, | Performed by: INTERNAL MEDICINE

## 2023-01-06 PROCEDURE — 1101F PT FALLS ASSESS-DOCD LE1/YR: CPT | Mod: CPTII,S$GLB,, | Performed by: INTERNAL MEDICINE

## 2023-01-06 PROCEDURE — 1111F DSCHRG MED/CURRENT MED MERGE: CPT | Mod: CPTII,S$GLB,, | Performed by: INTERNAL MEDICINE

## 2023-01-06 PROCEDURE — 1111F PR DISCHARGE MEDS RECONCILED W/ CURRENT OUTPATIENT MED LIST: ICD-10-PCS | Mod: CPTII,S$GLB,, | Performed by: INTERNAL MEDICINE

## 2023-01-06 PROCEDURE — 3078F DIAST BP <80 MM HG: CPT | Mod: CPTII,S$GLB,, | Performed by: INTERNAL MEDICINE

## 2023-01-06 PROCEDURE — 3074F SYST BP LT 130 MM HG: CPT | Mod: CPTII,S$GLB,, | Performed by: INTERNAL MEDICINE

## 2023-01-06 PROCEDURE — 1101F PR PT FALLS ASSESS DOC 0-1 FALLS W/OUT INJ PAST YR: ICD-10-PCS | Mod: CPTII,S$GLB,, | Performed by: INTERNAL MEDICINE

## 2023-01-06 PROCEDURE — 99214 PR OFFICE/OUTPT VISIT, EST, LEVL IV, 30-39 MIN: ICD-10-PCS | Mod: S$GLB,,, | Performed by: INTERNAL MEDICINE

## 2023-01-06 PROCEDURE — 3078F PR MOST RECENT DIASTOLIC BLOOD PRESSURE < 80 MM HG: ICD-10-PCS | Mod: CPTII,S$GLB,, | Performed by: INTERNAL MEDICINE

## 2023-01-06 PROCEDURE — 3288F PR FALLS RISK ASSESSMENT DOCUMENTED: ICD-10-PCS | Mod: CPTII,S$GLB,, | Performed by: INTERNAL MEDICINE

## 2023-01-06 PROCEDURE — 99999 PR PBB SHADOW E&M-EST. PATIENT-LVL III: CPT | Mod: PBBFAC,,, | Performed by: INTERNAL MEDICINE

## 2023-01-06 PROCEDURE — 4010F PR ACE/ARB THEARPY RXD/TAKEN: ICD-10-PCS | Mod: CPTII,S$GLB,, | Performed by: INTERNAL MEDICINE

## 2023-01-06 PROCEDURE — 3008F BODY MASS INDEX DOCD: CPT | Mod: CPTII,S$GLB,, | Performed by: INTERNAL MEDICINE

## 2023-01-06 RX ORDER — SPIRONOLACTONE 25 MG/1
25 TABLET ORAL DAILY
Qty: 90 TABLET | Refills: 3 | Status: SHIPPED | OUTPATIENT
Start: 2023-01-06 | End: 2023-01-19 | Stop reason: SDUPTHER

## 2023-01-06 RX ORDER — METOPROLOL SUCCINATE 50 MG/1
50 TABLET, EXTENDED RELEASE ORAL DAILY
Qty: 90 TABLET | Refills: 3 | Status: SHIPPED | OUTPATIENT
Start: 2023-01-06 | End: 2023-01-19 | Stop reason: SDUPTHER

## 2023-01-06 RX ORDER — AMIODARONE HYDROCHLORIDE 400 MG/1
TABLET ORAL
Qty: 90 TABLET | Refills: 3 | Status: SHIPPED | OUTPATIENT
Start: 2023-01-06 | End: 2023-01-19

## 2023-01-06 RX ORDER — CLOPIDOGREL BISULFATE 75 MG/1
75 TABLET ORAL NIGHTLY
Qty: 90 TABLET | Refills: 3 | Status: SHIPPED | OUTPATIENT
Start: 2023-01-06 | End: 2023-01-19 | Stop reason: SDUPTHER

## 2023-01-06 RX ORDER — DAPAGLIFLOZIN 10 MG/1
10 TABLET, FILM COATED ORAL DAILY
Qty: 90 TABLET | Refills: 3 | Status: SHIPPED | OUTPATIENT
Start: 2023-01-06 | End: 2023-04-10 | Stop reason: SDUPTHER

## 2023-01-06 NOTE — LETTER
January 6, 2023        Flip Hanna MD  [address] [place], [place] [address]  [address]  [place] [address]             [facility] - Cardiology  [address]  [place] [address]  Phone: [phone]   Patient: Balbir Rebollar Sr.   MR Number: [id number]   YOB: 1950   Date of Visit: 1/6/2023       Dear Dr. Hanna:    Thank you for referring Balbir Rebollar to me for evaluation. Attached you will find relevant portions of my assessment and plan of care.    If you have questions, please do not hesitate to call me. I look forward to following Balbir Rebollar along with you.    Sincerely,      Wesley Rico MD            CC  No Recipients    Enclosure

## 2023-01-06 NOTE — PROGRESS NOTES
CARDIOVASCULAR PROGRESS NOTE    REASON FOR CONSULT:   Balbir Rebollar Sr. is a 72 y.o. male who presents for follow up of CAD/ICM/CHF.    PCP: Jacques  HISTORY OF PRESENT ILLNESS:   The patient returns for follow-up, accompanied by his daughter.  He reports generally stable status without angina, dyspnea, palpitations, syncope, or defibrillator discharges.  There has been no PND, orthopnea, or lower extremity edema.  Denies melena, hematuria, or claudicant symptoms.  He appears to be tolerating his treatment with Eliquis and Plavix.    CARDIOVASCULAR HISTORY:   CAD/ICM 12/1/22 PCI LM-LCx 3.0x32 BOZENA     MDT ICD (1 lead)    PAF on eliquis    VT on amio    PAST MEDICAL HISTORY:     Past Medical History:   Diagnosis Date    AICD (automatic cardioverter/defibrillator) present     Anticoagulant long-term use     Chronic combined systolic and diastolic congestive heart failure     Left ventricular ejection fraction is estimated at 15-20% and measured to be 25-33%.    Coronary artery disease     History of myocardial infarction     Paroxysmal A-fib     Stroke        PAST SURGICAL HISTORY:     Past Surgical History:   Procedure Laterality Date    CHOLECYSTECTOMY      LEFT HEART CATHETERIZATION Left 11/29/2022    Procedure: Left heart cath;  Surgeon: Wesley Rico MD;  Location: [facility] CATH LAB;  Service: Cardiology;  Laterality: Left;  1030am start, R rad access    LEFT HEART CATHETERIZATION N/A 12/1/2022    Procedure: Left heart cath;  Surgeon: Tam Cho MD;  Location: [facility] CATH LAB;  Service: Cardiology;  Laterality: N/A;    REPLACEMENT OF DUAL CHAMBER IMPLANTABLE CARDIOVERTER-DEFIBRILLATOR         ALLERGIES AND MEDICATION:   Review of patient's allergies indicates:  No Known Allergies     Medication List            Accurate as of January 6, 2023  3:25 PM. If you have any questions, ask your nurse or doctor.                CONTINUE taking these medications      amiodarone 400 MG tablet  Commonly known  as: PACERONE  Take 1 tab po bid for 12 days, then 1 tab po daily.     apixaban 5 mg Tab  Commonly known as: ELIQUIS  Take 1 tablet (5 mg total) by mouth 2 (two) times daily.     aspirin 81 MG Chew  Take 1 tablet (81 mg total) by mouth once daily. Take for 30 days then please discontinue     clopidogreL 75 mg tablet  Commonly known as: PLAVIX  Take 1 tablet (75 mg total) by mouth every evening.     dapagliflozin 10 mg tablet  Commonly known as: FARXIGA  Take 1 tablet (10 mg total) by mouth once daily.     metoprolol succinate 50 MG 24 hr tablet  Commonly known as: TOPROL-XL  Take 1 tablet (50 mg total) by mouth once daily.     nitroGLYCERIN 0.4 MG SL tablet  Commonly known as: NITROSTAT  Place 1 tablet (0.4 mg total) under the tongue every 5 (five) minutes as needed for Chest pain. Up to 3 doses. If chest pain is not relieved or worsens 3 to 5 minutes after 1 dose, call 911 and seek immediate emergency medical attention.     pantoprazole 40 MG tablet  Commonly known as: PROTONIX  Take 1 tablet (40 mg total) by mouth once daily.     rosuvastatin 40 MG Tab  Commonly known as: CRESTOR  Take 1 tablet (40 mg total) by mouth every evening.     sacubitriL-valsartan 24-26 mg per tablet  Commonly known as: ENTRESTO  Take 1 tablet by mouth 2 (two) times daily.     spironolactone 25 MG tablet  Commonly known as: ALDACTONE  Take 0.5 tablets (12.5 mg total) by mouth once daily.            STOP taking these medications      blood pressure monitor Kit  Stopped by: Wesley Rico MD              SOCIAL HISTORY:     Social History     Socioeconomic History    Marital status:    Tobacco Use    Smoking status: Former    Smokeless tobacco: Never   Substance and Sexual Activity    Alcohol use: Never    Drug use: Never     Social Determinants of Health     Financial Resource Strain: Low Risk     Difficulty of Paying Living Expenses: Not hard at all   Food Insecurity: No Food Insecurity    Worried About Running Out of Food in  the Last Year: Never true    Ran Out of Food in the Last Year: Never true   Transportation Needs: No Transportation Needs    Lack of Transportation (Medical): No    Lack of Transportation (Non-Medical): No   Physical Activity: Inactive    Days of Exercise per Week: 0 days    Minutes of Exercise per Session: 0 min   Stress: No Stress Concern Present    Feeling of Stress : Not at all   Social Connections: Socially Isolated    Frequency of Communication with Friends and Family: Three times a week    Frequency of Social Gatherings with Friends and Family: Three times a week    Attends Muslim Services: Never    Active Member of Clubs or Organizations: No    Attends Club or Organization Meetings: Never    Marital Status:    Housing Stability: Low Risk     Unable to Pay for Housing in the Last Year: No    Number of Places Lived in the Last Year: 1    Unstable Housing in the Last Year: No       FAMILY HISTORY:   History reviewed. No pertinent family history.    REVIEW OF SYSTEMS:   Review of Systems   Constitutional:  Negative for chills, diaphoresis and fever.   HENT:  Negative for nosebleeds.    Eyes:  Negative for blurred vision, double vision and photophobia.   Respiratory:  Negative for hemoptysis, shortness of breath and wheezing.    Cardiovascular:  Negative for chest pain, palpitations, orthopnea, claudication, leg swelling and PND.   Gastrointestinal:  Negative for abdominal pain, blood in stool, heartburn, melena, nausea and vomiting.   Genitourinary:  Negative for flank pain and hematuria.   Musculoskeletal:  Negative for falls, myalgias and neck pain.   Skin:  Negative for rash.   Neurological:  Negative for dizziness, seizures, loss of consciousness, weakness and headaches.   Endo/Heme/Allergies:  Negative for polydipsia. Does not bruise/bleed easily.   Psychiatric/Behavioral:  Negative for depression and memory loss. The patient is not nervous/anxious.      PHYSICAL EXAM:     Vitals:    01/06/23  "1454   BP: 108/60   Pulse: 64   Resp: 16    Body mass index is 20.17 kg/m².  Weight: 62 kg (136 lb 11 oz)   Height: 5' 9.02" (175.3 cm)     Physical Exam  Vitals reviewed.   Constitutional:       General: He is not in acute distress.     Appearance: Normal appearance. He is well-developed and normal weight. He is not ill-appearing, toxic-appearing or diaphoretic.   HENT:      Head: Normocephalic and atraumatic.   Eyes:      General: No scleral icterus.     Extraocular Movements: Extraocular movements intact.      Conjunctiva/sclera: Conjunctivae normal.      Pupils: Pupils are equal, round, and reactive to light.   Neck:      Thyroid: No thyromegaly.      Vascular: Normal carotid pulses. No JVD.      Trachea: Trachea normal.   Cardiovascular:      Rate and Rhythm: Normal rate and regular rhythm.      Pulses:           Carotid pulses are 2+ on the right side and 2+ on the left side.     Heart sounds: S1 normal and S2 normal. No murmur heard.    No friction rub. No gallop.   Pulmonary:      Effort: Pulmonary effort is normal. No respiratory distress.      Breath sounds: Normal breath sounds. No stridor. No wheezing, rhonchi or rales.   Chest:      Chest wall: No tenderness.   Abdominal:      General: There is no distension.      Palpations: Abdomen is soft.   Musculoskeletal:         General: No swelling or tenderness. Normal range of motion.      Cervical back: Normal range of motion and neck supple. No edema or rigidity.      Right lower leg: No edema.      Left lower leg: No edema.   Feet:      Right foot:      Skin integrity: No ulcer.      Left foot:      Skin integrity: No ulcer.   Skin:     General: Skin is warm and dry.      Coloration: Skin is not jaundiced.   Neurological:      General: No focal deficit present.      Mental Status: He is alert and oriented to person, place, and time.      Cranial Nerves: No cranial nerve deficit.   Psychiatric:         Mood and Affect: Mood normal.         Speech: Speech " normal.         Behavior: Behavior normal. Behavior is cooperative.       DATA:   EKG: (personally reviewed tracing)  11/22/22 SR 87, RBBB/LAD, bigeminal PACs, QTc 474    Laboratory:  CBC:  Recent Labs   Lab 12/06/22  0912 12/12/22  0209 12/13/22  0438   WBC 5.47 7.17 7.00   Hemoglobin 15.4 15.1 15.0   Hematocrit 45.4 43.4 45.0   Platelets 169 204 205       CHEMISTRIES:  Recent Labs   Lab 12/02/22  0501 12/02/22  1828 12/03/22  0011 12/03/22  0559 12/04/22  0511 12/05/22  0442 12/06/22  0912 12/12/22 0209 12/13/22  0438   Glucose 86 127 H 109   < > 107 114 H 87 138 H 100   Sodium 136 133 L 133 L   < > 134 L 135 L 139 143 143   Potassium 4.1 3.7 3.9   < > 3.6 3.9 3.5 3.3 L 3.1 L   BUN 16 16 16   < > 14 15 18 26 H 27 H   Creatinine 0.7 0.7 0.7   < > 0.8 0.9 0.8 1.4 1.2   eGFR >60.0 >60.0 >60.0   < > >60.0 >60.0 >60.0 53 A >60   Calcium 8.3 L 8.8 8.5 L   < > 8.6 L 8.7 8.9 9.7 9.0   Magnesium 2.0 2.0 2.0  --   --   --   --  2.3 2.3    < > = values in this interval not displayed.       CARDIAC BIOMARKERS:  Recent Labs   Lab 11/23/22  0616 12/12/22 0209 12/12/22  1157   Troponin I 0.038 H 0.546 H 0.992 H       COAGS:  Recent Labs   Lab 12/05/22  0442 12/06/22  0912 12/12/22  0209   INR 2.8 H 3.8 H 3.8 H       LIPIDS/LFTS:  Recent Labs   Lab 12/06/22  0912 12/12/22  0209 12/13/22  0438   AST 66 H 37 34   ALT 45 H 33 28       Cardiovascular Testing:  Echo 12/12/22  Concentric hypertrophy and severely decreased systolic function.  The estimated ejection fraction is 20-25%.  Grade I left ventricular diastolic dysfunction.  Normal right ventricular size with normal right ventricular systolic function.  Mild to moderate left atrial enlargement.  Mild right atrial enlargement.  Mild aortic regurgitation.  Normal central venous pressure (3 mmHg).  The estimated PA systolic pressure is 17 mmHg.  Anterior wall is thinned out and akinetic    PCI LM->LCx 12/1/22 ([facility])  The RCA had luminal irregularity but no significant  obstructive disease.  The left main had 70% distal stenosis.  The LAD was occluded at the ostium  The left circumflex had 99% proximal stenosis.  Intervention:  A 2.5 balloon was used to dilate the left main into the left circumflex at high pressure (20 atmospheres).  3.0 x 32 stent was then deployed at high pressure (18 atmospheres).  Summary:  Severe two-vessel disease status post  of the LAD with scar tissue and V-tach with AICD in Situ.  Successful left main into left circumflex PCI with drug-eluting stents today.  Recommendations:  Postop care  Dual antiplatelet therapy for least a year  EP follow-up for his VT and AICD  Follow-up with Dr. Rico and follow-up with me as needed    Cath 11/29/22  LVEDP: 1mmHg  LVEF: 25% by echo  Wall Motion: LAD WMA by echo  Dominance: Right  LM: MLI  LAD: ost occluded, faint catherine from RCA via septals  LCx: prox 90%, T3 flow  RCA: diffuse up to 50-70%  Hemostasis:  R Radial band  Impression:  Sustained VT  Severe LV dysfxn  Occluded LAD, collateralized from RCA  Severe LCx stenosis  Mod RCA CAD  Sustained VT during cath successfully treated with ATP and subsequent amio IV.  R rad vasband for hemostasis  Plan:  Cont med rx  Cont ASA/Plavix/statin  Amio gtt/transfer to ICU  Transfer to [facility] to discuss viability eval vs high risk PCI of LAD +/- LCx (+/- MCS).  Currently on diversion.  Will manage pt at [facility] ICU pending bed availability.    Echo: 10/19/22 (care everywhere)  EF is approximately 20-30%, Ao root 4.1cm.    1. Severely decreased left ventricular systolic function.       2. Grade II diastolic dysfunction.       3. Wall motion abnormalities imply disease of the left anterior descending coronary artery.     4. Mild nonrheumatic mitral valve regurgitation with no suggestion of left atrial enlargement.     5. Mild nonrheumatic aortic valve regurgitation.       6. Mild-to-moderate aortic root dilation.     7. Although estimation of pulmonary artery systolic pressure is  not possible due to incomplete tricuspid regurgitation velocity       profile, pulmonary hypertension is not suspected.     Compared to previous echocardiogram 11/01/2021, no significant changes are seen        Cath: (care everywhere, [place] note 11/17/22)  (09/03/2006):  DE Stent placement in LAD due to anterior STEMI      ASSESSMENT:   # CAD/ICM s/p LM->LCx BOZENA 12/2022  # ICM, EF 30%  # HTN, controlled  # HLP on rosuva 40mg  # VT on amio  # PAF on eliquis 5mg bid  # MDT ICD in place    PLAN:   Cont med rx  Cont eliquis 5mg bid  Cont Plavix 75mg qd for 1 year (thru 12/2023), consider indef rx given LM BOZENA  Cont amio 400mg qd, consider decreasing to 200mg qd on follow up  Dec toprol 25mg qd  Inc aldact 25mg qd  RTC 3 months with MDT ICD check (Apr 2023)  Consider as a candidate for CardioMEMS in future      Wesley Rico MD, FACC

## 2023-01-09 ENCOUNTER — TELEPHONE (OUTPATIENT)
Dept: UROLOGY | Facility: CLINIC | Age: 73
End: 2023-01-09
Payer: MEDICARE

## 2023-01-09 NOTE — TELEPHONE ENCOUNTER
Spoke with pts daughter and she said his pressure came down to 108/56-instructed her to call us for any concerns.      ----- Message from Roosevelt Izquierdo sent at 1/9/2023  9:12 AM CST -----  Regarding: CAll BAck  Name of Who is Calling: Holly Daughter in Law              What is the request in detail: Holly requesting a call back states patient pressure was high.132/63 last night. This morning it's was    135/62 Please assist              Can the clinic reply by [facility]: No              What Number [facility]: Holly ph. [phone]

## 2023-01-13 NOTE — Clinical Note
01/13/23 0010   NIV Type   Mode (S)  Other (Comment)  (all per patient discretion)   Dr. Hope Neri pt. Requests use of their Home BIPAP during their stay. This requires a home         bipap order.  Thanks, Phone report was given to CICU RN

## 2023-01-20 RX ORDER — CLOPIDOGREL BISULFATE 75 MG/1
75 TABLET ORAL NIGHTLY
Qty: 90 TABLET | Refills: 3 | Status: SHIPPED | OUTPATIENT
Start: 2023-01-20 | End: 2024-01-20

## 2023-01-20 RX ORDER — PANTOPRAZOLE SODIUM 40 MG/1
40 TABLET, DELAYED RELEASE ORAL DAILY
Qty: 90 TABLET | Refills: 3 | Status: SHIPPED | OUTPATIENT
Start: 2023-01-20 | End: 2024-01-20

## 2023-01-20 RX ORDER — ROSUVASTATIN CALCIUM 40 MG/1
40 TABLET, COATED ORAL NIGHTLY
Qty: 90 TABLET | Refills: 3 | Status: SHIPPED | OUTPATIENT
Start: 2023-01-20 | End: 2024-01-20

## 2023-01-20 RX ORDER — AMIODARONE HYDROCHLORIDE 400 MG/1
400 TABLET ORAL DAILY
Qty: 90 TABLET | Refills: 3 | Status: SHIPPED | OUTPATIENT
Start: 2023-01-20 | End: 2023-02-16

## 2023-01-20 RX ORDER — SPIRONOLACTONE 25 MG/1
25 TABLET ORAL DAILY
Qty: 90 TABLET | Refills: 3 | Status: ON HOLD | OUTPATIENT
Start: 2023-01-20 | End: 2023-08-15 | Stop reason: HOSPADM

## 2023-01-20 RX ORDER — METOPROLOL SUCCINATE 50 MG/1
50 TABLET, EXTENDED RELEASE ORAL DAILY
Qty: 90 TABLET | Refills: 3 | Status: SHIPPED | OUTPATIENT
Start: 2023-01-20 | End: 2023-08-07

## 2023-02-12 ENCOUNTER — PATIENT MESSAGE (OUTPATIENT)
Dept: CARDIOLOGY | Facility: CLINIC | Age: 73
End: 2023-02-12
Payer: MEDICARE

## 2023-02-16 ENCOUNTER — TELEPHONE (OUTPATIENT)
Dept: CARDIOLOGY | Facility: CLINIC | Age: 73
End: 2023-02-16
Payer: MEDICARE

## 2023-02-16 RX ORDER — AMIODARONE HYDROCHLORIDE 200 MG/1
400 TABLET ORAL DAILY
Qty: 180 TABLET | Refills: 3 | Status: SHIPPED | OUTPATIENT
Start: 2023-02-16 | End: 2023-02-19 | Stop reason: SDUPTHER

## 2023-02-16 NOTE — TELEPHONE ENCOUNTER
Call and spoke to daughter and informed her refill sent to [facility]. Verbalized okay.       Jaclyn Escobedo  02/16/2023  4:33 PM          ----- Message from Wesley Rico MD sent at 2/16/2023  3:43 PM CST -----  Regarding: RE: Amiodarone  Call family and let them know I sent a refill for 200mg tabs to [facility] as requested.    [facility]    ----- Message -----  From: Jaclyn Escobedo RN  Sent: 2/16/2023   2:06 PM CST  To: Wesley Rico MD  Subject: Amiodarone                                       The patient family came in clinic to request refill for Amiodarone. The patient has only been taking 200mg daily instead of the 400mg ordered. They were using a old prescription bottle and family did not realize it was 200 mg pills.     She is requesting new prescription with pills saying 200mg 2tabs once daily due to the price of the 400mg pills. 200 mg tabs are about $5 while 400mg tabs are about $100.     I can put in refill request. Requesting [facility].

## 2023-03-03 ENCOUNTER — PATIENT MESSAGE (OUTPATIENT)
Dept: CARDIOLOGY | Facility: CLINIC | Age: 73
End: 2023-03-03
Payer: MEDICARE

## 2023-03-31 ENCOUNTER — PATIENT MESSAGE (OUTPATIENT)
Dept: CARDIOLOGY | Facility: CLINIC | Age: 73
End: 2023-03-31
Payer: MEDICARE

## 2023-04-07 ENCOUNTER — PATIENT MESSAGE (OUTPATIENT)
Dept: CARDIOLOGY | Facility: CLINIC | Age: 73
End: 2023-04-07
Payer: MEDICARE

## 2023-04-08 ENCOUNTER — NURSE TRIAGE (OUTPATIENT)
Dept: ADMINISTRATIVE | Facility: CLINIC | Age: 73
End: 2023-04-08
Payer: MEDICARE

## 2023-04-08 NOTE — TELEPHONE ENCOUNTER
dapagliflozin (FARXIGA) 10 mg tablet 90 tablet 3 1/6/2023  No   Sig - Route: Take 1 tablet (10 mg total) by mouth once daily. - Oral   Sent to pharmacy as: dapagliflozin (FARXIGA) 10 mg tablet   Class: Normal   Order: [id number]   Date/Time Signed: 1/6/2023 15:37         [id number] - [place], [place] - [address]  [phone]    Pts daughter calling stating that he is out of his farxiga and when they went to pick it up yesterday the pharmacy was closed. Per protocol advised I will call on call provider. verbalized understanding  Spoke with Dr. AKASH Hernandez OCP states that it is okay for pt to miss the dose today and tomorrow and then go pick it up Monday.   Advised pts daughter. verbalized understanding. Denies any further questions or concerns at this time, advised to call back if they have any that come up. Advised pt to call back with any other concerns or worsening symptoms. Verbalized understanding and will route message to provider.       Reason for Disposition   [1] Prescription refill request for ESSENTIAL medicine (i.e., likelihood of harm to patient if not taken) AND [2] triager unable to refill per department policy    Protocols used: Medication Refill and Renewal Call-A-

## 2023-04-10 RX ORDER — DAPAGLIFLOZIN 10 MG/1
10 TABLET, FILM COATED ORAL DAILY
Qty: 90 TABLET | Refills: 3 | Status: SHIPPED | OUTPATIENT
Start: 2023-04-10 | End: 2023-05-02

## 2023-04-11 DIAGNOSIS — I47.20 SUSTAINED VT (VENTRICULAR TACHYCARDIA): Primary | ICD-10-CM

## 2023-04-12 ENCOUNTER — PATIENT MESSAGE (OUTPATIENT)
Dept: CARDIOLOGY | Facility: CLINIC | Age: 73
End: 2023-04-12
Payer: MEDICARE

## 2023-04-12 DIAGNOSIS — I50.42 CHRONIC COMBINED SYSTOLIC AND DIASTOLIC CONGESTIVE HEART FAILURE: ICD-10-CM

## 2023-04-21 ENCOUNTER — CLINICAL SUPPORT (OUTPATIENT)
Dept: CARDIOLOGY | Facility: HOSPITAL | Age: 73
End: 2023-04-21
Attending: INTERNAL MEDICINE
Payer: MEDICARE

## 2023-04-21 ENCOUNTER — DOCUMENT SCAN (OUTPATIENT)
Dept: HOME HEALTH SERVICES | Facility: HOSPITAL | Age: 73
End: 2023-04-21
Payer: MEDICARE

## 2023-04-21 ENCOUNTER — HOSPITAL ENCOUNTER (OUTPATIENT)
Dept: CARDIOLOGY | Facility: CLINIC | Age: 73
Discharge: HOME | End: 2023-04-21
Payer: MEDICARE

## 2023-04-21 ENCOUNTER — OFFICE VISIT (OUTPATIENT)
Dept: ELECTROPHYSIOLOGY | Facility: CLINIC | Age: 73
End: 2023-04-21
Payer: MEDICARE

## 2023-04-21 VITALS
SYSTOLIC BLOOD PRESSURE: 112 MMHG | BODY MASS INDEX: 19.95 KG/M2 | HEIGHT: 69 IN | WEIGHT: 134.69 LBS | DIASTOLIC BLOOD PRESSURE: 58 MMHG | HEART RATE: 56 BPM

## 2023-04-21 DIAGNOSIS — I47.20 SUSTAINED VT (VENTRICULAR TACHYCARDIA): ICD-10-CM

## 2023-04-21 DIAGNOSIS — R06.02 SHORTNESS OF BREATH: ICD-10-CM

## 2023-04-21 DIAGNOSIS — I47.29 NONSUSTAINED VENTRICULAR TACHYCARDIA: ICD-10-CM

## 2023-04-21 DIAGNOSIS — I77.810 THORACIC AORTIC ECTASIA: Primary | ICD-10-CM

## 2023-04-21 DIAGNOSIS — I50.42 CHRONIC COMBINED SYSTOLIC AND DIASTOLIC CONGESTIVE HEART FAILURE: ICD-10-CM

## 2023-04-21 DIAGNOSIS — I45.2 RIGHT BUNDLE BRANCH BLOCK (RBBB) WITH LEFT ANTERIOR FASCICULAR BLOCK (LAFB): ICD-10-CM

## 2023-04-21 DIAGNOSIS — I48.0 PAROXYSMAL ATRIAL FIBRILLATION: ICD-10-CM

## 2023-04-21 DIAGNOSIS — Z86.73 HISTORY OF CVA (CEREBROVASCULAR ACCIDENT): ICD-10-CM

## 2023-04-21 DIAGNOSIS — R79.1 SUPRATHERAPEUTIC INR: ICD-10-CM

## 2023-04-21 DIAGNOSIS — I47.20 VENTRICULAR TACHYARRHYTHMIA: ICD-10-CM

## 2023-04-21 DIAGNOSIS — I25.10 CORONARY ARTERY DISEASE INVOLVING NATIVE CORONARY ARTERY OF NATIVE HEART WITHOUT ANGINA PECTORIS: ICD-10-CM

## 2023-04-21 DIAGNOSIS — I25.5 ISCHEMIC CARDIOMYOPATHY: ICD-10-CM

## 2023-04-21 DIAGNOSIS — Z95.810 ICD (IMPLANTABLE CARDIOVERTER-DEFIBRILLATOR), SINGLE, IN SITU: ICD-10-CM

## 2023-04-21 PROCEDURE — 1101F PR PT FALLS ASSESS DOC 0-1 FALLS W/OUT INJ PAST YR: ICD-10-PCS | Mod: CPTII,S$GLB,, | Performed by: INTERNAL MEDICINE

## 2023-04-21 PROCEDURE — 93005 RHYTHM STRIP: ICD-10-PCS | Mod: S$GLB,,, | Performed by: INTERNAL MEDICINE

## 2023-04-21 PROCEDURE — 99999 PR PBB SHADOW E&M-EST. PATIENT-LVL III: CPT | Mod: PBBFAC,,, | Performed by: INTERNAL MEDICINE

## 2023-04-21 PROCEDURE — 3074F PR MOST RECENT SYSTOLIC BLOOD PRESSURE < 130 MM HG: ICD-10-PCS | Mod: CPTII,S$GLB,, | Performed by: INTERNAL MEDICINE

## 2023-04-21 PROCEDURE — 3074F SYST BP LT 130 MM HG: CPT | Mod: CPTII,S$GLB,, | Performed by: INTERNAL MEDICINE

## 2023-04-21 PROCEDURE — 1160F RVW MEDS BY RX/DR IN RCRD: CPT | Mod: CPTII,S$GLB,, | Performed by: INTERNAL MEDICINE

## 2023-04-21 PROCEDURE — 3078F PR MOST RECENT DIASTOLIC BLOOD PRESSURE < 80 MM HG: ICD-10-PCS | Mod: CPTII,S$GLB,, | Performed by: INTERNAL MEDICINE

## 2023-04-21 PROCEDURE — 3288F PR FALLS RISK ASSESSMENT DOCUMENTED: ICD-10-PCS | Mod: CPTII,S$GLB,, | Performed by: INTERNAL MEDICINE

## 2023-04-21 PROCEDURE — 3008F PR BODY MASS INDEX (BMI) DOCUMENTED: ICD-10-PCS | Mod: CPTII,S$GLB,, | Performed by: INTERNAL MEDICINE

## 2023-04-21 PROCEDURE — 99214 OFFICE O/P EST MOD 30 MIN: CPT | Mod: S$GLB,,, | Performed by: INTERNAL MEDICINE

## 2023-04-21 PROCEDURE — 93010 RHYTHM STRIP: ICD-10-PCS | Mod: S$GLB,,, | Performed by: INTERNAL MEDICINE

## 2023-04-21 PROCEDURE — 3288F FALL RISK ASSESSMENT DOCD: CPT | Mod: CPTII,S$GLB,, | Performed by: INTERNAL MEDICINE

## 2023-04-21 PROCEDURE — 4010F ACE/ARB THERAPY RXD/TAKEN: CPT | Mod: CPTII,S$GLB,, | Performed by: INTERNAL MEDICINE

## 2023-04-21 PROCEDURE — 3008F BODY MASS INDEX DOCD: CPT | Mod: CPTII,S$GLB,, | Performed by: INTERNAL MEDICINE

## 2023-04-21 PROCEDURE — 1126F PR PAIN SEVERITY QUANTIFIED, NO PAIN PRESENT: ICD-10-PCS | Mod: CPTII,S$GLB,, | Performed by: INTERNAL MEDICINE

## 2023-04-21 PROCEDURE — 3078F DIAST BP <80 MM HG: CPT | Mod: CPTII,S$GLB,, | Performed by: INTERNAL MEDICINE

## 2023-04-21 PROCEDURE — 1160F PR REVIEW ALL MEDS BY PRESCRIBER/CLIN PHARMACIST DOCUMENTED: ICD-10-PCS | Mod: CPTII,S$GLB,, | Performed by: INTERNAL MEDICINE

## 2023-04-21 PROCEDURE — 4010F PR ACE/ARB THEARPY RXD/TAKEN: ICD-10-PCS | Mod: CPTII,S$GLB,, | Performed by: INTERNAL MEDICINE

## 2023-04-21 PROCEDURE — 93005 ELECTROCARDIOGRAM TRACING: CPT | Mod: S$GLB,,, | Performed by: INTERNAL MEDICINE

## 2023-04-21 PROCEDURE — 1101F PT FALLS ASSESS-DOCD LE1/YR: CPT | Mod: CPTII,S$GLB,, | Performed by: INTERNAL MEDICINE

## 2023-04-21 PROCEDURE — 93010 ELECTROCARDIOGRAM REPORT: CPT | Mod: S$GLB,,, | Performed by: INTERNAL MEDICINE

## 2023-04-21 PROCEDURE — 99999 PR PBB SHADOW E&M-EST. PATIENT-LVL III: ICD-10-PCS | Mod: PBBFAC,,, | Performed by: INTERNAL MEDICINE

## 2023-04-21 PROCEDURE — 1126F AMNT PAIN NOTED NONE PRSNT: CPT | Mod: CPTII,S$GLB,, | Performed by: INTERNAL MEDICINE

## 2023-04-21 PROCEDURE — 1159F MED LIST DOCD IN RCRD: CPT | Mod: CPTII,S$GLB,, | Performed by: INTERNAL MEDICINE

## 2023-04-21 PROCEDURE — 1159F PR MEDICATION LIST DOCUMENTED IN MEDICAL RECORD: ICD-10-PCS | Mod: CPTII,S$GLB,, | Performed by: INTERNAL MEDICINE

## 2023-04-21 PROCEDURE — 99214 PR OFFICE/OUTPT VISIT, EST, LEVL IV, 30-39 MIN: ICD-10-PCS | Mod: S$GLB,,, | Performed by: INTERNAL MEDICINE

## 2023-04-21 NOTE — PROGRESS NOTES
Subjective:   Patient ID:  Balbir Rebollar Sr. is a 72 y.o. male who presents for evaluation of VT    HPI  72 y.o. M  CAD, ICM  HTN   PAF  CVA  ICD, single chamber, 3/17 ([place])    Seen in hospital 12/22 after had slow VT post-PCI to RCA. We decreased VT zone to 120 bpm and increased amio to 400 qd.  No treated VT events since 1/23 (ATP'd x2).  He feels well. NYHA II. He walked up and down 2 flights stairs with me today, talking the whole time.  He does c/o fatigue.    12/2022 20-25% LVEF    My interpretation of today's ECG is NSR. RBBB/FARB with QRSd 174 bpm    Review of Systems   Constitutional: Positive for malaise/fatigue.   HENT: Negative.  Negative for ear pain and tinnitus.    Eyes:  Negative for blurred vision.   Cardiovascular: Negative.  Negative for chest pain, dyspnea on exertion, near-syncope, palpitations and syncope.   Respiratory: Negative.  Negative for shortness of breath.    Endocrine: Negative.  Negative for polyuria.   Hematologic/Lymphatic: Does not bruise/bleed easily.   Skin: Negative.  Negative for rash.   Musculoskeletal: Negative.  Negative for joint pain and muscle weakness.   Gastrointestinal: Negative.  Negative for abdominal pain and change in bowel habit.   Genitourinary:  Negative for frequency.   Neurological: Negative.  Negative for dizziness and weakness.   Psychiatric/Behavioral: Negative.  Negative for depression. The patient is not nervous/anxious.    Allergic/Immunologic: Negative for environmental allergies.     Objective:   Physical Exam  Vitals and nursing note reviewed.   Constitutional:       Appearance: He is well-developed.   HENT:      Head: Normocephalic and atraumatic.   Eyes:      General: Lids are normal. No scleral icterus.     Conjunctiva/sclera: Conjunctivae normal.   Neck:      Thyroid: No thyromegaly.      Vascular: No JVD.      Trachea: No tracheal deviation.   Cardiovascular:      Rate and Rhythm: Normal rate and regular rhythm.      Pulses:            Radial pulses are 2+ on the right side and 2+ on the left side.   Pulmonary:      Effort: Pulmonary effort is normal. No tachypnea, accessory muscle usage or respiratory distress.      Breath sounds: No wheezing.   Abdominal:      General: There is no distension.   Musculoskeletal:         General: Normal range of motion.      Cervical back: Normal range of motion.   Skin:     General: Skin is warm and dry.   Neurological:      Mental Status: He is alert and oriented to person, place, and time.      Motor: No abnormal muscle tone.      Deep Tendon Reflexes: Reflexes are normal and symmetric.   Psychiatric:         Behavior: Behavior normal.       Assessment:      1. Thoracic aortic ectasia    2. History of CVA (cerebrovascular accident)    3. Paroxysmal atrial fibrillation    4. Coronary artery disease involving native coronary artery of native heart without angina pectoris    5. ICD (implantable cardioverter-defibrillator), single, in situ    6. Ischemic cardiomyopathy    7. Nonsustained ventricular tachycardia    8. Right bundle branch block (RBBB) with left anterior fascicular block (LAFB)    9. Sustained VT (ventricular tachycardia)    10. Chronic combined systolic and diastolic congestive heart failure    11. Ventricular tachyarrhythmia    12. Supratherapeutic INR    13. Shortness of breath        Plan:     Doing well re: HF and ICD.  Will follow ICD here (pt request).    Check TSH, given c/o fatigue.  Given non-LBBB morphology and mild HF sx, CRT would be unlikely to help.    Return in 1 year with echo and amio tests, or earlier prn.

## 2023-04-28 ENCOUNTER — OFFICE VISIT (OUTPATIENT)
Dept: CARDIOLOGY | Facility: CLINIC | Age: 73
End: 2023-04-28
Payer: MEDICARE

## 2023-04-28 VITALS
HEIGHT: 69 IN | BODY MASS INDEX: 19.03 KG/M2 | RESPIRATION RATE: 18 BRPM | HEART RATE: 48 BPM | SYSTOLIC BLOOD PRESSURE: 94 MMHG | WEIGHT: 128.5 LBS | OXYGEN SATURATION: 98 % | DIASTOLIC BLOOD PRESSURE: 55 MMHG

## 2023-04-28 DIAGNOSIS — I47.20 V TACH: ICD-10-CM

## 2023-04-28 DIAGNOSIS — I50.42 CHRONIC COMBINED SYSTOLIC AND DIASTOLIC CONGESTIVE HEART FAILURE: ICD-10-CM

## 2023-04-28 DIAGNOSIS — I48.0 PAROXYSMAL ATRIAL FIBRILLATION: ICD-10-CM

## 2023-04-28 DIAGNOSIS — Z95.810 ICD (IMPLANTABLE CARDIOVERTER-DEFIBRILLATOR), SINGLE, IN SITU: ICD-10-CM

## 2023-04-28 DIAGNOSIS — Z79.01 CHRONIC ANTICOAGULATION: ICD-10-CM

## 2023-04-28 DIAGNOSIS — I25.5 ISCHEMIC CARDIOMYOPATHY: ICD-10-CM

## 2023-04-28 DIAGNOSIS — I25.10 CORONARY ARTERY DISEASE INVOLVING NATIVE CORONARY ARTERY OF NATIVE HEART WITHOUT ANGINA PECTORIS: Primary | ICD-10-CM

## 2023-04-28 PROCEDURE — 3078F PR MOST RECENT DIASTOLIC BLOOD PRESSURE < 80 MM HG: ICD-10-PCS | Mod: CPTII,S$GLB,, | Performed by: INTERNAL MEDICINE

## 2023-04-28 PROCEDURE — 1160F PR REVIEW ALL MEDS BY PRESCRIBER/CLIN PHARMACIST DOCUMENTED: ICD-10-PCS | Mod: CPTII,S$GLB,, | Performed by: INTERNAL MEDICINE

## 2023-04-28 PROCEDURE — 93282 PRGRMG EVAL IMPLANTABLE DFB: CPT | Mod: 26,,, | Performed by: INTERNAL MEDICINE

## 2023-04-28 PROCEDURE — 4010F ACE/ARB THERAPY RXD/TAKEN: CPT | Mod: CPTII,S$GLB,, | Performed by: INTERNAL MEDICINE

## 2023-04-28 PROCEDURE — 1159F PR MEDICATION LIST DOCUMENTED IN MEDICAL RECORD: ICD-10-PCS | Mod: CPTII,S$GLB,, | Performed by: INTERNAL MEDICINE

## 2023-04-28 PROCEDURE — 1126F AMNT PAIN NOTED NONE PRSNT: CPT | Mod: CPTII,S$GLB,, | Performed by: INTERNAL MEDICINE

## 2023-04-28 PROCEDURE — 1126F PR PAIN SEVERITY QUANTIFIED, NO PAIN PRESENT: ICD-10-PCS | Mod: CPTII,S$GLB,, | Performed by: INTERNAL MEDICINE

## 2023-04-28 PROCEDURE — 1160F RVW MEDS BY RX/DR IN RCRD: CPT | Mod: CPTII,S$GLB,, | Performed by: INTERNAL MEDICINE

## 2023-04-28 PROCEDURE — 99999 PR PBB SHADOW E&M-EST. PATIENT-LVL IV: CPT | Mod: PBBFAC,,, | Performed by: INTERNAL MEDICINE

## 2023-04-28 PROCEDURE — 3078F DIAST BP <80 MM HG: CPT | Mod: CPTII,S$GLB,, | Performed by: INTERNAL MEDICINE

## 2023-04-28 PROCEDURE — 3008F BODY MASS INDEX DOCD: CPT | Mod: CPTII,S$GLB,, | Performed by: INTERNAL MEDICINE

## 2023-04-28 PROCEDURE — 3074F SYST BP LT 130 MM HG: CPT | Mod: CPTII,S$GLB,, | Performed by: INTERNAL MEDICINE

## 2023-04-28 PROCEDURE — 3288F PR FALLS RISK ASSESSMENT DOCUMENTED: ICD-10-PCS | Mod: CPTII,S$GLB,, | Performed by: INTERNAL MEDICINE

## 2023-04-28 PROCEDURE — 99999 PR PBB SHADOW E&M-EST. PATIENT-LVL IV: ICD-10-PCS | Mod: PBBFAC,,, | Performed by: INTERNAL MEDICINE

## 2023-04-28 PROCEDURE — 1159F MED LIST DOCD IN RCRD: CPT | Mod: CPTII,S$GLB,, | Performed by: INTERNAL MEDICINE

## 2023-04-28 PROCEDURE — 3288F FALL RISK ASSESSMENT DOCD: CPT | Mod: CPTII,S$GLB,, | Performed by: INTERNAL MEDICINE

## 2023-04-28 PROCEDURE — 4010F PR ACE/ARB THEARPY RXD/TAKEN: ICD-10-PCS | Mod: CPTII,S$GLB,, | Performed by: INTERNAL MEDICINE

## 2023-04-28 PROCEDURE — 1101F PR PT FALLS ASSESS DOC 0-1 FALLS W/OUT INJ PAST YR: ICD-10-PCS | Mod: CPTII,S$GLB,, | Performed by: INTERNAL MEDICINE

## 2023-04-28 PROCEDURE — 99215 OFFICE O/P EST HI 40 MIN: CPT | Mod: S$GLB,,, | Performed by: INTERNAL MEDICINE

## 2023-04-28 PROCEDURE — 99215 PR OFFICE/OUTPT VISIT, EST, LEVL V, 40-54 MIN: ICD-10-PCS | Mod: S$GLB,,, | Performed by: INTERNAL MEDICINE

## 2023-04-28 PROCEDURE — 93282 PR PROGRAM EVAL (IN PERSON) IMPLANT DEVICE,CARDVERT/DEFIB,1 LEAD: ICD-10-PCS | Mod: 26,,, | Performed by: INTERNAL MEDICINE

## 2023-04-28 PROCEDURE — 3074F PR MOST RECENT SYSTOLIC BLOOD PRESSURE < 130 MM HG: ICD-10-PCS | Mod: CPTII,S$GLB,, | Performed by: INTERNAL MEDICINE

## 2023-04-28 PROCEDURE — 1101F PT FALLS ASSESS-DOCD LE1/YR: CPT | Mod: CPTII,S$GLB,, | Performed by: INTERNAL MEDICINE

## 2023-04-28 PROCEDURE — 3008F PR BODY MASS INDEX (BMI) DOCUMENTED: ICD-10-PCS | Mod: CPTII,S$GLB,, | Performed by: INTERNAL MEDICINE

## 2023-04-28 NOTE — PROGRESS NOTES
CARDIOVASCULAR PROGRESS NOTE    REASON FOR CONSULT:   Balbir Rebollar Sr. is a 72 y.o. male who presents for follow up of CAD/ICM/CHF.    PCP: Jacques [address]RE[address]: [place]  HISTORY OF PRESENT ILLNESS:   The patient returns for follow-up, accompanied by his daughter.  He reports generally stable status without angina, dyspnea, palpitations, syncope, or defibrillator discharges.  There has been no PND, orthopnea, or lower extremity edema.  Denies melena, hematuria, or claudicant symptoms.  He appears to be tolerating his treatment with Eliquis and Plavix.  He is not taking aspirin and I will remove this from his medication list.  The patient is also asking if he can reduce his pill burden.  I discussed possible Watchman implant which would obviate the need for Eliquis.  I also discussed potentially stopping the Farxiga, but explained that all of his current medications are indicated for coronary artery disease/CHF/AFib.  I also discussed possible CardioMEMS implant, the patient wishes to defer for the time being.    The Medtronic ICD was interrogated in the office today.  Underlying rhythm is ventricular sensed at 48 beats per minute.  He is pacing less than 0.1% of the time in the ventricle.  Estimated longevity is 5.5 years.  Sensing and pacing thresholds as well as impedance are normal.  There been no high ventricular rate episodes or observations.  No programming changes were made.    CARDIOVASCULAR HISTORY:   CAD/ICM 12/1/22 PCI LM-LCx 3.0x32 BOZENA     MDT ICD (1 lead)    PAF on eliquis    VT on amio    PAST MEDICAL HISTORY:     Past Medical History:   Diagnosis Date    AICD (automatic cardioverter/defibrillator) present     Anticoagulant long-term use     Cardiomyopathy     Chronic combined systolic and diastolic congestive heart failure     Left ventricular ejection fraction is estimated at 15-20% and measured to be 25-33%.    Coronary artery disease     History of myocardial infarction     Hypertension      Paroxysmal A-fib     Stroke        PAST SURGICAL HISTORY:     Past Surgical History:   Procedure Laterality Date    CHOLECYSTECTOMY      LEFT HEART CATHETERIZATION Left 11/29/2022    Procedure: Left heart cath;  Surgeon: Wesley Rico MD;  Location: [facility] CATH LAB;  Service: Cardiology;  Laterality: Left;  1030am start, R rad access    LEFT HEART CATHETERIZATION N/A 12/1/2022    Procedure: Left heart cath;  Surgeon: Tam Cho MD;  Location[facility] C[facility] LAB;  Service: Cardiology;  Laterality: N/A;    REPLACEMENT OF DUAL CHAMBER IMPLANTABLE CARDIOVERTER-DEFIBRILLATOR         ALLERGIES AND MEDICATION:   Review of patient's allergies indicates:  No Known Allergies     Medication List            Accurate as of April 28, 2023  1:52 PM. If you have any questions, ask your nurse or doctor.                CONTINUE taking these medications      amiodarone 200 MG Tab  Commonly known as: PACERONE  Take 2 tablets (400 mg total) by mouth once daily.     aspirin 81 MG Chew  Take 1 tablet (81 mg total) by mouth once daily. Take for 30 days then please discontinue     clopidogreL 75 mg tablet  Commonly known as: PLAVIX  Take 1 tablet (75 mg total) by mouth every evening.     dapagliflozin 10 mg tablet  Commonly known as: FARXIGA  Take 1 tablet (10 mg total) by mouth once daily.     ELIQUIS 5 mg Tab  Generic drug: apixaban  Take 1 tablet (5 mg total) by mouth 2 (two) times daily.     ENTRESTO 24-26 mg per tablet  Generic drug: sacubitriL-valsartan  Take 1 tablet by mouth 2 (two) times daily.     metoprolol succinate 50 MG 24 hr tablet  Commonly known as: TOPROL-XL  Take 1 tablet (50 mg total) by mouth once daily.     nitroGLYCERIN 0.4 MG SL tablet  Commonly known as: NITROSTAT  Place 1 tablet (0.4 mg total) under the tongue every 5 (five) minutes as needed for Chest pain. Up to 3 doses. If chest pain is not relieved or worsens 3 to 5 minutes after 1 dose, call 911 and seek immediate emergency medical attention.      pantoprazole 40 MG tablet  Commonly known as: PROTONIX  Take 1 tablet (40 mg total) by mouth once daily.     rosuvastatin 40 MG Tab  Commonly known as: CRESTOR  Take 1 tablet (40 mg total) by mouth every evening.     spironolactone 25 MG tablet  Commonly known as: ALDACTONE  Take 1 tablet (25 mg total) by mouth once daily.              SOCIAL HISTORY:     Social History     Socioeconomic History    Marital status:    Tobacco Use    Smoking status: Former    Smokeless tobacco: Never   Substance and Sexual Activity    Alcohol use: Never    Drug use: Never     Social Determinants of Health     Financial Resource Strain: Low Risk     Difficulty of Paying Living Expenses: Not hard at all   Food Insecurity: No Food Insecurity    Worried About Running Out of Food in the Last Year: Never true    Ran Out of Food in the Last Year: Never true   Transportation Needs: No Transportation Needs    Lack of Transportation (Medical): No    Lack of Transportation (Non-Medical): No   Physical Activity: Inactive    Days of Exercise per Week: 0 days    Minutes of Exercise per Session: 0 min   Stress: No Stress Concern Present    Feeling of Stress : Not at all   Social Connections: Socially Isolated    Frequency of Communication with Friends and Family: Three times a week    Frequency of Social Gatherings with Friends and Family: Three times a week    Attends Jew Services: Never    Active Member of Clubs or Organizations: No    Attends Club or Organization Meetings: Never    Marital Status:    Housing Stability: Low Risk     Unable to Pay for Housing in the Last Year: No    Number of Places Lived in the Last Year: 1    Unstable Housing in the Last Year: No       FAMILY HISTORY:   History reviewed. No pertinent family history.    REVIEW OF SYSTEMS:   Review of Systems   Constitutional:  Negative for chills, diaphoresis and fever.   HENT:  Negative for nosebleeds.    Eyes:  Negative for blurred vision, double vision  "and photophobia.   Respiratory:  Negative for hemoptysis, shortness of breath and wheezing.    Cardiovascular:  Negative for chest pain, palpitations, orthopnea, claudication, leg swelling and PND.   Gastrointestinal:  Negative for abdominal pain, blood in stool, heartburn, melena, nausea and vomiting.   Genitourinary:  Negative for flank pain and hematuria.   Musculoskeletal:  Negative for falls, myalgias and neck pain.   Skin:  Negative for rash.   Neurological:  Negative for dizziness, seizures, loss of consciousness, weakness and headaches.   Endo/Heme/Allergies:  Negative for polydipsia. Does not bruise/bleed easily.   Psychiatric/Behavioral:  Negative for depression and memory loss. The patient is not nervous/anxious.      PHYSICAL EXAM:     Vitals:    04/28/23 1328   BP: (!) 94/55   Pulse: (!) 48   Resp: 18    Body mass index is 18.98 kg/m².  Weight: 58.3 kg (128 lb 8.5 oz)   Height: 5' 9" (175.3 cm)     Physical Exam  Vitals reviewed.   Constitutional:       General: He is not in acute distress.     Appearance: Normal appearance. He is well-developed and normal weight. He is not ill-appearing, toxic-appearing or diaphoretic.   HENT:      Head: Normocephalic and atraumatic.   Eyes:      General: No scleral icterus.     Extraocular Movements: Extraocular movements intact.      Conjunctiva/sclera: Conjunctivae normal.      Pupils: Pupils are equal, round, and reactive to light.   Neck:      Thyroid: No thyromegaly.      Vascular: Normal carotid pulses. No JVD.      Trachea: Trachea normal.   Cardiovascular:      Rate and Rhythm: Normal rate and regular rhythm.      Pulses:           Carotid pulses are 2+ on the right side and 2+ on the left side.     Heart sounds: S1 normal and S2 normal. No murmur heard.    No friction rub. No gallop.   Pulmonary:      Effort: Pulmonary effort is normal. No respiratory distress.      Breath sounds: Normal breath sounds. No stridor. No wheezing, rhonchi or rales.   Chest:      " Chest wall: No tenderness.   Abdominal:      General: There is no distension.      Palpations: Abdomen is soft.   Musculoskeletal:         General: No swelling or tenderness. Normal range of motion.      Cervical back: Normal range of motion and neck supple. No edema or rigidity.      Right lower leg: No edema.      Left lower leg: No edema.   Feet:      Right foot:      Skin integrity: No ulcer.      Left foot:      Skin integrity: No ulcer.   Skin:     General: Skin is warm and dry.      Coloration: Skin is not jaundiced.   Neurological:      General: No focal deficit present.      Mental Status: He is alert and oriented to person, place, and time.      Cranial Nerves: No cranial nerve deficit.   Psychiatric:         Mood and Affect: Mood normal.         Speech: Speech normal.         Behavior: Behavior normal. Behavior is cooperative.       DATA:   EKG: (personally reviewed tracing)  11/22/22 SR 87, RBBB/LAD, bigeminal PACs, QTc 474    Laboratory:  CBC:  Recent Labs   Lab 12/06/22  0912 12/12/22  0209 12/13/22  0438   WBC 5.47 7.17 7.00   Hemoglobin 15.4 15.1 15.0   Hematocrit 45.4 43.4 45.0   Platelets 169 204 205         CHEMISTRIES:  Recent Labs   Lab 12/02/22  0501 12/02/22  1828 12/03/22  0011 12/03/22  0559 12/04/22  0511 12/05/22  0442 12/06/22  0912 12/12/22  0209 12/13/22  0438   Glucose 86 127 H 109   < > 107 114 H 87 138 H 100   Sodium 136 133 L 133 L   < > 134 L 135 L 139 143 143   Potassium 4.1 3.7 3.9   < > 3.6 3.9 3.5 3.3 L 3.1 L   BUN 16 16 16   < > 14 15 18 26 H 27 H   Creatinine 0.7 0.7 0.7   < > 0.8 0.9 0.8 1.4 1.2   eGFR >60.0 >60.0 >60.0   < > >60.0 >60.0 >60.0 53 A >60   Calcium 8.3 L 8.8 8.5 L   < > 8.6 L 8.7 8.9 9.7 9.0   Magnesium 2.0 2.0 2.0  --   --   --   --  2.3 2.3    < > = values in this interval not displayed.         CARDIAC BIOMARKERS:  Recent Labs   Lab 11/23/22  0616 12/12/22  0209 12/12/22  1157   Troponin I 0.038 H 0.546 H 0.992 H         COAGS:  Recent Labs   Lab  12/05/22  0442 12/06/22  0912 12/12/22  0209   INR 2.8 H 3.8 H 3.8 H         LIPIDS/LFTS:  Recent Labs   Lab 12/12/22  0209 12/13/22  0438 04/21/23  1216   AST 37 34 103 H   ALT 33 28 116 H         Cardiovascular Testing:  Echo 12/12/22  Concentric hypertrophy and severely decreased systolic function.  The estimated ejection fraction is 20-25%.  Grade I left ventricular diastolic dysfunction.  Normal right ventricular size with normal right ventricular systolic function.  Mild to moderate left atrial enlargement.  Mild right atrial enlargement.  Mild aortic regurgitation.  Normal central venous pressure (3 mmHg).  The estimated PA systolic pressure is 17 mmHg.  Anterior wall is thinned out and akinetic    PCI LM->LCx 12/1/22 (Ramee)  The RCA had luminal irregularity but no significant obstructive disease.  The left main had 70% distal stenosis.  The LAD was occluded at the ostium  The left circumflex had 99% proximal stenosis.  Intervention:  A 2.5 balloon was used to dilate the left main into the left circumflex at high pressure (20 atmospheres).  3.0 x 32 stent was then deployed at high pressure (18 atmospheres).  Summary:  Severe two-vessel disease status post  of the LAD with scar tissue and V-tach with AICD in Situ.  Successful left main into left circumflex PCI with drug-eluting stents today.  Recommendations:  Postop care  Dual antiplatelet therapy for least a year  EP follow-up for his VT and AICD  Follow-up with Dr. Rico and follow-up with me as needed    Cath 11/29/22  LVEDP: 1mmHg  LVEF: 25% by echo  Wall Motion: LAD WMA by echo  Dominance: Right  LM: MLI  LAD: ost occluded, faint catherine from RCA via septals  LCx: prox 90%, T3 flow  RCA: diffuse up to 50-70%  Hemostasis:  R Radial band  Impression:  Sustained VT  Severe LV dysfxn  Occluded LAD, collateralized from RCA  Severe LCx stenosis  Mod RCA CAD  Sustained VT during cath successfully treated with ATP and subsequent amio IV.  R rad vasband for  hemostasis  Plan:  Cont med rx  Cont ASA/Plavix/statin  Amio gtt/transfer to ICU  Transfer to [facility] to discuss viability eval vs high risk PCI of LAD +/- LCx (+/- MCS).  Currently on diversion.  Will manage pt at [facility] ICU pending bed availability.    Echo: 10/19/22 (care everywhere)  EF is approximately 20-30%, Ao root 4.1cm.    1. Severely decreased left ventricular systolic function.       2. Grade II diastolic dysfunction.       3. Wall motion abnormalities imply disease of the left anterior descending coronary artery.     4. Mild nonrheumatic mitral valve regurgitation with no suggestion of left atrial enlargement.     5. Mild nonrheumatic aortic valve regurgitation.       6. Mild-to-moderate aortic root dilation.     7. Although estimation of pulmonary artery systolic pressure is not possible due to incomplete tricuspid regurgitation velocity       profile, pulmonary hypertension is not suspected.     Compared to previous echocardiogram 11/01/2021, no significant changes are seen        Cath: (care everywhere, [facility] note 11/17/22)  (09/03/2006):  DE Stent placement in LAD due to anterior STEMI      ASSESSMENT:   # CAD/ICM s/p LM->LCx BOZENA 12/2022  # ICM, EF 30%, appears euvolemic.  # MDT ICD in place.  Now following with Dr. Nam.  # HTN, controlled  # HLP on rosuva 40mg  # VT on amio  # PAF on eliquis 5mg bid    PLAN:   Cont med rx  Cont eliquis 5mg bid  Cont Plavix 75mg qd for 1 year (thru 12/2023), consider indef rx given LM BOZENA  Cont amio 400mg qd, will defer mgmt to Dr. Nam.  Dec toprol 25mg qd  MDT ICD monitoring now thru Dr. Nam's office  RTC 6 months (Oct 2023)  Pt wants to consider decreasing pill burden.  I will aks Dr. Nam if he is a candidate for Watchman (will obviate eliquis).  Next drug to stop: Farxiga.  Consider as a candidate for CardioMEMS in future, pt declines at present.      Wesley Rico MD, [facility]    Addendum 04/28/2023:   Case discussed with Dr. Nam.  Does not think  that the patient is an appropriate Watchman candidate.    Addendum 5/2/23:  Silva Bledsoe, Patient Care Assistant  Wesley Rico MD  Caller: Unspecified (Today,  9:09 AM)  Good morning Dr. Rico,     This patient is currently stating that there medication costs for Farxiga, Eliquis, and Entresto are too expensive. I called [facility] to see if they could assist in price range. They state they didn't need a prior authorization for Eliquis and Entresto; however, they need a new prescription for Farxiga. Can the patient take Jardiance instead? It is on their insurance coverage plan. Also, they are asking about a tier exception Approval. Can you tell me what we can do to help this patient?     Plan (message sent to staff):  We will stop the Farxiga and not reorder the Jardiance.    He needs the entresto and eliquis.  If he can't afford them, he will need to come in so we can rearrange his med rx (which will include warfarin).    [facility]

## 2023-04-28 NOTE — LETTER
April 28, 2023        Flip Hanna MD  [address] [place], [place] [address]  [address]  [place] [address]             [facility] - Cardiology  [address]  [place] [address]  Phone: [phone]   Patient: Balbir Rebollar Sr.   MR Number: [id number]   YOB: 1950   Date of Visit: 4/28/2023       Dear Dr. Hanna:    Thank you for referring Balbir Rebollar to me for evaluation. Attached you will find relevant portions of my assessment and plan of care.    If you have questions, please do not hesitate to call me. I look forward to following Balbir Rebollar along with you.    Sincerely,      Wesley Rico MD            CC  No Recipients    Enclosure

## 2023-05-01 ENCOUNTER — PATIENT MESSAGE (OUTPATIENT)
Dept: CARDIOLOGY | Facility: CLINIC | Age: 73
End: 2023-05-01
Payer: MEDICARE

## 2023-05-01 ENCOUNTER — PATIENT MESSAGE (OUTPATIENT)
Dept: ELECTROPHYSIOLOGY | Facility: CLINIC | Age: 73
End: 2023-05-01
Payer: MEDICARE

## 2023-05-02 ENCOUNTER — PATIENT MESSAGE (OUTPATIENT)
Dept: ELECTROPHYSIOLOGY | Facility: CLINIC | Age: 73
End: 2023-05-02
Payer: MEDICARE

## 2023-05-10 ENCOUNTER — ANTI-COAG VISIT (OUTPATIENT)
Dept: CARDIOLOGY | Facility: CLINIC | Age: 73
End: 2023-05-10
Payer: MEDICARE

## 2023-05-10 ENCOUNTER — OFFICE VISIT (OUTPATIENT)
Dept: CARDIOLOGY | Facility: CLINIC | Age: 73
End: 2023-05-10
Payer: MEDICARE

## 2023-05-10 ENCOUNTER — PATIENT MESSAGE (OUTPATIENT)
Dept: CARDIOLOGY | Facility: CLINIC | Age: 73
End: 2023-05-10
Payer: MEDICARE

## 2023-05-10 ENCOUNTER — PATIENT MESSAGE (OUTPATIENT)
Dept: ELECTROPHYSIOLOGY | Facility: CLINIC | Age: 73
End: 2023-05-10
Payer: MEDICARE

## 2023-05-10 VITALS
SYSTOLIC BLOOD PRESSURE: 118 MMHG | BODY MASS INDEX: 19.99 KG/M2 | DIASTOLIC BLOOD PRESSURE: 58 MMHG | OXYGEN SATURATION: 98 % | HEIGHT: 69 IN | WEIGHT: 134.94 LBS | RESPIRATION RATE: 18 BRPM | HEART RATE: 45 BPM

## 2023-05-10 DIAGNOSIS — I48.0 PAROXYSMAL ATRIAL FIBRILLATION: ICD-10-CM

## 2023-05-10 DIAGNOSIS — Z95.810 ICD (IMPLANTABLE CARDIOVERTER-DEFIBRILLATOR), SINGLE, IN SITU: ICD-10-CM

## 2023-05-10 DIAGNOSIS — Z79.01 LONG TERM (CURRENT) USE OF ANTICOAGULANTS: Primary | ICD-10-CM

## 2023-05-10 DIAGNOSIS — Z79.01 CHRONIC ANTICOAGULATION: ICD-10-CM

## 2023-05-10 DIAGNOSIS — I47.29 NONSUSTAINED VENTRICULAR TACHYCARDIA: ICD-10-CM

## 2023-05-10 DIAGNOSIS — I25.5 ISCHEMIC CARDIOMYOPATHY: ICD-10-CM

## 2023-05-10 DIAGNOSIS — R00.1 BRADYCARDIA: ICD-10-CM

## 2023-05-10 DIAGNOSIS — I50.42 CHRONIC COMBINED SYSTOLIC AND DIASTOLIC CONGESTIVE HEART FAILURE: ICD-10-CM

## 2023-05-10 DIAGNOSIS — Z86.73 HISTORY OF CVA (CEREBROVASCULAR ACCIDENT): ICD-10-CM

## 2023-05-10 DIAGNOSIS — I25.10 CORONARY ARTERY DISEASE INVOLVING NATIVE CORONARY ARTERY OF NATIVE HEART WITHOUT ANGINA PECTORIS: Primary | ICD-10-CM

## 2023-05-10 PROCEDURE — 99999 PR PBB SHADOW E&M-EST. PATIENT-LVL III: CPT | Mod: PBBFAC,,, | Performed by: INTERNAL MEDICINE

## 2023-05-10 PROCEDURE — 1126F PR PAIN SEVERITY QUANTIFIED, NO PAIN PRESENT: ICD-10-PCS | Mod: CPTII,S$GLB,, | Performed by: INTERNAL MEDICINE

## 2023-05-10 PROCEDURE — 3074F SYST BP LT 130 MM HG: CPT | Mod: CPTII,S$GLB,, | Performed by: INTERNAL MEDICINE

## 2023-05-10 PROCEDURE — 99214 PR OFFICE/OUTPT VISIT, EST, LEVL IV, 30-39 MIN: ICD-10-PCS | Mod: S$GLB,,, | Performed by: INTERNAL MEDICINE

## 2023-05-10 PROCEDURE — 1160F PR REVIEW ALL MEDS BY PRESCRIBER/CLIN PHARMACIST DOCUMENTED: ICD-10-PCS | Mod: CPTII,S$GLB,, | Performed by: INTERNAL MEDICINE

## 2023-05-10 PROCEDURE — 3078F DIAST BP <80 MM HG: CPT | Mod: CPTII,S$GLB,, | Performed by: INTERNAL MEDICINE

## 2023-05-10 PROCEDURE — 1101F PR PT FALLS ASSESS DOC 0-1 FALLS W/OUT INJ PAST YR: ICD-10-PCS | Mod: CPTII,S$GLB,, | Performed by: INTERNAL MEDICINE

## 2023-05-10 PROCEDURE — 3008F PR BODY MASS INDEX (BMI) DOCUMENTED: ICD-10-PCS | Mod: CPTII,S$GLB,, | Performed by: INTERNAL MEDICINE

## 2023-05-10 PROCEDURE — 93000 ELECTROCARDIOGRAM COMPLETE: CPT | Mod: S$GLB,,, | Performed by: INTERNAL MEDICINE

## 2023-05-10 PROCEDURE — 4010F PR ACE/ARB THEARPY RXD/TAKEN: ICD-10-PCS | Mod: CPTII,S$GLB,, | Performed by: INTERNAL MEDICINE

## 2023-05-10 PROCEDURE — 3288F FALL RISK ASSESSMENT DOCD: CPT | Mod: CPTII,S$GLB,, | Performed by: INTERNAL MEDICINE

## 2023-05-10 PROCEDURE — 3078F PR MOST RECENT DIASTOLIC BLOOD PRESSURE < 80 MM HG: ICD-10-PCS | Mod: CPTII,S$GLB,, | Performed by: INTERNAL MEDICINE

## 2023-05-10 PROCEDURE — 1126F AMNT PAIN NOTED NONE PRSNT: CPT | Mod: CPTII,S$GLB,, | Performed by: INTERNAL MEDICINE

## 2023-05-10 PROCEDURE — 99214 OFFICE O/P EST MOD 30 MIN: CPT | Mod: S$GLB,,, | Performed by: INTERNAL MEDICINE

## 2023-05-10 PROCEDURE — 1101F PT FALLS ASSESS-DOCD LE1/YR: CPT | Mod: CPTII,S$GLB,, | Performed by: INTERNAL MEDICINE

## 2023-05-10 PROCEDURE — 3288F PR FALLS RISK ASSESSMENT DOCUMENTED: ICD-10-PCS | Mod: CPTII,S$GLB,, | Performed by: INTERNAL MEDICINE

## 2023-05-10 PROCEDURE — 1159F MED LIST DOCD IN RCRD: CPT | Mod: CPTII,S$GLB,, | Performed by: INTERNAL MEDICINE

## 2023-05-10 PROCEDURE — 99999 PR PBB SHADOW E&M-EST. PATIENT-LVL III: ICD-10-PCS | Mod: PBBFAC,,, | Performed by: INTERNAL MEDICINE

## 2023-05-10 PROCEDURE — 3008F BODY MASS INDEX DOCD: CPT | Mod: CPTII,S$GLB,, | Performed by: INTERNAL MEDICINE

## 2023-05-10 PROCEDURE — 4010F ACE/ARB THERAPY RXD/TAKEN: CPT | Mod: CPTII,S$GLB,, | Performed by: INTERNAL MEDICINE

## 2023-05-10 PROCEDURE — 1160F RVW MEDS BY RX/DR IN RCRD: CPT | Mod: CPTII,S$GLB,, | Performed by: INTERNAL MEDICINE

## 2023-05-10 PROCEDURE — 3074F PR MOST RECENT SYSTOLIC BLOOD PRESSURE < 130 MM HG: ICD-10-PCS | Mod: CPTII,S$GLB,, | Performed by: INTERNAL MEDICINE

## 2023-05-10 PROCEDURE — 1159F PR MEDICATION LIST DOCUMENTED IN MEDICAL RECORD: ICD-10-PCS | Mod: CPTII,S$GLB,, | Performed by: INTERNAL MEDICINE

## 2023-05-10 PROCEDURE — 93000 EKG 12-LEAD: ICD-10-PCS | Mod: S$GLB,,, | Performed by: INTERNAL MEDICINE

## 2023-05-10 RX ORDER — WARFARIN SODIUM 5 MG/1
5 TABLET ORAL DAILY
Qty: 30 TABLET | Refills: 11 | Status: SHIPPED | OUTPATIENT
Start: 2023-05-10 | End: 2023-05-11 | Stop reason: ALTCHOICE

## 2023-05-10 RX ORDER — LOSARTAN POTASSIUM 50 MG/1
50 TABLET ORAL DAILY
Qty: 90 TABLET | Refills: 3 | Status: SHIPPED | OUTPATIENT
Start: 2023-05-10 | End: 2023-06-14 | Stop reason: SDUPTHER

## 2023-05-10 NOTE — LETTER
May 10, 2023        Flip Hanna MD  [address] [place], [place] [address]  [address]  [place] [address]             [facility]  [address]  [place] [address]  Phone: [phone]   Patient: Balbir Rebollar Sr.   MR Number: [id number]   YOB: 1950   Date of Visit: 5/10/2023       Dear Dr. Hanna:    Thank you for referring Balbir Rebollar to me for evaluation. Attached you will find relevant portions of my assessment and plan of care.    If you have questions, please do not hesitate to call me. I look forward to following Balbir Rebollar along with you.    Sincerely,      Wesley Rico MD            CC  No Recipients    Enclosure

## 2023-05-10 NOTE — PROGRESS NOTES
71 y/o on OAC for stroke prevention with atrial fibrillation - currently on apixaban but needing to transition to warfarin 2/2 cost. His other PMH includes CVA, ICM (s/p ICD), CHF, CAD, seizure d/o.    Will plan to overlap warfarin + DOAC ~3 days. See calendar.

## 2023-05-10 NOTE — PROGRESS NOTES
CARDIOVASCULAR PROGRESS NOTE    REASON FOR CONSULT:   Balbir Rebollar Sr. is a 72 y.o. male who presents for follow up of CAD/ICM/CHF.    PCP: [address]: [place]  HISTORY OF PRESENT ILLNESS:   The patient returns for follow-up, accompanied by his daughter.  He reports generally stable status without angina, dyspnea, palpitations, syncope, or defibrillator discharges.  There has been no PND, orthopnea, or lower extremity edema.  Denies melena, hematuria, or claudicant symptoms.  The patient is unable to afford the Eliquis, Entresto, or Farxiga.  I will discontinue these medications and initiate losartan 50 mg daily and Coumadin.  The patient understands the need for dietary restrictions and frequent phlebotomy.  I have also referred him to the [facility]madin Clinic monitoring service.    CARDIOVASCULAR HISTORY:   CAD/ICM 12/1/22 PCI LM-LCx 3.0x32 BOZENA     MDT ICD (1 lead)    PAF on eliquis    VT on amio    PAST MEDICAL HISTORY:     Past Medical History:   Diagnosis Date    AICD (automatic cardioverter/defibrillator) present     Anticoagulant long-term use     Cardiomyopathy     Chronic combined systolic and diastolic congestive heart failure     Left ventricular ejection fraction is estimated at 15-20% and measured to be 25-33%.    Coronary artery disease     History of myocardial infarction     Hypertension     Paroxysmal A-fib     Stroke        PAST SURGICAL HISTORY:     Past Surgical History:   Procedure Laterality Date    CHOLECYSTECTOMY      LEFT HEART CATHETERIZATION Left 11/29/2022    Procedure: Left heart cath;  Surgeon: Wesley Rico MD;  Location: [facility] CATH LAB;  Service: Cardiology;  Laterality: Left;  1030am start, R rad access    LEFT HEART CATHETERIZATION N/A 12/1/2022    Procedure: Left heart cath;  Surgeon: Tam Cho MD;  Location: [facility] CATH LAB;  Service: Cardiology;  Laterality: N/A;    REPLACEMENT OF DUAL CHAMBER IMPLANTABLE CARDIOVERTER-DEFIBRILLATOR         ALLERGIES AND MEDICATION:    Review of patient's allergies indicates:  No Known Allergies     Medication List            Accurate as of May 10, 2023  1:10 PM. If you have any questions, ask your nurse or doctor.                CONTINUE taking these medications      amiodarone 200 MG Tab  Commonly known as: PACERONE  Take 2 tablets (400 mg total) by mouth once daily.     aspirin 81 MG Chew  Take 1 tablet (81 mg total) by mouth once daily. Take for 30 days then please discontinue     clopidogreL 75 mg tablet  Commonly known as: PLAVIX  Take 1 tablet (75 mg total) by mouth every evening.     ELIQUIS 5 mg Tab  Generic drug: apixaban  Take 1 tablet (5 mg total) by mouth 2 (two) times daily.     ENTRESTO 24-26 mg per tablet  Generic drug: sacubitriL-valsartan  Take 1 tablet by mouth 2 (two) times daily.     metoprolol succinate 50 MG 24 hr tablet  Commonly known as: TOPROL-XL  Take 1 tablet (50 mg total) by mouth once daily.     nitroGLYCERIN 0.4 MG SL tablet  Commonly known as: NITROSTAT  Place 1 tablet (0.4 mg total) under the tongue every 5 (five) minutes as needed for Chest pain. Up to 3 doses. If chest pain is not relieved or worsens 3 to 5 minutes after 1 dose, call 911 and seek immediate emergency medical attention.     pantoprazole 40 MG tablet  Commonly known as: PROTONIX  Take 1 tablet (40 mg total) by mouth once daily.     rosuvastatin 40 MG Tab  Commonly known as: CRESTOR  Take 1 tablet (40 mg total) by mouth every evening.     spironolactone 25 MG tablet  Commonly known as: ALDACTONE  Take 1 tablet (25 mg total) by mouth once daily.              SOCIAL HISTORY:     Social History     Socioeconomic History    Marital status:    Tobacco Use    Smoking status: Former    Smokeless tobacco: Never   Substance and Sexual Activity    Alcohol use: Never    Drug use: Never     Social Determinants of Health     Financial Resource Strain: Low Risk     Difficulty of Paying Living Expenses: Not hard at all   Food Insecurity: No Food  Insecurity    Worried About Running Out of Food in the Last Year: Never true    Ran Out of Food in the Last Year: Never true   Transportation Needs: No Transportation Needs    Lack of Transportation (Medical): No    Lack of Transportation (Non-Medical): No   Physical Activity: Inactive    Days of Exercise per Week: 0 days    Minutes of Exercise per Session: 0 min   Stress: No Stress Concern Present    Feeling of Stress : Not at all   Social Connections: Socially Isolated    Frequency of Communication with Friends and Family: Three times a week    Frequency of Social Gatherings with Friends and Family: Three times a week    Attends Hinduism Services: Never    Active Member of Clubs or Organizations: No    Attends Club or Organization Meetings: Never    Marital Status:    Housing Stability: Low Risk     Unable to Pay for Housing in the Last Year: No    Number of Places Lived in the Last Year: 1    Unstable Housing in the Last Year: No       FAMILY HISTORY:   History reviewed. No pertinent family history.    REVIEW OF SYSTEMS:   Review of Systems   Constitutional:  Negative for chills, diaphoresis and fever.   HENT:  Negative for nosebleeds.    Eyes:  Negative for blurred vision, double vision and photophobia.   Respiratory:  Negative for hemoptysis, shortness of breath and wheezing.    Cardiovascular:  Negative for chest pain, palpitations, orthopnea, claudication, leg swelling and PND.   Gastrointestinal:  Negative for abdominal pain, blood in stool, heartburn, melena, nausea and vomiting.   Genitourinary:  Negative for flank pain and hematuria.   Musculoskeletal:  Negative for falls, myalgias and neck pain.   Skin:  Negative for rash.   Neurological:  Negative for dizziness, seizures, loss of consciousness, weakness and headaches.   Endo/Heme/Allergies:  Negative for polydipsia. Does not bruise/bleed easily.   Psychiatric/Behavioral:  Negative for depression and memory loss. The patient is not  nervous/anxious.      PHYSICAL EXAM:     There were no vitals filed for this visit.   There is no height or weight on file to calculate BMI.            Physical Exam  Vitals reviewed.   Constitutional:       General: He is not in acute distress.     Appearance: Normal appearance. He is well-developed and normal weight. He is not ill-appearing, toxic-appearing or diaphoretic.   HENT:      Head: Normocephalic and atraumatic.   Eyes:      General: No scleral icterus.     Extraocular Movements: Extraocular movements intact.      Conjunctiva/sclera: Conjunctivae normal.      Pupils: Pupils are equal, round, and reactive to light.   Neck:      Thyroid: No thyromegaly.      Vascular: Normal carotid pulses. No JVD.      Trachea: Trachea normal.   Cardiovascular:      Rate and Rhythm: Regular rhythm. Bradycardia present.      Pulses:           Carotid pulses are 2+ on the right side and 2+ on the left side.     Heart sounds: S1 normal and S2 normal. No murmur heard.    No friction rub. No gallop.   Pulmonary:      Effort: Pulmonary effort is normal. No respiratory distress.      Breath sounds: Normal breath sounds. No stridor. No wheezing, rhonchi or rales.   Chest:      Chest wall: No tenderness.   Abdominal:      General: There is no distension.      Palpations: Abdomen is soft.   Musculoskeletal:         General: No swelling or tenderness. Normal range of motion.      Cervical back: Normal range of motion and neck supple. No edema or rigidity.      Right lower leg: No edema.      Left lower leg: No edema.   Feet:      Right foot:      Skin integrity: No ulcer.      Left foot:      Skin integrity: No ulcer.   Skin:     General: Skin is warm and dry.      Coloration: Skin is not jaundiced.   Neurological:      General: No focal deficit present.      Mental Status: He is alert and oriented to person, place, and time.      Cranial Nerves: No cranial nerve deficit.   Psychiatric:         Mood and Affect: Mood normal.          Speech: Speech normal.         Behavior: Behavior normal. Behavior is cooperative.       DATA:   EKG: (personally reviewed tracing)  5/10/23 SR 45, RBBB/LAHB/PRWP, QTc 501    Laboratory:  CBC:  Recent Labs   Lab 12/06/22  0912 12/12/22 0209 12/13/22  0438   WBC 5.47 7.17 7.00   Hemoglobin 15.4 15.1 15.0   Hematocrit 45.4 43.4 45.0   Platelets 169 204 205         CHEMISTRIES:  Recent Labs   Lab 12/02/22  0501 12/02/22  1828 12/03/22  0011 12/03/22  0559 12/04/22  0511 12/05/22  0442 12/06/22  0912 12/12/22 0209 12/13/22  0438   Glucose 86 127 H 109   < > 107 114 H 87 138 H 100   Sodium 136 133 L 133 L   < > 134 L 135 L 139 143 143   Potassium 4.1 3.7 3.9   < > 3.6 3.9 3.5 3.3 L 3.1 L   BUN 16 16 16   < > 14 15 18 26 H 27 H   Creatinine 0.7 0.7 0.7   < > 0.8 0.9 0.8 1.4 1.2   eGFR >60.0 >60.0 >60.0   < > >60.0 >60.0 >60.0 53 A >60   Calcium 8.3 L 8.8 8.5 L   < > 8.6 L 8.7 8.9 9.7 9.0   Magnesium 2.0 2.0 2.0  --   --   --   --  2.3 2.3    < > = values in this interval not displayed.         CARDIAC BIOMARKERS:  Recent Labs   Lab 11/23/22  0616 12/12/22 0209 12/12/22  1157   Troponin I 0.038 H 0.546 H 0.992 H         COAGS:  Recent Labs   Lab 12/05/22  0442 12/06/22  0912 12/12/22  0209   INR 2.8 H 3.8 H 3.8 H         LIPIDS/LFTS:  Recent Labs   Lab 12/12/22  0209 12/13/22  0438 04/21/23  1216   AST 37 34 103 H   ALT 33 28 116 H         Cardiovascular Testing:  Echo 12/12/22  Concentric hypertrophy and severely decreased systolic function.  The estimated ejection fraction is 20-25%.  Grade I left ventricular diastolic dysfunction.  Normal right ventricular size with normal right ventricular systolic function.  Mild to moderate left atrial enlargement.  Mild right atrial enlargement.  Mild aortic regurgitation.  Normal central venous pressure (3 mmHg).  The estimated PA systolic pressure is 17 mmHg.  Anterior wall is thinned out and akinetic    PCI LM->LCx 12/1/22 ([facility])  The RCA had luminal irregularity but no  significant obstructive disease.  The left main had 70% distal stenosis.  The LAD was occluded at the ostium  The left circumflex had 99% proximal stenosis.  Intervention:  A 2.5 balloon was used to dilate the left main into the left circumflex at high pressure (20 atmospheres).  3.0 x 32 stent was then deployed at high pressure (18 atmospheres).  Summary:  Severe two-vessel disease status post  of the LAD with scar tissue and V-tach with AICD in Situ.  Successful left main into left circumflex PCI with drug-eluting stents today.  Recommendations:  Postop care  Dual antiplatelet therapy for least a year  EP follow-up for his VT and AICD  Follow-up with Dr. Rico and follow-up with me as needed    Cath 11/29/22  LVEDP: 1mmHg  LVEF: 25% by echo  Wall Motion: LAD WMA by echo  Dominance: Right  LM: MLI  LAD: ost occluded, faint catherine from RCA via septals  LCx: prox 90%, T3 flow  RCA: diffuse up to 50-70%  Hemostasis:  R Radial band  Impression:  Sustained VT  Severe LV dysfxn  Occluded LAD, collateralized from RCA  Severe LCx stenosis  Mod RCA CAD  Sustained VT during cath successfully treated with ATP and subsequent amio IV.  R rad vasband for hemostasis  Plan:  Cont med rx  Cont ASA/Plavix/statin  Amio gtt/transfer to ICU  Transfer to [facility] to discuss viability eval vs high risk PCI of LAD +/- LCx (+/- MCS).  Currently on diversion.  Will manage pt at [facility] ICU pending bed availability.    Echo: 10/19/22 (care everywhere)  EF is approximately 20-30%, Ao root 4.1cm.    1. Severely decreased left ventricular systolic function.       2. Grade II diastolic dysfunction.       3. Wall motion abnormalities imply disease of the left anterior descending coronary artery.     4. Mild nonrheumatic mitral valve regurgitation with no suggestion of left atrial enlargement.     5. Mild nonrheumatic aortic valve regurgitation.       6. Mild-to-moderate aortic root dilation.     7. Although estimation of pulmonary artery systolic  pressure is not possible due to incomplete tricuspid regurgitation velocity       profile, pulmonary hypertension is not suspected.     Compared to previous echocardiogram 11/01/2021, no significant changes are seen        Cath: (care everywhere, [facility] note 11/17/22)  (09/03/2006):  DE Stent placement in LAD due to anterior STEMI      ASSESSMENT:   # CAD/ICM s/p LM->LCx BOZENA 12/2022  # ICM, EF 30%, appears euvolemic.  Unavble to afford entresto, will switch to losartan.  # MDT ICD in place.  Now following with Dr. Nam.  # HTN, controlled  # HLP on rosuva 40mg  # VT on amio  # PAF, pt unable to afford eliquis, will transition to coumadin.  Understands need for diet restrictions and freq phlebotomy.  Dr. Nam does no think pt is a good candidate for [facility].    PLAN:   Cont med rx  Change entresto->losartan 50mg qd  Change eliquis->coumadin and refer to coumadin monitoring clinic  Cont Plavix 75mg qd for 1 year (thru 12/2023), consider indef rx given LM BOZENA  Cont amio 400mg qd, will defer mgmt to Dr. Nam.  ASA removed from med list  MDT ICD monitoring now thru Dr. Nam's office  RTC 6 months (Oct 2023)  Consider as a candidate for CardioMEMS in future, pt declines at present.      Wesley Rico MD, [facility]    Addendum 05/11/2023:   I received a message that the patient does not want to start Coumadin and wants to be back on his other medications.  I discussed the case with the patient's daughter.  We will stop Coumadin and resume apixaban 5 milligrams b.i.d..  I have also suggested we stop the Entresto and keep him on losartan as previously ordered.  I will discontinue the Farxiga as above.  I have also suggested a palliative care consultation so that goals of care can be determined.

## 2023-05-10 NOTE — PROGRESS NOTES
Spoke to patient's daughter, Ms. Braga regarding enrollment into the Coumadin Clinic. She verified that the patient has a peach 5mg warfarin tablet. She understands that the patient is to overlap Eliquis + 5mg of warfarin 5/10 -5/12.  Patient will start 2.5 mg (0.5 tablet) of warfarin qPM on 5/13. INR scheduled for 05.15.23 at [facility].      Ms. Braga educated on diet and when to call. Ms. Braga'questions and concerns addressed at this time, and she expressed understanding. Education sheets sent via patient portal to reinforce teaching.    Patient will avoid foods with vitamin K, drink green V8 daily, and avoid EtOH. The risks associated with consuming EtOH, while taking warfarin, and the importance of a consistent diet discussed.    5/11 - Per Ms. Braga, patient's daughter, the patient has decided against initiating warfarin therapy at this time. Will refer to PharmD for discharge from the Coumadin Clinic.

## 2023-05-11 ENCOUNTER — PATIENT MESSAGE (OUTPATIENT)
Dept: CARDIOLOGY | Facility: CLINIC | Age: 73
End: 2023-05-11
Payer: MEDICARE

## 2023-05-11 DIAGNOSIS — E78.00 PURE HYPERCHOLESTEROLEMIA: ICD-10-CM

## 2023-05-11 DIAGNOSIS — Z95.810 ICD (IMPLANTABLE CARDIOVERTER-DEFIBRILLATOR), SINGLE, IN SITU: ICD-10-CM

## 2023-05-11 DIAGNOSIS — I48.0 PAROXYSMAL ATRIAL FIBRILLATION: ICD-10-CM

## 2023-05-11 DIAGNOSIS — I25.10 CORONARY ARTERY DISEASE INVOLVING NATIVE CORONARY ARTERY OF NATIVE HEART WITHOUT ANGINA PECTORIS: Primary | ICD-10-CM

## 2023-05-11 DIAGNOSIS — I48.0 PAROXYSMAL ATRIAL FIBRILLATION: Primary | ICD-10-CM

## 2023-05-11 DIAGNOSIS — I50.42 CHRONIC COMBINED SYSTOLIC AND DIASTOLIC CONGESTIVE HEART FAILURE: ICD-10-CM

## 2023-05-11 DIAGNOSIS — I25.5 ISCHEMIC CARDIOMYOPATHY: ICD-10-CM

## 2023-05-11 RX ORDER — SACUBITRIL AND VALSARTAN 24; 26 MG/1; MG/1
1 TABLET, FILM COATED ORAL 2 TIMES DAILY
COMMUNITY
End: 2023-05-11

## 2023-05-11 RX ORDER — DAPAGLIFLOZIN 10 MG/1
10 TABLET, FILM COATED ORAL DAILY
COMMUNITY
End: 2023-08-03 | Stop reason: SDUPTHER

## 2023-05-11 RX ORDER — SACUBITRIL AND VALSARTAN 24; 26 MG/1; MG/1
1 TABLET, FILM COATED ORAL 2 TIMES DAILY
Qty: 180 TABLET | Refills: 0 | OUTPATIENT
Start: 2023-05-11

## 2023-05-11 RX ORDER — DAPAGLIFLOZIN 10 MG/1
10 TABLET, FILM COATED ORAL DAILY
Qty: 180 TABLET | Refills: 0 | OUTPATIENT
Start: 2023-05-11

## 2023-05-11 RX ORDER — SACUBITRIL AND VALSARTAN 24; 26 MG/1; MG/1
1 TABLET, FILM COATED ORAL 2 TIMES DAILY
Qty: 180 TABLET | Refills: 3 | Status: CANCELLED | OUTPATIENT
Start: 2023-05-11

## 2023-05-11 NOTE — TELEPHONE ENCOUNTER
Called and spoke with daughter Holly, informed her that even if he switched to a gen card at [facility] that he would not be seeing that doctor and Dr Nam together, explained that it is 2 separate clinics, she verbalized understanding, and still wants to switch to a new gen card at [facility], she reports the pt no longer wants to follow up with Dr Rico  Reports that at appt yesterday Dr Rico switched him from eliquis to coumadin, entresto to losartan and d/c'd farxiga all due to cost, she reports they want him back on the original medications, she reports she will assist in paying for the meds, new eRx was sent for eliquis to Dr Nam to sign, sent staff message to Dr Rico's office to have pt restarted on farxiga and switched back to entresto, will have MA assist pt and daughter with new gen card appt, see previous message re:meds, tier exception had been done for eliquis and was denied, message was sent to pt assist to see if pt qualified for any assistance for eliquis on 5/2, sent message to that dept to f/u on this as daughter reports she has not heard from anyone about an application

## 2023-05-11 NOTE — TELEPHONE ENCOUNTER
Dr Rico was switching pt from eliquis to coumadin due to cost, daughter called and pt wants to stay on the eliquis

## 2023-05-12 RX ORDER — DAPAGLIFLOZIN 10 MG/1
10 TABLET, FILM COATED ORAL DAILY
Qty: 90 TABLET | Refills: 3 | Status: ON HOLD | OUTPATIENT
Start: 2023-05-12 | End: 2023-08-15 | Stop reason: HOSPADM

## 2023-05-19 ENCOUNTER — OFFICE VISIT (OUTPATIENT)
Dept: OTOLARYNGOLOGY | Facility: CLINIC | Age: 73
End: 2023-05-19
Payer: MEDICARE

## 2023-05-19 VITALS — WEIGHT: 134 LBS | HEIGHT: 69 IN | BODY MASS INDEX: 19.85 KG/M2

## 2023-05-19 DIAGNOSIS — R09.82 POSTNASAL DRIP: Primary | ICD-10-CM

## 2023-05-19 DIAGNOSIS — J30.0 VASOMOTOR RHINITIS: ICD-10-CM

## 2023-05-19 DIAGNOSIS — J30.89 CHRONIC NONSEASONAL ALLERGIC RHINITIS DUE TO POLLEN: ICD-10-CM

## 2023-05-19 PROCEDURE — 4010F PR ACE/ARB THEARPY RXD/TAKEN: ICD-10-PCS | Mod: CPTII,S$GLB,, | Performed by: OTOLARYNGOLOGY

## 2023-05-19 PROCEDURE — 1126F PR PAIN SEVERITY QUANTIFIED, NO PAIN PRESENT: ICD-10-PCS | Mod: CPTII,S$GLB,, | Performed by: OTOLARYNGOLOGY

## 2023-05-19 PROCEDURE — 1126F AMNT PAIN NOTED NONE PRSNT: CPT | Mod: CPTII,S$GLB,, | Performed by: OTOLARYNGOLOGY

## 2023-05-19 PROCEDURE — 3008F PR BODY MASS INDEX (BMI) DOCUMENTED: ICD-10-PCS | Mod: CPTII,S$GLB,, | Performed by: OTOLARYNGOLOGY

## 2023-05-19 PROCEDURE — 1101F PT FALLS ASSESS-DOCD LE1/YR: CPT | Mod: CPTII,S$GLB,, | Performed by: OTOLARYNGOLOGY

## 2023-05-19 PROCEDURE — 3288F PR FALLS RISK ASSESSMENT DOCUMENTED: ICD-10-PCS | Mod: CPTII,S$GLB,, | Performed by: OTOLARYNGOLOGY

## 2023-05-19 PROCEDURE — 4010F ACE/ARB THERAPY RXD/TAKEN: CPT | Mod: CPTII,S$GLB,, | Performed by: OTOLARYNGOLOGY

## 2023-05-19 PROCEDURE — 1101F PR PT FALLS ASSESS DOC 0-1 FALLS W/OUT INJ PAST YR: ICD-10-PCS | Mod: CPTII,S$GLB,, | Performed by: OTOLARYNGOLOGY

## 2023-05-19 PROCEDURE — 99203 PR OFFICE/OUTPT VISIT, NEW, LEVL III, 30-44 MIN: ICD-10-PCS | Mod: S$GLB,,, | Performed by: OTOLARYNGOLOGY

## 2023-05-19 PROCEDURE — 99203 OFFICE O/P NEW LOW 30 MIN: CPT | Mod: S$GLB,,, | Performed by: OTOLARYNGOLOGY

## 2023-05-19 PROCEDURE — 3288F FALL RISK ASSESSMENT DOCD: CPT | Mod: CPTII,S$GLB,, | Performed by: OTOLARYNGOLOGY

## 2023-05-19 PROCEDURE — 3008F BODY MASS INDEX DOCD: CPT | Mod: CPTII,S$GLB,, | Performed by: OTOLARYNGOLOGY

## 2023-05-19 RX ORDER — FLUTICASONE PROPIONATE 50 MCG
1 SPRAY, SUSPENSION (ML) NASAL DAILY
COMMUNITY

## 2023-05-19 RX ORDER — IPRATROPIUM BROMIDE 21 UG/1
2 SPRAY, METERED NASAL 4 TIMES DAILY PRN
Qty: 30 ML | Refills: 0 | Status: SHIPPED | OUTPATIENT
Start: 2023-05-19

## 2023-05-19 NOTE — PROGRESS NOTES
OTOLARYNGOLOGY CLINIC NOTE  Date:  05/19/2023     Chief complaint:  Chief Complaint   Patient presents with    Other     Was seen in the hospital for congested heart failure, then after that he started with a nasal drip out the front and PND ever since       History of Present Illness  Balbir Rebollar . is a 72 y.o. male  presenting today for a new evaluation of postnasal drip     When hospitalized in November with heart stuff started having issues a week after that with the postnasal drip. Amiodarone started right before he went into the  hospital. In the hospital he was started on intersto and eliquis. Entrseto was replaced with losartan. Metoprolol started and plavix as well.   Daughter said possibly farxega may be causing the issue from what she read online.     Has drainage down the back of nose and out the front of the nose. Bilateral. Clear drainage. Occurs All day . Has not noticed worsening rhinitis with eating.   It is bettter when he lays down but still has phelgm in the back of his throat just not as bad. No headaches with laying down.  No increase in drainage with leaning forward and/or straining. No h/o sinus surgery Went to [facility] (outside [facility]) and had a scope done. States that scope was normal. Not hoarse today and has not had issue with this in a while.     He reports that he also gets Hoarse on and off - this is usually associated with allergy flare ups    No classical heartburn symptoms.     Past Medical History  Past Medical History:   Diagnosis Date    AICD (automatic cardioverter/defibrillator) present     Anticoagulant long-term use     Cardiomyopathy     Chronic combined systolic and diastolic congestive heart failure     Left ventricular ejection fraction is estimated at 15-20% and measured to be 25-33%.    Coronary artery disease     History of myocardial infarction     Hypertension     Paroxysmal A-fib     Stroke         Past Surgical History  Past Surgical History:    Procedure Laterality Date    CHOLECYSTECTOMY      LEFT HEART CATHETERIZATION Left 11/29/2022    Procedure: Left heart cath;  Surgeon: Wesley Rico MD;  Location: [facility] CATH LAB;  Service: Cardiology;  Laterality: Left;  1030am start, R rad access    LEFT HEART CATHETERIZATION N/A 12/1/2022    Procedure: Left heart cath;  Surgeon: Tam Cho MD;  Location: [facility] CATH LAB;  Service: Cardiology;  Laterality: N/A;    REPLACEMENT OF DUAL CHAMBER IMPLANTABLE CARDIOVERTER-DEFIBRILLATOR          Medications  Current Outpatient Medications on File Prior to Visit   Medication Sig Dispense Refill    amiodarone (PACERONE) 200 MG Tab Take 2 tablets (400 mg total) by mouth once daily. 180 tablet 3    apixaban (ELIQUIS) 5 mg Tab Take 1 tablet (5 mg total) by mouth 2 (two) times daily. 180 tablet 3    clopidogreL (PLAVIX) 75 mg tablet Take 1 tablet (75 mg total) by mouth every evening. 90 tablet 3    dapagliflozin (FARXIGA) 10 mg tablet Take 10 mg by mouth once daily.      dapagliflozin (FARXIGA) 10 mg tablet Take 1 tablet (10 mg total) by mouth once daily. 90 tablet 3    fluticasone propionate (FLONASE) 50 mcg/actuation nasal spray 1 spray by Each Nostril route once daily.      losartan (COZAAR) 50 MG tablet Take 1 tablet (50 mg total) by mouth once daily. 90 tablet 3    metoprolol succinate (TOPROL-XL) 50 MG 24 hr tablet Take 1 tablet (50 mg total) by mouth once daily. 90 tablet 3    nitroGLYCERIN (NITROSTAT) 0.4 MG SL tablet Place 1 tablet (0.4 mg total) under the tongue every 5 (five) minutes as needed for Chest pain. Up to 3 doses. If chest pain is not relieved or worsens 3 to 5 minutes after 1 dose, call 911 and seek immediate emergency medical attention. 20 tablet 3    pantoprazole (PROTONIX) 40 MG tablet Take 1 tablet (40 mg total) by mouth once daily. 90 tablet 3    rosuvastatin (CRESTOR) 40 MG Tab Take 1 tablet (40 mg total) by mouth every evening. 90 tablet 3    spironolactone (ALDACTONE) 25 MG tablet Take  "1 tablet (25 mg total) by mouth once daily. 90 tablet 3     No current facility-administered medications on file prior to visit.       Review of Systems  Review of Systems   Constitutional: Negative.    Eyes: Negative.    Respiratory:  Positive for shortness of breath.    Gastrointestinal: Negative.    Genitourinary: Negative.    Musculoskeletal: Negative.    Skin: Negative.    Neurological: Negative.    Psychiatric/Behavioral: Negative.      Answers submitted by the patient for this visit:  Review of Symptoms Questionnaire  (Submitted on 5/19/2023)  postnasal drip: Yes  Irregular heartbeat?: Yes  Seasonal Allergies?: Yes  Social History   reports that he has quit smoking. He has never used smokeless tobacco. He reports that he does not drink alcohol and does not use drugs.     Family History  No family history on file.     Physical Exam   There were no vitals filed for this visit. Body mass index is 19.79 kg/m².  Weight: 60.8 kg (134 lb)   Height: 5' 9" (175.3 cm)     GENERAL: no acute distress.  HEAD: normocephalic.   EYES: No scleral icterus  EARS: external ear without lesion, normal pinna shape and position.  External auditory canal with normal cerumen, tympanic membrane fully visible, no perforation , no retraction. No middle ear effusion. Ossicles intact.   NOSE: external nose without significant bony abnormality, mild turbinate hypertrophy on anterior rhinoscopy   ORAL CAVITY/OROPHARYNX: tongue mobile.   NECK: trachea midline.   LYMPH NODES:No cervical lymphadenopathy.  RESPIRATORY: no stridor, no stertor. Voice normal. Respirations nonlabored.  NEURO: alert, responds to questions appropriately.    PSYCH:mood appropriate      Imaging:  The patient does not have recent but does have pertinent imaging of the head and neck . Ct head from  . Paranasal sinuses without mucosal thickening nor osteitis on imaged paranasal sinuses    Labs:  CBC  Recent Labs   Lab 12/06/22  0912 12/12/22  0209 12/13/22  0438 "   WBC 5.47 7.17 7.00   Hemoglobin 15.4 15.1 15.0   Hematocrit 45.4 43.4 45.0   MCV 91 90 90   Platelets 169 204 205     BMP  Recent Labs   Lab 12/03/22  0011 12/03/22  0559 12/06/22  0912 12/12/22  0209 12/13/22  0438   Glucose 109   < > 87 138 H 100   Sodium 133 L   < > 139 143 143   Potassium 3.9   < > 3.5 3.3 L 3.1 L   Chloride 104   < > 106 106 105   CO2 19 L   < > 19 L 18 L 22 L   BUN 16   < > 18 26 H 27 H   Creatinine 0.7   < > 0.8 1.4 1.2   Calcium 8.5 L   < > 8.9 9.7 9.0   Phosphorus 2.3 L  --   --   --  3.4   Magnesium 2.0  --   --  2.3 2.3    < > = values in this interval not displayed.     COAGS  Recent Labs   Lab 12/05/22  0442 12/06/22 0912 12/12/22 0209   INR 2.8 H 3.8 H 3.8 H       Assessment  1. Postnasal drip    2. Chronic nonseasonal allergic rhinitis due to pollen    3. Vasomotor rhinitis       Plan:  Discussed plan of care with patient in detail and all questions answered. Patient reported understanding of plan of care. I gave the patient the opportunity to ask questions and patient confirmed all questions answered to satisfaction.     Allergic rhinitis(AR):  discussed about pathophysiology of allergic disease and sinus disease. We discussed about treatment options for this including but not limited to medications and potential surgeries as well as when surgery is indicated.  - I recommend dual nasal spray therapy as combo of steroid and antihistamine nasal spray has been shown in evidence based studies to be better than either alone. I offered option of starting with saline and flonase alone and adding astelin if needed which the patient preferred.    -Discussed medication administration technique ( point toward outer corner of eye and not towards nasal septum) and use nasal saline prior to medication sprays.   -We discussed importance of saline use prior to medication sprays to help sprays work better and to clean the nose.   -Counseled on risk of bleeding, risk of increased intraocular  pressure/cataract with long term use.   -Discussed how to increase and decrease nasal spray regimen based on symptoms and that sprays can be used on prn basis but work best when used daily and take about 2-3 weeks to get to max level . If patient using sprays on a prn basis and symptoms not controlled should go back to daily /bid use  -discussed as well as gave patient physical documentation with photos  about the saline and other medication sprays ( paperwork summarized discussion topics)    Can message me after tries atrovent and can send astelin     Vasomotor/gustatory rhinitis: although not around mealtime, more common to develop vidian nerve issues with age so recommend trial of atrovent.    Postnasal drip (PND) : PND can becaused by allergic rhinitis (AR ) or laryngopharyngo reflux (LPR).  Will start with treatment of AR first as both medication regimens require several weeks to work fully and I do not want to confound which medication is actually working. If the patient has no response to PPI therapy , will eliminate this as etiology of PND. If the pt has  incomplete response to treatment of LPR ( and AR has been excluded), will recommend workup for hiatal hernia and obstructive sleep apnea (MAR) which both can cause medically refractory GERD. I would also recommend evaluation by GI physician if pt responds to PPI therapy to ensure no additional pathology involved and evaluation for need to have EGD.     Advised him to reach out to prescribing physician about new medications and if they have noted rhinitis with these. I am not aware of any of the medications he listed causing rhinitis but will also look into it further myself  No evidence of acute bacterial sinus infection   Think less likely to be csf leak since bilateral and has not noticed worsening with straining but will consider workup if persists despite appropriate mechanisms . May also be candidate for vidian nerve ablation - will eval response to  atrovent to assess for this    F/u 2 months with possible scope sooner if issue      Please be aware that this note has been generated with the assistance of MModal voice-to-text.  Please excuse any spelling or grammatical errors.

## 2023-05-19 NOTE — PATIENT INSTRUCTIONS
Information and instructions from your visit with me today:    Start using the following medication nasal sprays:   Atrovent nasal spray - 4 times a day as needed for runny nose   Flonase one to two times a day every day until you see me back   Start nasal irrigations with saline solution- always clean nose with saline prior to any medication spray       NASAL SALINE SPRAY ( simply saline and arm and hammer are examples) There are several different brands found in the cold and flu aisle of the pharmacy. You can use any brand of saline spray - this will deliver the saline by a gentle mist ( if you have difficulty or discomfort with nasal rinse/ a lot of fluid in the nose, this will be more comfortable).       Always rinse your nose with saline prior to using medication sprays and wait a couple of hours before using again. You can use the saline throughout the day to help with stuffy nose or dry nose.    Do not use nasal decongestant sprays such as Afrin or similar products long term ( over 3 days) .  This can cause long term physical nasal addiction. Afrin should only be used if having nose bleeds, severe nasal congestion , or severe ear pain/fullness and should not be used for more than 2-3 days in a row . It is a not a medication that should be used for a long period of time.     It was nice meeting you today, and I look forward to helping you feel better soon. Please don't hesitate to call if you have any other questions or concerns, or if I can be of any assistance in the meantime.      Faith Donald MD    [facility]     Phone  [phone]    Fax      [phone]        Faith Donald MD  Otorhinolaryngology

## 2023-06-08 ENCOUNTER — PATIENT MESSAGE (OUTPATIENT)
Dept: CARDIOLOGY | Facility: CLINIC | Age: 73
End: 2023-06-08
Payer: MEDICARE

## 2023-06-09 ENCOUNTER — PATIENT MESSAGE (OUTPATIENT)
Dept: ELECTROPHYSIOLOGY | Facility: CLINIC | Age: 73
End: 2023-06-09
Payer: MEDICARE

## 2023-06-09 ENCOUNTER — LAB VISIT (OUTPATIENT)
Dept: LAB | Facility: HOSPITAL | Age: 73
End: 2023-06-09
Attending: NURSE PRACTITIONER
Payer: MEDICARE

## 2023-06-09 ENCOUNTER — OFFICE VISIT (OUTPATIENT)
Dept: FAMILY MEDICINE | Facility: CLINIC | Age: 73
End: 2023-06-09
Payer: MEDICARE

## 2023-06-09 ENCOUNTER — PATIENT MESSAGE (OUTPATIENT)
Dept: FAMILY MEDICINE | Facility: CLINIC | Age: 73
End: 2023-06-09
Payer: MEDICARE

## 2023-06-09 ENCOUNTER — TELEPHONE (OUTPATIENT)
Dept: ELECTROPHYSIOLOGY | Facility: CLINIC | Age: 73
End: 2023-06-09
Payer: MEDICARE

## 2023-06-09 VITALS
SYSTOLIC BLOOD PRESSURE: 116 MMHG | WEIGHT: 135.5 LBS | TEMPERATURE: 98 F | BODY MASS INDEX: 20.07 KG/M2 | OXYGEN SATURATION: 96 % | HEART RATE: 48 BPM | DIASTOLIC BLOOD PRESSURE: 40 MMHG | HEIGHT: 69 IN

## 2023-06-09 DIAGNOSIS — R42 LIGHTHEADEDNESS: Primary | ICD-10-CM

## 2023-06-09 DIAGNOSIS — I25.10 CORONARY ARTERY DISEASE INVOLVING NATIVE CORONARY ARTERY OF NATIVE HEART WITHOUT ANGINA PECTORIS: ICD-10-CM

## 2023-06-09 DIAGNOSIS — I50.9 CONGESTIVE HEART FAILURE, UNSPECIFIED HF CHRONICITY, UNSPECIFIED HEART FAILURE TYPE: Primary | ICD-10-CM

## 2023-06-09 DIAGNOSIS — Z11.59 NEED FOR HEPATITIS C SCREENING TEST: ICD-10-CM

## 2023-06-09 DIAGNOSIS — R79.89 OTHER SPECIFIED ABNORMAL FINDINGS OF BLOOD CHEMISTRY: ICD-10-CM

## 2023-06-09 DIAGNOSIS — R53.83 FATIGUE, UNSPECIFIED TYPE: ICD-10-CM

## 2023-06-09 DIAGNOSIS — R42 LIGHTHEADEDNESS: ICD-10-CM

## 2023-06-09 DIAGNOSIS — Z13.6 SCREENING FOR AAA (ABDOMINAL AORTIC ANEURYSM): ICD-10-CM

## 2023-06-09 DIAGNOSIS — I25.10 CORONARY ARTERY DISEASE, UNSPECIFIED VESSEL OR LESION TYPE, UNSPECIFIED WHETHER ANGINA PRESENT, UNSPECIFIED WHETHER NATIVE OR TRANSPLANTED HEART: ICD-10-CM

## 2023-06-09 DIAGNOSIS — I48.0 PAROXYSMAL ATRIAL FIBRILLATION: ICD-10-CM

## 2023-06-09 DIAGNOSIS — R64 CACHEXIA: ICD-10-CM

## 2023-06-09 DIAGNOSIS — I50.42 CHRONIC COMBINED SYSTOLIC AND DIASTOLIC CONGESTIVE HEART FAILURE: ICD-10-CM

## 2023-06-09 DIAGNOSIS — E78.00 PURE HYPERCHOLESTEROLEMIA: ICD-10-CM

## 2023-06-09 DIAGNOSIS — I77.810 THORACIC AORTIC ECTASIA: ICD-10-CM

## 2023-06-09 LAB
ALBUMIN SERPL BCP-MCNC: 3.7 G/DL (ref 3.5–5.2)
ALP SERPL-CCNC: 60 U/L (ref 55–135)
ALT SERPL W/O P-5'-P-CCNC: 71 U/L (ref 10–44)
ANION GAP SERPL CALC-SCNC: 9 MMOL/L (ref 8–16)
AST SERPL-CCNC: 61 U/L (ref 10–40)
BASOPHILS # BLD AUTO: 0.07 K/UL (ref 0–0.2)
BASOPHILS NFR BLD: 1.2 % (ref 0–1.9)
BILIRUB SERPL-MCNC: 0.5 MG/DL (ref 0.1–1)
BUN SERPL-MCNC: 21 MG/DL (ref 8–23)
CALCIUM SERPL-MCNC: 9.4 MG/DL (ref 8.7–10.5)
CHLORIDE SERPL-SCNC: 108 MMOL/L (ref 95–110)
CO2 SERPL-SCNC: 27 MMOL/L (ref 23–29)
CREAT SERPL-MCNC: 1.2 MG/DL (ref 0.5–1.4)
DIFFERENTIAL METHOD: ABNORMAL
EOSINOPHIL # BLD AUTO: 0.1 K/UL (ref 0–0.5)
EOSINOPHIL NFR BLD: 2.3 % (ref 0–8)
ERYTHROCYTE [DISTWIDTH] IN BLOOD BY AUTOMATED COUNT: 15.2 % (ref 11.5–14.5)
EST. GFR  (NO RACE VARIABLE): >60 ML/MIN/1.73 M^2
FERRITIN SERPL-MCNC: 477 NG/ML (ref 20–300)
GLUCOSE SERPL-MCNC: 101 MG/DL (ref 70–110)
HCT VFR BLD AUTO: 46.1 % (ref 40–54)
HCV AB SERPL QL IA: NORMAL
HGB BLD-MCNC: 14.9 G/DL (ref 14–18)
IMM GRANULOCYTES # BLD AUTO: 0.01 K/UL (ref 0–0.04)
IMM GRANULOCYTES NFR BLD AUTO: 0.2 % (ref 0–0.5)
IRON SERPL-MCNC: 88 UG/DL (ref 45–160)
LYMPHOCYTES # BLD AUTO: 1.2 K/UL (ref 1–4.8)
LYMPHOCYTES NFR BLD: 19.6 % (ref 18–48)
MCH RBC QN AUTO: 31.1 PG (ref 27–31)
MCHC RBC AUTO-ENTMCNC: 32.3 G/DL (ref 32–36)
MCV RBC AUTO: 96 FL (ref 82–98)
MONOCYTES # BLD AUTO: 0.6 K/UL (ref 0.3–1)
MONOCYTES NFR BLD: 10.5 % (ref 4–15)
NEUTROPHILS # BLD AUTO: 4 K/UL (ref 1.8–7.7)
NEUTROPHILS NFR BLD: 66.2 % (ref 38–73)
NRBC BLD-RTO: 0 /100 WBC
PLATELET # BLD AUTO: 185 K/UL (ref 150–450)
PMV BLD AUTO: 10.1 FL (ref 9.2–12.9)
POTASSIUM SERPL-SCNC: 3.5 MMOL/L (ref 3.5–5.1)
PROT SERPL-MCNC: 6.9 G/DL (ref 6–8.4)
RBC # BLD AUTO: 4.79 M/UL (ref 4.6–6.2)
SATURATED IRON: 23 % (ref 20–50)
SODIUM SERPL-SCNC: 144 MMOL/L (ref 136–145)
TOTAL IRON BINDING CAPACITY: 385 UG/DL (ref 250–450)
TRANSFERRIN SERPL-MCNC: 260 MG/DL (ref 200–375)
TSH SERPL DL<=0.005 MIU/L-ACNC: 0.56 UIU/ML (ref 0.4–4)
WBC # BLD AUTO: 6.07 K/UL (ref 3.9–12.7)

## 2023-06-09 PROCEDURE — 3078F DIAST BP <80 MM HG: CPT | Mod: CPTII,S$GLB,, | Performed by: NURSE PRACTITIONER

## 2023-06-09 PROCEDURE — 1101F PR PT FALLS ASSESS DOC 0-1 FALLS W/OUT INJ PAST YR: ICD-10-PCS | Mod: CPTII,S$GLB,, | Performed by: NURSE PRACTITIONER

## 2023-06-09 PROCEDURE — 80053 COMPREHEN METABOLIC PANEL: CPT | Performed by: NURSE PRACTITIONER

## 2023-06-09 PROCEDURE — 85025 COMPLETE CBC W/AUTO DIFF WBC: CPT | Performed by: NURSE PRACTITIONER

## 2023-06-09 PROCEDURE — 4010F PR ACE/ARB THEARPY RXD/TAKEN: ICD-10-PCS | Mod: CPTII,S$GLB,, | Performed by: NURSE PRACTITIONER

## 2023-06-09 PROCEDURE — 4010F ACE/ARB THERAPY RXD/TAKEN: CPT | Mod: CPTII,S$GLB,, | Performed by: NURSE PRACTITIONER

## 2023-06-09 PROCEDURE — 84443 ASSAY THYROID STIM HORMONE: CPT | Performed by: NURSE PRACTITIONER

## 2023-06-09 PROCEDURE — 99999 PR PBB SHADOW E&M-EST. PATIENT-LVL V: CPT | Mod: PBBFAC,,, | Performed by: NURSE PRACTITIONER

## 2023-06-09 PROCEDURE — 1159F PR MEDICATION LIST DOCUMENTED IN MEDICAL RECORD: ICD-10-PCS | Mod: CPTII,S$GLB,, | Performed by: NURSE PRACTITIONER

## 2023-06-09 PROCEDURE — 36415 COLL VENOUS BLD VENIPUNCTURE: CPT | Mod: PO | Performed by: NURSE PRACTITIONER

## 2023-06-09 PROCEDURE — 3074F PR MOST RECENT SYSTOLIC BLOOD PRESSURE < 130 MM HG: ICD-10-PCS | Mod: CPTII,S$GLB,, | Performed by: NURSE PRACTITIONER

## 2023-06-09 PROCEDURE — 1160F PR REVIEW ALL MEDS BY PRESCRIBER/CLIN PHARMACIST DOCUMENTED: ICD-10-PCS | Mod: CPTII,S$GLB,, | Performed by: NURSE PRACTITIONER

## 2023-06-09 PROCEDURE — 3288F FALL RISK ASSESSMENT DOCD: CPT | Mod: CPTII,S$GLB,, | Performed by: NURSE PRACTITIONER

## 2023-06-09 PROCEDURE — 1126F PR PAIN SEVERITY QUANTIFIED, NO PAIN PRESENT: ICD-10-PCS | Mod: CPTII,S$GLB,, | Performed by: NURSE PRACTITIONER

## 2023-06-09 PROCEDURE — 84466 ASSAY OF TRANSFERRIN: CPT | Performed by: NURSE PRACTITIONER

## 2023-06-09 PROCEDURE — 82728 ASSAY OF FERRITIN: CPT | Performed by: NURSE PRACTITIONER

## 2023-06-09 PROCEDURE — 86803 HEPATITIS C AB TEST: CPT | Performed by: NURSE PRACTITIONER

## 2023-06-09 PROCEDURE — 3074F SYST BP LT 130 MM HG: CPT | Mod: CPTII,S$GLB,, | Performed by: NURSE PRACTITIONER

## 2023-06-09 PROCEDURE — 3078F PR MOST RECENT DIASTOLIC BLOOD PRESSURE < 80 MM HG: ICD-10-PCS | Mod: CPTII,S$GLB,, | Performed by: NURSE PRACTITIONER

## 2023-06-09 PROCEDURE — 1159F MED LIST DOCD IN RCRD: CPT | Mod: CPTII,S$GLB,, | Performed by: NURSE PRACTITIONER

## 2023-06-09 PROCEDURE — 99214 OFFICE O/P EST MOD 30 MIN: CPT | Mod: S$GLB,,, | Performed by: NURSE PRACTITIONER

## 2023-06-09 PROCEDURE — 3288F PR FALLS RISK ASSESSMENT DOCUMENTED: ICD-10-PCS | Mod: CPTII,S$GLB,, | Performed by: NURSE PRACTITIONER

## 2023-06-09 PROCEDURE — 1101F PT FALLS ASSESS-DOCD LE1/YR: CPT | Mod: CPTII,S$GLB,, | Performed by: NURSE PRACTITIONER

## 2023-06-09 PROCEDURE — 99214 PR OFFICE/OUTPT VISIT, EST, LEVL IV, 30-39 MIN: ICD-10-PCS | Mod: S$GLB,,, | Performed by: NURSE PRACTITIONER

## 2023-06-09 PROCEDURE — 1126F AMNT PAIN NOTED NONE PRSNT: CPT | Mod: CPTII,S$GLB,, | Performed by: NURSE PRACTITIONER

## 2023-06-09 PROCEDURE — 99999 PR PBB SHADOW E&M-EST. PATIENT-LVL V: ICD-10-PCS | Mod: PBBFAC,,, | Performed by: NURSE PRACTITIONER

## 2023-06-09 PROCEDURE — 1160F RVW MEDS BY RX/DR IN RCRD: CPT | Mod: CPTII,S$GLB,, | Performed by: NURSE PRACTITIONER

## 2023-06-09 NOTE — PROGRESS NOTES
History of Present Illness   Balbir Rebollar Sr. is a 73 y.o. man with medical history as listed below who presents today with his daughter for evaluation of fatigue, weakness, and intermittent lightheadedness. He reports this has been present since his hospitalization and rehab stay in December but has worsened over the last three weeks. He has an AICD in place and at home interrogation was completed which was unremarkable per daughter. He denies chest pain, palpitations, MORRELL, or edema. He does report decline in mood feeling depressed, lack of appetite, and wanting to stay in his room most of the day. He is very concerned with the number of medications he is taking and would like to cut back on this if possible. He has no additional complaints and is otherwise healthy on today's visit.    Past Medical History:   Diagnosis Date    AICD (automatic cardioverter/defibrillator) present     Anticoagulant long-term use     Cardiomyopathy     Chronic combined systolic and diastolic congestive heart failure     Left ventricular ejection fraction is estimated at 15-20% and measured to be 25-33%.    Coronary artery disease     History of myocardial infarction     Hypertension     Paroxysmal A-fib     Stroke        Past Surgical History:   Procedure Laterality Date    CHOLECYSTECTOMY      LEFT HEART CATHETERIZATION Left 11/29/2022    Procedure: Left heart cath;  Surgeon: Wesley Rico MD;  Location: [facility] CATH LAB;  Service: Cardiology;  Laterality: Left;  1030am start, R rad access    LEFT HEART CATHETERIZATION N/A 12/1/2022    Procedure: Left heart cath;  Surgeon: Tam Cho MD;  Location: [facility] CATH LAB;  Service: Cardiology;  Laterality: N/A;    REPLACEMENT OF DUAL CHAMBER IMPLANTABLE CARDIOVERTER-DEFIBRILLATOR         Social History     Socioeconomic History    Marital status:    Tobacco Use    Smoking status: Former    Smokeless tobacco: Never   Substance and Sexual Activity    Alcohol use:  Never    Drug use: Never     Social Determinants of Health     Financial Resource Strain: Low Risk     Difficulty of Paying Living Expenses: Not hard at all   Food Insecurity: No Food Insecurity    Worried About Running Out of Food in the Last Year: Never true    Ran Out of Food in the Last Year: Never true   Transportation Needs: No Transportation Needs    Lack of Transportation (Medical): No    Lack of Transportation (Non-Medical): No   Physical Activity: Inactive    Days of Exercise per Week: 0 days    Minutes of Exercise per Session: 0 min   Stress: No Stress Concern Present    Feeling of Stress : Not at all   Social Connections: Socially Isolated    Frequency of Communication with Friends and Family: Three times a week    Frequency of Social Gatherings with Friends and Family: Three times a week    Attends Temple Services: Never    Active Member of Clubs or Organizations: No    Attends Club or Organization Meetings: Never    Marital Status:    Housing Stability: Low Risk     Unable to Pay for Housing in the Last Year: No    Number of Places Lived in the Last Year: 1    Unstable Housing in the Last Year: No       History reviewed. No pertinent family history.    Review of Systems  Review of Systems   Constitutional:  Positive for malaise/fatigue and weight loss. Negative for chills and fever.   HENT:  Positive for congestion. Negative for sinus pain and sore throat.    Eyes:  Negative for blurred vision and double vision.   Respiratory:  Positive for cough. Negative for shortness of breath and wheezing.    Cardiovascular:  Negative for chest pain, palpitations, orthopnea, claudication, leg swelling and PND.   Gastrointestinal:  Negative for blood in stool and melena.   Genitourinary:  Negative for flank pain and hematuria.   Skin:  Positive for itching (with xerosis). Negative for rash.   Neurological:  Positive for dizziness. Negative for loss of consciousness and headaches.   Endo/Heme/Allergies:   "Bruises/bleeds easily.   Psychiatric/Behavioral:  Positive for depression (depressed mood). The patient is not nervous/anxious and does not have insomnia.      A complete review of systems was otherwise negative.    Physical Exam  BP (!) 116/40   Pulse (!) 48   Temp 97.5 °F (36.4 °C)   Ht 5' 9" (1.753 m)   Wt 61.4 kg (135 lb 7.6 oz)   SpO2 96%   BMI 20.01 kg/m²   General appearance: alert, appears stated age, cooperative, and no distress  Neck: no carotid bruit, no JVD, supple, symmetrical, trachea midline, and thyroid not enlarged, symmetric, no tenderness/mass/nodules  Lungs: clear to auscultation bilaterally  Heart: regular rate and rhythm, S1, S2 normal, no murmur, click, rub or gallop  Extremities: extremities normal, atraumatic, no cyanosis or edema  Pulses: 2+ and symmetric  Skin: Skin color, texture, turgor normal. No rashes or lesions  Neurologic: Grossly normal    Assessment/Plan  Lightheadedness  Check labs as below.  Follow-up with cardiology as scheduled.  -     CBC Auto Differential; Future; Expected date: 06/09/2023  -     Comprehensive Metabolic Panel; Future; Expected date: 06/09/2023  -     TSH; Future; Expected date: 06/09/2023  -     Iron and TIBC; Future; Expected date: 06/09/2023  -     Ferritin; Future; Expected date: 06/09/2023    Fatigue, unspecified type  Check labs as below.  Follow-up with cardiology as scheduled.  We discussed that depression and physical deconditioning may be contributing to symptoms.  Would suggest close follow-up with PCP for unremarkable work-up from myself and cardiology.  -     CBC Auto Differential; Future; Expected date: 06/09/2023  -     Comprehensive Metabolic Panel; Future; Expected date: 06/09/2023  -     TSH; Future; Expected date: 06/09/2023  -     Iron and TIBC; Future; Expected date: 06/09/2023  -     Ferritin; Future; Expected date: 06/09/2023    Cachexia  Check labs as below.  Follow-up with cardiology as scheduled.  We discussed that depression " and physical deconditioning may be contributing to symptoms.  Would suggest close follow-up with PCP for unremarkable work-up from myself and cardiology.  -     Ferritin; Future; Expected date: 06/09/2023    Chronic combined systolic and diastolic congestive heart failure  Check labs as below.  Follow-up with cardiology as scheduled.  -     Ambulatory referral/consult to Pharmacy Assistance; Future; Expected date: 06/16/2023    Paroxysmal atrial fibrillation  Check labs as below.  Follow-up with cardiology as scheduled.    Coronary artery disease involving native coronary artery of native heart without angina pectoris  Check labs as below.  Follow-up with cardiology as scheduled.    Coronary artery disease, unspecified vessel or lesion type, unspecified whether angina present, unspecified whether native or transplanted heart  Check labs as below.  Follow-up with cardiology as scheduled.    Pure hypercholesterolemia  Check labs as below.  Follow-up with cardiology as scheduled.    Thoracic aortic ectasia  Check labs as below.  Follow-up with cardiology as scheduled.    Need for hepatitis C screening test  Routine screening.  -     Hepatitis C Antibody; Future; Expected date: 06/09/2023    Screening for AAA (abdominal aortic aneurysm)  Routine screening.  -     US Abdominal Aorta; Future; Expected date: 06/09/2023    Other specified abnormal findings of blood chemistry  Routine screening.  -     Iron and TIBC; Future; Expected date: 06/09/2023    Patient has verbalized understanding and is in agreement with plan of care.    Follow up for TBD pending labs.

## 2023-06-09 NOTE — TELEPHONE ENCOUNTER
Called pt's daughter, Silvia on this am in relation to message received via the pt portal. (Please see portal message from this am).  Asked if she was currently with the pt to assist him in sending a manual transmission via his remote ICD home monitor.  She stated that she had to run a quick errand and would be going back for which the pt lives with her.  Contact # for the [facility] was given to the daughter and asked that she please call upon her return home.  Understanding was verbalized.  She appreciated the call.

## 2023-06-11 ENCOUNTER — PATIENT MESSAGE (OUTPATIENT)
Dept: FAMILY MEDICINE | Facility: CLINIC | Age: 73
End: 2023-06-11
Payer: MEDICARE

## 2023-06-12 ENCOUNTER — TELEPHONE (OUTPATIENT)
Dept: PHARMACY | Facility: CLINIC | Age: 73
End: 2023-06-12
Payer: MEDICARE

## 2023-06-12 NOTE — TELEPHONE ENCOUNTER
I have spoken with Balbir Rebollar Sr. and informed him of the [employer] application process for Farxiga and what's required to apply.  Balbir Rebollar Sr. will provide the following documents: Proof of household Income( such as social security statement, 1099 form, pension statement or 3 consecutive pay stubs and Signed & dated [employer]/ Patient Authorization Forms      I will follow up with the patient in 5 business days.

## 2023-06-14 ENCOUNTER — OFFICE VISIT (OUTPATIENT)
Dept: CARDIOLOGY | Facility: CLINIC | Age: 73
End: 2023-06-14
Payer: MEDICARE

## 2023-06-14 ENCOUNTER — PATIENT MESSAGE (OUTPATIENT)
Dept: CARDIOLOGY | Facility: CLINIC | Age: 73
End: 2023-06-14

## 2023-06-14 ENCOUNTER — HOSPITAL ENCOUNTER (OUTPATIENT)
Dept: CARDIOLOGY | Facility: CLINIC | Age: 73
Discharge: HOME | End: 2023-06-14
Payer: MEDICARE

## 2023-06-14 VITALS — BODY MASS INDEX: 19.83 KG/M2 | DIASTOLIC BLOOD PRESSURE: 54 MMHG | WEIGHT: 134.25 LBS | SYSTOLIC BLOOD PRESSURE: 97 MMHG

## 2023-06-14 DIAGNOSIS — I77.810 THORACIC AORTIC ECTASIA: ICD-10-CM

## 2023-06-14 DIAGNOSIS — I50.9 CONGESTIVE HEART FAILURE, UNSPECIFIED HF CHRONICITY, UNSPECIFIED HEART FAILURE TYPE: ICD-10-CM

## 2023-06-14 DIAGNOSIS — Z87.891 FORMER SMOKER: ICD-10-CM

## 2023-06-14 DIAGNOSIS — Z95.810 ICD (IMPLANTABLE CARDIOVERTER-DEFIBRILLATOR), SINGLE, IN SITU: ICD-10-CM

## 2023-06-14 DIAGNOSIS — I25.10 CORONARY ARTERY DISEASE INVOLVING NATIVE CORONARY ARTERY OF NATIVE HEART WITHOUT ANGINA PECTORIS: ICD-10-CM

## 2023-06-14 DIAGNOSIS — I25.5 ISCHEMIC CARDIOMYOPATHY: ICD-10-CM

## 2023-06-14 DIAGNOSIS — I48.0 PAROXYSMAL ATRIAL FIBRILLATION: Primary | ICD-10-CM

## 2023-06-14 DIAGNOSIS — I45.2 RIGHT BUNDLE BRANCH BLOCK (RBBB) WITH LEFT ANTERIOR FASCICULAR BLOCK (LAFB): ICD-10-CM

## 2023-06-14 PROCEDURE — 3074F SYST BP LT 130 MM HG: CPT | Mod: CPTII,S$GLB,, | Performed by: INTERNAL MEDICINE

## 2023-06-14 PROCEDURE — 93005 ELECTROCARDIOGRAM TRACING: CPT | Mod: S$GLB,,, | Performed by: INTERNAL MEDICINE

## 2023-06-14 PROCEDURE — 99215 PR OFFICE/OUTPT VISIT, EST, LEVL V, 40-54 MIN: ICD-10-PCS | Mod: S$GLB,,, | Performed by: INTERNAL MEDICINE

## 2023-06-14 PROCEDURE — 3074F PR MOST RECENT SYSTOLIC BLOOD PRESSURE < 130 MM HG: ICD-10-PCS | Mod: CPTII,S$GLB,, | Performed by: INTERNAL MEDICINE

## 2023-06-14 PROCEDURE — 3288F PR FALLS RISK ASSESSMENT DOCUMENTED: ICD-10-PCS | Mod: CPTII,S$GLB,, | Performed by: INTERNAL MEDICINE

## 2023-06-14 PROCEDURE — 93005 EKG 12-LEAD: ICD-10-PCS | Mod: S$GLB,,, | Performed by: INTERNAL MEDICINE

## 2023-06-14 PROCEDURE — 1101F PT FALLS ASSESS-DOCD LE1/YR: CPT | Mod: CPTII,S$GLB,, | Performed by: INTERNAL MEDICINE

## 2023-06-14 PROCEDURE — 93010 ELECTROCARDIOGRAM REPORT: CPT | Mod: S$GLB,,, | Performed by: INTERNAL MEDICINE

## 2023-06-14 PROCEDURE — 4010F ACE/ARB THERAPY RXD/TAKEN: CPT | Mod: CPTII,S$GLB,, | Performed by: INTERNAL MEDICINE

## 2023-06-14 PROCEDURE — 1160F RVW MEDS BY RX/DR IN RCRD: CPT | Mod: CPTII,S$GLB,, | Performed by: INTERNAL MEDICINE

## 2023-06-14 PROCEDURE — 99999 PR PBB SHADOW E&M-EST. PATIENT-LVL IV: ICD-10-PCS | Mod: PBBFAC,,, | Performed by: INTERNAL MEDICINE

## 2023-06-14 PROCEDURE — 1126F PR PAIN SEVERITY QUANTIFIED, NO PAIN PRESENT: ICD-10-PCS | Mod: CPTII,S$GLB,, | Performed by: INTERNAL MEDICINE

## 2023-06-14 PROCEDURE — 1159F PR MEDICATION LIST DOCUMENTED IN MEDICAL RECORD: ICD-10-PCS | Mod: CPTII,S$GLB,, | Performed by: INTERNAL MEDICINE

## 2023-06-14 PROCEDURE — 1159F MED LIST DOCD IN RCRD: CPT | Mod: CPTII,S$GLB,, | Performed by: INTERNAL MEDICINE

## 2023-06-14 PROCEDURE — 3078F DIAST BP <80 MM HG: CPT | Mod: CPTII,S$GLB,, | Performed by: INTERNAL MEDICINE

## 2023-06-14 PROCEDURE — 1160F PR REVIEW ALL MEDS BY PRESCRIBER/CLIN PHARMACIST DOCUMENTED: ICD-10-PCS | Mod: CPTII,S$GLB,, | Performed by: INTERNAL MEDICINE

## 2023-06-14 PROCEDURE — 99215 OFFICE O/P EST HI 40 MIN: CPT | Mod: S$GLB,,, | Performed by: INTERNAL MEDICINE

## 2023-06-14 PROCEDURE — 3078F PR MOST RECENT DIASTOLIC BLOOD PRESSURE < 80 MM HG: ICD-10-PCS | Mod: CPTII,S$GLB,, | Performed by: INTERNAL MEDICINE

## 2023-06-14 PROCEDURE — 99999 PR PBB SHADOW E&M-EST. PATIENT-LVL IV: CPT | Mod: PBBFAC,,, | Performed by: INTERNAL MEDICINE

## 2023-06-14 PROCEDURE — 4010F PR ACE/ARB THEARPY RXD/TAKEN: ICD-10-PCS | Mod: CPTII,S$GLB,, | Performed by: INTERNAL MEDICINE

## 2023-06-14 PROCEDURE — 93010 EKG 12-LEAD: ICD-10-PCS | Mod: S$GLB,,, | Performed by: INTERNAL MEDICINE

## 2023-06-14 PROCEDURE — 3288F FALL RISK ASSESSMENT DOCD: CPT | Mod: CPTII,S$GLB,, | Performed by: INTERNAL MEDICINE

## 2023-06-14 PROCEDURE — 1126F AMNT PAIN NOTED NONE PRSNT: CPT | Mod: CPTII,S$GLB,, | Performed by: INTERNAL MEDICINE

## 2023-06-14 PROCEDURE — 1101F PR PT FALLS ASSESS DOC 0-1 FALLS W/OUT INJ PAST YR: ICD-10-PCS | Mod: CPTII,S$GLB,, | Performed by: INTERNAL MEDICINE

## 2023-06-14 RX ORDER — LOSARTAN POTASSIUM 25 MG/1
25 TABLET ORAL DAILY
Qty: 90 TABLET | Refills: 3 | Status: SHIPPED | OUTPATIENT
Start: 2023-06-14 | End: 2023-07-25 | Stop reason: SDUPTHER

## 2023-06-14 NOTE — PROGRESS NOTES
Subjective:   Patient ID:  Balbir Rebollar Sr. is a 73 y.o. male who presents for evaluation of Coronary Artery Disease      HPI: Very pleasant man here with his daughter.  History of ischemic cardiomyopathy, AF, ICD for VT.    Concerned about overmedication.    HR and BP have been low.  No angina or new/worsening MORRELL.  No syncope.  No palpitations.    Stopped Eliquis for coumadin because of cost, but when he saw what was needed for coumadin INR monitoring, he wanted to get back to Eliquis.    Struggling with sleep.    December 2022 Echo:  Concentric hypertrophy and severely decreased systolic function.  The estimated ejection fraction is 20-25%.  Grade I left ventricular diastolic dysfunction.  Normal right ventricular size with normal right ventricular systolic function.  Mild to moderate left atrial enlargement.  Mild right atrial enlargement.  Mild aortic regurgitation.  Normal central venous pressure (3 mmHg).  The estimated PA systolic pressure is 17 mmHg.  Anterior wall is thinned out and akinetic    Past Medical History:   Diagnosis Date    AICD (automatic cardioverter/defibrillator) present     Anticoagulant long-term use     Cardiomyopathy     Chronic combined systolic and diastolic congestive heart failure     Left ventricular ejection fraction is estimated at 15-20% and measured to be 25-33%.    Coronary artery disease     History of myocardial infarction     Hypertension     Paroxysmal A-fib     Stroke        Past Surgical History:   Procedure Laterality Date    CHOLECYSTECTOMY      LEFT HEART CATHETERIZATION Left 11/29/2022    Procedure: Left heart cath;  Surgeon: Wesley Rico MD;  Location: [facility] CATH LAB;  Service: Cardiology;  Laterality: Left;  1030am start, R rad access    LEFT HEART CATHETERIZATION N/A 12/1/2022    Procedure: Left heart cath;  Surgeon: Tam Cho MD;  Location: [facility] CATH LAB;  Service: Cardiology;  Laterality: N/A;    REPLACEMENT OF DUAL CHAMBER IMPLANTABLE  CARDIOVERTER-DEFIBRILLATOR         Social History     Tobacco Use    Smoking status: Former    Smokeless tobacco: Never   Substance Use Topics    Alcohol use: Never    Drug use: Never       No family history on file.    Current Outpatient Medications   Medication Sig    amiodarone (PACERONE) 200 MG Tab Take 2 tablets (400 mg total) by mouth once daily.    clopidogreL (PLAVIX) 75 mg tablet Take 1 tablet (75 mg total) by mouth every evening.    dapagliflozin (FARXIGA) 10 mg tablet Take 10 mg by mouth once daily.    dapagliflozin (FARXIGA) 10 mg tablet Take 1 tablet (10 mg total) by mouth once daily.    fluticasone propionate (FLONASE) 50 mcg/actuation nasal spray 1 spray by Each Nostril route once daily.    ipratropium (ATROVENT) 21 mcg (0.03 %) nasal spray 2 sprays by Each Nostril route 4 (four) times daily as needed for Rhinitis.    losartan (COZAAR) 50 MG tablet Take 1 tablet (50 mg total) by mouth once daily.    metoprolol succinate (TOPROL-XL) 50 MG 24 hr tablet Take 1 tablet (50 mg total) by mouth once daily.    pantoprazole (PROTONIX) 40 MG tablet Take 1 tablet (40 mg total) by mouth once daily.    rosuvastatin (CRESTOR) 40 MG Tab Take 1 tablet (40 mg total) by mouth every evening.    spironolactone (ALDACTONE) 25 MG tablet Take 1 tablet (25 mg total) by mouth once daily.    apixaban (ELIQUIS) 5 mg Tab Take 1 tablet (5 mg total) by mouth 2 (two) times daily.    nitroGLYCERIN (NITROSTAT) 0.4 MG SL tablet Place 1 tablet (0.4 mg total) under the tongue every 5 (five) minutes as needed for Chest pain. Up to 3 doses. If chest pain is not relieved or worsens 3 to 5 minutes after 1 dose, call 911 and seek immediate emergency medical attention. (Patient not taking: Reported on 6/14/2023)     No current facility-administered medications for this visit.       Review of patient's allergies indicates:  No Known Allergies    ROS  The review of systems is negative except as above.    Objective:   Physical Exam  Vitals  reviewed.   Constitutional:       Appearance: He is well-developed.   HENT:      Head: Normocephalic and atraumatic.   Eyes:      General: No scleral icterus.     Conjunctiva/sclera: Conjunctivae normal.   Neck:      Vascular: No JVD.   Cardiovascular:      Rate and Rhythm: Normal rate and regular rhythm.      Pulses: Intact distal pulses.      Heart sounds: Normal heart sounds. No murmur heard.    No friction rub. No gallop.   Pulmonary:      Effort: Pulmonary effort is normal.      Breath sounds: Normal breath sounds. No wheezing or rales.   Abdominal:      General: Bowel sounds are normal. There is no distension.      Palpations: Abdomen is soft.      Tenderness: There is no abdominal tenderness.   Musculoskeletal:         General: Normal range of motion.      Cervical back: Normal range of motion and neck supple.   Skin:     General: Skin is warm and dry.      Findings: No erythema or rash.   Neurological:      Mental Status: He is alert and oriented to person, place, and time.   Psychiatric:         Behavior: Behavior normal.         Thought Content: Thought content normal.         Judgment: Judgment normal.       Lab Results   Component Value Date    WBC 6.07 06/09/2023    HGB 14.9 06/09/2023    HCT 46.1 06/09/2023    MCV 96 06/09/2023     06/09/2023         Chemistry        Component Value Date/Time     06/09/2023 1220    K 3.5 06/09/2023 1220     06/09/2023 1220    CO2 27 06/09/2023 1220    BUN 21 06/09/2023 1220    CREATININE 1.2 06/09/2023 1220     06/09/2023 1220        Component Value Date/Time    CALCIUM 9.4 06/09/2023 1220    ALKPHOS 60 06/09/2023 1220    AST 61 (H) 06/09/2023 1220    ALT 71 (H) 06/09/2023 1220    BILITOT 0.5 06/09/2023 1220            No results found for: CHOL  No results found for: HDL  No results found for: LDLCALC  No results found for: TRIG  No results found for: CHOLHDL    Lab Results   Component Value Date    TSH 0.563 06/09/2023       Lab Results    Component Value Date    HGBA1C 5.8 (H) 10/18/2022         Assessment:     1. Paroxysmal atrial fibrillation    2. Coronary artery disease involving native coronary artery of native heart without angina pectoris    3. Ischemic cardiomyopathy    4. ICD (implantable cardioverter-defibrillator), single, in situ    5. Right bundle branch block (RBBB) with left anterior fascicular block (LAFB)    6. Former smoker    7. Thoracic aortic ectasia        Plan:     Continue current medicines.  Cut losartan in half to 25 because of occasional symptomatic hypotension.    Diet/exercise goals reinforced.    F/U 6 months

## 2023-06-22 ENCOUNTER — PATIENT MESSAGE (OUTPATIENT)
Dept: FAMILY MEDICINE | Facility: CLINIC | Age: 73
End: 2023-06-22
Payer: MEDICARE

## 2023-06-22 ENCOUNTER — TELEPHONE (OUTPATIENT)
Dept: FAMILY MEDICINE | Facility: CLINIC | Age: 73
End: 2023-06-22
Payer: MEDICARE

## 2023-06-22 DIAGNOSIS — D43.3: Primary | ICD-10-CM

## 2023-06-22 NOTE — TELEPHONE ENCOUNTER
----- Message from Faith Knight sent at 6/22/2023 12:36 PM CDT -----  Regarding: Suly  Name of caller:  Holly ( daughter)       What is the requesting detail: Holly is requesting a call back to schedule a sooner appt with Suly Prescott. The first available is in September and the pt feels he needs to be seen today or tomorrow       Can the clinic reply by [facility]:       What number to call back:[phone]            [facility] Note: Responded to [facility] consult for SI. Crisis counselor to assess as soon as possible pending, an attending being assigned, medical clearance, and UDS results.

## 2023-06-23 ENCOUNTER — OFFICE VISIT (OUTPATIENT)
Dept: FAMILY MEDICINE | Facility: CLINIC | Age: 73
End: 2023-06-23
Payer: MEDICARE

## 2023-06-23 VITALS
HEART RATE: 53 BPM | WEIGHT: 137 LBS | SYSTOLIC BLOOD PRESSURE: 90 MMHG | TEMPERATURE: 98 F | OXYGEN SATURATION: 97 % | BODY MASS INDEX: 20.29 KG/M2 | DIASTOLIC BLOOD PRESSURE: 50 MMHG | HEIGHT: 69 IN

## 2023-06-23 DIAGNOSIS — Z95.810 ICD (IMPLANTABLE CARDIOVERTER-DEFIBRILLATOR), SINGLE, IN SITU: ICD-10-CM

## 2023-06-23 DIAGNOSIS — I25.10 CORONARY ARTERY DISEASE INVOLVING NATIVE CORONARY ARTERY OF NATIVE HEART WITHOUT ANGINA PECTORIS: ICD-10-CM

## 2023-06-23 DIAGNOSIS — E78.00 PURE HYPERCHOLESTEROLEMIA: ICD-10-CM

## 2023-06-23 DIAGNOSIS — I45.2 RIGHT BUNDLE BRANCH BLOCK (RBBB) WITH LEFT ANTERIOR FASCICULAR BLOCK (LAFB): ICD-10-CM

## 2023-06-23 DIAGNOSIS — I50.42 CHRONIC COMBINED SYSTOLIC AND DIASTOLIC CONGESTIVE HEART FAILURE: ICD-10-CM

## 2023-06-23 DIAGNOSIS — F32.1 CURRENT MODERATE EPISODE OF MAJOR DEPRESSIVE DISORDER WITHOUT PRIOR EPISODE: Primary | ICD-10-CM

## 2023-06-23 DIAGNOSIS — I25.5 ISCHEMIC CARDIOMYOPATHY: ICD-10-CM

## 2023-06-23 DIAGNOSIS — J32.9 CHRONIC RHINOSINUSITIS: ICD-10-CM

## 2023-06-23 DIAGNOSIS — I48.0 PAROXYSMAL ATRIAL FIBRILLATION: ICD-10-CM

## 2023-06-23 PROCEDURE — 1160F PR REVIEW ALL MEDS BY PRESCRIBER/CLIN PHARMACIST DOCUMENTED: ICD-10-PCS | Mod: CPTII,S$GLB,, | Performed by: NURSE PRACTITIONER

## 2023-06-23 PROCEDURE — 1101F PT FALLS ASSESS-DOCD LE1/YR: CPT | Mod: CPTII,S$GLB,, | Performed by: NURSE PRACTITIONER

## 2023-06-23 PROCEDURE — 3074F PR MOST RECENT SYSTOLIC BLOOD PRESSURE < 130 MM HG: ICD-10-PCS | Mod: CPTII,S$GLB,, | Performed by: NURSE PRACTITIONER

## 2023-06-23 PROCEDURE — 1125F PR PAIN SEVERITY QUANTIFIED, PAIN PRESENT: ICD-10-PCS | Mod: CPTII,S$GLB,, | Performed by: NURSE PRACTITIONER

## 2023-06-23 PROCEDURE — 1101F PR PT FALLS ASSESS DOC 0-1 FALLS W/OUT INJ PAST YR: ICD-10-PCS | Mod: CPTII,S$GLB,, | Performed by: NURSE PRACTITIONER

## 2023-06-23 PROCEDURE — 4010F PR ACE/ARB THEARPY RXD/TAKEN: ICD-10-PCS | Mod: CPTII,S$GLB,, | Performed by: NURSE PRACTITIONER

## 2023-06-23 PROCEDURE — 1159F MED LIST DOCD IN RCRD: CPT | Mod: CPTII,S$GLB,, | Performed by: NURSE PRACTITIONER

## 2023-06-23 PROCEDURE — 3288F PR FALLS RISK ASSESSMENT DOCUMENTED: ICD-10-PCS | Mod: CPTII,S$GLB,, | Performed by: NURSE PRACTITIONER

## 2023-06-23 PROCEDURE — 3078F PR MOST RECENT DIASTOLIC BLOOD PRESSURE < 80 MM HG: ICD-10-PCS | Mod: CPTII,S$GLB,, | Performed by: NURSE PRACTITIONER

## 2023-06-23 PROCEDURE — 99214 PR OFFICE/OUTPT VISIT, EST, LEVL IV, 30-39 MIN: ICD-10-PCS | Mod: S$GLB,,, | Performed by: NURSE PRACTITIONER

## 2023-06-23 PROCEDURE — 4010F ACE/ARB THERAPY RXD/TAKEN: CPT | Mod: CPTII,S$GLB,, | Performed by: NURSE PRACTITIONER

## 2023-06-23 PROCEDURE — 99214 OFFICE O/P EST MOD 30 MIN: CPT | Mod: S$GLB,,, | Performed by: NURSE PRACTITIONER

## 2023-06-23 PROCEDURE — 1159F PR MEDICATION LIST DOCUMENTED IN MEDICAL RECORD: ICD-10-PCS | Mod: CPTII,S$GLB,, | Performed by: NURSE PRACTITIONER

## 2023-06-23 PROCEDURE — 99999 PR PBB SHADOW E&M-EST. PATIENT-LVL IV: ICD-10-PCS | Mod: PBBFAC,,, | Performed by: NURSE PRACTITIONER

## 2023-06-23 PROCEDURE — 3288F FALL RISK ASSESSMENT DOCD: CPT | Mod: CPTII,S$GLB,, | Performed by: NURSE PRACTITIONER

## 2023-06-23 PROCEDURE — 3074F SYST BP LT 130 MM HG: CPT | Mod: CPTII,S$GLB,, | Performed by: NURSE PRACTITIONER

## 2023-06-23 PROCEDURE — 3078F DIAST BP <80 MM HG: CPT | Mod: CPTII,S$GLB,, | Performed by: NURSE PRACTITIONER

## 2023-06-23 PROCEDURE — 99999 PR PBB SHADOW E&M-EST. PATIENT-LVL IV: CPT | Mod: PBBFAC,,, | Performed by: NURSE PRACTITIONER

## 2023-06-23 PROCEDURE — 1125F AMNT PAIN NOTED PAIN PRSNT: CPT | Mod: CPTII,S$GLB,, | Performed by: NURSE PRACTITIONER

## 2023-06-23 PROCEDURE — 1160F RVW MEDS BY RX/DR IN RCRD: CPT | Mod: CPTII,S$GLB,, | Performed by: NURSE PRACTITIONER

## 2023-06-23 RX ORDER — SERTRALINE HYDROCHLORIDE 25 MG/1
25 TABLET, FILM COATED ORAL DAILY
Qty: 30 TABLET | Refills: 2 | Status: SHIPPED | OUTPATIENT
Start: 2023-06-23 | End: 2023-07-14 | Stop reason: SDUPTHER

## 2023-06-23 NOTE — PROGRESS NOTES
History of Present Illness   Balbir Rebollar Sr. is a 73 y.o. man with medical history as listed below who presents today for evaluation of change in mood. He reports feeling depressed with no interest in doing things. Daughter states he is not showering or eating regularly. He also reports decreased appetite and weight loss. He complains of fatigue but is not sleeping well at night. Of note, he also reports he continues to have lightheadedness and low BP readings despite reducing his Losartan to 25 mg. He also reports persistent post nasal drip despite using nasal sprays as prescribed per ENT. He has no additional complaints and is otherwise healthy on today's visit.    Past Medical History:   Diagnosis Date    AICD (automatic cardioverter/defibrillator) present     Anticoagulant long-term use     Cardiomyopathy     Chronic combined systolic and diastolic congestive heart failure     Left ventricular ejection fraction is estimated at 15-20% and measured to be 25-33%.    Coronary artery disease     History of myocardial infarction     Hypertension     Paroxysmal A-fib     Stroke        Past Surgical History:   Procedure Laterality Date    CHOLECYSTECTOMY      LEFT HEART CATHETERIZATION Left 11/29/2022    Procedure: Left heart cath;  Surgeon: Wesley Rico MD;  Location: [facility] CATH LAB;  Service: Cardiology;  Laterality: Left;  1030am start, R rad access    LEFT HEART CATHETERIZATION N/A 12/1/2022    Procedure: Left heart cath;  Surgeon: Tam Cho MD;  Location: [facility] CATH LAB;  Service: Cardiology;  Laterality: N/A;    REPLACEMENT OF DUAL CHAMBER IMPLANTABLE CARDIOVERTER-DEFIBRILLATOR         Social History     Socioeconomic History    Marital status:    Tobacco Use    Smoking status: Former     Passive exposure: Past    Smokeless tobacco: Never   Substance and Sexual Activity    Alcohol use: Never    Drug use: Never     Social Determinants of Health     Financial Resource Strain: Low  Risk     Difficulty of Paying Living Expenses: Not hard at all   Food Insecurity: No Food Insecurity    Worried About Running Out of Food in the Last Year: Never true    Ran Out of Food in the Last Year: Never true   Transportation Needs: No Transportation Needs    Lack of Transportation (Medical): No    Lack of Transportation (Non-Medical): No   Physical Activity: Inactive    Days of Exercise per Week: 0 days    Minutes of Exercise per Session: 0 min   Stress: No Stress Concern Present    Feeling of Stress : Not at all   Social Connections: Socially Isolated    Frequency of Communication with Friends and Family: Three times a week    Frequency of Social Gatherings with Friends and Family: Three times a week    Attends [employer]ism Services: Never    Active Member of Clubs or Organizations: No    Attends Club or Organization Meetings: Never    Marital Status:    Housing Stability: Low Risk     Unable to Pay for Housing in the Last Year: No    Number of Places Lived in the Last Year: 1    Unstable Housing in the Last Year: No       History reviewed. No pertinent family history.    Review of Systems  Review of Systems   Constitutional:  Positive for malaise/fatigue and weight loss.   HENT:  Positive for congestion. Negative for ear pain, sinus pain and sore throat.    Eyes:  Negative for blurred vision and double vision.   Respiratory:  Negative for cough, shortness of breath and wheezing.    Cardiovascular:  Negative for chest pain, palpitations, orthopnea, claudication, leg swelling and PND.   Gastrointestinal:  Negative for blood in stool and melena.   Genitourinary:  Negative for flank pain and hematuria.   Musculoskeletal:  Negative for falls.   Neurological:  Positive for dizziness.   Endo/Heme/Allergies:  Bruises/bleeds easily.   Psychiatric/Behavioral:  Positive for depression. Negative for hallucinations, memory loss, substance abuse and suicidal ideas. The patient has insomnia. The patient is not  "nervous/anxious.      A complete review of systems was otherwise negative.    Physical Exam  BP (!) 90/50   Pulse (!) 53   Temp 97.5 °F (36.4 °C)   Ht 5' 9" (1.753 m)   Wt 62.1 kg (137 lb 0.3 oz)   SpO2 97%   BMI 20.23 kg/m²   General appearance: alert, appears stated age, cooperative, and no distress  Eyes: negative findings: lids and lashes normal and conjunctivae and sclerae normal  Ears: normal TM's and external ear canals both ears  Nose: Nares normal. Septum midline. Mucosa normal. No drainage or sinus tenderness.  Throat: lips, mucosa, and tongue normal; teeth and gums normal and post nasal drip  Neck: no carotid bruit, no JVD, and supple, symmetrical, trachea midline  Lungs: clear to auscultation bilaterally  Heart: regular rate and rhythm, S1, S2 normal, no murmur, click, rub or gallop  Extremities: extremities normal, atraumatic, no cyanosis or edema  Pulses: 2+ and symmetric  Skin: Skin color, texture, turgor normal. No rashes or lesions  Neurologic: Grossly normal    Assessment/Plan  Current moderate episode of major depressive disorder without prior episode  Trial of Zoloft daily.  Return in 2 weeks for reassessment.  -     sertraline (ZOLOFT) 25 MG tablet; Take 1 tablet (25 mg total) by mouth once daily.  Dispense: 30 tablet; Refill: 2    Chronic rhinosinusitis  Continue nasal sprays as prescribed.  Follow-up with ENT as scheduled.    Paroxysmal atrial fibrillation  The current medical regimen is effective;  continue present plan and medications.  Cut Losartan in half due to low blood pressure.  Followed by cardiology.    Coronary artery disease involving native coronary artery of native heart without angina pectoris  The current medical regimen is effective;  continue present plan and medications.  Cut Losartan in half due to low blood pressure.  Followed by cardiology.    ICD (implantable cardioverter-defibrillator), single, in situ  The current medical regimen is effective;  continue present " plan and medications.  Cut Losartan in half due to low blood pressure.  Followed by cardiology.    Ischemic cardiomyopathy  The current medical regimen is effective;  continue present plan and medications.  Cut Losartan in half due to low blood pressure.  Followed by cardiology.    Pure hypercholesterolemia  The current medical regimen is effective;  continue present plan and medications.  Cut Losartan in half due to low blood pressure.  Followed by cardiology.    Right bundle branch block (RBBB) with left anterior fascicular block (LAFB)  The current medical regimen is effective;  continue present plan and medications.  Cut Losartan in half due to low blood pressure.  Followed by cardiology.    Chronic combined systolic and diastolic congestive heart failure  The current medical regimen is effective;  continue present plan and medications.  Cut Losartan in half due to low blood pressure.  Followed by cardiology.    Patient has verbalized understanding and is in agreement with plan of care.    Follow up in about 2 weeks (around 7/7/2023).

## 2023-06-23 NOTE — PATIENT INSTRUCTIONS
Take a half tablet of the Losartan.   Check the blood pressure daily.  Let us know if the blood pressure is consistently running <100/60 or >140/90.  Start taking the Zoloft daily for the mood.  Resume the nasal sprays daily as prescribed.  Keep follow-up with ENT.  We will see you back in 2 weeks for a follow-up on Zoloft.

## 2023-06-26 ENCOUNTER — TELEPHONE (OUTPATIENT)
Dept: OTOLARYNGOLOGY | Facility: CLINIC | Age: 73
End: 2023-06-26
Payer: MEDICARE

## 2023-06-26 NOTE — TELEPHONE ENCOUNTER
Spoke with daughter, her dad is still having a lot of mucous. Pcp convinced him that he needs to have the flex scope done to verify the problem.

## 2023-06-26 NOTE — TELEPHONE ENCOUNTER
----- Message from Jessica Moses sent at 6/23/2023  2:44 PM CDT -----  Regarding: [id number]  Type:  Sooner Appointment Request    Patient is requesting a sooner appointment.  Patient declined first available appointment listed as well as another facility and provider .  Patient will not accept being placed on the waitlist and is requesting a message be sent to doctor.    Name of Caller: Holly - daughter    When is the first available appointment? 7/17    Symptoms: 2 month follow up for PND, runny nose poss flex    Would the patient rather a call back or a response via [email or web address]? Call back     Best Call Back Number: [phone]      Additional Information:

## 2023-06-27 DIAGNOSIS — I48.0 PAROXYSMAL ATRIAL FIBRILLATION: ICD-10-CM

## 2023-06-28 ENCOUNTER — OFFICE VISIT (OUTPATIENT)
Dept: OTOLARYNGOLOGY | Facility: CLINIC | Age: 73
End: 2023-06-28
Payer: MEDICARE

## 2023-06-28 VITALS
HEIGHT: 69 IN | WEIGHT: 133.94 LBS | DIASTOLIC BLOOD PRESSURE: 65 MMHG | BODY MASS INDEX: 19.84 KG/M2 | SYSTOLIC BLOOD PRESSURE: 130 MMHG

## 2023-06-28 DIAGNOSIS — J30.89 CHRONIC NONSEASONAL ALLERGIC RHINITIS DUE TO POLLEN: ICD-10-CM

## 2023-06-28 DIAGNOSIS — R09.82 POSTNASAL DRIP: ICD-10-CM

## 2023-06-28 DIAGNOSIS — J38.01 PARESIS OF RIGHT VOCAL FOLD: Primary | ICD-10-CM

## 2023-06-28 DIAGNOSIS — R49.0 DYSPHONIA: ICD-10-CM

## 2023-06-28 PROCEDURE — 1101F PR PT FALLS ASSESS DOC 0-1 FALLS W/OUT INJ PAST YR: ICD-10-PCS | Mod: CPTII,S$GLB,, | Performed by: OTOLARYNGOLOGY

## 2023-06-28 PROCEDURE — 3288F FALL RISK ASSESSMENT DOCD: CPT | Mod: CPTII,S$GLB,, | Performed by: OTOLARYNGOLOGY

## 2023-06-28 PROCEDURE — 4010F PR ACE/ARB THEARPY RXD/TAKEN: ICD-10-PCS | Mod: CPTII,S$GLB,, | Performed by: OTOLARYNGOLOGY

## 2023-06-28 PROCEDURE — 3078F DIAST BP <80 MM HG: CPT | Mod: CPTII,S$GLB,, | Performed by: OTOLARYNGOLOGY

## 2023-06-28 PROCEDURE — 3288F PR FALLS RISK ASSESSMENT DOCUMENTED: ICD-10-PCS | Mod: CPTII,S$GLB,, | Performed by: OTOLARYNGOLOGY

## 2023-06-28 PROCEDURE — 99213 OFFICE O/P EST LOW 20 MIN: CPT | Mod: 25,S$GLB,, | Performed by: OTOLARYNGOLOGY

## 2023-06-28 PROCEDURE — 99213 PR OFFICE/OUTPT VISIT, EST, LEVL III, 20-29 MIN: ICD-10-PCS | Mod: 25,S$GLB,, | Performed by: OTOLARYNGOLOGY

## 2023-06-28 PROCEDURE — 3078F PR MOST RECENT DIASTOLIC BLOOD PRESSURE < 80 MM HG: ICD-10-PCS | Mod: CPTII,S$GLB,, | Performed by: OTOLARYNGOLOGY

## 2023-06-28 PROCEDURE — 31575 DIAGNOSTIC LARYNGOSCOPY: CPT | Mod: S$GLB,,, | Performed by: OTOLARYNGOLOGY

## 2023-06-28 PROCEDURE — 1101F PT FALLS ASSESS-DOCD LE1/YR: CPT | Mod: CPTII,S$GLB,, | Performed by: OTOLARYNGOLOGY

## 2023-06-28 PROCEDURE — 1126F PR PAIN SEVERITY QUANTIFIED, NO PAIN PRESENT: ICD-10-PCS | Mod: CPTII,S$GLB,, | Performed by: OTOLARYNGOLOGY

## 2023-06-28 PROCEDURE — 4010F ACE/ARB THERAPY RXD/TAKEN: CPT | Mod: CPTII,S$GLB,, | Performed by: OTOLARYNGOLOGY

## 2023-06-28 PROCEDURE — 1126F AMNT PAIN NOTED NONE PRSNT: CPT | Mod: CPTII,S$GLB,, | Performed by: OTOLARYNGOLOGY

## 2023-06-28 PROCEDURE — 31575 PR LARYNGOSCOPY, FLEXIBLE; DIAGNOSTIC: ICD-10-PCS | Mod: S$GLB,,, | Performed by: OTOLARYNGOLOGY

## 2023-06-28 PROCEDURE — 3075F SYST BP GE 130 - 139MM HG: CPT | Mod: CPTII,S$GLB,, | Performed by: OTOLARYNGOLOGY

## 2023-06-28 PROCEDURE — 3075F PR MOST RECENT SYSTOLIC BLOOD PRESS GE 130-139MM HG: ICD-10-PCS | Mod: CPTII,S$GLB,, | Performed by: OTOLARYNGOLOGY

## 2023-06-28 NOTE — PROGRESS NOTES
OTOLARYNGOLOGY CLINIC NOTE  Date:  06/28/2023     Chief complaint:  Chief Complaint   Patient presents with    Other     Flex scope -- 2 mon f/u, runny nose, PND       History of Present Illness  Balbir Rebollar Sr. is a 73 y.o. male  presenting today for a followup. Here with family member    Reports that he did have to be intubated for a procedure prior to voice issues starting    Doing  just one spray not the regimen I prescribed him. He had report of negative flex scope at outside ent ( see below) however having persistent issues with intermittent dysphonia. He did not want a scope at last visit either but is willing now to get scope done after talking with his pcp.    I originally saw the patient on 5-19-23. Below text is copied from initial note on that date describing history of present illness at time of presentation.   When hospitalized in November with heart stuff started having issues a week after that with the postnasal drip. Amiodarone started right before he went into the  hospital. In the hospital he was started on intersto and eliquis. Entrseto was replaced with losartan. Metoprolol started and plavix as well.   Daughter said possibly farxega may be causing the issue from what she read online.      Has drainage down the back of nose and out the front of the nose. Bilateral. Clear drainage. Occurs All day . Has not noticed worsening rhinitis with eating.   It is bettter when he lays down but still has phelgm in the back of his throat just not as bad. No headaches with laying down.  No increase in drainage with leaning forward and/or straining. No h/o sinus surgery Went to [facility] (outside [facility]) and had a scope done. States that scope was normal. Not hoarse today and has not had issue with this in a while.      He reports that he also gets Hoarse on and off - this is usually associated with allergy flare ups     No classical heartburn symptoms.     Past Medical History  Past Medical  History:   Diagnosis Date    AICD (automatic cardioverter/defibrillator) present     Anticoagulant long-term use     Cardiomyopathy     Chronic combined systolic and diastolic congestive heart failure     Left ventricular ejection fraction is estimated at 15-20% and measured to be 25-33%.    Coronary artery disease     History of myocardial infarction     Hypertension     Paroxysmal A-fib     Stroke         Past Surgical History  Past Surgical History:   Procedure Laterality Date    CHOLECYSTECTOMY      LEFT HEART CATHETERIZATION Left 11/29/2022    Procedure: Left heart cath;  Surgeon: Wesley Rico MD;  Location: [facility] CATH LAB;  Service: Cardiology;  Laterality: Left;  1030am start, R rad access    LEFT HEART CATHETERIZATION N/A 12/1/2022    Procedure: Left heart cath;  Surgeon: Tam Cho MD;  Location: [facility] CATH LAB;  Service: Cardiology;  Laterality: N/A;    REPLACEMENT OF DUAL CHAMBER IMPLANTABLE CARDIOVERTER-DEFIBRILLATOR          Medications  Current Outpatient Medications on File Prior to Visit   Medication Sig Dispense Refill    amiodarone (PACERONE) 200 MG Tab Take 2 tablets (400 mg total) by mouth once daily. 180 tablet 3    apixaban (ELIQUIS) 5 mg Tab Take 1 tablet (5 mg total) by mouth 2 (two) times daily. 180 tablet 3    clopidogreL (PLAVIX) 75 mg tablet Take 1 tablet (75 mg total) by mouth every evening. 90 tablet 3    dapagliflozin (FARXIGA) 10 mg tablet Take 10 mg by mouth once daily.      dapagliflozin (FARXIGA) 10 mg tablet Take 1 tablet (10 mg total) by mouth once daily. 90 tablet 3    fluticasone propionate (FLONASE) 50 mcg/actuation nasal spray 1 spray by Each Nostril route once daily.      ipratropium (ATROVENT) 21 mcg (0.03 %) nasal spray 2 sprays by Each Nostril route 4 (four) times daily as needed for Rhinitis. 30 mL 0    losartan (COZAAR) 25 MG tablet Take 1 tablet (25 mg total) by mouth once daily. 90 tablet 3    metoprolol succinate (TOPROL-XL) 50 MG 24 hr tablet Take 1  "tablet (50 mg total) by mouth once daily. 90 tablet 3    nitroGLYCERIN (NITROSTAT) 0.4 MG SL tablet Place 1 tablet (0.4 mg total) under the tongue every 5 (five) minutes as needed for Chest pain. Up to 3 doses. If chest pain is not relieved or worsens 3 to 5 minutes after 1 dose, call 911 and seek immediate emergency medical attention. 20 tablet 3    pantoprazole (PROTONIX) 40 MG tablet Take 1 tablet (40 mg total) by mouth once daily. 90 tablet 3    rosuvastatin (CRESTOR) 40 MG Tab Take 1 tablet (40 mg total) by mouth every evening. 90 tablet 3    sertraline (ZOLOFT) 25 MG tablet Take 1 tablet (25 mg total) by mouth once daily. 30 tablet 2    spironolactone (ALDACTONE) 25 MG tablet Take 1 tablet (25 mg total) by mouth once daily. 90 tablet 3     No current facility-administered medications on file prior to visit.       Review of Systems  Review of Systems   Constitutional:  Positive for malaise/fatigue.   HENT:  Positive for sore throat.    Eyes: Negative.    Respiratory:  Positive for shortness of breath.    Cardiovascular: Negative.    Gastrointestinal: Negative.    Genitourinary: Negative.    Skin:  Positive for rash.   Neurological:  Positive for dizziness.   Psychiatric/Behavioral:  Positive for depression.       Social History   reports that he has quit smoking. He has been exposed to tobacco smoke. He has never used smokeless tobacco. He reports that he does not drink alcohol and does not use drugs.     Family History  No family history on file.     Physical Exam   Vitals:    06/28/23 0956   BP: 130/65    Body mass index is 19.78 kg/m².  Weight: 60.7 kg (133 lb 14.9 oz)   Height: 5' 9" (175.3 cm)     GENERAL: no acute distress.  HEAD: normocephalic.   EYES: No scleral icterus  EARS: external ear without lesion, normal pinna shape and position.    NOSE: external nose without significant bony abnormality  ORAL CAVITY/OROPHARYNX: tongue mobile.   NECK: trachea midline.   LYMPH NODES:No cervical " lymphadenopathy.  RESPIRATORY: no stridor, no stertor. Respirations nonlabored.  NEURO: alert, responds to questions appropriately.    PSYCH:mood appropriate    PROCEDURE NOTE  NAME OF PROCEDURE: Flexible Laryngoscopy, diagnostic  INDICATIONS: gag reflex precludes mirror exam, intermittent dysphonia  FINDINGS: Right vocal fold weak compared to left, has some aduction but slight. No mass    Consent: After procedure was explained in detail and all questions answered, verbal consent was obtained for performing flexible laryngoscopy.  Anesthesia: topical 4% lidocaine and neosynephrine  Procedure: With patient in seated position, the scope was inserted into the bilateral nasal passageway and advanced atraumatically into the nasopharynx to examine the following structures:     Nasopharynx: no mass or lesion noted in nasopharynx.   Oropharynx: base of tongue without  mass or ulceration. Lingual tonsils normal in appearance  Hypopharynx: posterior pharyngeal wall without mass or lesion. No pooling of secretions. Pyriform sinuses visible without mass or lesion  Larynx: epiglottis normal without lesion. False vocal folds without edema/erythema/lesion. True vocal folds Right vocal fold weak compared to left, has some aduction but slightMild interarytenoid edema no erythema . Postcricoid region with mild edema no lesion   Subglottis: visualized portion of subglottis normal in appearance    After examination performed, the scope was removed atraumatically . The patient tolerated the procedure well. Photodocumentation obtained with representative images below, all images and/or videos uploaded in media section of [employer].  Imaging:  The patient does not have any new imaging of the head and neck since last visit.     Labs:  CBC  Recent Labs   Lab 12/12/22  0209 12/13/22  0438 06/09/23  1220   WBC 7.17 7.00 6.07   Hemoglobin 15.1 15.0 14.9   Hematocrit 43.4 45.0 46.1   MCV 90 90 96   Platelets 204 205 185     BMP  Recent Labs   Lab  12/03/22  0011 12/03/22  0559 12/12/22  0209 12/13/22  0438 06/09/23  1220   Glucose 109   < > 138 H 100 101   Sodium 133 L   < > 143 143 144   Potassium 3.9   < > 3.3 L 3.1 L 3.5   Chloride 104   < > 106 105 108   CO2 19 L   < > 18 L 22 L 27   BUN 16   < > 26 H 27 H 21   Creatinine 0.7   < > 1.4 1.2 1.2   Calcium 8.5 L   < > 9.7 9.0 9.4   Phosphorus 2.3 L  --   --  3.4  --    Magnesium 2.0  --  2.3 2.3  --     < > = values in this interval not displayed.     COAGS  Recent Labs   Lab 12/05/22  0442 12/06/22  0912 12/12/22  0209   INR 2.8 H 3.8 H 3.8 H       Assessment  1. Paresis of right vocal fold  - CREATININE, SERUM; Future  - CT Neck Chest With Contrast (XPD); Future    2. Postnasal drip    3. Chronic nonseasonal allergic rhinitis due to pollen    4. Dysphonia       Plan:  Discussed plan of care with patient in detail and all questions answered. Patient reported understanding of plan of care. I gave the patient the opportunity to ask questions and patient confirmed all questions answered to satisfaction.     Will get ct neck and chest to evaluate  Possibly post intubation but will evaluate with above to ensure no other etiology.      F/u asap after ct     I spent a total of 20 minutes on the day of the visit.  This includes face to face time and non-face to face time preparing to see the patient (eg, review of tests), obtaining and/or reviewing separately obtained history, documenting clinical information in the electronic or other health record, independently interpreting results and communicating results to the patient/family/caregiver, or care coordinator.   Please be aware that this note has been generated with the assistance of [employer] voice-to-text.  Please excuse any spelling or grammatical errors.

## 2023-06-30 ENCOUNTER — HOSPITAL ENCOUNTER (OUTPATIENT)
Dept: RADIOLOGY | Facility: HOSPITAL | Age: 73
Discharge: HOME | End: 2023-06-30
Attending: NURSE PRACTITIONER
Payer: MEDICARE

## 2023-06-30 DIAGNOSIS — Z13.6 SCREENING FOR AAA (ABDOMINAL AORTIC ANEURYSM): ICD-10-CM

## 2023-06-30 PROCEDURE — 76775 US EXAM ABDO BACK WALL LIM: CPT | Mod: 26,,, | Performed by: RADIOLOGY

## 2023-06-30 PROCEDURE — 76775 US ABDOMINAL AORTA: ICD-10-PCS | Mod: 26,,, | Performed by: RADIOLOGY

## 2023-06-30 PROCEDURE — 76775 US EXAM ABDO BACK WALL LIM: CPT | Mod: TC

## 2023-07-05 ENCOUNTER — HOSPITAL ENCOUNTER (OUTPATIENT)
Dept: RADIOLOGY | Facility: HOSPITAL | Age: 73
Discharge: HOME | End: 2023-07-05
Attending: OTOLARYNGOLOGY
Payer: MEDICARE

## 2023-07-05 DIAGNOSIS — J38.01 PARESIS OF RIGHT VOCAL FOLD: ICD-10-CM

## 2023-07-05 PROCEDURE — 70491 CT NECK CHEST WITH CONTRAST (XPD): ICD-10-PCS | Mod: 26,,, | Performed by: RADIOLOGY

## 2023-07-05 PROCEDURE — 70491 CT SOFT TISSUE NECK W/DYE: CPT | Mod: TC

## 2023-07-05 PROCEDURE — 25500020 PHARM REV CODE 255: Performed by: OTOLARYNGOLOGY

## 2023-07-05 PROCEDURE — 71260 CT NECK CHEST WITH CONTRAST (XPD): ICD-10-PCS | Mod: 26,,, | Performed by: RADIOLOGY

## 2023-07-05 PROCEDURE — 70491 CT SOFT TISSUE NECK W/DYE: CPT | Mod: 26,,, | Performed by: RADIOLOGY

## 2023-07-05 PROCEDURE — 71260 CT THORAX DX C+: CPT | Mod: 26,,, | Performed by: RADIOLOGY

## 2023-07-05 RX ADMIN — IOHEXOL 75 ML: 350 INJECTION, SOLUTION INTRAVENOUS at 03:07

## 2023-07-07 ENCOUNTER — TELEPHONE (OUTPATIENT)
Dept: FAMILY MEDICINE | Facility: CLINIC | Age: 73
End: 2023-07-07
Payer: MEDICARE

## 2023-07-10 ENCOUNTER — OFFICE VISIT (OUTPATIENT)
Dept: OTOLARYNGOLOGY | Facility: CLINIC | Age: 73
End: 2023-07-10
Payer: MEDICARE

## 2023-07-10 VITALS — WEIGHT: 133 LBS | BODY MASS INDEX: 19.7 KG/M2 | HEIGHT: 69 IN

## 2023-07-10 DIAGNOSIS — J30.0 VASOMOTOR RHINITIS: ICD-10-CM

## 2023-07-10 DIAGNOSIS — R09.82 POSTNASAL DRIP: ICD-10-CM

## 2023-07-10 DIAGNOSIS — J30.89 CHRONIC NONSEASONAL ALLERGIC RHINITIS DUE TO POLLEN: ICD-10-CM

## 2023-07-10 DIAGNOSIS — J38.01 PARESIS OF RIGHT VOCAL FOLD: ICD-10-CM

## 2023-07-10 DIAGNOSIS — R49.0 DYSPHONIA: ICD-10-CM

## 2023-07-10 DIAGNOSIS — G52.3 DISORDER OF HYPOGLOSSAL NERVE: Primary | ICD-10-CM

## 2023-07-10 PROCEDURE — 3288F PR FALLS RISK ASSESSMENT DOCUMENTED: ICD-10-PCS | Mod: CPTII,S$GLB,, | Performed by: OTOLARYNGOLOGY

## 2023-07-10 PROCEDURE — 4010F ACE/ARB THERAPY RXD/TAKEN: CPT | Mod: CPTII,S$GLB,, | Performed by: OTOLARYNGOLOGY

## 2023-07-10 PROCEDURE — 99214 PR OFFICE/OUTPT VISIT, EST, LEVL IV, 30-39 MIN: ICD-10-PCS | Mod: S$GLB,,, | Performed by: OTOLARYNGOLOGY

## 2023-07-10 PROCEDURE — 3288F FALL RISK ASSESSMENT DOCD: CPT | Mod: CPTII,S$GLB,, | Performed by: OTOLARYNGOLOGY

## 2023-07-10 PROCEDURE — 1101F PR PT FALLS ASSESS DOC 0-1 FALLS W/OUT INJ PAST YR: ICD-10-PCS | Mod: CPTII,S$GLB,, | Performed by: OTOLARYNGOLOGY

## 2023-07-10 PROCEDURE — 1126F AMNT PAIN NOTED NONE PRSNT: CPT | Mod: CPTII,S$GLB,, | Performed by: OTOLARYNGOLOGY

## 2023-07-10 PROCEDURE — 99214 OFFICE O/P EST MOD 30 MIN: CPT | Mod: S$GLB,,, | Performed by: OTOLARYNGOLOGY

## 2023-07-10 PROCEDURE — 1101F PT FALLS ASSESS-DOCD LE1/YR: CPT | Mod: CPTII,S$GLB,, | Performed by: OTOLARYNGOLOGY

## 2023-07-10 PROCEDURE — 1126F PR PAIN SEVERITY QUANTIFIED, NO PAIN PRESENT: ICD-10-PCS | Mod: CPTII,S$GLB,, | Performed by: OTOLARYNGOLOGY

## 2023-07-10 PROCEDURE — 4010F PR ACE/ARB THEARPY RXD/TAKEN: ICD-10-PCS | Mod: CPTII,S$GLB,, | Performed by: OTOLARYNGOLOGY

## 2023-07-10 NOTE — PROGRESS NOTES
OTOLARYNGOLOGY CLINIC NOTE  Date:  07/10/2023     Chief complaint:  Chief Complaint   Patient presents with    Follow-up     Follow up for Ct results       History of Present Illness  Balbir Rebollar . is a 73 y.o. male  presenting today for a followup.    Has been using sprays but not consistently    Had procedure in November or December    I last saw the patient on 6-28-23. Below text is copied from initial note on that date describing history of present illness at that time  and at time of presentation.   Reports that he did have to be intubated for a procedure prior to voice issues starting     Doing  just one spray not the regimen I prescribed him. He had report of negative flex scope at outside ent ( see below) however having persistent issues with intermittent dysphonia. He did not want a scope at last visit either but is willing now to get scope done after talking with his pcp.     I originally saw the patient on 5-19-23. Below text is copied from initial note on that date describing history of present illness at time of presentation.   When hospitalized in November with heart stuff started having issues a week after that with the postnasal drip. Amiodarone started right before he went into the  hospital. In the hospital he was started on intersto and eliquis. Entrseto was replaced with losartan. Metoprolol started and plavix as well.   Daughter said possibly farxega may be causing the issue from what she read online.      Has drainage down the back of nose and out the front of the nose. Bilateral. Clear drainage. Occurs All day . Has not noticed worsening rhinitis with eating.   It is bettter when he lays down but still has phelgm in the back of his throat just not as bad. No headaches with laying down.  No increase in drainage with leaning forward and/or straining. No h/o sinus surgery Went to [facility] (outside [facility]) and had a scope done. States that scope was normal. Not hoarse today and  has not had issue with this in a while.      He reports that he also gets Hoarse on and off - this is usually associated with allergy flare ups     No classical heartburn symptoms.        Past Medical History  Past Medical History:   Diagnosis Date    AICD (automatic cardioverter/defibrillator) present     Anticoagulant long-term use     Cardiomyopathy     Chronic combined systolic and diastolic congestive heart failure     Left ventricular ejection fraction is estimated at 15-20% and measured to be 25-33%.    Coronary artery disease     History of myocardial infarction     Hypertension     Paroxysmal A-fib     Stroke         Past Surgical History  Past Surgical History:   Procedure Laterality Date    CHOLECYSTECTOMY      LEFT HEART CATHETERIZATION Left 11/29/2022    Procedure: Left heart cath;  Surgeon: Wesley Rico MD;  Location: [facility] CATH LAB;  Service: Cardiology;  Laterality: Left;  1030am start, R rad access    LEFT HEART CATHETERIZATION N/A 12/1/2022    Procedure: Left heart cath;  Surgeon: Tam Cho MD;  Location: [facility] CATH LAB;  Service: Cardiology;  Laterality: N/A;    REPLACEMENT OF DUAL CHAMBER IMPLANTABLE CARDIOVERTER-DEFIBRILLATOR          Medications  Current Outpatient Medications on File Prior to Visit   Medication Sig Dispense Refill    amiodarone (PACERONE) 200 MG Tab Take 2 tablets (400 mg total) by mouth once daily. 180 tablet 3    apixaban (ELIQUIS) 5 mg Tab Take 1 tablet (5 mg total) by mouth 2 (two) times daily. 180 tablet 3    clopidogreL (PLAVIX) 75 mg tablet Take 1 tablet (75 mg total) by mouth every evening. 90 tablet 3    dapagliflozin (FARXIGA) 10 mg tablet Take 10 mg by mouth once daily.      dapagliflozin (FARXIGA) 10 mg tablet Take 1 tablet (10 mg total) by mouth once daily. 90 tablet 3    fluticasone propionate (FLONASE) 50 mcg/actuation nasal spray 1 spray by Each Nostril route once daily.      ipratropium (ATROVENT) 21 mcg (0.03 %) nasal spray 2 sprays by Each  Nostril route 4 (four) times daily as needed for Rhinitis. 30 mL 0    losartan (COZAAR) 25 MG tablet Take 1 tablet (25 mg total) by mouth once daily. 90 tablet 3    metoprolol succinate (TOPROL-XL) 50 MG 24 hr tablet Take 1 tablet (50 mg total) by mouth once daily. 90 tablet 3    nitroGLYCERIN (NITROSTAT) 0.4 MG SL tablet Place 1 tablet (0.4 mg total) under the tongue every 5 (five) minutes as needed for Chest pain. Up to 3 doses. If chest pain is not relieved or worsens 3 to 5 minutes after 1 dose, call 911 and seek immediate emergency medical attention. 20 tablet 3    pantoprazole (PROTONIX) 40 MG tablet Take 1 tablet (40 mg total) by mouth once daily. 90 tablet 3    rosuvastatin (CRESTOR) 40 MG Tab Take 1 tablet (40 mg total) by mouth every evening. 90 tablet 3    sertraline (ZOLOFT) 25 MG tablet Take 1 tablet (25 mg total) by mouth once daily. 30 tablet 2    spironolactone (ALDACTONE) 25 MG tablet Take 1 tablet (25 mg total) by mouth once daily. 90 tablet 3     No current facility-administered medications on file prior to visit.       Review of Systems  Review of Systems   Constitutional:  Positive for malaise/fatigue.   HENT:  Positive for sore throat.    Eyes: Negative.    Respiratory:  Positive for cough.    Cardiovascular: Negative.    Genitourinary: Negative.    Musculoskeletal: Negative.    Skin: Negative.    Psychiatric/Behavioral:  Positive for depression.         Answers submitted by the patient for this visit:  Review of Symptoms Questionnaire  (Submitted on 7/10/2023)  sinus pressure : Yes  Acid Reflux?: Yes  Cold all of the time? : Yes  Light-headedness: Yes    Social History   reports that he has quit smoking. He has been exposed to tobacco smoke. He has never used smokeless tobacco. He reports that he does not drink alcohol and does not use drugs.     Family History  No family history on file.     Physical Exam   There were no vitals filed for this visit. Body mass index is 19.64 kg/m².  Weight:  "60.3 kg (133 lb)   Height: 5' 9" (175.3 cm)     GENERAL: no acute distress.  HEAD: normocephalic.   EYES: No scleral icterus  EARS: external ear without lesion, normal pinna shape and position.  External auditory canal with normal cerumen, tympanic membrane fully visible, no perforation , no retraction. No middle ear effusion. Ossicles intact.   NOSE: external nose without significant bony abnormality  ORAL CAVITY/OROPHARYNX: tongue mobile.   NECK: trachea midline.   LYMPH NODES:No cervical lymphadenopathy.  RESPIRATORY: no stridor, no stertor. Voice normal. Respirations nonlabored.  NEURO: alert, responds to questions appropriately.    PSYCH:mood appropriate      Imaging:  The patient does have any new imaging of the head and neck since last visit.   Mri  did not see anything corresponding to area of concern on CT scan but could be because of thicker cuts.  will obtain new mri with thin cuts     Ct neck and chest images and report personally reviewed          Labs:  CBC  Recent Labs   Lab 12/12/22 0209 12/13/22 0438 06/09/23  1220   WBC 7.17 7.00 6.07   Hemoglobin 15.1 15.0 14.9   Hematocrit 43.4 45.0 46.1   MCV 90 90 96   Platelets 204 205 185     BMP  Recent Labs   Lab 12/03/22  0011 12/03/22  0559 12/12/22  0209 12/13/22  0438 06/09/23  1220 07/05/23  1431   Glucose 109   < > 138 H 100 101  --    Sodium 133 L   < > 143 143 144  --    Potassium 3.9   < > 3.3 L 3.1 L 3.5  --    Chloride 104   < > 106 105 108  --    CO2 19 L   < > 18 L 22 L 27  --    BUN 16   < > 26 H 27 H 21  --    Creatinine 0.7   < > 1.4 1.2 1.2 1.3   Calcium 8.5 L   < > 9.7 9.0 9.4  --    Phosphorus 2.3 L  --   --  3.4  --   --    Magnesium 2.0  --  2.3 2.3  --   --     < > = values in this interval not displayed.     COAGS  Recent Labs   Lab 12/05/22  0442 12/06/22  0912 12/12/22  0209   INR 2.8 H 3.8 H 3.8 H       Assessment  1. Disorder of hypoglossal nerve  - MRI Brain W WO Contrast; Future    2. Paresis of right vocal fold  - " Ambulatory referral/consult to Speech Therapy; Future    3. Dysphonia  - Ambulatory referral/consult to Speech Therapy; Future    4. Postnasal drip    5. Chronic nonseasonal allergic rhinitis due to pollen    6. Vasomotor rhinitis       Plan:  Discussed plan of care with patient in detail and all questions answered. Patient reported understanding of plan of care. I gave the patient the opportunity to ask questions and patient confirmed all questions answered to satisfaction.     Bring nasal sprays to next visit- states has 4 - I dont see astelin on profile so unclear what he is using  States using daily but not all 4 daily ; unclear how to direct him about when and how to use sprays as he is not sure what he is doing. I have tried to explain this to him at a couple of visits so I have asked him to bring with him at next visit    Mri  did not see anything corresponding to area of concern on CT scan but could be because of thicker cuts.  will obtain new mri with thin cuts . Mri to eval possible mass in hypoglossal canal that was reported on ct neck and chest that was obtained for evaluation of right vocal fold paresis. We discussed briefly today about vocal fold augmentation for vocal fold paresis and how this could help with voice if he is interested. We can discuss more about vocal fold injection at mri follow up. Hypoglossal canal does appear expanded but clinically no evidence of denervation - no tongue deviation and no atrophy of muscles    SLP referral     F/u after imaging      Please be aware that this note has been generated with the assistance of Blanca voice-to-text.  Please excuse any spelling or grammatical errors.

## 2023-07-14 ENCOUNTER — OFFICE VISIT (OUTPATIENT)
Dept: FAMILY MEDICINE | Facility: CLINIC | Age: 73
End: 2023-07-14
Payer: MEDICARE

## 2023-07-14 ENCOUNTER — PATIENT MESSAGE (OUTPATIENT)
Dept: OTOLARYNGOLOGY | Facility: CLINIC | Age: 73
End: 2023-07-14
Payer: MEDICARE

## 2023-07-14 VITALS
DIASTOLIC BLOOD PRESSURE: 50 MMHG | BODY MASS INDEX: 19.56 KG/M2 | SYSTOLIC BLOOD PRESSURE: 100 MMHG | HEART RATE: 53 BPM | WEIGHT: 132.06 LBS | TEMPERATURE: 97 F | HEIGHT: 69 IN | OXYGEN SATURATION: 97 %

## 2023-07-14 DIAGNOSIS — F32.1 CURRENT MODERATE EPISODE OF MAJOR DEPRESSIVE DISORDER WITHOUT PRIOR EPISODE: Primary | ICD-10-CM

## 2023-07-14 PROCEDURE — 4010F ACE/ARB THERAPY RXD/TAKEN: CPT | Mod: CPTII,S$GLB,, | Performed by: NURSE PRACTITIONER

## 2023-07-14 PROCEDURE — 1126F AMNT PAIN NOTED NONE PRSNT: CPT | Mod: CPTII,S$GLB,, | Performed by: NURSE PRACTITIONER

## 2023-07-14 PROCEDURE — 3074F SYST BP LT 130 MM HG: CPT | Mod: CPTII,S$GLB,, | Performed by: NURSE PRACTITIONER

## 2023-07-14 PROCEDURE — 1101F PR PT FALLS ASSESS DOC 0-1 FALLS W/OUT INJ PAST YR: ICD-10-PCS | Mod: CPTII,S$GLB,, | Performed by: NURSE PRACTITIONER

## 2023-07-14 PROCEDURE — 3288F PR FALLS RISK ASSESSMENT DOCUMENTED: ICD-10-PCS | Mod: CPTII,S$GLB,, | Performed by: NURSE PRACTITIONER

## 2023-07-14 PROCEDURE — 3288F FALL RISK ASSESSMENT DOCD: CPT | Mod: CPTII,S$GLB,, | Performed by: NURSE PRACTITIONER

## 2023-07-14 PROCEDURE — 1159F MED LIST DOCD IN RCRD: CPT | Mod: CPTII,S$GLB,, | Performed by: NURSE PRACTITIONER

## 2023-07-14 PROCEDURE — 1101F PT FALLS ASSESS-DOCD LE1/YR: CPT | Mod: CPTII,S$GLB,, | Performed by: NURSE PRACTITIONER

## 2023-07-14 PROCEDURE — 1126F PR PAIN SEVERITY QUANTIFIED, NO PAIN PRESENT: ICD-10-PCS | Mod: CPTII,S$GLB,, | Performed by: NURSE PRACTITIONER

## 2023-07-14 PROCEDURE — 4010F PR ACE/ARB THEARPY RXD/TAKEN: ICD-10-PCS | Mod: CPTII,S$GLB,, | Performed by: NURSE PRACTITIONER

## 2023-07-14 PROCEDURE — 1159F PR MEDICATION LIST DOCUMENTED IN MEDICAL RECORD: ICD-10-PCS | Mod: CPTII,S$GLB,, | Performed by: NURSE PRACTITIONER

## 2023-07-14 PROCEDURE — 99999 PR PBB SHADOW E&M-EST. PATIENT-LVL IV: ICD-10-PCS | Mod: PBBFAC,,, | Performed by: NURSE PRACTITIONER

## 2023-07-14 PROCEDURE — 3078F PR MOST RECENT DIASTOLIC BLOOD PRESSURE < 80 MM HG: ICD-10-PCS | Mod: CPTII,S$GLB,, | Performed by: NURSE PRACTITIONER

## 2023-07-14 PROCEDURE — 3078F DIAST BP <80 MM HG: CPT | Mod: CPTII,S$GLB,, | Performed by: NURSE PRACTITIONER

## 2023-07-14 PROCEDURE — 1160F RVW MEDS BY RX/DR IN RCRD: CPT | Mod: CPTII,S$GLB,, | Performed by: NURSE PRACTITIONER

## 2023-07-14 PROCEDURE — 99214 OFFICE O/P EST MOD 30 MIN: CPT | Mod: S$GLB,,, | Performed by: NURSE PRACTITIONER

## 2023-07-14 PROCEDURE — 3074F PR MOST RECENT SYSTOLIC BLOOD PRESSURE < 130 MM HG: ICD-10-PCS | Mod: CPTII,S$GLB,, | Performed by: NURSE PRACTITIONER

## 2023-07-14 PROCEDURE — 99214 PR OFFICE/OUTPT VISIT, EST, LEVL IV, 30-39 MIN: ICD-10-PCS | Mod: S$GLB,,, | Performed by: NURSE PRACTITIONER

## 2023-07-14 PROCEDURE — 99999 PR PBB SHADOW E&M-EST. PATIENT-LVL IV: CPT | Mod: PBBFAC,,, | Performed by: NURSE PRACTITIONER

## 2023-07-14 PROCEDURE — 1160F PR REVIEW ALL MEDS BY PRESCRIBER/CLIN PHARMACIST DOCUMENTED: ICD-10-PCS | Mod: CPTII,S$GLB,, | Performed by: NURSE PRACTITIONER

## 2023-07-14 RX ORDER — SERTRALINE HYDROCHLORIDE 50 MG/1
50 TABLET, FILM COATED ORAL DAILY
Qty: 30 TABLET | Refills: 2 | Status: SHIPPED | OUTPATIENT
Start: 2023-07-14 | End: 2024-07-13

## 2023-07-14 NOTE — PATIENT INSTRUCTIONS
You are being referred to the [facility] program. A representative will be reaching out to you in the near future.

## 2023-07-14 NOTE — PROGRESS NOTES
History of Present Illness   Balbir Rebollar Sr. is a 73 y.o. man with medical history as listed below who presents today for 2 week follow-up, depression. He is taking Zoloft as prescribed without perceived SE. He reports minimal improvement in mood. He is still feeling down and depressed and not sleeping well, although he feels he is waking up frequently to cough. He is still not eating well. Daughter feels socialization would be good for him. He is open to trying a dose increase in the Zoloft.    Past Medical History:   Diagnosis Date    AICD (automatic cardioverter/defibrillator) present     Anticoagulant long-term use     Cardiomyopathy     Chronic combined systolic and diastolic congestive heart failure     Left ventricular ejection fraction is estimated at 15-20% and measured to be 25-33%.    Coronary artery disease     History of myocardial infarction     Hypertension     Paroxysmal A-fib     Stroke        Past Surgical History:   Procedure Laterality Date    CHOLECYSTECTOMY      LEFT HEART CATHETERIZATION Left 11/29/2022    Procedure: Left heart cath;  Surgeon: Wesley Rico MD;  Location: [facility] CATH LAB;  Service: Cardiology;  Laterality: Left;  1030am start, R rad access    LEFT HEART CATHETERIZATION N/A 12/1/2022    Procedure: Left heart cath;  Surgeon: Tam Cho MD;  Location: [facility] CATH LAB;  Service: Cardiology;  Laterality: N/A;    REPLACEMENT OF DUAL CHAMBER IMPLANTABLE CARDIOVERTER-DEFIBRILLATOR         Social History     Socioeconomic History    Marital status:    Tobacco Use    Smoking status: Former     Passive exposure: Past    Smokeless tobacco: Never   Substance and Sexual Activity    Alcohol use: Never    Drug use: Never     Social Determinants of Health     Financial Resource Strain: Low Risk     Difficulty of Paying Living Expenses: Not hard at all   Food Insecurity: No Food Insecurity    Worried About Running Out of Food in the Last Year: Never true    Ran Out  "of Food in the Last Year: Never true   Transportation Needs: No Transportation Needs    Lack of Transportation (Medical): No    Lack of Transportation (Non-Medical): No   Physical Activity: Inactive    Days of Exercise per Week: 0 days    Minutes of Exercise per Session: 0 min   Stress: No Stress Concern Present    Feeling of Stress : Not at all   Social Connections: Socially Isolated    Frequency of Communication with Friends and Family: Three times a week    Frequency of Social Gatherings with Friends and Family: Three times a week    Attends Anabaptism Services: Never    Active Member of Clubs or Organizations: No    Attends Club or Organization Meetings: Never    Marital Status:    Housing Stability: Low Risk     Unable to Pay for Housing in the Last Year: No    Number of Places Lived in the Last Year: 1    Unstable Housing in the Last Year: No       No family history on file.    Review of Systems  Review of Systems   Constitutional:  Positive for malaise/fatigue and weight loss.   HENT:  Positive for sore throat.    Respiratory:  Positive for cough. Negative for shortness of breath.    Cardiovascular:  Negative for chest pain and palpitations.   Musculoskeletal:  Negative for falls.   Neurological:  Positive for dizziness. Negative for loss of consciousness and headaches.   Psychiatric/Behavioral:  Positive for depression. Negative for suicidal ideas. The patient has insomnia. The patient is not nervous/anxious.      A complete review of systems was otherwise negative.    Physical Exam  BP (!) 100/50   Pulse (!) 53   Temp 97.1 °F (36.2 °C)   Ht 5' 9" (1.753 m)   Wt 59.9 kg (132 lb 0.9 oz)   SpO2 97%   BMI 19.50 kg/m²   General appearance: alert, appears stated age, cooperative, and no distress  Lungs: clear to auscultation bilaterally  Heart: regular rate and rhythm, S1, S2 normal, no murmur, click, rub or gallop  Extremities: extremities normal, atraumatic, no cyanosis or edema  Neurologic: " Grossly normal    Assessment/Plan  Current moderate episode of major depressive disorder without prior episode  Increase Zoloft to 50 mg.  Referral to [facility] to promote socialization.  RTC PRN.  -     sertraline (ZOLOFT) 50 MG tablet; Take 1 tablet (50 mg total) by mouth once daily.  Dispense: 30 tablet; Refill: 2  -     Ambulatory referral/consult to the [facility]    Patient has verbalized understanding and is in agreement with plan of care.    Follow up in about 3 months (around 10/14/2023).

## 2023-07-17 ENCOUNTER — TELEPHONE (OUTPATIENT)
Dept: SPEECH THERAPY | Facility: HOSPITAL | Age: 73
End: 2023-07-17
Payer: MEDICARE

## 2023-07-18 ENCOUNTER — CLINICAL SUPPORT (OUTPATIENT)
Dept: SPEECH THERAPY | Facility: HOSPITAL | Age: 73
End: 2023-07-18
Attending: OTOLARYNGOLOGY
Payer: MEDICARE

## 2023-07-18 DIAGNOSIS — J38.01 PARESIS OF RIGHT VOCAL FOLD: ICD-10-CM

## 2023-07-18 DIAGNOSIS — R49.0 DYSPHONIA: ICD-10-CM

## 2023-07-18 PROCEDURE — 92524 BEHAVRAL QUALIT ANALYS VOICE: CPT | Mod: GN

## 2023-07-18 NOTE — Clinical Note
Hello, thanks for this referral. I know you are planning to f/u from recent imaging. I did go ahead and make an appt with Dr Trujillo re:voice and possible augmentation. However, if you were planning to help him with this, I can cancel that. I wasn't sure if in office procedure, if warranted, would be a better option than asleep. Just let me know your thoughts. Thanks! Best, Clau

## 2023-07-18 NOTE — PATIENT INSTRUCTIONS
"Practice Strong Voice:    Every 3-4 hours spend about 2-3 minutes on the following:    Say the vowel "ahhhh" with strong voice, hold that note as long as you can keeping voice strong: repeat 5-10 x  Again the vowel "ahhhh" glide from high to low as best you can with STRONG voice. 5-10 x hi to low and 5-10x low to high  Make list of 10 things you say often and practice saying these with STRONG voice.    I want a fish sandwich from [employer].   I take too much medicine  Say your name and date of birth    "

## 2023-07-18 NOTE — PROGRESS NOTES
Referring provider: Dr. Faith Donald  Reason for visit:  Behavioral and qualitative analysis of voice and resonance (CPT 00294)    Subjective / History    Balbir Rebollar Sr. is a 73 y.o. male referred for voice evaluation (CPT 25947) by Dr. Donald.  Pt reports decline in voice following hospital stay and heart procedures in December 2022. He believes voice is getting worse since this time. He clears his throat a lot and spits a lot of mucus. Nose has also been running a lot since this time, Flonase may be helping a bit. Did have dysphagia for liquids after surgery, this has resolved. Has lost about 40 lbs in this time. Feels has appetite, not gaining weight. No pill or food dysphagia. Lives with his daughter. Dr Donald is also following pt for findings on imaging of change of the right hypoglossal canal. Additional c/o excess saliva with poor management and resulting more frequent coughing. Medication and problem lists were reviewed.     Swallow: difficulty denied, however, daughter reports frequent throat clearing with and without eating/drinking  Breathing: throat clearing    Smoking: former  Reflux: no    Laryngoscopy findings per Dr. Donald  6/288/23  NAME OF PROCEDURE: Flexible Laryngoscopy, diagnostic  INDICATIONS: gag reflex precludes mirror exam, intermittent dysphonia  FINDINGS: Right vocal fold weak compared to left, has some aduction but slight. No mass     Consent: After procedure was explained in detail and all questions answered, verbal consent was obtained for performing flexible laryngoscopy.  Anesthesia: topical 4% lidocaine and neosynephrine  Procedure: With patient in seated position, the scope was inserted into the bilateral nasal passageway and advanced atraumatically into the nasopharynx to examine the following structures:   Nasopharynx: no mass or lesion noted in nasopharynx.   Oropharynx: base of tongue without  mass or ulceration. Lingual tonsils normal in  appearance  Hypopharynx: posterior pharyngeal wall without mass or lesion. No pooling of secretions. Pyriform sinuses visible without mass or lesion  Larynx: epiglottis normal without lesion. False vocal folds without edema/erythema/lesion. True vocal folds Right vocal fold weak compared to left, has some aduction but slightMild interarytenoid edema no erythema . Postcricoid region with mild edema no lesion   Subglottis: visualized portion of subglottis normal in appearance    Imagin23 CT Neck Chest  Impression:   Relative expansile change at the right hypoglossal canal with differential consideration to include underlying lesion such as schwannoma.  Further correlation with MRI with and without contrast recommended.   Diminutive caliber of the right vertebral artery with areas of multifocal severe narrowing or occlusion with reconstituted distal portion.   No discrete neck soft tissue mass or cervical adenopathy.   Mild lung emphysematous change.  No acute intrathoracic abnormality.   Suspected remote lacunar infarct at the left cerebellar lobe and additional findings as above.       Past Medical History:   Diagnosis Date    AICD (automatic cardioverter/defibrillator) present     Anticoagulant long-term use     Cardiomyopathy     Chronic combined systolic and diastolic congestive heart failure     Left ventricular ejection fraction is estimated at 15-20% and measured to be 25-33%.    Coronary artery disease     History of myocardial infarction     Hypertension     Paroxysmal A-fib     Stroke      Current Outpatient Medications on File Prior to Visit   Medication Sig Dispense Refill    amiodarone (PACERONE) 200 MG Tab Take 2 tablets (400 mg total) by mouth once daily. 180 tablet 3    apixaban (ELIQUIS) 5 mg Tab Take 1 tablet (5 mg total) by mouth 2 (two) times daily. 180 tablet 3    clopidogreL (PLAVIX) 75 mg tablet Take 1 tablet (75 mg total) by mouth every evening. 90 tablet 3    dapagliflozin (FARXIGA) 10  mg tablet Take 10 mg by mouth once daily.      dapagliflozin (FARXIGA) 10 mg tablet Take 1 tablet (10 mg total) by mouth once daily. 90 tablet 3    fluticasone propionate (FLONASE) 50 mcg/actuation nasal spray 1 spray by Each Nostril route once daily.      ipratropium (ATROVENT) 21 mcg (0.03 %) nasal spray 2 sprays by Each Nostril route 4 (four) times daily as needed for Rhinitis. 30 mL 0    losartan (COZAAR) 25 MG tablet Take 1 tablet (25 mg total) by mouth once daily. 90 tablet 3    metoprolol succinate (TOPROL-XL) 50 MG 24 hr tablet Take 1 tablet (50 mg total) by mouth once daily. 90 tablet 3    nitroGLYCERIN (NITROSTAT) 0.4 MG SL tablet Place 1 tablet (0.4 mg total) under the tongue every 5 (five) minutes as needed for Chest pain. Up to 3 doses. If chest pain is not relieved or worsens 3 to 5 minutes after 1 dose, call 911 and seek immediate emergency medical attention. 20 tablet 3    pantoprazole (PROTONIX) 40 MG tablet Take 1 tablet (40 mg total) by mouth once daily. 90 tablet 3    rosuvastatin (CRESTOR) 40 MG Tab Take 1 tablet (40 mg total) by mouth every evening. 90 tablet 3    sertraline (ZOLOFT) 50 MG tablet Take 1 tablet (50 mg total) by mouth once daily. 30 tablet 2    spironolactone (ALDACTONE) 25 MG tablet Take 1 tablet (25 mg total) by mouth once daily. 90 tablet 3     No current facility-administered medications on file prior to visit.       Objective    Perceptual/behavioral assessment  -Quality: strained and breathy  -Volume: decreased projection  -Pitch: high F0  -Flexibility: diminished  -Habitual respiratory pattern: chest/clavicular    Voice Handicap Index-10   0 = Never 1 = Almost Never 2 = Sometimes 3 = Almost Always 4 = Always   My voice makes it difficult for people to hear me. 0 1 2 3 4   People have difficulty understanding me in a noisy room. 0 1 2 3 4   My voice difficulties restrict personal and social life. 0 1 2 3 4   I feel left out of conversations because of my voice. 0 1 2 3 4    My voice problem causes me to lose income. 0 1 2 3 4   I feel as though I have to strain to produce voice. 0 1 2 3 4   The clarity of my voice is unpredictable. 0 1 2 3 4   My voice problem upsets me. 0 1 2 3 4   My voice makes me feel handicapped. 0 1 2 3 4   People ask, Whats wrong with your voice? 0 1 2 3 4  Score: 13    Clinical Evaluation/Treatment: This is a thin appearing male who presents in no acute distress, he is accompanied by his daughter. The patient is fully oriented, affect is WNL. Oral mechanism examination reveals articulators to have range, speed and coordination of movement WFL for speech and swallow tasks. Voice today is moderately dysphonic with vocal weakness, breathy quality, decreased projection and strain.     Education / Stimulability Trials  Education provided on Dr. Donald's laryngoscopy findings as they pertain to voice production, the patient's diagnosis, associated symptoms, rationale for voice therapy and plan of care for management of dysphonia. Discussed importance of vocal hygiene including: hydration, reducing caffeine/drying agents and reducing throat clearing, coughing, other phonotraumatic behaviors. Patient was stimulable for improved voice using exuberant voice exercises. Improvements in forward resonance with clearer vocal quality noted on sustained notes and phrases, pt reports this does take significant effort. Patient establishes optimal pitch at lower F0 when using stronger voice. Written instructions for Home Exercise program provided. Encouraged practicing exercises several times daily to solidify muscle memory patterns and reduce extralaryngeal tension during speech tasks. He was amenable to all suggestions.     Functional goals  Short Term Goals: (4 weeks)   1. Patient will complete SOVT exercises and/or resonant-focused exercises 3-5x daily to improve vocal cord function.     Initiated   2. Patient will improve the quality, efficiency, and ease of  phonation through resonant and/or airflow exercises from the syllable to conversation level with 90% accuracy.     Initiated   3.  Patient will discriminate between easy and strained phonation with 90% accuracy.     Initiated   4. Pt will recall and utilize vocal hygiene strategies ind'ly.  Initiated   5. Pt will reduce/eliminate/substitute throat clearing ind'ly.     Initiated        Assessment     Patient presents with moderate dysphonia secondary to R TVF weakness and glottic insufficiency as diagnosed by Dr. Donald.  Prognosis for continued improvement is fair.     Recommendations / POC    -Recommend 2-3 sessions of voice therapy over 4-6 weeks with a speech-language pathologist to optimize glottal postures for improved vocal function, vocal efficiency, and ease of phonation  -Initiate laryngeal hygiene and voice exercises as trained and discussed in session  -will schedule evaluation with Dr Trujillo [facility]  -Contact clinician with any further questions

## 2023-07-19 ENCOUNTER — PATIENT MESSAGE (OUTPATIENT)
Dept: OTOLARYNGOLOGY | Facility: CLINIC | Age: 73
End: 2023-07-19
Payer: MEDICARE

## 2023-07-19 ENCOUNTER — PATIENT MESSAGE (OUTPATIENT)
Dept: SPEECH THERAPY | Facility: HOSPITAL | Age: 73
End: 2023-07-19
Payer: MEDICARE

## 2023-07-19 NOTE — TELEPHONE ENCOUNTER
Spoke with daughter jeannine, went over dr figueroa information with her along with his appt notes from [facility] yesterday. With all recommendation advised. She stated she understood.

## 2023-07-20 ENCOUNTER — CLINICAL SUPPORT (OUTPATIENT)
Dept: CARDIOLOGY | Facility: HOSPITAL | Age: 73
End: 2023-07-20
Payer: MEDICARE

## 2023-07-20 DIAGNOSIS — Z95.810 PRESENCE OF AUTOMATIC (IMPLANTABLE) CARDIAC DEFIBRILLATOR: ICD-10-CM

## 2023-07-20 PROCEDURE — 93295 CARDIAC DEVICE CHECK - REMOTE: ICD-10-PCS | Mod: ,,, | Performed by: INTERNAL MEDICINE

## 2023-07-20 PROCEDURE — 93296 REM INTERROG EVL PM/IDS: CPT | Performed by: INTERNAL MEDICINE

## 2023-07-20 PROCEDURE — 93295 DEV INTERROG REMOTE 1/2/MLT: CPT | Mod: ,,, | Performed by: INTERNAL MEDICINE

## 2023-07-24 ENCOUNTER — PATIENT MESSAGE (OUTPATIENT)
Dept: CARDIOLOGY | Facility: CLINIC | Age: 73
End: 2023-07-24
Payer: MEDICARE

## 2023-07-25 DIAGNOSIS — I25.5 ISCHEMIC CARDIOMYOPATHY: ICD-10-CM

## 2023-07-25 DIAGNOSIS — I50.42 CHRONIC COMBINED SYSTOLIC AND DIASTOLIC CONGESTIVE HEART FAILURE: ICD-10-CM

## 2023-07-25 DIAGNOSIS — I50.9 CONGESTIVE HEART FAILURE, UNSPECIFIED HF CHRONICITY, UNSPECIFIED HEART FAILURE TYPE: Primary | ICD-10-CM

## 2023-07-25 RX ORDER — SACUBITRIL AND VALSARTAN 24; 26 MG/1; MG/1
1 TABLET, FILM COATED ORAL 2 TIMES DAILY
Qty: 60 TABLET | Refills: 11 | Status: ON HOLD | OUTPATIENT
Start: 2023-07-25 | End: 2023-08-15 | Stop reason: HOSPADM

## 2023-07-25 RX ORDER — LOSARTAN POTASSIUM 25 MG/1
25 TABLET ORAL DAILY
Qty: 90 TABLET | Refills: 3 | Status: ON HOLD | OUTPATIENT
Start: 2023-07-25 | End: 2023-08-15 | Stop reason: HOSPADM

## 2023-07-25 NOTE — TELEPHONE ENCOUNTER
Daughter Holly takes care of pt's meds. She brought the application for assistance for Farxiga (sent to her by [employer]) today and it was faxed back to [employer]. Pt is currently getting assistance for Eliquis. Pt was changed fr Entresto to losartan by dr Rico. I discussed the issue w. her and dr Davis. Dr Davis would rather have pt on Entresto, meanwhile continue the losartan. Daughter interested in getting assistance for Entresto (teaching done and referral sent). She was told about not taking both meds at the same time, stopping losartan when receives/ able to afford Entresto. She verbalized understanding.

## 2023-07-26 ENCOUNTER — PATIENT MESSAGE (OUTPATIENT)
Dept: SPEECH THERAPY | Facility: HOSPITAL | Age: 73
End: 2023-07-26
Payer: MEDICARE

## 2023-08-01 NOTE — TELEPHONE ENCOUNTER
A 2nd attempt has been made to establish contact with Balbir Rebollar Sr.  via PHONE. The final contact attempt will be made in 5 business days

## 2023-08-02 ENCOUNTER — PATIENT MESSAGE (OUTPATIENT)
Dept: OTOLARYNGOLOGY | Facility: CLINIC | Age: 73
End: 2023-08-02
Payer: MEDICARE

## 2023-08-03 ENCOUNTER — LAB VISIT (OUTPATIENT)
Dept: LAB | Facility: HOSPITAL | Age: 73
End: 2023-08-03
Attending: INTERNAL MEDICINE
Payer: MEDICARE

## 2023-08-03 ENCOUNTER — OFFICE VISIT (OUTPATIENT)
Dept: FAMILY MEDICINE | Facility: CLINIC | Age: 73
End: 2023-08-03
Payer: MEDICARE

## 2023-08-03 VITALS
TEMPERATURE: 98 F | OXYGEN SATURATION: 96 % | WEIGHT: 129.63 LBS | BODY MASS INDEX: 19.2 KG/M2 | HEART RATE: 47 BPM | HEIGHT: 69 IN | DIASTOLIC BLOOD PRESSURE: 40 MMHG | SYSTOLIC BLOOD PRESSURE: 92 MMHG

## 2023-08-03 DIAGNOSIS — I95.9 HYPOTENSION, UNSPECIFIED HYPOTENSION TYPE: ICD-10-CM

## 2023-08-03 DIAGNOSIS — R74.01 TRANSAMINITIS: Primary | ICD-10-CM

## 2023-08-03 DIAGNOSIS — I50.42 CHRONIC COMBINED SYSTOLIC AND DIASTOLIC CONGESTIVE HEART FAILURE: ICD-10-CM

## 2023-08-03 DIAGNOSIS — I48.0 PAROXYSMAL ATRIAL FIBRILLATION: ICD-10-CM

## 2023-08-03 DIAGNOSIS — I25.10 CORONARY ARTERY DISEASE INVOLVING NATIVE CORONARY ARTERY OF NATIVE HEART WITHOUT ANGINA PECTORIS: ICD-10-CM

## 2023-08-03 DIAGNOSIS — R74.01 TRANSAMINITIS: ICD-10-CM

## 2023-08-03 PROBLEM — G40.909 SEIZURE DISORDER: Status: RESOLVED | Noted: 2019-03-06 | Resolved: 2023-08-03

## 2023-08-03 PROBLEM — E78.5 DYSLIPIDEMIA: Status: ACTIVE | Noted: 2019-03-06

## 2023-08-03 PROBLEM — G93.41 ENCEPHALOPATHY, METABOLIC: Status: RESOLVED | Noted: 2022-11-25 | Resolved: 2023-08-03

## 2023-08-03 PROBLEM — I47.20 SUSTAINED VT (VENTRICULAR TACHYCARDIA): Status: RESOLVED | Noted: 2022-11-23 | Resolved: 2023-08-03

## 2023-08-03 PROBLEM — I50.22 CHRONIC SYSTOLIC CONGESTIVE HEART FAILURE: Status: RESOLVED | Noted: 2022-12-03 | Resolved: 2023-08-03

## 2023-08-03 LAB
ALBUMIN SERPL BCP-MCNC: 3.5 G/DL (ref 3.5–5.2)
ALP SERPL-CCNC: 65 U/L (ref 55–135)
ALT SERPL W/O P-5'-P-CCNC: 311 U/L (ref 10–44)
ANION GAP SERPL CALC-SCNC: 8 MMOL/L (ref 8–16)
AST SERPL-CCNC: 271 U/L (ref 10–40)
BILIRUB SERPL-MCNC: 0.6 MG/DL (ref 0.1–1)
BUN SERPL-MCNC: 33 MG/DL (ref 8–23)
CALCIUM SERPL-MCNC: 9.5 MG/DL (ref 8.7–10.5)
CHLORIDE SERPL-SCNC: 105 MMOL/L (ref 95–110)
CO2 SERPL-SCNC: 22 MMOL/L (ref 23–29)
CREAT SERPL-MCNC: 1.3 MG/DL (ref 0.5–1.4)
ERYTHROCYTE [DISTWIDTH] IN BLOOD BY AUTOMATED COUNT: 13.9 % (ref 11.5–14.5)
EST. GFR  (NO RACE VARIABLE): 57.3 ML/MIN/1.73 M^2
GLUCOSE SERPL-MCNC: 87 MG/DL (ref 70–110)
HCT VFR BLD AUTO: 46.9 % (ref 40–54)
HGB BLD-MCNC: 15 G/DL (ref 14–18)
MCH RBC QN AUTO: 30.9 PG (ref 27–31)
MCHC RBC AUTO-ENTMCNC: 32 G/DL (ref 32–36)
MCV RBC AUTO: 97 FL (ref 82–98)
PLATELET # BLD AUTO: 219 K/UL (ref 150–450)
PMV BLD AUTO: 10.3 FL (ref 9.2–12.9)
POTASSIUM SERPL-SCNC: 4.1 MMOL/L (ref 3.5–5.1)
PROT SERPL-MCNC: 7.1 G/DL (ref 6–8.4)
RBC # BLD AUTO: 4.85 M/UL (ref 4.6–6.2)
SODIUM SERPL-SCNC: 135 MMOL/L (ref 136–145)
WBC # BLD AUTO: 6.17 K/UL (ref 3.9–12.7)

## 2023-08-03 PROCEDURE — 1101F PR PT FALLS ASSESS DOC 0-1 FALLS W/OUT INJ PAST YR: ICD-10-PCS | Mod: CPTII,S$GLB,, | Performed by: INTERNAL MEDICINE

## 2023-08-03 PROCEDURE — 99999 PR PBB SHADOW E&M-EST. PATIENT-LVL IV: CPT | Mod: PBBFAC,,, | Performed by: INTERNAL MEDICINE

## 2023-08-03 PROCEDURE — 80053 COMPREHEN METABOLIC PANEL: CPT | Performed by: INTERNAL MEDICINE

## 2023-08-03 PROCEDURE — 99214 PR OFFICE/OUTPT VISIT, EST, LEVL IV, 30-39 MIN: ICD-10-PCS | Mod: S$GLB,,, | Performed by: INTERNAL MEDICINE

## 2023-08-03 PROCEDURE — 3078F DIAST BP <80 MM HG: CPT | Mod: CPTII,S$GLB,, | Performed by: INTERNAL MEDICINE

## 2023-08-03 PROCEDURE — 1126F AMNT PAIN NOTED NONE PRSNT: CPT | Mod: CPTII,S$GLB,, | Performed by: INTERNAL MEDICINE

## 2023-08-03 PROCEDURE — 1159F PR MEDICATION LIST DOCUMENTED IN MEDICAL RECORD: ICD-10-PCS | Mod: CPTII,S$GLB,, | Performed by: INTERNAL MEDICINE

## 2023-08-03 PROCEDURE — 1160F RVW MEDS BY RX/DR IN RCRD: CPT | Mod: CPTII,S$GLB,, | Performed by: INTERNAL MEDICINE

## 2023-08-03 PROCEDURE — 99214 OFFICE O/P EST MOD 30 MIN: CPT | Mod: S$GLB,,, | Performed by: INTERNAL MEDICINE

## 2023-08-03 PROCEDURE — 1126F PR PAIN SEVERITY QUANTIFIED, NO PAIN PRESENT: ICD-10-PCS | Mod: CPTII,S$GLB,, | Performed by: INTERNAL MEDICINE

## 2023-08-03 PROCEDURE — 1101F PT FALLS ASSESS-DOCD LE1/YR: CPT | Mod: CPTII,S$GLB,, | Performed by: INTERNAL MEDICINE

## 2023-08-03 PROCEDURE — 3078F PR MOST RECENT DIASTOLIC BLOOD PRESSURE < 80 MM HG: ICD-10-PCS | Mod: CPTII,S$GLB,, | Performed by: INTERNAL MEDICINE

## 2023-08-03 PROCEDURE — 4010F ACE/ARB THERAPY RXD/TAKEN: CPT | Mod: CPTII,S$GLB,, | Performed by: INTERNAL MEDICINE

## 2023-08-03 PROCEDURE — 1159F MED LIST DOCD IN RCRD: CPT | Mod: CPTII,S$GLB,, | Performed by: INTERNAL MEDICINE

## 2023-08-03 PROCEDURE — 99999 PR PBB SHADOW E&M-EST. PATIENT-LVL IV: ICD-10-PCS | Mod: PBBFAC,,, | Performed by: INTERNAL MEDICINE

## 2023-08-03 PROCEDURE — 36415 COLL VENOUS BLD VENIPUNCTURE: CPT | Mod: PO | Performed by: INTERNAL MEDICINE

## 2023-08-03 PROCEDURE — 85027 COMPLETE CBC AUTOMATED: CPT | Performed by: INTERNAL MEDICINE

## 2023-08-03 PROCEDURE — 1160F PR REVIEW ALL MEDS BY PRESCRIBER/CLIN PHARMACIST DOCUMENTED: ICD-10-PCS | Mod: CPTII,S$GLB,, | Performed by: INTERNAL MEDICINE

## 2023-08-03 PROCEDURE — 3074F SYST BP LT 130 MM HG: CPT | Mod: CPTII,S$GLB,, | Performed by: INTERNAL MEDICINE

## 2023-08-03 PROCEDURE — 3288F FALL RISK ASSESSMENT DOCD: CPT | Mod: CPTII,S$GLB,, | Performed by: INTERNAL MEDICINE

## 2023-08-03 PROCEDURE — 3074F PR MOST RECENT SYSTOLIC BLOOD PRESSURE < 130 MM HG: ICD-10-PCS | Mod: CPTII,S$GLB,, | Performed by: INTERNAL MEDICINE

## 2023-08-03 PROCEDURE — 3288F PR FALLS RISK ASSESSMENT DOCUMENTED: ICD-10-PCS | Mod: CPTII,S$GLB,, | Performed by: INTERNAL MEDICINE

## 2023-08-03 PROCEDURE — 4010F PR ACE/ARB THEARPY RXD/TAKEN: ICD-10-PCS | Mod: CPTII,S$GLB,, | Performed by: INTERNAL MEDICINE

## 2023-08-03 NOTE — Clinical Note
Hello, seeing this pt today, c/o weakness, BP and HR low; you lowered losartan last visit. Checking labs. Would you be OK with lowering toprol xl from 50 to 25?
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
## 2023-08-04 ENCOUNTER — TELEPHONE (OUTPATIENT)
Dept: ELECTROPHYSIOLOGY | Facility: CLINIC | Age: 73
End: 2023-08-04
Payer: MEDICARE

## 2023-08-04 ENCOUNTER — PATIENT MESSAGE (OUTPATIENT)
Dept: FAMILY MEDICINE | Facility: CLINIC | Age: 73
End: 2023-08-04
Payer: MEDICARE

## 2023-08-04 DIAGNOSIS — R74.01 TRANSAMINITIS: Primary | ICD-10-CM

## 2023-08-04 DIAGNOSIS — I47.29 NONSUSTAINED VENTRICULAR TACHYCARDIA: ICD-10-CM

## 2023-08-04 DIAGNOSIS — I48.0 PAROXYSMAL ATRIAL FIBRILLATION: Primary | ICD-10-CM

## 2023-08-04 RX ORDER — AMIODARONE HYDROCHLORIDE 200 MG/1
200 TABLET ORAL DAILY
Qty: 90 TABLET | Refills: 3 | Status: ON HOLD
Start: 2023-08-04 | End: 2023-08-15 | Stop reason: HOSPADM

## 2023-08-04 NOTE — PROGRESS NOTES
CMP with elevated LFT transaminases. Previously mild elevated, markedly increased.   Bili normal  Albumin WNL  CBC WNL    Check RUQ US  And repeat labs 10-14 days  Hypoperfusion from hypotension?     Notified daughter of results by phone  Ordered liver US  And CMP and hep A IgM  6/2023 HCV negative

## 2023-08-04 NOTE — TELEPHONE ENCOUNTER
"----- Message from Daniel Nam MD sent at 8/4/2023 11:10 AM CDT -----  Regarding: RE: Check Monitor Please  can try decreasing amio to 200  dickson  Marko    ----- Message -----  From: Edna Hassan RN  Sent: 8/4/2023  11:02 AM CDT  To: Daniel Nam MD  Subject: FW: Check Monitor Please                         Pt had PCP visit yesterday, b/p 92/40 hr 47, daughter reports he has a yellow tone to his color, LH, extreme fatigue, and feeling "off balance" at times.  Current medications:  Amiodarone 400mg daily  Metoprolol 50mg daily  Losaratan 25mg daily  Spironolactone 25mg daily    Had blood work done yesterday:      Bili WNL    PCP ordered liver u/s    Do you have any medication change recommendations?     Edna Velasquez   ----- Message -----  From: Bharti Long  Sent: 8/4/2023  10:16 AM CDT  To: Edna Hassan RN  Subject: FW: Check Monitor Please                         Spoke with Holly, sintia daughter. She stated he has been feeling bad and his blood pressure was 92/40.  Daughter asked if she should send a remote transmission.  I informed her that she can sent a transmission and I will review it and send the information to the nurse.  Remote transmission came through and was WNL.  No episodes/events and battery is WNL.  Patients daughter can be reached at [phone].  Please call daughter back to address him feeling bad and his blood pressure.  Thanks.  ----- Message -----  From: Indy Salguero MA  Sent: 8/3/2023   4:35 PM CDT  To: [facility] Arrhythmia Device Staff  Subject: FW: Check Monitor Please                           ----- Message -----  From: Edna Hassan RN  Sent: 8/3/2023   3:31 PM CDT  To: Indy Salguero MA  Subject: RE: Check Monitor Please                         Can you please call and see if he is asking to have his device checked? And if so,please send the message to device team  Edna Velasquez  ----- Message -----  From: Indy Salguero MA  Sent: 8/3/2023   3:27 PM CDT  To: " Edna Hassan RN  Subject: FW: Check Monitor Please                           ----- Message -----  From: Linda Staley  Sent: 8/3/2023   3:21 PM CDT  To: [facility] Staff  Subject: Check Monitor Please                             Please call to check monitor.     [phone]    Thanks

## 2023-08-04 NOTE — TELEPHONE ENCOUNTER
Informed daughter of Dr Nam's recommendation to decrease the amiodarone to 200mg daily, she reports PCP ordered u/s of his liver, informed daughter that if his symptoms do not improve to call us back and if they worsen to go to ER for eval

## 2023-08-07 ENCOUNTER — TELEPHONE (OUTPATIENT)
Dept: FAMILY MEDICINE | Facility: CLINIC | Age: 73
End: 2023-08-07
Payer: MEDICARE

## 2023-08-07 DIAGNOSIS — I50.42 CHRONIC COMBINED SYSTOLIC AND DIASTOLIC CONGESTIVE HEART FAILURE: Primary | ICD-10-CM

## 2023-08-07 RX ORDER — METOPROLOL SUCCINATE 25 MG/1
12.5 TABLET, EXTENDED RELEASE ORAL DAILY
Qty: 15 TABLET | Refills: 11 | Status: ON HOLD | OUTPATIENT
Start: 2023-08-07 | End: 2023-08-15 | Stop reason: HOSPADM

## 2023-08-07 RX ORDER — METOPROLOL SUCCINATE 50 MG/1
25 TABLET, EXTENDED RELEASE ORAL DAILY
Qty: 45 TABLET | Refills: 3
Start: 2023-08-07 | End: 2023-08-07 | Stop reason: DRUGHIGH

## 2023-08-07 NOTE — PROGRESS NOTES
Discussed with Cardiology, okay to decrease the Toprol XL dose.  Will try him on 25 milligrams.  If still hypotensive may decrease further to 12.5.    On losartan 25, they are trying to get him changed over to Entresto so hold off on adjusting ARB dose.

## 2023-08-07 NOTE — TELEPHONE ENCOUNTER
Please call pt/family - I discussed with cardiology - OK to cut down the metoprolol.  Currently taking toprol xl 50 mg; can he try half tablet? So that would be 25 mg  If BP still low may try him on even lower dose 12.5 mg

## 2023-08-08 ENCOUNTER — HOSPITAL ENCOUNTER (OUTPATIENT)
Dept: RADIOLOGY | Facility: HOSPITAL | Age: 73
Discharge: HOME | DRG: 177 | End: 2023-08-08
Attending: INTERNAL MEDICINE
Payer: MEDICARE

## 2023-08-08 DIAGNOSIS — R74.01 TRANSAMINITIS: ICD-10-CM

## 2023-08-08 PROCEDURE — 76705 ECHO EXAM OF ABDOMEN: CPT | Mod: 26,,, | Performed by: RADIOLOGY

## 2023-08-08 PROCEDURE — 76705 ECHO EXAM OF ABDOMEN: CPT | Mod: TC

## 2023-08-08 PROCEDURE — 76705 US ABDOMEN LIMITED_LIVER: ICD-10-PCS | Mod: 26,,, | Performed by: RADIOLOGY

## 2023-08-09 ENCOUNTER — TELEPHONE (OUTPATIENT)
Dept: FAMILY MEDICINE | Facility: CLINIC | Age: 73
End: 2023-08-09
Payer: MEDICARE

## 2023-08-09 DIAGNOSIS — U07.1 COVID-19 VIRUS DETECTED: ICD-10-CM

## 2023-08-09 NOTE — PROGRESS NOTES
Two small indeterminate areas in the liver, incompletely characterized by this exam.  This can be further assessed with dedicated liver protocol CT or MRI as warranted.  Prior cholecystectomy.    After imaging, pt went to ED, had positive covid swab. Unclear when this might have been. Had been c/o fatigue x weeks    Elevated LFTs modestly decreased from previous  Is this from hypotension? Or from covid?   Continue to monitor

## 2023-08-09 NOTE — TELEPHONE ENCOUNTER
Spoke with patient's daughter. Patient daughter verbally understood. Patient's daughter states she will reschedule labs on her own.

## 2023-08-09 NOTE — TELEPHONE ENCOUNTER
Please call pt/family - liver ultrasound was normal.   I saw that he went to the ED and that they found he had covid.  Unclear how long he has had covid for.    Not sure if that was a part of why his liver tests were abnormal    Would repeat liver tests again in about 2 weeks. He has lab test scheduled tomorrow - have him reschedule that for 2 weeks out.  It looks like he has a cardiology appointment 8/22, and he can repeat it at that time if it is more convenient.  That appointment is at [facility] so can he do the labs at [facility]? Nonfasting lab visit.

## 2023-08-10 ENCOUNTER — TELEPHONE (OUTPATIENT)
Dept: FAMILY MEDICINE | Facility: CLINIC | Age: 73
End: 2023-08-10
Payer: MEDICARE

## 2023-08-10 NOTE — TELEPHONE ENCOUNTER
The main thing that was wrong with his labs was his liver enzyme tests.  Compared to the labs he did with me they are slightly better.  I am not sure if the liver tests are abnormal because of COVID infection or because of his low blood pressure which can irritate the liver.  That is why I want him to repeat the labs again in about 2 weeks.    The rest of his labs were okay.  Troponin, testing for heart damage, was negative.    BNP, marker for heart failure, was mildly elevated.  He has a known cardiomyopathy so that is expected.  I do not think he has a urine infection so I would hold off on antibiotics

## 2023-08-10 NOTE — TELEPHONE ENCOUNTER
----- Message from Shad Chau sent at 8/10/2023  1:20 PM CDT -----  Regarding: Daughter [phone]  Type: Patient Call Back    Who called:  Daughter    What is the request in detail:  called to talk to the office about some lab results that the pt had gotten back, stating there were some red flags. Would like a call back.     Can the clinic reply by [facility]? No     Would the patient rather a call back or a response via [facility]? Call back     Best call back number: [phone]    Additional Information:    Thank you.

## 2023-08-10 NOTE — TELEPHONE ENCOUNTER
Patient's daughter wants to know why the labs drawn at the ED were abnormal and what should they do because of the labs. Please advise.

## 2023-08-11 ENCOUNTER — HOSPITAL ENCOUNTER (INPATIENT)
Facility: HOSPITAL | Age: 73
LOS: 4 days | Discharge: HOSPICE | DRG: 177 | End: 2023-08-15
Attending: EMERGENCY MEDICINE | Admitting: HOSPITALIST
Payer: MEDICARE

## 2023-08-11 DIAGNOSIS — U07.1 COVID: ICD-10-CM

## 2023-08-11 DIAGNOSIS — N17.9 ACUTE KIDNEY INJURY: ICD-10-CM

## 2023-08-11 DIAGNOSIS — E86.0 SEVERE DEHYDRATION: Primary | ICD-10-CM

## 2023-08-11 DIAGNOSIS — E87.5 HYPERKALEMIA: ICD-10-CM

## 2023-08-11 DIAGNOSIS — R07.9 CHEST PAIN: ICD-10-CM

## 2023-08-11 DIAGNOSIS — R11.10 VOMITING, UNSPECIFIED VOMITING TYPE, UNSPECIFIED WHETHER NAUSEA PRESENT: ICD-10-CM

## 2023-08-11 DIAGNOSIS — R53.1 GENERALIZED WEAKNESS: ICD-10-CM

## 2023-08-11 PROBLEM — R79.89 ELEVATED LFTS: Status: ACTIVE | Noted: 2023-08-11

## 2023-08-11 LAB
ALBUMIN SERPL BCP-MCNC: 2.4 G/DL (ref 3.5–5.2)
ALLENS TEST: NORMAL
ALP SERPL-CCNC: 60 U/L (ref 55–135)
ALT SERPL W/O P-5'-P-CCNC: 359 U/L (ref 10–44)
ANION GAP SERPL CALC-SCNC: 10 MMOL/L (ref 8–16)
AST SERPL-CCNC: 444 U/L (ref 10–40)
BASOPHILS # BLD AUTO: 0.03 K/UL (ref 0–0.2)
BASOPHILS NFR BLD: 0.4 % (ref 0–1.9)
BILIRUB SERPL-MCNC: 0.6 MG/DL (ref 0.1–1)
BNP SERPL-MCNC: 78 PG/ML (ref 0–99)
BUN SERPL-MCNC: 62 MG/DL (ref 8–23)
CALCIUM SERPL-MCNC: 8.1 MG/DL (ref 8.7–10.5)
CHLORIDE SERPL-SCNC: 109 MMOL/L (ref 95–110)
CO2 SERPL-SCNC: 17 MMOL/L (ref 23–29)
CREAT SERPL-MCNC: 4.6 MG/DL (ref 0.5–1.4)
DIFFERENTIAL METHOD: ABNORMAL
EOSINOPHIL # BLD AUTO: 0 K/UL (ref 0–0.5)
EOSINOPHIL NFR BLD: 0.1 % (ref 0–8)
ERYTHROCYTE [DISTWIDTH] IN BLOOD BY AUTOMATED COUNT: 14.5 % (ref 11.5–14.5)
EST. GFR  (NO RACE VARIABLE): 13 ML/MIN/1.73 M^2
GLUCOSE SERPL-MCNC: 91 MG/DL (ref 70–110)
HCT VFR BLD AUTO: 44 % (ref 40–54)
HGB BLD-MCNC: 14 G/DL (ref 14–18)
IMM GRANULOCYTES # BLD AUTO: 0.03 K/UL (ref 0–0.04)
IMM GRANULOCYTES NFR BLD AUTO: 0.4 % (ref 0–0.5)
LACTATE SERPL-SCNC: 1.6 MMOL/L (ref 0.5–2.2)
LDH SERPL L TO P-CCNC: 1.15 MMOL/L (ref 0.5–2.2)
LYMPHOCYTES # BLD AUTO: 1 K/UL (ref 1–4.8)
LYMPHOCYTES NFR BLD: 12.2 % (ref 18–48)
MAGNESIUM SERPL-MCNC: 2.5 MG/DL (ref 1.6–2.6)
MCH RBC QN AUTO: 30.6 PG (ref 27–31)
MCHC RBC AUTO-ENTMCNC: 31.8 G/DL (ref 32–36)
MCV RBC AUTO: 96 FL (ref 82–98)
MONOCYTES # BLD AUTO: 0.9 K/UL (ref 0.3–1)
MONOCYTES NFR BLD: 10.5 % (ref 4–15)
NEUTROPHILS # BLD AUTO: 6.3 K/UL (ref 1.8–7.7)
NEUTROPHILS NFR BLD: 76.4 % (ref 38–73)
NRBC BLD-RTO: 0 /100 WBC
PLATELET # BLD AUTO: 177 K/UL (ref 150–450)
PMV BLD AUTO: 10.4 FL (ref 9.2–12.9)
POTASSIUM SERPL-SCNC: 5.6 MMOL/L (ref 3.5–5.1)
PROCALCITONIN SERPL IA-MCNC: 1.06 NG/ML
PROT SERPL-MCNC: 6.2 G/DL (ref 6–8.4)
RBC # BLD AUTO: 4.57 M/UL (ref 4.6–6.2)
SAMPLE: NORMAL
SITE: NORMAL
SODIUM SERPL-SCNC: 136 MMOL/L (ref 136–145)
TROPONIN I SERPL DL<=0.01 NG/ML-MCNC: 0.04 NG/ML (ref 0–0.03)
WBC # BLD AUTO: 8.2 K/UL (ref 3.9–12.7)

## 2023-08-11 PROCEDURE — 84145 PROCALCITONIN (PCT): CPT | Performed by: EMERGENCY MEDICINE

## 2023-08-11 PROCEDURE — 83735 ASSAY OF MAGNESIUM: CPT | Performed by: EMERGENCY MEDICINE

## 2023-08-11 PROCEDURE — 85025 COMPLETE CBC W/AUTO DIFF WBC: CPT | Performed by: EMERGENCY MEDICINE

## 2023-08-11 PROCEDURE — 11000001 HC ACUTE MED/SURG PRIVATE ROOM

## 2023-08-11 PROCEDURE — 93005 ELECTROCARDIOGRAM TRACING: CPT

## 2023-08-11 PROCEDURE — 25000003 PHARM REV CODE 250: Performed by: STUDENT IN AN ORGANIZED HEALTH CARE EDUCATION/TRAINING PROGRAM

## 2023-08-11 PROCEDURE — 84484 ASSAY OF TROPONIN QUANT: CPT | Performed by: EMERGENCY MEDICINE

## 2023-08-11 PROCEDURE — 83880 ASSAY OF NATRIURETIC PEPTIDE: CPT | Performed by: EMERGENCY MEDICINE

## 2023-08-11 PROCEDURE — 99900035 HC TECH TIME PER 15 MIN (STAT)

## 2023-08-11 PROCEDURE — 63600175 PHARM REV CODE 636 W HCPCS: Performed by: EMERGENCY MEDICINE

## 2023-08-11 PROCEDURE — 80053 COMPREHEN METABOLIC PANEL: CPT | Performed by: EMERGENCY MEDICINE

## 2023-08-11 PROCEDURE — 83605 ASSAY OF LACTIC ACID: CPT

## 2023-08-11 PROCEDURE — 87040 BLOOD CULTURE FOR BACTERIA: CPT | Performed by: EMERGENCY MEDICINE

## 2023-08-11 PROCEDURE — 25000003 PHARM REV CODE 250: Performed by: HOSPITALIST

## 2023-08-11 PROCEDURE — 27000207 HC ISOLATION

## 2023-08-11 PROCEDURE — 99285 EMERGENCY DEPT VISIT HI MDM: CPT | Mod: 25

## 2023-08-11 PROCEDURE — 93010 ELECTROCARDIOGRAM REPORT: CPT | Mod: ,,, | Performed by: INTERNAL MEDICINE

## 2023-08-11 PROCEDURE — 93010 EKG 12-LEAD: ICD-10-PCS | Mod: ,,, | Performed by: INTERNAL MEDICINE

## 2023-08-11 PROCEDURE — 96360 HYDRATION IV INFUSION INIT: CPT

## 2023-08-11 PROCEDURE — 83605 ASSAY OF LACTIC ACID: CPT | Performed by: EMERGENCY MEDICINE

## 2023-08-11 PROCEDURE — 25000003 PHARM REV CODE 250: Performed by: EMERGENCY MEDICINE

## 2023-08-11 RX ORDER — BISACODYL 10 MG
10 SUPPOSITORY, RECTAL RECTAL DAILY PRN
Status: DISCONTINUED | OUTPATIENT
Start: 2023-08-11 | End: 2023-08-15 | Stop reason: HOSPADM

## 2023-08-11 RX ORDER — MAG HYDROX/ALUMINUM HYD/SIMETH 200-200-20
30 SUSPENSION, ORAL (FINAL DOSE FORM) ORAL 4 TIMES DAILY PRN
Status: DISCONTINUED | OUTPATIENT
Start: 2023-08-11 | End: 2023-08-15

## 2023-08-11 RX ORDER — ACETAMINOPHEN 325 MG/1
650 TABLET ORAL EVERY 8 HOURS PRN
Status: DISCONTINUED | OUTPATIENT
Start: 2023-08-11 | End: 2023-08-15 | Stop reason: HOSPADM

## 2023-08-11 RX ORDER — IBUPROFEN 200 MG
16 TABLET ORAL
Status: DISCONTINUED | OUTPATIENT
Start: 2023-08-11 | End: 2023-08-15

## 2023-08-11 RX ORDER — ONDANSETRON 2 MG/ML
4 INJECTION INTRAMUSCULAR; INTRAVENOUS EVERY 8 HOURS PRN
Status: DISCONTINUED | OUTPATIENT
Start: 2023-08-11 | End: 2023-08-11

## 2023-08-11 RX ORDER — LANOLIN ALCOHOL/MO/W.PET/CERES
800 CREAM (GRAM) TOPICAL
Status: DISCONTINUED | OUTPATIENT
Start: 2023-08-11 | End: 2023-08-14

## 2023-08-11 RX ORDER — AMIODARONE HYDROCHLORIDE 200 MG/1
200 TABLET ORAL DAILY
Status: DISCONTINUED | OUTPATIENT
Start: 2023-08-12 | End: 2023-08-14

## 2023-08-11 RX ORDER — SODIUM CHLORIDE 0.9 % (FLUSH) 0.9 %
10 SYRINGE (ML) INJECTION EVERY 12 HOURS PRN
Status: DISCONTINUED | OUTPATIENT
Start: 2023-08-11 | End: 2023-08-15 | Stop reason: HOSPADM

## 2023-08-11 RX ORDER — IBUPROFEN 200 MG
24 TABLET ORAL
Status: DISCONTINUED | OUTPATIENT
Start: 2023-08-11 | End: 2023-08-15

## 2023-08-11 RX ORDER — PROCHLORPERAZINE EDISYLATE 5 MG/ML
5 INJECTION INTRAMUSCULAR; INTRAVENOUS EVERY 6 HOURS PRN
Status: DISCONTINUED | OUTPATIENT
Start: 2023-08-11 | End: 2023-08-11

## 2023-08-11 RX ORDER — MUPIROCIN 20 MG/G
OINTMENT TOPICAL 2 TIMES DAILY
Status: DISCONTINUED | OUTPATIENT
Start: 2023-08-11 | End: 2023-08-15 | Stop reason: HOSPADM

## 2023-08-11 RX ORDER — SODIUM,POTASSIUM PHOSPHATES 280-250MG
2 POWDER IN PACKET (EA) ORAL
Status: DISCONTINUED | OUTPATIENT
Start: 2023-08-11 | End: 2023-08-14

## 2023-08-11 RX ORDER — CLOPIDOGREL BISULFATE 75 MG/1
75 TABLET ORAL NIGHTLY
Status: DISCONTINUED | OUTPATIENT
Start: 2023-08-11 | End: 2023-08-14

## 2023-08-11 RX ORDER — POLYETHYLENE GLYCOL 3350 17 G/17G
17 POWDER, FOR SOLUTION ORAL DAILY
Status: DISCONTINUED | OUTPATIENT
Start: 2023-08-12 | End: 2023-08-14

## 2023-08-11 RX ORDER — GLUCAGON 1 MG
1 KIT INJECTION
Status: DISCONTINUED | OUTPATIENT
Start: 2023-08-11 | End: 2023-08-15 | Stop reason: HOSPADM

## 2023-08-11 RX ORDER — SODIUM CHLORIDE 9 MG/ML
INJECTION, SOLUTION INTRAVENOUS CONTINUOUS
Status: DISCONTINUED | OUTPATIENT
Start: 2023-08-11 | End: 2023-08-12

## 2023-08-11 RX ORDER — NALOXONE HCL 0.4 MG/ML
0.02 VIAL (ML) INJECTION
Status: DISCONTINUED | OUTPATIENT
Start: 2023-08-11 | End: 2023-08-14

## 2023-08-11 RX ORDER — ACETAMINOPHEN 325 MG/1
650 TABLET ORAL EVERY 4 HOURS PRN
Status: DISCONTINUED | OUTPATIENT
Start: 2023-08-11 | End: 2023-08-14

## 2023-08-11 RX ORDER — SIMETHICONE 80 MG
1 TABLET,CHEWABLE ORAL 4 TIMES DAILY PRN
Status: DISCONTINUED | OUTPATIENT
Start: 2023-08-11 | End: 2023-08-14

## 2023-08-11 RX ORDER — ATORVASTATIN CALCIUM 40 MG/1
40 TABLET, FILM COATED ORAL DAILY
Status: DISCONTINUED | OUTPATIENT
Start: 2023-08-12 | End: 2023-08-14

## 2023-08-11 RX ORDER — TALC
6 POWDER (GRAM) TOPICAL NIGHTLY PRN
Status: DISCONTINUED | OUTPATIENT
Start: 2023-08-11 | End: 2023-08-14

## 2023-08-11 RX ADMIN — PIPERACILLIN AND TAZOBACTAM 4.5 G: 4; .5 INJECTION, POWDER, LYOPHILIZED, FOR SOLUTION INTRAVENOUS; PARENTERAL at 07:08

## 2023-08-11 RX ADMIN — SODIUM CHLORIDE, POTASSIUM CHLORIDE, SODIUM LACTATE AND CALCIUM CHLORIDE 1770 ML: 600; 310; 30; 20 INJECTION, SOLUTION INTRAVENOUS at 05:08

## 2023-08-11 RX ADMIN — CLOPIDOGREL BISULFATE 75 MG: 75 TABLET ORAL at 09:08

## 2023-08-11 RX ADMIN — APIXABAN 5 MG: 5 TABLET, FILM COATED ORAL at 09:08

## 2023-08-11 RX ADMIN — SODIUM CHLORIDE: 9 INJECTION, SOLUTION INTRAVENOUS at 09:08

## 2023-08-11 RX ADMIN — MUPIROCIN: 20 OINTMENT TOPICAL at 09:08

## 2023-08-11 NOTE — ED PROVIDER NOTES
Encounter Date: 8/11/2023    SCRIBE #1 NOTE: I, Claire Heredia, am scribing for, and in the presence of,  Sarmad Penaloza MD. I have scribed the following portions of the note - Other sections scribed: HPI, ROS.       History     Chief Complaint   Patient presents with    Fatigue     EMS called to 72yo male that has been having weakness, lack of appetite, vomiting x1 week. Was seen at [facility] a few days ago for same symptoms, treated, and discharged. Patient is hypotensive. Initial bp was 78/50 and was given 700ml NS enroute. Bp at triage was 85/49     This is a 73 year old male with a PMHx of CVA, HTN, CAD, A-Fib, and MI who presents to the ED via EMS for chief complaint of Generalized Weakness onset 2 weeks. Additional history obtained by an independent historian: patient's son, reports that the patient was seen at [facility] a few days ago for similar symptoms where he was diagnosed with COVID, treated and discharged. Patient's son endorses emesis (2x), change in appetite, right elbow pain, bilateral foot pain, and cough. Patient's son notes that the patient is on blood thinners. No other alleviating or exacerbating factors. Patient denies fall or injury. Patient denies shortness of breath, chest pain, fever, chills, abdominal pain, nausea, diarrhea, dysuria, headaches, congestion, sore throat, arm or leg trouble, eye pain, ear pain, rash, or other associated symptoms. This is the extent of the patient's complaints in the ED. NKDA.     The history is provided by a relative and the patient. No  was used.     Review of patient's allergies indicates:  No Known Allergies  Past Medical History:   Diagnosis Date    AICD (automatic cardioverter/defibrillator) present     Anticoagulant long-term use     Cardiomyopathy     Chronic combined systolic and diastolic congestive heart failure     Left ventricular ejection fraction is estimated at 15-20% and measured to be 25-33%.    Coronary artery  disease     History of myocardial infarction     Hypertension     Paroxysmal A-fib     Stroke      Past Surgical History:   Procedure Laterality Date    CHOLECYSTECTOMY      LEFT HEART CATHETERIZATION Left 11/29/2022    Procedure: Left heart cath;  Surgeon: Wesley Rico MD;  Location: [facility] CATH LAB;  Service: Cardiology;  Laterality: Left;  1030am start, R rad access    LEFT HEART CATHETERIZATION N/A 12/1/2022    Procedure: Left heart cath;  Surgeon: Tam Cho MD;  Location: [facility] CATH LAB;  Service: Cardiology;  Laterality: N/A;    REPLACEMENT OF DUAL CHAMBER IMPLANTABLE CARDIOVERTER-DEFIBRILLATOR       No family history on file.  Social History     Tobacco Use    Smoking status: Former     Current packs/day: 0.00     Passive exposure: Past    Smokeless tobacco: Never   Substance Use Topics    Alcohol use: Never    Drug use: Never     Review of Systems   Constitutional:  Positive for appetite change. Negative for chills, diaphoresis and fever.   HENT:  Negative for congestion, ear pain and sore throat.    Eyes:  Negative for pain and visual disturbance.   Respiratory:  Positive for cough. Negative for shortness of breath.    Cardiovascular:  Negative for chest pain.   Gastrointestinal:  Positive for vomiting (2x). Negative for abdominal pain, diarrhea and nausea.   Genitourinary:  Negative for dysuria.   Musculoskeletal:  Negative for myalgias.        (+) Right Elbow Pain  (+) Bilateral Foot Pain   Skin:  Negative for rash.   Neurological:  Positive for weakness (Generalized). Negative for headaches.       Physical Exam     Initial Vitals [08/11/23 1608]   BP Pulse Resp Temp SpO2   (!) 85/49 (!) 56 20 99.1 °F (37.3 °C) 98 %      MAP       --         Physical Exam  The patient was examined specifically for the following:   General:No significant distress, Good color, Warm and dry. Head and neck:Scalp atraumatic, Neck supple. Neurological:Appropriate conversation, Gross motor deficits. Eyes:Conjugate  gaze, Clear corneas. ENT: No epistaxis. Cardiac: Regular rate and rhythm, Grossly normal heart tones. Pulmonary: Wheezing, Rales. Gastrointestinal: Abdominal tenderness, Abdominal distention. Musculoskeletal: Extremity deformity, Apparent pain with range of motion of the joints. Skin: Rash.   The findings on examination were normal except for the following:  The patient has some pain with range of motion of the right elbow.  His blood pressure is 85/49.  The right elbow is not swollen red or tender.  There is no joint effusion.  The patient has normal-appearing feet.  There is no tenderness of the feet.  The lungs are clear and free of wheezing rales rubs or rhonchi.  Heart tones are normal.  Patient has regular bradycardia.  The abdomen is nontender.  Patient looks very weak.  There is no evidence of respiratory distress.  ED Course   Critical Care    Date/Time: 8/11/2023 6:49 PM    Performed by: Sarmad Penaloza MD  Authorized by: Sarmad Penaloza MD  Direct patient critical care time: 22 minutes  Additional history critical care time: 11 minutes  Ordering / reviewing critical care time: 11 minutes  Documentation critical care time: 11 minutes  Consulting other physicians critical care time: 5 minutes  Total critical care time (exclusive of procedural time) : 60 minutes  Critical care time was exclusive of separately billable procedures and treating other patients and teaching time.  Critical care was necessary to treat or prevent imminent or life-threatening deterioration of the following conditions: metabolic crisis, renal failure and dehydration.  Critical care was time spent personally by me on the following activities: development of treatment plan with patient or surrogate, discussions with primary provider, evaluation of patient's response to treatment, examination of patient, obtaining history from patient or surrogate, ordering and performing treatments and interventions, ordering and review of  laboratory studies, ordering and review of radiographic studies, pulse oximetry, re-evaluation of patient's condition and review of old charts.        Labs Reviewed   CBC W/ AUTO DIFFERENTIAL - Abnormal; Notable for the following components:       Result Value    RBC 4.57 (*)     MCHC 31.8 (*)     Gran % 76.4 (*)     Lymph % 12.2 (*)     All other components within normal limits   COMPREHENSIVE METABOLIC PANEL - Abnormal; Notable for the following components:    Potassium 5.6 (*)     CO2 17 (*)     BUN 62 (*)     Creatinine 4.6 (*)     Calcium 8.1 (*)     Albumin 2.4 (*)      (*)      (*)     eGFR 13 (*)     All other components within normal limits   PROCALCITONIN - Abnormal; Notable for the following components:    Procalcitonin 1.06 (*)     All other components within normal limits   TROPONIN I - Abnormal; Notable for the following components:    Troponin I 0.044 (*)     All other components within normal limits   CULTURE, BLOOD   CULTURE, BLOOD   LACTIC ACID, PLASMA   MAGNESIUM   LACTIC ACID, PLASMA   URINALYSIS, REFLEX TO URINE CULTURE   ISTAT LACTATE     EKG Readings: (Independently Interpreted)   This patient is in a sinus bradycardia with a first-degree AV block.  There is a wide QRS complex.  There is an intraventricular conduction delay.  This is likely a right bundle branch block.  There are nonspecific ST segment and T-wave changes there is no definite evidence of acute myocardial infarction or malignant arrhythmia.       Imaging Results              X-Ray Chest AP Portable (Final result)  Result time 08/11/23 17:13:02      Final result by Go Martínez MD (08/11/23 17:13:02)                   Impression:      No acute cardiopulmonary process identified.      Electronically signed by: Go Martínez MD  Date:    08/11/2023  Time:    17:13               Narrative:    EXAMINATION:  XR CHEST AP PORTABLE    CLINICAL HISTORY:  Sepsis;    TECHNIQUE:  Single frontal view of the chest was  performed.    COMPARISON:  08/08/2023.    FINDINGS:  Cardiac silhouette is stable in size.  Left-sided pacer device is seen.  Lungs are expanded with mild elevation of the right hemidiaphragm seen.  No evidence of new focal consolidative process, pneumothorax, or significant pleural effusion.  No acute osseous abnormality identified.                                    This 73-year-old male presents emergency with a recent diagnosis of COVID.  He has been anorexic.  He is not eating or drinking.  His blood pressure was low in the emergency room.  He was hypotensive.  He is more than doubled his creatinine.  I believe he is severely dehydrated.  You may have acute renal failure.  His potassium is 5.6.  He has a slightly elevated troponin.  His procalcitonin is elevated but I can not find a focus of infection.  His urine is pending.  He was treated with full sepsis fluids.  Blood cultures were sent.  I will treat with antibiotics.  Pending the urine.  His blood pressure was 85/49 in triage.  His initial blood pressure was 78/50.  The patient vomits 3 times a day.  I will place this patient to inpatient for treatment of severe dehydration and acute kidney injury.  The patient has an unexplained transaminitis.       Medications   piperacillin-tazobactam (ZOSYN) 4.5 g in dextrose 5 % in water (D5W) 100 mL IVPB (MB+) (has no administration in time range)   lactated ringers bolus 1,770 mL (1,770 mLs Intravenous New Bag 8/11/23 8285)              Scribe Attestation:   Scribe #1: I performed the above scribed service and the documentation accurately describes the services I performed. I attest to the accuracy of the note.                   I personally performed the services described in this documentation.  All medical record  entries made by the scribe are at my direction and in my presence.  Signed, Dr. Penaloza  Clinical Impression:   Final diagnoses:  [R53.1] Generalized weakness  [E86.0] Severe dehydration  (Primary)  [N17.9] Acute kidney injury  [E87.5] Hyperkalemia  [R11.10] Vomiting, unspecified vomiting type, unspecified whether nausea present  [U07.1] COVID        ED Disposition Condition    Admit Stable                Sarmad Penaloza MD  08/11/23 7121

## 2023-08-12 LAB
ANION GAP SERPL CALC-SCNC: 9 MMOL/L (ref 8–16)
BACTERIA #/AREA URNS HPF: ABNORMAL /HPF
BASOPHILS # BLD AUTO: 0.04 K/UL (ref 0–0.2)
BASOPHILS NFR BLD: 0.4 % (ref 0–1.9)
BILIRUB UR QL STRIP: NEGATIVE
BUN SERPL-MCNC: 72 MG/DL (ref 8–23)
CALCIUM SERPL-MCNC: 8.1 MG/DL (ref 8.7–10.5)
CHLORIDE SERPL-SCNC: 108 MMOL/L (ref 95–110)
CLARITY UR: CLEAR
CO2 SERPL-SCNC: 18 MMOL/L (ref 23–29)
COLOR UR: YELLOW
CREAT SERPL-MCNC: 5.3 MG/DL (ref 0.5–1.4)
CREAT UR-MCNC: 28.6 MG/DL (ref 23–375)
DIFFERENTIAL METHOD: ABNORMAL
EOSINOPHIL # BLD AUTO: 0 K/UL (ref 0–0.5)
EOSINOPHIL NFR BLD: 0.3 % (ref 0–8)
EOSINOPHIL URNS QL WRIGHT STN: NORMAL
ERYTHROCYTE [DISTWIDTH] IN BLOOD BY AUTOMATED COUNT: 14.4 % (ref 11.5–14.5)
EST. GFR  (NO RACE VARIABLE): 11 ML/MIN/1.73 M^2
GLUCOSE SERPL-MCNC: 103 MG/DL (ref 70–110)
GLUCOSE UR QL STRIP: ABNORMAL
HAV IGM SERPL QL IA: NORMAL
HBV CORE IGM SERPL QL IA: NORMAL
HBV SURFACE AG SERPL QL IA: NORMAL
HCT VFR BLD AUTO: 39.4 % (ref 40–54)
HCV AB SERPL QL IA: NORMAL
HGB BLD-MCNC: 13.1 G/DL (ref 14–18)
HGB UR QL STRIP: ABNORMAL
HYALINE CASTS #/AREA URNS LPF: 0 /LPF
IMM GRANULOCYTES # BLD AUTO: 0.05 K/UL (ref 0–0.04)
IMM GRANULOCYTES NFR BLD AUTO: 0.5 % (ref 0–0.5)
KETONES UR QL STRIP: NEGATIVE
LEUKOCYTE ESTERASE UR QL STRIP: NEGATIVE
LYMPHOCYTES # BLD AUTO: 0.9 K/UL (ref 1–4.8)
LYMPHOCYTES NFR BLD: 9.5 % (ref 18–48)
MAGNESIUM SERPL-MCNC: 2.2 MG/DL (ref 1.6–2.6)
MCH RBC QN AUTO: 30.5 PG (ref 27–31)
MCHC RBC AUTO-ENTMCNC: 33.2 G/DL (ref 32–36)
MCV RBC AUTO: 92 FL (ref 82–98)
MICROSCOPIC COMMENT: ABNORMAL
MONOCYTES # BLD AUTO: 0.8 K/UL (ref 0.3–1)
MONOCYTES NFR BLD: 8.3 % (ref 4–15)
NEUTROPHILS # BLD AUTO: 7.5 K/UL (ref 1.8–7.7)
NEUTROPHILS NFR BLD: 81 % (ref 38–73)
NITRITE UR QL STRIP: NEGATIVE
NRBC BLD-RTO: 0 /100 WBC
PH UR STRIP: 6 [PH] (ref 5–8)
PHOSPHATE SERPL-MCNC: 3.8 MG/DL (ref 2.7–4.5)
PLATELET # BLD AUTO: 160 K/UL (ref 150–450)
PMV BLD AUTO: 9.9 FL (ref 9.2–12.9)
POTASSIUM SERPL-SCNC: 4.9 MMOL/L (ref 3.5–5.1)
PROT UR QL STRIP: ABNORMAL
RBC # BLD AUTO: 4.29 M/UL (ref 4.6–6.2)
RBC #/AREA URNS HPF: 54 /HPF (ref 0–4)
SODIUM SERPL-SCNC: 135 MMOL/L (ref 136–145)
SODIUM UR-SCNC: 72 MMOL/L (ref 20–250)
SP GR UR STRIP: 1.01 (ref 1–1.03)
URN SPEC COLLECT METH UR: ABNORMAL
UROBILINOGEN UR STRIP-ACNC: NEGATIVE EU/DL
WBC # BLD AUTO: 9.29 K/UL (ref 3.9–12.7)
WBC #/AREA URNS HPF: 6 /HPF (ref 0–5)
YEAST URNS QL MICRO: ABNORMAL

## 2023-08-12 PROCEDURE — 81000 URINALYSIS NONAUTO W/SCOPE: CPT | Performed by: EMERGENCY MEDICINE

## 2023-08-12 PROCEDURE — 85025 COMPLETE CBC W/AUTO DIFF WBC: CPT | Performed by: STUDENT IN AN ORGANIZED HEALTH CARE EDUCATION/TRAINING PROGRAM

## 2023-08-12 PROCEDURE — 84300 ASSAY OF URINE SODIUM: CPT | Performed by: INTERNAL MEDICINE

## 2023-08-12 PROCEDURE — 11000001 HC ACUTE MED/SURG PRIVATE ROOM

## 2023-08-12 PROCEDURE — 27000207 HC ISOLATION

## 2023-08-12 PROCEDURE — 25000003 PHARM REV CODE 250: Performed by: STUDENT IN AN ORGANIZED HEALTH CARE EDUCATION/TRAINING PROGRAM

## 2023-08-12 PROCEDURE — 82570 ASSAY OF URINE CREATININE: CPT | Performed by: INTERNAL MEDICINE

## 2023-08-12 PROCEDURE — 97535 SELF CARE MNGMENT TRAINING: CPT

## 2023-08-12 PROCEDURE — 36415 COLL VENOUS BLD VENIPUNCTURE: CPT | Performed by: STUDENT IN AN ORGANIZED HEALTH CARE EDUCATION/TRAINING PROGRAM

## 2023-08-12 PROCEDURE — 94761 N-INVAS EAR/PLS OXIMETRY MLT: CPT

## 2023-08-12 PROCEDURE — 80048 BASIC METABOLIC PNL TOTAL CA: CPT | Performed by: STUDENT IN AN ORGANIZED HEALTH CARE EDUCATION/TRAINING PROGRAM

## 2023-08-12 PROCEDURE — 51798 US URINE CAPACITY MEASURE: CPT

## 2023-08-12 PROCEDURE — 87205 SMEAR GRAM STAIN: CPT | Performed by: INTERNAL MEDICINE

## 2023-08-12 PROCEDURE — 94760 N-INVAS EAR/PLS OXIMETRY 1: CPT

## 2023-08-12 PROCEDURE — 80074 ACUTE HEPATITIS PANEL: CPT | Performed by: STUDENT IN AN ORGANIZED HEALTH CARE EDUCATION/TRAINING PROGRAM

## 2023-08-12 PROCEDURE — 25000003 PHARM REV CODE 250: Performed by: INTERNAL MEDICINE

## 2023-08-12 PROCEDURE — 84100 ASSAY OF PHOSPHORUS: CPT | Performed by: STUDENT IN AN ORGANIZED HEALTH CARE EDUCATION/TRAINING PROGRAM

## 2023-08-12 PROCEDURE — 97165 OT EVAL LOW COMPLEX 30 MIN: CPT

## 2023-08-12 PROCEDURE — 83735 ASSAY OF MAGNESIUM: CPT | Performed by: STUDENT IN AN ORGANIZED HEALTH CARE EDUCATION/TRAINING PROGRAM

## 2023-08-12 PROCEDURE — 25000003 PHARM REV CODE 250: Performed by: HOSPITALIST

## 2023-08-12 RX ORDER — MIRTAZAPINE 7.5 MG/1
7.5 TABLET, FILM COATED ORAL NIGHTLY
Status: DISCONTINUED | OUTPATIENT
Start: 2023-08-12 | End: 2023-08-14

## 2023-08-12 RX ORDER — SODIUM CHLORIDE 450 MG/100ML
INJECTION, SOLUTION INTRAVENOUS CONTINUOUS
Status: DISCONTINUED | OUTPATIENT
Start: 2023-08-12 | End: 2023-08-12

## 2023-08-12 RX ADMIN — MUPIROCIN: 20 OINTMENT TOPICAL at 09:08

## 2023-08-12 RX ADMIN — AMIODARONE HYDROCHLORIDE 200 MG: 200 TABLET ORAL at 09:08

## 2023-08-12 RX ADMIN — APIXABAN 5 MG: 5 TABLET, FILM COATED ORAL at 09:08

## 2023-08-12 RX ADMIN — SODIUM BICARBONATE: 84 INJECTION, SOLUTION INTRAVENOUS at 09:08

## 2023-08-12 RX ADMIN — SODIUM BICARBONATE: 84 INJECTION, SOLUTION INTRAVENOUS at 11:08

## 2023-08-12 RX ADMIN — ATORVASTATIN CALCIUM 40 MG: 40 TABLET, FILM COATED ORAL at 09:08

## 2023-08-12 RX ADMIN — MIRTAZAPINE 7.5 MG: 7.5 TABLET ORAL at 08:08

## 2023-08-12 RX ADMIN — APIXABAN 5 MG: 5 TABLET, FILM COATED ORAL at 08:08

## 2023-08-12 RX ADMIN — CLOPIDOGREL BISULFATE 75 MG: 75 TABLET ORAL at 08:08

## 2023-08-12 NOTE — ASSESSMENT & PLAN NOTE
Chronic, worsening  US abdomen (8/4/2023):  Two small indeterminate areas in the liver, incompletely characterized by this exam.  Prior cholecystectomy.  Could be secondary to amiodarone use, or hepatitis.   Obtain hepatitis panel   Needs follow-up with Cardiology for alternative for amiodarone

## 2023-08-12 NOTE — ASSESSMENT & PLAN NOTE
Patient with acute kidney injury/acute renal failure likely due to pre-renal azotemia due to IVVD, due to nephrotoxic medications & Covid nephropathy   TREVOR is currently worsening. Baseline creatinine 1.2 -   Labs reviewed- Renal function/electrolytes with Estimated Creatinine Clearance: 12 mL/min (A) (based on SCr of 4.6 mg/dL (H)). according to latest data. Monitor urine output and serial BMP and adjust therapy as needed.   Avoid nephrotoxins and renally dose meds for GFR listed above.  Received 1.7 L of LR in the ED  Continue with gentle hydration

## 2023-08-12 NOTE — PT/OT/SLP EVAL
Occupational Therapy Evaluation and Treatment    Name: Balbir Rebollar Sr.  MRN: [id number]  Admitting Diagnosis: TREVOR (acute kidney injury)  Recent Surgery: * No surgery found *      Recommendations:     Discharge Recommendations:  (TBD pending progress)  Level of Assistance Recommended: Intermittent assistance  Discharge Equipment Recommendations: walker, rolling, bedside commode  Barriers to discharge:  (The patient with generalized weakness and not at his baseline of Mod I.COVID+)    Assessment:     Balbir Rebollar Sr. is a 73 y.o. male with a medical diagnosis of TREVOR (acute kidney injury). He presents with performance deficits affecting function including weakness, impaired endurance, impaired self care skills, impaired functional mobility, gait instability, impaired balance, decreased coordination, decreased upper extremity function, decreased lower extremity function, decreased safety awareness, impaired cardiopulmonary response to activity, impaired cognition.     Rehab Prognosis: Good; patient would benefit from acute OT services to address these deficits and reach maximum level of function.    Plan:     Patient to be seen 5 x/week to address the above listed problems via self-care/home management, therapeutic activities, therapeutic exercises  Plan of Care Expires: 08/26/23  Plan of Care Reviewed with: patient, son    Subjective     Chief Complaint: coughing up stuff  Patient Comments/Goals: agreeable to sit on the EOB with encouragement and education  Pain/Comfort:  Pain Rating 1: 0/10    Patients cultural, spiritual, Pent[employer]alism conflicts given the current situation: no    Social History:  Living Environment: Pt lives w/ son in 2SH w/ 5 [facility] and [facility]; pt resides on 2nd floor w/ access to full bath, equipped w/ t/s combo  Prior Level of Function: Prior to admission, patient  was recently D/C from [facility]. The patient amb with his son assist to the 2nd floor bed/bathroom and remain upstairs.  "The patient dressed himself but per his son "doesn't bathe"  Roles and Routines: Patient was receiving his meals upstairs. The pt was driving  Equipment Used at Home: cane, straight  DME owned (not currently used): none  Assistance Upon Discharge:  2 sons    Objective:     Communicated with nurseTrena prior to session. Patient found left sidelying with bed alarm, telemetry, pressure relief boots, peripheral IV upon OT entry to room.    General Precautions: Standard, fall, airborne, contact, special contact, droplet   Orthopedic Precautions: N/A   Braces: N/A    Respiratory Status: Room air    Occupational Performance    Gait belt applied - No    Bed Mobility:   Scooting anteriorly to EOB to have both feet planted on floor: minimum assistance  Supine to sit from right side of bed with minimum assistance  Sit to Supine with minimum assistance on right side of bed    Functional Mobility/Transfers:  Sit <> Stand Transfer with moderate assistance with hand-held assist  Functional Mobility: The patient side stepped ~5 steps along the EOB with mod assist/HHA    Activities of Daily Living:  Feeding: stand by assistance and contact guard assistance to drink lemonade x1 and supplement/shake x1. The patient refused to each his dinner stating he isn't hungry.  Grooming: stand by assistance to wipe hands and face in bed  Upper Body Dressing: moderate assist  Lower Body Dressing: dependence    Cognitive/Visual Perceptual:  Cognitive/Psychosocial Skills:    -     Oriented to: Person, Place, and Situation  -     Follows Commands/attention: Follows one-step commands  -     Communication: clear/fluent  -     Memory: No Deficits noted  -     Safety awareness/insight to disability: impaired  -     Mood/Affect/Coping skills/emotional control: Appropriate to situation  Visual/Perceptual:    -     Intact    Physical Exam:  Balance: -     Sitting: supervision and stand by assistance  Postural examination/scapula alignment: -       " Rounded shoulders  -       Forward head  Skin integrity: Visible skin intact  Edema:  None noted  Dominant hand: Right  Upper Extremity Range of Motion:     -       Right Upper Extremity: WFL  -       Left Upper Extremity: WFL  Upper Extremity Strength: -       Right Upper Extremity: WFL  -       Left Upper Extremity: WFL   Strength:    -       Right Upper Extremity: WFL  -       Left Upper Extremity: WFL    AMPAC 6 Click ADL:  AMPA Total Score: 15    Treatment & Education:  Patient educated on role of OT, POC, and goals for therapy  Performed self care and functional mobility as noted above  Vitals: BP:123/58, HR 62, SpO2 93%. O2 sats decreased to 80's upon return to bed but returned to 96% with VC to perform PLB        Patient left HOB elevated with all lines intact, call button in reach, RN notified, bed alarm on, and 2 sons present.    GOALS:   Multidisciplinary Problems       Occupational Therapy Goals          Problem: Occupational Therapy    Goal Priority Disciplines Outcome Interventions   Occupational Therapy Goal     OT, PT/OT Ongoing, Progressing    Description: Goals to be met by: 8/26/23     Patient will increase functional independence with ADLs by performing:    Feeding with Modified [place].  UE Dressing with Modified [place].  LE Dressing with Modified [place].  Grooming while standing at sink with Stand-by Assistance and Assistive Devices as needed.  Toileting from toilet with Supervision and Assistive Devices as needed for hygiene and clothing management.   Sitting at edge of bed x30 minutes with Modified [place].  Rolling to Bilateral with Modified [place].   Supine to sit with Modified [place].  Step transfer with Modified [place] and Supervision  Toilet transfer to toilet with Supervision.  Upper extremity exercise program x15 reps per handout, with assistance as needed.                         History:     Past Medical History:   Diagnosis Date    AICD  (automatic cardioverter/defibrillator) present     Anticoagulant long-term use     Cardiomyopathy     Chronic combined systolic and diastolic congestive heart failure     Left ventricular ejection fraction is estimated at 15-20% and measured to be 25-33%.    Coronary artery disease     History of myocardial infarction     Hypertension     Paroxysmal A-fib     Stroke          Past Surgical History:   Procedure Laterality Date    CHOLECYSTECTOMY      LEFT HEART CATHETERIZATION Left 11/29/2022    Procedure: Left heart cath;  Surgeon: Wesley Rico MD;  Location: [facility] CATH LAB;  Service: Cardiology;  Laterality: Left;  1030am start, R rad access    LEFT HEART CATHETERIZATION N/A 12/1/2022    Procedure: Left heart cath;  Surgeon: Tam Cho MD;  Location: [facility] CATH LAB;  Service: Cardiology;  Laterality: N/A;    REPLACEMENT OF DUAL CHAMBER IMPLANTABLE CARDIOVERTER-DEFIBRILLATOR         Time Tracking:     OT Date of Treatment: 08/12/23  OT Start Time: 1651  OT Stop Time: 1718  OT Total Time (min): 27 min    Billable Minutes: Evaluation 15 and Self Care/Home Management 12    8/12/2023

## 2023-08-12 NOTE — HPI
This is a 73-year-old male with a past medical history of AFib (on Eliquis), CAD, ICM (20-25%, s/p ICD), CVA, seizure disorder, who presents with fatigue.    Patient initially presented to the ED on 08/09 with generalized fatigue.  Workup demonstrated positive COVID test, and elevated LFTs (being worked up as an outpatient).  He was discharged to follow-up as an outpatient.  Patient returned to the ED on 08/11 for persistence of symptoms. Patient reports generalized weakness, dizziness, presyncope, nausea and vomiting.  Associated symptoms include decreased p.o. intake and 2 falls without head trauma.  He denies having diarrhea or abdominal pain.    In the ED, the patient was hypotensive (85/49) and bradycardic.  Labs were remarkable for worsening creatinine (4.6 from a baseline of 1.2) hyperkalemia (5.6-moderately hemolyzed specimen), elevated troponin (0.044), elevated LFTs (AST: 444, ALT:  359), elevated procalcitonin (1.06).  Chest x-ray showed no acute cardiopulmonary process.  Patient was given 1.7 L of LR, Zosyn and was admitted for further management.

## 2023-08-12 NOTE — SUBJECTIVE & OBJECTIVE
Past Medical History:   Diagnosis Date    AICD (automatic cardioverter/defibrillator) present     Anticoagulant long-term use     Cardiomyopathy     Chronic combined systolic and diastolic congestive heart failure     Left ventricular ejection fraction is estimated at 15-20% and measured to be 25-33%.    Coronary artery disease     History of myocardial infarction     Hypertension     Paroxysmal A-fib     Stroke        Past Surgical History:   Procedure Laterality Date    CHOLECYSTECTOMY      LEFT HEART CATHETERIZATION Left 11/29/2022    Procedure: Left heart cath;  Surgeon: Wesley Rico MD;  Location: [facility] CATH LAB;  Service: Cardiology;  Laterality: Left;  1030am start, R rad access    LEFT HEART CATHETERIZATION N/A 12/1/2022    Procedure: Left heart cath;  Surgeon: Tam Cho MD;  Location: [facility] CATH LAB;  Service: Cardiology;  Laterality: N/A;    REPLACEMENT OF DUAL CHAMBER IMPLANTABLE CARDIOVERTER-DEFIBRILLATOR         Review of patient's allergies indicates:  No Known Allergies    No current facility-administered medications on file prior to encounter.     Current Outpatient Medications on File Prior to Encounter   Medication Sig    amiodarone (PACERONE) 200 MG Tab Take 1 tablet (200 mg total) by mouth once daily.    apixaban (ELIQUIS) 5 mg Tab Take 1 tablet (5 mg total) by mouth 2 (two) times daily.    clopidogreL (PLAVIX) 75 mg tablet Take 1 tablet (75 mg total) by mouth every evening.    dapagliflozin propanediol (FARXIGA) 10 mg tablet Take 1 tablet (10 mg total) by mouth once daily.    fluticasone propionate (FLONASE) 50 mcg/actuation nasal spray 1 spray by Each Nostril route once daily.    ipratropium (ATROVENT) 21 mcg (0.03 %) nasal spray 2 sprays by Each Nostril route 4 (four) times daily as needed for Rhinitis.    losartan (COZAAR) 25 MG tablet Take 1 tablet (25 mg total) by mouth once daily. Stop when able to start and afford Entresto.    metoprolol succinate (TOPROL-XL) 25 MG 24 hr  tablet Take 0.5 tablets (12.5 mg total) by mouth once daily. Lower dose    nitroGLYCERIN (NITROSTAT) 0.4 MG SL tablet Place 1 tablet (0.4 mg total) under the tongue every 5 (five) minutes as needed for Chest pain. Up to 3 doses. If chest pain is not relieved or worsens 3 to 5 minutes after 1 dose, call 911 and seek immediate emergency medical attention.    pantoprazole (PROTONIX) 40 MG tablet Take 1 tablet (40 mg total) by mouth once daily.    rosuvastatin (CRESTOR) 40 MG Tab Take 1 tablet (40 mg total) by mouth every evening.    sacubitriL-valsartan (ENTRESTO) 24-26 mg per tablet Take 1 tablet by mouth 2 (two) times daily. Stop losartan when starting Entresto.    sertraline (ZOLOFT) 50 MG tablet Take 1 tablet (50 mg total) by mouth once daily.    spironolactone (ALDACTONE) 25 MG tablet Take 1 tablet (25 mg total) by mouth once daily.     Family History    None       Tobacco Use    Smoking status: Former     Current packs/day: 0.00     Passive exposure: Past    Smokeless tobacco: Never   Substance and Sexual Activity    Alcohol use: Never    Drug use: Never    Sexual activity: Not on file     Review of Systems   Constitutional:  Positive for activity change, appetite change and fatigue.   HENT: Negative.     Eyes: Negative.    Respiratory: Negative.     Cardiovascular: Negative.    Gastrointestinal:  Positive for nausea and vomiting.   Endocrine: Negative.    Genitourinary: Negative.    Musculoskeletal: Negative.    Skin: Negative.    Allergic/Immunologic: Negative.    Neurological: Negative.    Psychiatric/Behavioral: Negative.       Objective:     Vital Signs (Most Recent):  Temp: 97.9 °F (36.6 °C) (08/11/23 1922)  Pulse: 60 (08/11/23 1922)  Resp: 16 (08/11/23 1922)  BP: (!) 114/55 (08/11/23 1922)  SpO2: 95 % (08/11/23 1922) Vital Signs (24h Range):  Temp:  [97.9 °F (36.6 °C)-99.5 °F (37.5 °C)] 97.9 °F (36.6 °C)  Pulse:  [56-62] 60  Resp:  [16-28] 16  SpO2:  [93 %-98 %] 95 %  BP: ()/(49-59) 114/55      Weight: 59 kg (130 lb)  Body mass index is 19.2 kg/m².     Physical Exam  Vitals and nursing note reviewed.   Constitutional:       General: He is not in acute distress.     Appearance: Normal appearance. He is ill-appearing.   HENT:      Head: Normocephalic and atraumatic.      Nose: Nose normal.      Mouth/Throat:      Mouth: Mucous membranes are moist.   Eyes:      Extraocular Movements: Extraocular movements intact.   Cardiovascular:      Rate and Rhythm: Normal rate.      Pulses: Normal pulses.      Heart sounds: No murmur heard.  Pulmonary:      Effort: Pulmonary effort is normal. No respiratory distress.   Abdominal:      General: Abdomen is flat.      Palpations: Abdomen is soft.      Tenderness: There is no abdominal tenderness.   Musculoskeletal:      Right lower leg: No edema.      Left lower leg: No edema.   Skin:     General: Skin is warm.      Capillary Refill: Capillary refill takes less than 2 seconds.   Neurological:      General: No focal deficit present.      Mental Status: He is alert.   Psychiatric:         Mood and Affect: Mood normal.                Significant Labs: All pertinent labs within the past 24 hours have been reviewed.    Significant Imaging: I have reviewed all pertinent imaging results/findings within the past 24 hours.

## 2023-08-12 NOTE — NURSING
Pt arrived on unit, awake alert and oriented. IV site noted, saline lock. Pt on room air, no distress noted. Skin assessed and intact. Call light with reach. Bed alarm set. Safety measures maintained. Will cont to monitor

## 2023-08-12 NOTE — PROGRESS NOTES
[facility] Medicine  Progress Note    Patient Name: Balbir Rebollar Sr.  MRN: [id number]  Patient Class: IP- Inpatient   Admission Date: 8/11/2023  Length of Stay: 1 days  Attending Physician: Jane Villalta, *  Primary Care Provider: Flip Hanna MD        Subjective:     Principal Problem:TREVOR (acute kidney injury)        HPI:  This is a 73-year-old male with a past medical history of AFib (on Eliquis), CAD, ICM (20-25%, s/p ICD), CVA, seizure disorder, who presents with fatigue.    Patient initially presented to the ED on 08/09 with generalized fatigue.  Workup demonstrated positive COVID test, and elevated LFTs (being worked up as an outpatient).  He was discharged to follow-up as an outpatient.  Patient returned to the ED on 08/11 for persistence of symptoms. Patient reports generalized weakness, dizziness, presyncope, nausea and vomiting.  Associated symptoms include decreased p.o. intake and 2 falls without head trauma.  He denies having diarrhea or abdominal pain.    In the ED, the patient was hypotensive (85/49) and bradycardic.  Labs were remarkable for worsening creatinine (4.6 from a baseline of 1.2) hyperkalemia (5.6-moderately hemolyzed specimen), elevated troponin (0.044), elevated LFTs (AST: 444, ALT:  359), elevated procalcitonin (1.06).  Chest x-ray showed no acute cardiopulmonary process.  Patient was given 1.7 L of LR, Zosyn and was admitted for further management.      Overview/Hospital Course:  This is a 73-year-old male with a past medical history of AFib (on Eliquis), CAD, ICM (20-25%, s/p ICD), CVA, seizure disorder, who presents with fatigue.  Patient initially presented to the ED on 08/09 with generalized fatigue.  Workup demonstrated positive COVID test, and elevated LFTs (being worked up as an outpatient).  He was discharged to follow-up as an outpatient.  Patient returned to the ED on 08/11 for persistence of symptoms. Patient reports generalized  weakness, dizziness, presyncope, nausea and vomiting.  Associated symptoms include decreased p.o. intake and 2 falls without head trauma.  He denies having diarrhea or abdominal pain.  In the ED, the patient was hypotensive (85/49) and bradycardic.  Labs were remarkable for worsening creatinine (4.6 from a baseline of 1.2) hyperkalemia (5.6-moderately hemolyzed specimen), elevated troponin (0.044), elevated LFTs (AST: 444, ALT:  359), elevated procalcitonin (1.06).  Chest x-ray showed no acute cardiopulmonary process.  Patient was given 1.7 L of LR, Zosyn and was admitted for further manageme.,  ARF did not improved,started on IVF and consulted nephrology,check bladder scan.  PT,OT       Past Medical History:   Diagnosis Date    AICD (automatic cardioverter/defibrillator) present     Anticoagulant long-term use     Cardiomyopathy     Chronic combined systolic and diastolic congestive heart failure     Left ventricular ejection fraction is estimated at 15-20% and measured to be 25-33%.    Coronary artery disease     History of myocardial infarction     Hypertension     Paroxysmal A-fib     Stroke        Past Surgical History:   Procedure Laterality Date    CHOLECYSTECTOMY      LEFT HEART CATHETERIZATION Left 11/29/2022    Procedure: Left heart cath;  Surgeon: Wesley Rico MD;  Location: [facility] CATH LAB;  Service: Cardiology;  Laterality: Left;  1030am start, R rad access    LEFT HEART CATHETERIZATION N/A 12/1/2022    Procedure: Left heart cath;  Surgeon: Tam Cho MD;  Location: [facility] CATH LAB;  Service: Cardiology;  Laterality: N/A;    REPLACEMENT OF DUAL CHAMBER IMPLANTABLE CARDIOVERTER-DEFIBRILLATOR         Review of patient's allergies indicates:  No Known Allergies    No current facility-administered medications on file prior to encounter.     Current Outpatient Medications on File Prior to Encounter   Medication Sig    amiodarone (PACERONE) 200 MG Tab Take 1 tablet (200 mg total) by  mouth once daily.    apixaban (ELIQUIS) 5 mg Tab Take 1 tablet (5 mg total) by mouth 2 (two) times daily.    clopidogreL (PLAVIX) 75 mg tablet Take 1 tablet (75 mg total) by mouth every evening.    dapagliflozin propanediol (FARXIGA) 10 mg tablet Take 1 tablet (10 mg total) by mouth once daily.    fluticasone propionate (FLONASE) 50 mcg/actuation nasal spray 1 spray by Each Nostril route once daily.    ipratropium (ATROVENT) 21 mcg (0.03 %) nasal spray 2 sprays by Each Nostril route 4 (four) times daily as needed for Rhinitis.    losartan (COZAAR) 25 MG tablet Take 1 tablet (25 mg total) by mouth once daily. Stop when able to start and afford Entresto.    metoprolol succinate (TOPROL-XL) 25 MG 24 hr tablet Take 0.5 tablets (12.5 mg total) by mouth once daily. Lower dose    nitroGLYCERIN (NITROSTAT) 0.4 MG SL tablet Place 1 tablet (0.4 mg total) under the tongue every 5 (five) minutes as needed for Chest pain. Up to 3 doses. If chest pain is not relieved or worsens 3 to 5 minutes after 1 dose, call 911 and seek immediate emergency medical attention.    pantoprazole (PROTONIX) 40 MG tablet Take 1 tablet (40 mg total) by mouth once daily.    rosuvastatin (CRESTOR) 40 MG Tab Take 1 tablet (40 mg total) by mouth every evening.    sacubitriL-valsartan (ENTRESTO) 24-26 mg per tablet Take 1 tablet by mouth 2 (two) times daily. Stop losartan when starting Entresto.    sertraline (ZOLOFT) 50 MG tablet Take 1 tablet (50 mg total) by mouth once daily.    spironolactone (ALDACTONE) 25 MG tablet Take 1 tablet (25 mg total) by mouth once daily.     Family History    None       Tobacco Use    Smoking status: Former     Current packs/day: 0.00     Passive exposure: Past    Smokeless tobacco: Never   Substance and Sexual Activity    Alcohol use: Never    Drug use: Never    Sexual activity: Not on file     Review of Systems   Constitutional:  Positive for activity change, appetite change and fatigue.   HENT:  Negative.     Eyes: Negative.    Respiratory: Negative.     Cardiovascular: Negative.    Gastrointestinal:  Positive for nausea and vomiting.   Endocrine: Negative.    Genitourinary: Negative.    Musculoskeletal: Negative.    Skin: Negative.    Allergic/Immunologic: Negative.    Neurological: Negative.    Psychiatric/Behavioral: Negative.       Objective:     Vital Signs (Most Recent):  Temp: 98.4 °F (36.9 °C) (08/12/23 0741)  Pulse: (!) 54 (08/12/23 0812)  Resp: 18 (08/12/23 0741)  BP: (!) 95/53 (08/12/23 0741)  SpO2: (!) 91 % (08/12/23 0812) Vital Signs (24h Range):  Temp:  [97.9 °F (36.6 °C)-99.5 °F (37.5 °C)] 98.4 °F (36.9 °C)  Pulse:  [54-62] 54  Resp:  [16-28] 18  SpO2:  [91 %-98 %] 91 %  BP: ()/(49-59) 95/53     Weight: 61.1 kg (134 lb 11.2 oz)  Body mass index is 19.89 kg/m².     Physical Exam  Vitals and nursing note reviewed.   Constitutional:       General: He is not in acute distress.     Appearance: Normal appearance. He is ill-appearing.   HENT:      Head: Normocephalic and atraumatic.      Nose: Nose normal.      Mouth/Throat:      Mouth: Mucous membranes are moist.   Eyes:      Extraocular Movements: Extraocular movements intact.   Cardiovascular:      Rate and Rhythm: Normal rate.      Pulses: Normal pulses.      Heart sounds: No murmur heard.  Pulmonary:      Effort: Pulmonary effort is normal. No respiratory distress.   Abdominal:      General: Abdomen is flat.      Palpations: Abdomen is soft.      Tenderness: There is no abdominal tenderness.   Musculoskeletal:      Right lower leg: No edema.      Left lower leg: No edema.   Skin:     General: Skin is warm.      Capillary Refill: Capillary refill takes less than 2 seconds.   Neurological:      General: No focal deficit present.      Mental Status: He is alert.   Psychiatric:         Mood and Affect: Mood normal.                Significant Labs: All pertinent labs within the past 24 hours have been reviewed.    Significant Imaging: I have  reviewed all pertinent imaging results/findings within the past 24 hours.      Assessment/Plan:      * TREVOR (acute kidney injury)  Patient with acute kidney injury/acute renal failure likely due to pre-renal azotemia due to IVVD, due to nephrotoxic medications & Covid nephropathy   TREVOR is currently worsening. Baseline creatinine 1.2 -   Labs reviewed- Renal function/electrolytes with Estimated Creatinine Clearance: 10.4 mL/min (A) (based on SCr of 5.3 mg/dL (H)). according to latest data. Monitor urine output and serial BMP and adjust therapy as needed.   Avoid nephrotoxins and renally dose meds for GFR listed above.  Received 1.7 L of LR in the ED  Continue with gentle hydration,    ARF did not improved,started on IVF and consulted nephrology,check bladder scan.  PT,OT     Elevated LFTs  Chronic, worsening  US abdomen (8/4/2023):  Two small indeterminate areas in the liver, incompletely characterized by this exam.  Prior cholecystectomy.  Could be secondary to amiodarone use, or hepatitis.   Obtain hepatitis panel   Needs follow-up with Cardiology for alternative for amiodarone    COVID-19  Patient is identified as low risk (not hypoxic).   Initiate standard COVID protocols; COVID-19 testing ,Infection Control notification  and isolation- respiratory, contact and droplet per protocol  Does not meet criteria for treatment  Monitor respiratory status    QT prolongation  History noted. Avoid QT prolonging agents.       History of seizure disorder  Not currently on any medications      Chronic combined systolic and diastolic congestive heart failure  Patient is identified as having Combined Systolic and Diastolic heart failure that is Chronic. CHF is currently controlled. Latest ECHO performed and demonstrates- Results for orders placed during the hospital encounter of 12/12/22    Echo Saline Bubble? No    Interpretation Summary  · Concentric hypertrophy and severely decreased systolic function.  · The estimated  ejection fraction is 20-25%.  · Grade I left ventricular diastolic dysfunction.  · Normal right ventricular size with normal right ventricular systolic function.  · Mild to moderate left atrial enlargement.  · Mild right atrial enlargement.  · Mild aortic regurgitation.  · Normal central venous pressure (3 mmHg).  · The estimated PA systolic pressure is 17 mmHg.  · Anterior wall is thinned out and akinetic  . Continue Beta Blocker and monitor clinical status closely. Monitor on telemetry. Patient is off CHF pathway.  Monitor strict Is&Os and daily weights.  Place on fluid restriction of 1.5 L. Cardiology has not been any consulted. Continue to stress to patient importance of self efficacy and  on diet for CHF. Last BNP reviewed- and noted below   Recent Labs   Lab 08/11/23  1711   BNP 78   .    Ischemic cardiomyopathy  Results for orders placed during the hospital encounter of 12/12/22    Echo Saline Bubble? No    Interpretation Summary  · Concentric hypertrophy and severely decreased systolic function.  · The estimated ejection fraction is 20-25%.  · Grade I left ventricular diastolic dysfunction.  · Normal right ventricular size with normal right ventricular systolic function.  · Mild to moderate left atrial enlargement.  · Mild right atrial enlargement.  · Mild aortic regurgitation.  · Normal central venous pressure (3 mmHg).  · The estimated PA systolic pressure is 17 mmHg.  · Anterior wall is thinned out and akinetic  No acute issues.       ICD (implantable cardioverter-defibrillator), single, in situ  History noted.  Telemetry monitoring.      History of CVA (cerebrovascular accident)  Resume home medications      Coronary artery disease involving native coronary artery of native heart without angina pectoris  Resume home medications      Paroxysmal atrial fibrillation  Resume home medications (Toprol-XL, amiodarone, Eliquis)   [id number] Score: 3.      VTE Risk Mitigation (From admission, onward)          Ordered     apixaban tablet 5 mg  2 times daily         08/11/23 2026     IP VTE HIGH RISK PATIENT  Once         08/11/23 2026     Place sequential compression device  Until discontinued         08/11/23 2026                Discharge Planning   MOR:      Code Status: Full Code   Is the patient medically ready for discharge?:     Reason for patient still in hospital (select all that apply): Patient trending condition                     Jane Villalta MD  Department of Hospital Medicine   [facility]

## 2023-08-12 NOTE — HOSPITAL COURSE
This is a 73-year-old male with a past medical history of AFib (on Eliquis), CAD, ICM (20-25%, s/p ICD), CVA, seizure disorder, who presents with fatigue.  Patient initially presented to the ED on 08/09 with generalized fatigue.  Workup demonstrated positive COVID test, and elevated LFTs (being worked up as an outpatient).  He was discharged to follow-up as an outpatient.  Patient returned to the ED on 08/11 for persistence of symptoms. Patient reports generalized weakness, dizziness, presyncope, nausea and vomiting.  Associated symptoms include decreased p.o. intake and 2 falls without head trauma.  He denies having diarrhea or abdominal pain.  In the ED, the patient was hypotensive (85/49) and bradycardic.  Labs were remarkable for worsening creatinine (4.6 from a baseline of 1.2) hyperkalemia (5.6-moderately hemolyzed specimen), elevated troponin (0.044), elevated LFTs (AST: 444, ALT:  359), elevated procalcitonin (1.06).  Chest x-ray showed no acute cardiopulmonary process.  Patient was given 1.7 L of LR, Zosyn and was admitted for further manageme.,  ARF did not improved,started on IVF and consulted nephrology,check bladder scan.retaining ,placed vo.ARF did not still improved,poor candidate for HD  PT,OT .  Both daughters,including jeannine agree wit DNR status and see palliative.,  ARF did not improved,per nephrology patient is not candidate for HD, Daughters agree with Inpatient Hospice,palliative care was following.  Patient was discharged to inpatient Hospice.

## 2023-08-12 NOTE — NURSING
Scheduled medication given without difficulty. IVF started and infusing. Tele #[id number] placed on pt. Isolation precautions maintained. Safety measures maintained. Will cont to monitor

## 2023-08-12 NOTE — ASSESSMENT & PLAN NOTE
Results for orders placed during the hospital encounter of 12/12/22    Echo Saline Bubble? No    Interpretation Summary  · Concentric hypertrophy and severely decreased systolic function.  · The estimated ejection fraction is 20-25%.  · Grade I left ventricular diastolic dysfunction.  · Normal right ventricular size with normal right ventricular systolic function.  · Mild to moderate left atrial enlargement.  · Mild right atrial enlargement.  · Mild aortic regurgitation.  · Normal central venous pressure (3 mmHg).  · The estimated PA systolic pressure is 17 mmHg.  · Anterior wall is thinned out and akinetic  No acute issues.

## 2023-08-12 NOTE — ASSESSMENT & PLAN NOTE
Patient is identified as low risk (not hypoxic).   Initiate standard COVID protocols; COVID-19 testing ,Infection Control notification  and isolation- respiratory, contact and droplet per protocol  Does not meet criteria for treatment  Monitor respiratory status

## 2023-08-12 NOTE — H&P
[facility] Medicine  History & Physical    Patient Name: Balbir Rebollar Sr.  MRN: [id number]  Patient Class: IP- Inpatient  Admission Date: 8/11/2023  Attending Physician: Jane Villalta, *   Primary Care Provider: Flip Hanna MD         Patient information was obtained from patient and ER records.     Subjective:     Principal Problem:TREVOR (acute kidney injury)    Chief Complaint:   Chief Complaint   Patient presents with    Fatigue     EMS called to 72yo male that has been having weakness, lack of appetite, vomiting x1 week. Was seen at [facility] a few days ago for same symptoms, treated, and discharged. Patient is hypotensive. Initial bp was 78/50 and was given 700ml NS enroute. Bp at triage was 85/49        HPI: This is a 73-year-old male with a past medical history of AFib (on Eliquis), CAD, ICM (20-25%, s/p ICD), CVA, seizure disorder, who presents with fatigue.    Patient initially presented to the ED on 08/09 with generalized fatigue.  Workup demonstrated positive COVID test, and elevated LFTs (being worked up as an outpatient).  He was discharged to follow-up as an outpatient.  Patient returned to the ED on 08/11 for persistence of symptoms. Patient reports generalized weakness, dizziness, presyncope, nausea and vomiting.  Associated symptoms include decreased p.o. intake and 2 falls without head trauma.  He denies having diarrhea or abdominal pain.    In the ED, the patient was hypotensive (85/49) and bradycardic.  Labs were remarkable for worsening creatinine (4.6 from a baseline of 1.2) hyperkalemia (5.6-moderately hemolyzed specimen), elevated troponin (0.044), elevated LFTs (AST: 444, ALT:  359), elevated procalcitonin (1.06).  Chest x-ray showed no acute cardiopulmonary process.  Patient was given 1.7 L of LR, Zosyn and was admitted for further management.      Past Medical History:   Diagnosis Date    AICD (automatic cardioverter/defibrillator) present      Anticoagulant long-term use     Cardiomyopathy     Chronic combined systolic and diastolic congestive heart failure     Left ventricular ejection fraction is estimated at 15-20% and measured to be 25-33%.    Coronary artery disease     History of myocardial infarction     Hypertension     Paroxysmal A-fib     Stroke        Past Surgical History:   Procedure Laterality Date    CHOLECYSTECTOMY      LEFT HEART CATHETERIZATION Left 11/29/2022    Procedure: Left heart cath;  Surgeon: Wesley Rico MD;  Location: [facility] CATH LAB;  Service: Cardiology;  Laterality: Left;  1030am start, R rad access    LEFT HEART CATHETERIZATION N/A 12/1/2022    Procedure: Left heart cath;  Surgeon: Tam Cho MD;  Location: [facility] CATH LAB;  Service: Cardiology;  Laterality: N/A;    REPLACEMENT OF DUAL CHAMBER IMPLANTABLE CARDIOVERTER-DEFIBRILLATOR         Review of patient's allergies indicates:  No Known Allergies    No current facility-administered medications on file prior to encounter.     Current Outpatient Medications on File Prior to Encounter   Medication Sig    amiodarone (PACERONE) 200 MG Tab Take 1 tablet (200 mg total) by mouth once daily.    apixaban (ELIQUIS) 5 mg Tab Take 1 tablet (5 mg total) by mouth 2 (two) times daily.    clopidogreL (PLAVIX) 75 mg tablet Take 1 tablet (75 mg total) by mouth every evening.    dapagliflozin propanediol (FARXIGA) 10 mg tablet Take 1 tablet (10 mg total) by mouth once daily.    fluticasone propionate (FLONASE) 50 mcg/actuation nasal spray 1 spray by Each Nostril route once daily.    ipratropium (ATROVENT) 21 mcg (0.03 %) nasal spray 2 sprays by Each Nostril route 4 (four) times daily as needed for Rhinitis.    losartan (COZAAR) 25 MG tablet Take 1 tablet (25 mg total) by mouth once daily. Stop when able to start and afford Entresto.    metoprolol succinate (TOPROL-XL) 25 MG 24 hr tablet Take 0.5 tablets (12.5 mg total) by mouth once daily. Lower dose     nitroGLYCERIN (NITROSTAT) 0.4 MG SL tablet Place 1 tablet (0.4 mg total) under the tongue every 5 (five) minutes as needed for Chest pain. Up to 3 doses. If chest pain is not relieved or worsens 3 to 5 minutes after 1 dose, call 911 and seek immediate emergency medical attention.    pantoprazole (PROTONIX) 40 MG tablet Take 1 tablet (40 mg total) by mouth once daily.    rosuvastatin (CRESTOR) 40 MG Tab Take 1 tablet (40 mg total) by mouth every evening.    sacubitriL-valsartan (ENTRESTO) 24-26 mg per tablet Take 1 tablet by mouth 2 (two) times daily. Stop losartan when starting Entresto.    sertraline (ZOLOFT) 50 MG tablet Take 1 tablet (50 mg total) by mouth once daily.    spironolactone (ALDACTONE) 25 MG tablet Take 1 tablet (25 mg total) by mouth once daily.     Family History    None       Tobacco Use    Smoking status: Former     Current packs/day: 0.00     Passive exposure: Past    Smokeless tobacco: Never   Substance and Sexual Activity    Alcohol use: Never    Drug use: Never    Sexual activity: Not on file     Review of Systems   Constitutional:  Positive for activity change, appetite change and fatigue.   HENT: Negative.     Eyes: Negative.    Respiratory: Negative.     Cardiovascular: Negative.    Gastrointestinal:  Positive for nausea and vomiting.   Endocrine: Negative.    Genitourinary: Negative.    Musculoskeletal: Negative.    Skin: Negative.    Allergic/Immunologic: Negative.    Neurological: Negative.    Psychiatric/Behavioral: Negative.       Objective:     Vital Signs (Most Recent):  Temp: 97.9 °F (36.6 °C) (08/11/23 1922)  Pulse: 60 (08/11/23 1922)  Resp: 16 (08/11/23 1922)  BP: (!) 114/55 (08/11/23 1922)  SpO2: 95 % (08/11/23 1922) Vital Signs (24h Range):  Temp:  [97.9 °F (36.6 °C)-99.5 °F (37.5 °C)] 97.9 °F (36.6 °C)  Pulse:  [56-62] 60  Resp:  [16-28] 16  SpO2:  [93 %-98 %] 95 %  BP: ()/(49-59) 114/55     Weight: 59 kg (130 lb)  Body mass index is 19.2 kg/m².     Physical  Exam  Vitals and nursing note reviewed.   Constitutional:       General: He is not in acute distress.     Appearance: Normal appearance. He is ill-appearing.   HENT:      Head: Normocephalic and atraumatic.      Nose: Nose normal.      Mouth/Throat:      Mouth: Mucous membranes are moist.   Eyes:      Extraocular Movements: Extraocular movements intact.   Cardiovascular:      Rate and Rhythm: Normal rate.      Pulses: Normal pulses.      Heart sounds: No murmur heard.  Pulmonary:      Effort: Pulmonary effort is normal. No respiratory distress.   Abdominal:      General: Abdomen is flat.      Palpations: Abdomen is soft.      Tenderness: There is no abdominal tenderness.   Musculoskeletal:      Right lower leg: No edema.      Left lower leg: No edema.   Skin:     General: Skin is warm.      Capillary Refill: Capillary refill takes less than 2 seconds.   Neurological:      General: No focal deficit present.      Mental Status: He is alert.   Psychiatric:         Mood and Affect: Mood normal.                Significant Labs: All pertinent labs within the past 24 hours have been reviewed.    Significant Imaging: I have reviewed all pertinent imaging results/findings within the past 24 hours.    Assessment/Plan:     * TREVOR (acute kidney injury)  Patient with acute kidney injury/acute renal failure likely due to pre-renal azotemia due to IVVD, due to nephrotoxic medications & Covid nephropathy   TREVOR is currently worsening. Baseline creatinine 1.2 -   Labs reviewed- Renal function/electrolytes with Estimated Creatinine Clearance: 12 mL/min (A) (based on SCr of 4.6 mg/dL (H)). according to latest data. Monitor urine output and serial BMP and adjust therapy as needed.   Avoid nephrotoxins and renally dose meds for GFR listed above.  Received 1.7 L of LR in the ED  Continue with gentle hydration      Elevated LFTs  Chronic, worsening  US abdomen (8/4/2023):  Two small indeterminate areas in the liver, incompletely  characterized by this exam.  Prior cholecystectomy.  Could be secondary to amiodarone use, or hepatitis.   Obtain hepatitis panel   Needs follow-up with Cardiology for alternative for amiodarone    COVID-19  Patient is identified as low risk (not hypoxic).   Initiate standard COVID protocols; COVID-19 testing ,Infection Control notification  and isolation- respiratory, contact and droplet per protocol  Does not meet criteria for treatment  Monitor respiratory status    QT prolongation  History noted. Avoid QT prolonging agents.       History of seizure disorder  Not currently on any medications      Ischemic cardiomyopathy  Results for orders placed during the hospital encounter of 12/12/22    Echo Saline Bubble? No    Interpretation Summary  · Concentric hypertrophy and severely decreased systolic function.  · The estimated ejection fraction is 20-25%.  · Grade I left ventricular diastolic dysfunction.  · Normal right ventricular size with normal right ventricular systolic function.  · Mild to moderate left atrial enlargement.  · Mild right atrial enlargement.  · Mild aortic regurgitation.  · Normal central venous pressure (3 mmHg).  · The estimated PA systolic pressure is 17 mmHg.  · Anterior wall is thinned out and akinetic  No acute issues.       ICD (implantable cardioverter-defibrillator), single, in situ  History noted.  Telemetry monitoring.      History of CVA (cerebrovascular accident)  Resume home medications      Coronary artery disease involving native coronary artery of native heart without angina pectoris  Resume home medications      Paroxysmal atrial fibrillation  Resume home medications (Toprol-XL, amiodarone, Eliquis)   [id number] Score: 3.      VTE Risk Mitigation (From admission, onward)         Ordered     apixaban tablet 5 mg  2 times daily         08/11/23 2026     IP VTE HIGH RISK PATIENT  Once         08/11/23 2026     Place sequential compression device  Until discontinued         08/11/23  2026               Kanu Aguilera MD  Department of Hospital Medicine  [facility] [place] [facility]

## 2023-08-12 NOTE — SUBJECTIVE & OBJECTIVE
Past Medical History:   Diagnosis Date    AICD (automatic cardioverter/defibrillator) present     Anticoagulant long-term use     Cardiomyopathy     Chronic combined systolic and diastolic congestive heart failure     Left ventricular ejection fraction is estimated at 15-20% and measured to be 25-33%.    Coronary artery disease     History of myocardial infarction     Hypertension     Paroxysmal A-fib     Stroke        Past Surgical History:   Procedure Laterality Date    CHOLECYSTECTOMY      LEFT HEART CATHETERIZATION Left 11/29/2022    Procedure: Left heart cath;  Surgeon: Wesley Rico MD;  Location: [facility] CATH LAB;  Service: Cardiology;  Laterality: Left;  1030am start, R rad access    LEFT HEART CATHETERIZATION N/A 12/1/2022    Procedure: Left heart cath;  Surgeon: Tam Cho MD;  Location: [facility] CATH LAB;  Service: Cardiology;  Laterality: N/A;    REPLACEMENT OF DUAL CHAMBER IMPLANTABLE CARDIOVERTER-DEFIBRILLATOR         Review of patient's allergies indicates:  No Known Allergies    No current facility-administered medications on file prior to encounter.     Current Outpatient Medications on File Prior to Encounter   Medication Sig    amiodarone (PACERONE) 200 MG Tab Take 1 tablet (200 mg total) by mouth once daily.    apixaban (ELIQUIS) 5 mg Tab Take 1 tablet (5 mg total) by mouth 2 (two) times daily.    clopidogreL (PLAVIX) 75 mg tablet Take 1 tablet (75 mg total) by mouth every evening.    dapagliflozin propanediol (FARXIGA) 10 mg tablet Take 1 tablet (10 mg total) by mouth once daily.    fluticasone propionate (FLONASE) 50 mcg/actuation nasal spray 1 spray by Each Nostril route once daily.    ipratropium (ATROVENT) 21 mcg (0.03 %) nasal spray 2 sprays by Each Nostril route 4 (four) times daily as needed for Rhinitis.    losartan (COZAAR) 25 MG tablet Take 1 tablet (25 mg total) by mouth once daily. Stop when able to start and afford Entresto.    metoprolol succinate (TOPROL-XL) 25 MG 24 hr  tablet Take 0.5 tablets (12.5 mg total) by mouth once daily. Lower dose    nitroGLYCERIN (NITROSTAT) 0.4 MG SL tablet Place 1 tablet (0.4 mg total) under the tongue every 5 (five) minutes as needed for Chest pain. Up to 3 doses. If chest pain is not relieved or worsens 3 to 5 minutes after 1 dose, call 911 and seek immediate emergency medical attention.    pantoprazole (PROTONIX) 40 MG tablet Take 1 tablet (40 mg total) by mouth once daily.    rosuvastatin (CRESTOR) 40 MG Tab Take 1 tablet (40 mg total) by mouth every evening.    sacubitriL-valsartan (ENTRESTO) 24-26 mg per tablet Take 1 tablet by mouth 2 (two) times daily. Stop losartan when starting Entresto.    sertraline (ZOLOFT) 50 MG tablet Take 1 tablet (50 mg total) by mouth once daily.    spironolactone (ALDACTONE) 25 MG tablet Take 1 tablet (25 mg total) by mouth once daily.     Family History    None       Tobacco Use    Smoking status: Former     Current packs/day: 0.00     Passive exposure: Past    Smokeless tobacco: Never   Substance and Sexual Activity    Alcohol use: Never    Drug use: Never    Sexual activity: Not on file     Review of Systems   Constitutional:  Positive for activity change, appetite change and fatigue.   HENT: Negative.     Eyes: Negative.    Respiratory: Negative.     Cardiovascular: Negative.    Gastrointestinal:  Positive for nausea and vomiting.   Endocrine: Negative.    Genitourinary: Negative.    Musculoskeletal: Negative.    Skin: Negative.    Allergic/Immunologic: Negative.    Neurological: Negative.    Psychiatric/Behavioral: Negative.       Objective:     Vital Signs (Most Recent):  Temp: 98.4 °F (36.9 °C) (08/12/23 0741)  Pulse: (!) 54 (08/12/23 0812)  Resp: 18 (08/12/23 0741)  BP: (!) 95/53 (08/12/23 0741)  SpO2: (!) 91 % (08/12/23 0812) Vital Signs (24h Range):  Temp:  [97.9 °F (36.6 °C)-99.5 °F (37.5 °C)] 98.4 °F (36.9 °C)  Pulse:  [54-62] 54  Resp:  [16-28] 18  SpO2:  [91 %-98 %] 91 %  BP: ()/(49-59) 95/53      Weight: 61.1 kg (134 lb 11.2 oz)  Body mass index is 19.89 kg/m².     Physical Exam  Vitals and nursing note reviewed.   Constitutional:       General: He is not in acute distress.     Appearance: Normal appearance. He is ill-appearing.   HENT:      Head: Normocephalic and atraumatic.      Nose: Nose normal.      Mouth/Throat:      Mouth: Mucous membranes are moist.   Eyes:      Extraocular Movements: Extraocular movements intact.   Cardiovascular:      Rate and Rhythm: Normal rate.      Pulses: Normal pulses.      Heart sounds: No murmur heard.  Pulmonary:      Effort: Pulmonary effort is normal. No respiratory distress.   Abdominal:      General: Abdomen is flat.      Palpations: Abdomen is soft.      Tenderness: There is no abdominal tenderness.   Musculoskeletal:      Right lower leg: No edema.      Left lower leg: No edema.   Skin:     General: Skin is warm.      Capillary Refill: Capillary refill takes less than 2 seconds.   Neurological:      General: No focal deficit present.      Mental Status: He is alert.   Psychiatric:         Mood and Affect: Mood normal.                Significant Labs: All pertinent labs within the past 24 hours have been reviewed.    Significant Imaging: I have reviewed all pertinent imaging results/findings within the past 24 hours.

## 2023-08-12 NOTE — CONSULTS
Reason for consultation:  Acute renal failure    HPI:  74 yo man with h/o HTN, chronic combined CHF, AICD in situ, chronic A fib, CVA presented to the hospital with c/o having problem with weakness, N/V and poor appetite for about a week.  He was seen with same problem on 8/8 and tested (+) for COVID.  He came back to the hospital because of worsening conditions.  He was found in acute renal failure and nephrology was consulted for evaluation.    PMH:  As above.    Scheduled Meds:   amiodarone  200 mg Oral Daily    apixaban  5 mg Oral BID    atorvastatin  40 mg Oral Daily    clopidogreL  75 mg Oral QHS    mupirocin   Nasal BID    polyethylene glycol  17 g Oral Daily       Review of patient's allergies indicates:  No Known Allergies    Family history:  Non contributory    Social history:  remote h/o tobacco use, no alcohol    Vital Signs Range (Last 24H):  Temp:  [97.9 °F (36.6 °C)-99.5 °F (37.5 °C)]   Pulse:  [54-62]   Resp:  [16-28]   BP: ()/(49-59)   SpO2:  [91 %-98 %]     I & O (Last 24H):  Intake/Output Summary (Last 24 hours) at 8/12/2023 0911  Last data filed at 8/12/2023 0830  Gross per 24 hour   Intake 365 ml   Output --   Net 365 ml           Physical Exam:  General appearance: well developed, cachectic, no distress  Lungs:  clear to auscultation bilaterally and normal respiratory effort  Heart: regular rate and rhythm  Abdomen:  soft, normal bowel sounds  Extremities: edema negative    Laboratory:  I have reviewed all pertinent lab results within the past 24 hours.  CBC:   Recent Labs   Lab 08/12/23  0537   WBC 9.29   RBC 4.29*   HGB 13.1*   HCT 39.4*      MCV 92   MCH 30.5   MCHC 33.2     CMP:   Recent Labs   Lab 08/11/23  1711 08/12/23  0537   GLU 91 103   CALCIUM 8.1* 8.1*   ALBUMIN 2.4*  --    PROT 6.2  --     135*   K 5.6* 4.9   CO2 17* 18*    108   BUN 62* 72*   CREATININE 4.6* 5.3*   ALKPHOS 60  --    *  --    *  --    BILITOT 0.6  --        Assessment &  Plan    TREVOR - prerenal azeotemia, ATN due to prolonged state of dehydration, ?COVID related  COVID-19 infection  HTN  Elevated LFT  Chronic combined CHF    Continue IVF, add bicarb   Urine Na, creatinine, urinalysis  Watch renal function closely  We'll follow    Thank you for the courtesy of the consultation      Elaina Lundberg  8/12/2023

## 2023-08-12 NOTE — ASSESSMENT & PLAN NOTE
Patient with acute kidney injury/acute renal failure likely due to pre-renal azotemia due to IVVD, due to nephrotoxic medications & Covid nephropathy   TREVOR is currently worsening. Baseline creatinine 1.2 -   Labs reviewed- Renal function/electrolytes with Estimated Creatinine Clearance: 10.4 mL/min (A) (based on SCr of 5.3 mg/dL (H)). according to latest data. Monitor urine output and serial BMP and adjust therapy as needed.   Avoid nephrotoxins and renally dose meds for GFR listed above.  Received 1.7 L of LR in the ED  Continue with gentle hydration,    ARF did not improved,started on IVF and consulted nephrology,check bladder scan.  PT,OT

## 2023-08-12 NOTE — ASSESSMENT & PLAN NOTE
History noted. Avoid QT prolonging agents.      Prior Authorization Request  Who s requesting:  Pharmacy  Pharmacy Name and Location: [facility] [address] [place] [address].   Medication Name: DULoxetine (CYMBALTA) 30 MG capsule     Insurance Plan: [employer]  Insurance Member ID Number:  [id number]  CoverMyMeds Key: [id number]  Informed patient that prior authorizations can take up to 10 business days for response:   NA  Okay to leave a detailed message: Yes

## 2023-08-12 NOTE — PLAN OF CARE
Problem: Occupational Therapy  Goal: Occupational Therapy Goal  Description: Goals to be met by: 8/26/23     Patient will increase functional independence with ADLs by performing:    Feeding with Modified [place].  UE Dressing with Modified [place].  LE Dressing with Modified [place].  Grooming while standing at sink with Stand-by Assistance and Assistive Devices as needed.  Toileting from toilet with Supervision and Assistive Devices as needed for hygiene and clothing management.   Sitting at edge of bed x30 minutes with Modified [place].  Rolling to Bilateral with Modified [place].   Supine to sit with Modified [place].  Step transfer with Modified [place] and Supervision  Toilet transfer to toilet with Supervision.  Upper extremity exercise program x15 reps per handout, with assistance as needed.    Outcome: Ongoing, Progressing   The patient participated in OT eval with encouragement. The patient required mod assist for bed mobility and to stand and side step along the EOB. The patient c/o feeling weak and frequent cough/need to spit in an emesis bag.  D/C recs will be made pending progress.

## 2023-08-12 NOTE — ASSESSMENT & PLAN NOTE
Patient is identified as having Combined Systolic and Diastolic heart failure that is Chronic. CHF is currently controlled. Latest ECHO performed and demonstrates- Results for orders placed during the hospital encounter of 12/12/22    Echo Saline Bubble? No    Interpretation Summary  · Concentric hypertrophy and severely decreased systolic function.  · The estimated ejection fraction is 20-25%.  · Grade I left ventricular diastolic dysfunction.  · Normal right ventricular size with normal right ventricular systolic function.  · Mild to moderate left atrial enlargement.  · Mild right atrial enlargement.  · Mild aortic regurgitation.  · Normal central venous pressure (3 mmHg).  · The estimated PA systolic pressure is 17 mmHg.  · Anterior wall is thinned out and akinetic  . Continue Beta Blocker and monitor clinical status closely. Monitor on telemetry. Patient is off CHF pathway.  Monitor strict Is&Os and daily weights.  Place on fluid restriction of 1.5 L. Cardiology has not been any consulted. Continue to stress to patient importance of self efficacy and  on diet for CHF. Last BNP reviewed- and noted below   Recent Labs   Lab 08/11/23  1711   BNP 78   .

## 2023-08-12 NOTE — NURSING
Rechecked pt bp 91/50 hr 56, pt is alert and oriented no distress noted, pt has cont NS infusing @50ml/hr. Notified Dr Lo; no new orders given. Will cont to monitor

## 2023-08-12 NOTE — NURSING
[facility] [place]  Nurses Note -- 4 Eyes      8/11/2023       Skin assessed on: Admit      [x] No Pressure Injuries Present    [x]Prevention Measures Documented    [] Yes LDA  for Pressure Injury Previously documented     [] Yes New Pressure Injury Discovered   [] LDA for New Pressure Injury Added      Attending RN:  Sarah Arroyo LPN     Second RN:  OMAR Santos

## 2023-08-12 NOTE — NURSING
[facility]  Nurses Note -- 4 Eyes      8/12/2023       Skin assessed on: Q Shift      [x] No Pressure Injuries Present    []Prevention Measures Documented    [] Yes LDA  for Pressure Injury Previously documented     [] Yes New Pressure Injury Discovered   [] LDA for New Pressure Injury Added      Attending RN:  Hallie Thrasher, OMAR     Second RN:  Sarah Arroyo LPN

## 2023-08-13 LAB
ALBUMIN SERPL BCP-MCNC: 2.1 G/DL (ref 3.5–5.2)
ALP SERPL-CCNC: 61 U/L (ref 55–135)
ALT SERPL W/O P-5'-P-CCNC: 237 U/L (ref 10–44)
ANION GAP SERPL CALC-SCNC: 10 MMOL/L (ref 8–16)
ANION GAP SERPL CALC-SCNC: 12 MMOL/L (ref 8–16)
AST SERPL-CCNC: 261 U/L (ref 10–40)
BASOPHILS # BLD AUTO: 0.02 K/UL (ref 0–0.2)
BASOPHILS NFR BLD: 0.2 % (ref 0–1.9)
BILIRUB SERPL-MCNC: 0.7 MG/DL (ref 0.1–1)
BUN SERPL-MCNC: 79 MG/DL (ref 8–23)
BUN SERPL-MCNC: 79 MG/DL (ref 8–23)
CALCIUM SERPL-MCNC: 7.8 MG/DL (ref 8.7–10.5)
CALCIUM SERPL-MCNC: 8 MG/DL (ref 8.7–10.5)
CHLORIDE SERPL-SCNC: 101 MMOL/L (ref 95–110)
CHLORIDE SERPL-SCNC: 101 MMOL/L (ref 95–110)
CO2 SERPL-SCNC: 23 MMOL/L (ref 23–29)
CO2 SERPL-SCNC: 24 MMOL/L (ref 23–29)
CREAT SERPL-MCNC: 6.4 MG/DL (ref 0.5–1.4)
CREAT SERPL-MCNC: 6.6 MG/DL (ref 0.5–1.4)
DIFFERENTIAL METHOD: ABNORMAL
EOSINOPHIL # BLD AUTO: 0 K/UL (ref 0–0.5)
EOSINOPHIL NFR BLD: 0.1 % (ref 0–8)
ERYTHROCYTE [DISTWIDTH] IN BLOOD BY AUTOMATED COUNT: 14.3 % (ref 11.5–14.5)
EST. GFR  (NO RACE VARIABLE): 8 ML/MIN/1.73 M^2
EST. GFR  (NO RACE VARIABLE): 8 ML/MIN/1.73 M^2
GLUCOSE SERPL-MCNC: 107 MG/DL (ref 70–110)
GLUCOSE SERPL-MCNC: 107 MG/DL (ref 70–110)
HCT VFR BLD AUTO: 41.8 % (ref 40–54)
HGB BLD-MCNC: 14 G/DL (ref 14–18)
IMM GRANULOCYTES # BLD AUTO: 0.03 K/UL (ref 0–0.04)
IMM GRANULOCYTES NFR BLD AUTO: 0.3 % (ref 0–0.5)
LYMPHOCYTES # BLD AUTO: 0.8 K/UL (ref 1–4.8)
LYMPHOCYTES NFR BLD: 8.6 % (ref 18–48)
MCH RBC QN AUTO: 30.4 PG (ref 27–31)
MCHC RBC AUTO-ENTMCNC: 33.5 G/DL (ref 32–36)
MCV RBC AUTO: 91 FL (ref 82–98)
MONOCYTES # BLD AUTO: 0.6 K/UL (ref 0.3–1)
MONOCYTES NFR BLD: 6.3 % (ref 4–15)
NEUTROPHILS # BLD AUTO: 7.7 K/UL (ref 1.8–7.7)
NEUTROPHILS NFR BLD: 84.5 % (ref 38–73)
NRBC BLD-RTO: 0 /100 WBC
PLATELET # BLD AUTO: 152 K/UL (ref 150–450)
PMV BLD AUTO: 9.6 FL (ref 9.2–12.9)
POTASSIUM SERPL-SCNC: 4.1 MMOL/L (ref 3.5–5.1)
POTASSIUM SERPL-SCNC: 4.4 MMOL/L (ref 3.5–5.1)
PROT SERPL-MCNC: 5.2 G/DL (ref 6–8.4)
RBC # BLD AUTO: 4.6 M/UL (ref 4.6–6.2)
SODIUM SERPL-SCNC: 135 MMOL/L (ref 136–145)
SODIUM SERPL-SCNC: 136 MMOL/L (ref 136–145)
WBC # BLD AUTO: 9.1 K/UL (ref 3.9–12.7)

## 2023-08-13 PROCEDURE — 11000001 HC ACUTE MED/SURG PRIVATE ROOM

## 2023-08-13 PROCEDURE — 80053 COMPREHEN METABOLIC PANEL: CPT | Performed by: INTERNAL MEDICINE

## 2023-08-13 PROCEDURE — 27000207 HC ISOLATION

## 2023-08-13 PROCEDURE — 80048 BASIC METABOLIC PNL TOTAL CA: CPT | Mod: XB | Performed by: HOSPITALIST

## 2023-08-13 PROCEDURE — 25000003 PHARM REV CODE 250: Performed by: HOSPITALIST

## 2023-08-13 PROCEDURE — 85025 COMPLETE CBC W/AUTO DIFF WBC: CPT | Performed by: HOSPITALIST

## 2023-08-13 PROCEDURE — 25000003 PHARM REV CODE 250: Performed by: INTERNAL MEDICINE

## 2023-08-13 PROCEDURE — 94761 N-INVAS EAR/PLS OXIMETRY MLT: CPT

## 2023-08-13 PROCEDURE — 25000003 PHARM REV CODE 250: Performed by: STUDENT IN AN ORGANIZED HEALTH CARE EDUCATION/TRAINING PROGRAM

## 2023-08-13 PROCEDURE — 36415 COLL VENOUS BLD VENIPUNCTURE: CPT | Performed by: HOSPITALIST

## 2023-08-13 PROCEDURE — 51798 US URINE CAPACITY MEASURE: CPT

## 2023-08-13 PROCEDURE — 51702 INSERT TEMP BLADDER CATH: CPT

## 2023-08-13 RX ORDER — TAMSULOSIN HYDROCHLORIDE 0.4 MG/1
0.4 CAPSULE ORAL DAILY
Status: DISCONTINUED | OUTPATIENT
Start: 2023-08-13 | End: 2023-08-14

## 2023-08-13 RX ORDER — MIDODRINE HYDROCHLORIDE 5 MG/1
10 TABLET ORAL 3 TIMES DAILY
Status: DISCONTINUED | OUTPATIENT
Start: 2023-08-13 | End: 2023-08-14

## 2023-08-13 RX ADMIN — APIXABAN 5 MG: 5 TABLET, FILM COATED ORAL at 09:08

## 2023-08-13 RX ADMIN — MIRTAZAPINE 7.5 MG: 7.5 TABLET ORAL at 08:08

## 2023-08-13 RX ADMIN — CLOPIDOGREL BISULFATE 75 MG: 75 TABLET ORAL at 08:08

## 2023-08-13 RX ADMIN — SODIUM BICARBONATE: 84 INJECTION, SOLUTION INTRAVENOUS at 12:08

## 2023-08-13 RX ADMIN — SODIUM BICARBONATE: 84 INJECTION, SOLUTION INTRAVENOUS at 11:08

## 2023-08-13 RX ADMIN — POLYETHYLENE GLYCOL 3350 17 G: 17 POWDER, FOR SOLUTION ORAL at 09:08

## 2023-08-13 RX ADMIN — MIDODRINE HYDROCHLORIDE 10 MG: 5 TABLET ORAL at 05:08

## 2023-08-13 RX ADMIN — AMIODARONE HYDROCHLORIDE 200 MG: 200 TABLET ORAL at 09:08

## 2023-08-13 RX ADMIN — TAMSULOSIN HYDROCHLORIDE 0.4 MG: 0.4 CAPSULE ORAL at 09:08

## 2023-08-13 RX ADMIN — MUPIROCIN: 20 OINTMENT TOPICAL at 09:08

## 2023-08-13 RX ADMIN — ATORVASTATIN CALCIUM 40 MG: 40 TABLET, FILM COATED ORAL at 09:08

## 2023-08-13 RX ADMIN — APIXABAN 5 MG: 5 TABLET, FILM COATED ORAL at 08:08

## 2023-08-13 RX ADMIN — MIDODRINE HYDROCHLORIDE 10 MG: 5 TABLET ORAL at 08:08

## 2023-08-13 NOTE — NURSING
[facility] [place]  Nurses Note -- 4 Eyes      8/13/2023       Skin assessed on: Q Shift      [x] No Pressure Injuries Present    [x]Prevention Measures Documented    [] Yes LDA  for Pressure Injury Previously documented     [] Yes New Pressure Injury Discovered   [] LDA for New Pressure Injury Added      Attending RN:  Demi Blackman RN     Second RN:  Tin JUARES RN

## 2023-08-13 NOTE — NURSING
Pt's BP 89/52 HR 65, awake and alert. Denies lightheadedness. MD notified. New order placed for midodrine. Will continue to monitor.

## 2023-08-13 NOTE — ASSESSMENT & PLAN NOTE
Chronic, worsening  US abdomen (8/4/2023):  Two small indeterminate areas in the liver, incompletely characterized by this exam.  Prior cholecystectomy.  Could be secondary to amiodarone use, or hepatitis.   Obtain hepatitis panel   Needs follow-up with Cardiology for alternative for amiodarone  Improving.

## 2023-08-13 NOTE — ASSESSMENT & PLAN NOTE
Patient with acute kidney injury/acute renal failure likely due to pre-renal azotemia due to IVVD, due to nephrotoxic medications & Covid nephropathy   TREVOR is currently worsening. Baseline creatinine 1.2 -   Labs reviewed- Renal function/electrolytes with Estimated Creatinine Clearance: 8.6 mL/min (A) (based on SCr of 6.4 mg/dL (H)). according to latest data. Monitor urine output and serial BMP and adjust therapy as needed.   Avoid nephrotoxins and renally dose meds for GFR listed above.  Received 1.7 L of LR in the ED  Continue with gentle hydration,    ARF did not improved,started on IVF and consulted nephrology,check bladder scan.retaining ,placed vo.  PT,OT

## 2023-08-13 NOTE — PROGRESS NOTES
[facility] Medicine  Progress Note    Patient Name: Balbir Rebollar Sr.  MRN: [id number]  Patient Class: IP- Inpatient   Admission Date: 8/11/2023  Length of Stay: 2 days  Attending Physician: Jane Villalta, *  Primary Care Provider: Flip Hanna MD        Subjective:     Principal Problem:TREVOR (acute kidney injury)        HPI:  This is a 73-year-old male with a past medical history of AFib (on Eliquis), CAD, ICM (20-25%, s/p ICD), CVA, seizure disorder, who presents with fatigue.    Patient initially presented to the ED on 08/09 with generalized fatigue.  Workup demonstrated positive COVID test, and elevated LFTs (being worked up as an outpatient).  He was discharged to follow-up as an outpatient.  Patient returned to the ED on 08/11 for persistence of symptoms. Patient reports generalized weakness, dizziness, presyncope, nausea and vomiting.  Associated symptoms include decreased p.o. intake and 2 falls without head trauma.  He denies having diarrhea or abdominal pain.    In the ED, the patient was hypotensive (85/49) and bradycardic.  Labs were remarkable for worsening creatinine (4.6 from a baseline of 1.2) hyperkalemia (5.6-moderately hemolyzed specimen), elevated troponin (0.044), elevated LFTs (AST: 444, ALT:  359), elevated procalcitonin (1.06).  Chest x-ray showed no acute cardiopulmonary process.  Patient was given 1.7 L of LR, Zosyn and was admitted for further management.      Overview/Hospital Course:  This is a 73-year-old male with a past medical history of AFib (on Eliquis), CAD, ICM (20-25%, s/p ICD), CVA, seizure disorder, who presents with fatigue.  Patient initially presented to the ED on 08/09 with generalized fatigue.  Workup demonstrated positive COVID test, and elevated LFTs (being worked up as an outpatient).  He was discharged to follow-up as an outpatient.  Patient returned to the ED on 08/11 for persistence of symptoms. Patient reports generalized  weakness, dizziness, presyncope, nausea and vomiting.  Associated symptoms include decreased p.o. intake and 2 falls without head trauma.  He denies having diarrhea or abdominal pain.  In the ED, the patient was hypotensive (85/49) and bradycardic.  Labs were remarkable for worsening creatinine (4.6 from a baseline of 1.2) hyperkalemia (5.6-moderately hemolyzed specimen), elevated troponin (0.044), elevated LFTs (AST: 444, ALT:  359), elevated procalcitonin (1.06).  Chest x-ray showed no acute cardiopulmonary process.  Patient was given 1.7 L of LR, Zosyn and was admitted for further manageme.,  ARF did not improved,started on IVF and consulted nephrology,check bladder scan.retaining ,placed vo.  PT,OT       Past Medical History:   Diagnosis Date    AICD (automatic cardioverter/defibrillator) present     Anticoagulant long-term use     Cardiomyopathy     Chronic combined systolic and diastolic congestive heart failure     Left ventricular ejection fraction is estimated at 15-20% and measured to be 25-33%.    Coronary artery disease     History of myocardial infarction     Hypertension     Paroxysmal A-fib     Stroke        Past Surgical History:   Procedure Laterality Date    CHOLECYSTECTOMY      LEFT HEART CATHETERIZATION Left 11/29/2022    Procedure: Left heart cath;  Surgeon: Wesley Rico MD;  Location: [facility] CATH LAB;  Service: Cardiology;  Laterality: Left;  1030am start, R rad access    LEFT HEART CATHETERIZATION N/A 12/1/2022    Procedure: Left heart cath;  Surgeon: Tam hCo MD;  Location: [facility] CATH LAB;  Service: Cardiology;  Laterality: N/A;    REPLACEMENT OF DUAL CHAMBER IMPLANTABLE CARDIOVERTER-DEFIBRILLATOR         Review of patient's allergies indicates:  No Known Allergies    No current facility-administered medications on file prior to encounter.     Current Outpatient Medications on File Prior to Encounter   Medication Sig    amiodarone (PACERONE) 200 MG Tab Take 1  tablet (200 mg total) by mouth once daily.    apixaban (ELIQUIS) 5 mg Tab Take 1 tablet (5 mg total) by mouth 2 (two) times daily.    clopidogreL (PLAVIX) 75 mg tablet Take 1 tablet (75 mg total) by mouth every evening.    dapagliflozin propanediol (FARXIGA) 10 mg tablet Take 1 tablet (10 mg total) by mouth once daily.    fluticasone propionate (FLONASE) 50 mcg/actuation nasal spray 1 spray by Each Nostril route once daily.    ipratropium (ATROVENT) 21 mcg (0.03 %) nasal spray 2 sprays by Each Nostril route 4 (four) times daily as needed for Rhinitis.    losartan (COZAAR) 25 MG tablet Take 1 tablet (25 mg total) by mouth once daily. Stop when able to start and afford Entresto.    metoprolol succinate (TOPROL-XL) 25 MG 24 hr tablet Take 0.5 tablets (12.5 mg total) by mouth once daily. Lower dose    nitroGLYCERIN (NITROSTAT) 0.4 MG SL tablet Place 1 tablet (0.4 mg total) under the tongue every 5 (five) minutes as needed for Chest pain. Up to 3 doses. If chest pain is not relieved or worsens 3 to 5 minutes after 1 dose, call 911 and seek immediate emergency medical attention.    pantoprazole (PROTONIX) 40 MG tablet Take 1 tablet (40 mg total) by mouth once daily.    rosuvastatin (CRESTOR) 40 MG Tab Take 1 tablet (40 mg total) by mouth every evening.    sacubitriL-valsartan (ENTRESTO) 24-26 mg per tablet Take 1 tablet by mouth 2 (two) times daily. Stop losartan when starting Entresto.    sertraline (ZOLOFT) 50 MG tablet Take 1 tablet (50 mg total) by mouth once daily.    spironolactone (ALDACTONE) 25 MG tablet Take 1 tablet (25 mg total) by mouth once daily.     Family History    None       Tobacco Use    Smoking status: Former     Current packs/day: 0.00     Passive exposure: Past    Smokeless tobacco: Never   Substance and Sexual Activity    Alcohol use: Never    Drug use: Never    Sexual activity: Not on file     Review of Systems   Constitutional:  Positive for activity change, appetite change and  fatigue.   HENT: Negative.     Eyes: Negative.    Respiratory: Negative.     Cardiovascular: Negative.    Gastrointestinal:  Positive for nausea and vomiting.   Endocrine: Negative.    Genitourinary: Negative.    Musculoskeletal: Negative.    Skin: Negative.    Allergic/Immunologic: Negative.    Neurological: Negative.    Psychiatric/Behavioral: Negative.       Objective:     Vital Signs (Most Recent):  Temp: 98.6 °F (37 °C) (08/13/23 0747)  Pulse: 68 (08/13/23 0747)  Resp: 18 (08/13/23 0747)  BP: (!) 113/57 (08/13/23 0747)  SpO2: (!) 93 % (08/13/23 0747) Vital Signs (24h Range):  Temp:  [98.1 °F (36.7 °C)-99.8 °F (37.7 °C)] 98.6 °F (37 °C)  Pulse:  [55-68] 68  Resp:  [16-18] 18  SpO2:  [91 %-94 %] 93 %  BP: ()/(51-59) 113/57     Weight: 61.1 kg (134 lb 11.2 oz)  Body mass index is 19.89 kg/m².     Physical Exam  Vitals and nursing note reviewed.   Constitutional:       General: He is not in acute distress.     Appearance: Normal appearance. He is ill-appearing.   HENT:      Head: Normocephalic and atraumatic.      Nose: Nose normal.      Mouth/Throat:      Mouth: Mucous membranes are moist.   Eyes:      Extraocular Movements: Extraocular movements intact.   Cardiovascular:      Rate and Rhythm: Normal rate.      Pulses: Normal pulses.      Heart sounds: No murmur heard.  Pulmonary:      Effort: Pulmonary effort is normal. No respiratory distress.   Abdominal:      General: Abdomen is flat.      Palpations: Abdomen is soft.      Tenderness: There is no abdominal tenderness.   Musculoskeletal:      Right lower leg: No edema.      Left lower leg: No edema.   Skin:     General: Skin is warm.      Capillary Refill: Capillary refill takes less than 2 seconds.   Neurological:      General: No focal deficit present.      Mental Status: He is alert.   Psychiatric:         Mood and Affect: Mood normal.                Significant Labs: All pertinent labs within the past 24 hours have been reviewed.    Significant  Imaging: I have reviewed all pertinent imaging results/findings within the past 24 hours.      Assessment/Plan:      * TREVOR (acute kidney injury)  Patient with acute kidney injury/acute renal failure likely due to pre-renal azotemia due to IVVD, due to nephrotoxic medications & Covid nephropathy   TREVOR is currently worsening. Baseline creatinine 1.2 -   Labs reviewed- Renal function/electrolytes with Estimated Creatinine Clearance: 8.6 mL/min (A) (based on SCr of 6.4 mg/dL (H)). according to latest data. Monitor urine output and serial BMP and adjust therapy as needed.   Avoid nephrotoxins and renally dose meds for GFR listed above.  Received 1.7 L of LR in the ED  Continue with gentle hydration,    ARF did not improved,started on IVF and consulted nephrology,check bladder scan.retaining ,placed vo.  PT,OT     Elevated LFTs  Chronic, worsening  US abdomen (8/4/2023):  Two small indeterminate areas in the liver, incompletely characterized by this exam.  Prior cholecystectomy.  Could be secondary to amiodarone use, or hepatitis.   Obtain hepatitis panel   Needs follow-up with Cardiology for alternative for amiodarone  Improving.    COVID-19  Patient is identified as low risk (not hypoxic).   Initiate standard COVID protocols; COVID-19 testing ,Infection Control notification  and isolation- respiratory, contact and droplet per protocol  Does not meet criteria for treatment  Monitor respiratory status    QT prolongation  History noted. Avoid QT prolonging agents.       History of seizure disorder  Not currently on any medications      Chronic combined systolic and diastolic congestive heart failure  Patient is identified as having Combined Systolic and Diastolic heart failure that is Chronic. CHF is currently controlled. Latest ECHO performed and demonstrates- Results for orders placed during the hospital encounter of 12/12/22    Echo Saline Bubble? No    Interpretation Summary  · Concentric hypertrophy and severely  decreased systolic function.  · The estimated ejection fraction is 20-25%.  · Grade I left ventricular diastolic dysfunction.  · Normal right ventricular size with normal right ventricular systolic function.  · Mild to moderate left atrial enlargement.  · Mild right atrial enlargement.  · Mild aortic regurgitation.  · Normal central venous pressure (3 mmHg).  · The estimated PA systolic pressure is 17 mmHg.  · Anterior wall is thinned out and akinetic  . Continue Beta Blocker and monitor clinical status closely. Monitor on telemetry. Patient is off CHF pathway.  Monitor strict Is&Os and daily weights.  Place on fluid restriction of 1.5 L. Cardiology has not been any consulted. Continue to stress to patient importance of self efficacy and  on diet for CHF. Last BNP reviewed- and noted below   Recent Labs   Lab 08/11/23  1711   BNP 78   .    Ischemic cardiomyopathy  Results for orders placed during the hospital encounter of 12/12/22    Echo Saline Bubble? No    Interpretation Summary  · Concentric hypertrophy and severely decreased systolic function.  · The estimated ejection fraction is 20-25%.  · Grade I left ventricular diastolic dysfunction.  · Normal right ventricular size with normal right ventricular systolic function.  · Mild to moderate left atrial enlargement.  · Mild right atrial enlargement.  · Mild aortic regurgitation.  · Normal central venous pressure (3 mmHg).  · The estimated PA systolic pressure is 17 mmHg.  · Anterior wall is thinned out and akinetic  No acute issues.       ICD (implantable cardioverter-defibrillator), single, in situ  History noted.  Telemetry monitoring.      History of CVA (cerebrovascular accident)  Resume home medications      Coronary artery disease involving native coronary artery of native heart without angina pectoris  Resume home medications      Paroxysmal atrial fibrillation  Resume home medications (Toprol-XL, amiodarone, Eliquis)   [id number] Score: 3.      VTE  Risk Mitigation (From admission, onward)         Ordered     apixaban tablet 5 mg  2 times daily         08/11/23 2026     IP VTE HIGH RISK PATIENT  Once         08/11/23 2026     Place sequential compression device  Until discontinued         08/11/23 2026                Discharge Planning   MOR:      Code Status: Full Code   Is the patient medically ready for discharge?:     Reason for patient still in hospital (select all that apply): Patient trending condition                     Jane Villalta MD  Department of Hospital Medicine   [facility]

## 2023-08-13 NOTE — PROGRESS NOTES
Balbir Rebollar Sr. is a 73 y.o. male patient.    Follow for TREVOR    No new c/o, comfortable    Scheduled Meds:   amiodarone  200 mg Oral Daily    apixaban  5 mg Oral BID    atorvastatin  40 mg Oral Daily    clopidogreL  75 mg Oral QHS    mirtazapine  7.5 mg Oral QHS    mupirocin   Nasal BID    polyethylene glycol  17 g Oral Daily    tamsulosin  0.4 mg Oral Daily       Review of patient's allergies indicates:  No Known Allergies      Vital Signs Range (Last 24H):  Temp:  [98.1 °F (36.7 °C)-99.8 °F (37.7 °C)]   Pulse:  [55-68]   Resp:  [16-18]   BP: ()/(51-59)   SpO2:  [91 %-94 %]     I & O (Last 24H):  Intake/Output Summary (Last 24 hours) at 8/13/2023 0943  Last data filed at 8/13/2023 0830  Gross per 24 hour   Intake 1435.45 ml   Output 500 ml   Net 935.45 ml           Physical Exam:  General appearance: well developed, no distress  Lungs:  clear to auscultation bilaterally and normal respiratory effort  Heart: regular rate and rhythm  Abdomen:  soft, normal bowel sounds  Extremities: edema (-)    Laboratory:  I have reviewed all pertinent lab results within the past 24 hours.  CBC:   Recent Labs   Lab 08/13/23  0457   WBC 9.10   RBC 4.60   HGB 14.0   HCT 41.8      MCV 91   MCH 30.4   MCHC 33.5     CMP:   Recent Labs   Lab 08/13/23  0457     107   CALCIUM 7.8*  8.0*   ALBUMIN 2.1*   PROT 5.2*   *  136   K 4.4  4.1   CO2 24  23     101   BUN 79*  79*   CREATININE 6.6*  6.4*   ALKPHOS 61   *   *   BILITOT 0.7       Assessment & Plan    TREVOR - creatinine up, bladder scan done earlier this am with 370 mL of urine  Urinary retention - placement of [place] catheter  COVID-19 infection  Elevated LFT  HTN  Chronic combined CHF    Continue present RX  Watch renal function closely  Discussed patient's conditions with daughters      Elaina Lundberg  8/13/2023

## 2023-08-13 NOTE — PLAN OF CARE
VSS on RA, Airborne precaution maintained, Coude cath in place, Bed alarm on, Avasys at bedside.    Problem: Adult Inpatient Plan of Care  Goal: Plan of Care Review  Outcome: Ongoing, Progressing  Goal: Patient-Specific Goal (Individualized)  Outcome: Ongoing, Progressing  Goal: Absence of Hospital-Acquired Illness or Injury  Outcome: Ongoing, Progressing  Goal: Optimal Comfort and Wellbeing  Outcome: Ongoing, Progressing  Goal: Readiness for Transition of Care  Outcome: Ongoing, Progressing     Problem: Infection  Goal: Absence of Infection Signs and Symptoms  Outcome: Ongoing, Progressing     Problem: Skin Injury Risk Increased  Goal: Skin Health and Integrity  Outcome: Ongoing, Progressing     Problem: Renal Function Impairment (Acute Kidney Injury/Impairment)  Goal: Effective Renal Function  Outcome: Ongoing, Progressing     Problem: Fluid and Electrolyte Imbalance (Acute Kidney Injury/Impairment)  Goal: Fluid and Electrolyte Balance  Outcome: Ongoing, Progressing     Problem: Oral Intake Inadequate (Acute Kidney Injury/Impairment)  Goal: Optimal Nutrition Intake  Outcome: Ongoing, Progressing

## 2023-08-13 NOTE — SUBJECTIVE & OBJECTIVE
Past Medical History:   Diagnosis Date    AICD (automatic cardioverter/defibrillator) present     Anticoagulant long-term use     Cardiomyopathy     Chronic combined systolic and diastolic congestive heart failure     Left ventricular ejection fraction is estimated at 15-20% and measured to be 25-33%.    Coronary artery disease     History of myocardial infarction     Hypertension     Paroxysmal A-fib     Stroke        Past Surgical History:   Procedure Laterality Date    CHOLECYSTECTOMY      LEFT HEART CATHETERIZATION Left 11/29/2022    Procedure: Left heart cath;  Surgeon: Wesley Rico MD;  Location: [facility] CATH LAB;  Service: Cardiology;  Laterality: Left;  1030am start, R rad access    LEFT HEART CATHETERIZATION N/A 12/1/2022    Procedure: Left heart cath;  Surgeon: Tam Cho MD;  Location: [facility] CATH LAB;  Service: Cardiology;  Laterality: N/A;    REPLACEMENT OF DUAL CHAMBER IMPLANTABLE CARDIOVERTER-DEFIBRILLATOR         Review of patient's allergies indicates:  No Known Allergies    No current facility-administered medications on file prior to encounter.     Current Outpatient Medications on File Prior to Encounter   Medication Sig    amiodarone (PACERONE) 200 MG Tab Take 1 tablet (200 mg total) by mouth once daily.    apixaban (ELIQUIS) 5 mg Tab Take 1 tablet (5 mg total) by mouth 2 (two) times daily.    clopidogreL (PLAVIX) 75 mg tablet Take 1 tablet (75 mg total) by mouth every evening.    dapagliflozin propanediol (FARXIGA) 10 mg tablet Take 1 tablet (10 mg total) by mouth once daily.    fluticasone propionate (FLONASE) 50 mcg/actuation nasal spray 1 spray by Each Nostril route once daily.    ipratropium (ATROVENT) 21 mcg (0.03 %) nasal spray 2 sprays by Each Nostril route 4 (four) times daily as needed for Rhinitis.    losartan (COZAAR) 25 MG tablet Take 1 tablet (25 mg total) by mouth once daily. Stop when able to start and afford Entresto.    metoprolol succinate (TOPROL-XL) 25 MG 24 hr  tablet Take 0.5 tablets (12.5 mg total) by mouth once daily. Lower dose    nitroGLYCERIN (NITROSTAT) 0.4 MG SL tablet Place 1 tablet (0.4 mg total) under the tongue every 5 (five) minutes as needed for Chest pain. Up to 3 doses. If chest pain is not relieved or worsens 3 to 5 minutes after 1 dose, call 911 and seek immediate emergency medical attention.    pantoprazole (PROTONIX) 40 MG tablet Take 1 tablet (40 mg total) by mouth once daily.    rosuvastatin (CRESTOR) 40 MG Tab Take 1 tablet (40 mg total) by mouth every evening.    sacubitriL-valsartan (ENTRESTO) 24-26 mg per tablet Take 1 tablet by mouth 2 (two) times daily. Stop losartan when starting Entresto.    sertraline (ZOLOFT) 50 MG tablet Take 1 tablet (50 mg total) by mouth once daily.    spironolactone (ALDACTONE) 25 MG tablet Take 1 tablet (25 mg total) by mouth once daily.     Family History    None       Tobacco Use    Smoking status: Former     Current packs/day: 0.00     Passive exposure: Past    Smokeless tobacco: Never   Substance and Sexual Activity    Alcohol use: Never    Drug use: Never    Sexual activity: Not on file     Review of Systems   Constitutional:  Positive for activity change, appetite change and fatigue.   HENT: Negative.     Eyes: Negative.    Respiratory: Negative.     Cardiovascular: Negative.    Gastrointestinal:  Positive for nausea and vomiting.   Endocrine: Negative.    Genitourinary: Negative.    Musculoskeletal: Negative.    Skin: Negative.    Allergic/Immunologic: Negative.    Neurological: Negative.    Psychiatric/Behavioral: Negative.       Objective:     Vital Signs (Most Recent):  Temp: 98.6 °F (37 °C) (08/13/23 0747)  Pulse: 68 (08/13/23 0747)  Resp: 18 (08/13/23 0747)  BP: (!) 113/57 (08/13/23 0747)  SpO2: (!) 93 % (08/13/23 0747) Vital Signs (24h Range):  Temp:  [98.1 °F (36.7 °C)-99.8 °F (37.7 °C)] 98.6 °F (37 °C)  Pulse:  [55-68] 68  Resp:  [16-18] 18  SpO2:  [91 %-94 %] 93 %  BP: ()/(51-59) 113/57      Weight: 61.1 kg (134 lb 11.2 oz)  Body mass index is 19.89 kg/m².     Physical Exam  Vitals and nursing note reviewed.   Constitutional:       General: He is not in acute distress.     Appearance: Normal appearance. He is ill-appearing.   HENT:      Head: Normocephalic and atraumatic.      Nose: Nose normal.      Mouth/Throat:      Mouth: Mucous membranes are moist.   Eyes:      Extraocular Movements: Extraocular movements intact.   Cardiovascular:      Rate and Rhythm: Normal rate.      Pulses: Normal pulses.      Heart sounds: No murmur heard.  Pulmonary:      Effort: Pulmonary effort is normal. No respiratory distress.   Abdominal:      General: Abdomen is flat.      Palpations: Abdomen is soft.      Tenderness: There is no abdominal tenderness.   Musculoskeletal:      Right lower leg: No edema.      Left lower leg: No edema.   Skin:     General: Skin is warm.      Capillary Refill: Capillary refill takes less than 2 seconds.   Neurological:      General: No focal deficit present.      Mental Status: He is alert.   Psychiatric:         Mood and Affect: Mood normal.                Significant Labs: All pertinent labs within the past 24 hours have been reviewed.    Significant Imaging: I have reviewed all pertinent imaging results/findings within the past 24 hours.

## 2023-08-13 NOTE — PLAN OF CARE
Case Management Assessment     PCP: Suly Prescott NP    Pharmacy: [facility]    Patient Arrived From: home  Existing Help at Home: Silvia Braga (Daughter)   [phone] (Mobile)    Barriers to Discharge: Requires medical help    Discharge Plan:    A. Home with family   B. Home with family and home health      Patient lives with fabiana Vega.  Paient is positive for covid.  Patient has a cane, bsc and r/w at home.  Silvia transports patient as needed.         08/13/23 1516   Discharge Assessment   Assessment Type Discharge Planning Assessment   Confirmed/corrected address, phone number and insurance Yes   Confirmed Demographics Correct on Facesheet   Source of Information family   Communicated MOR with patient/caregiver Date not available/Unable to determine   Reason For Admission Weak, Loss of appetite  Covid   People in Home child(carlton), adult   Do you expect to return to your current living situation? Yes   Do you have help at home or someone to help you manage your care at home? Yes   Who are your caregiver(s) and their phone number(s)? Silvia Braga (Daughter)   [phone] (Mobile   Prior to hospitilization cognitive status: Alert/Oriented   Current cognitive status: Alert/Oriented   Walking or Climbing Stairs ambulation difficulty, requires equipment   Home Layout Able to live on 1st floor;Other (see comments)  (on second floor but now moving to first floor)   Equipment Currently Used at Home cane, straight;bedside commode;walker, rolling   Readmission within 30 days? No   Patient currently being followed by outpatient case management? Unable to determine (comments)   Do you currently have service(s) that help you manage your care at home? No   Do you take prescription medications? Yes   Do you have prescription coverage? Yes   Coverage [employer] MANAGED MEDICARE - [employer] MEDICARE HMO   Do you have any problems affording any of your prescribed medications? No   Who is going to help  you get home at discharge? Silvia Braga (Daughter)   [phone] (Mobile   How do you get to doctors appointments? family or friend will provide   Are you on dialysis? No   Do you take coumadin? No   Discharge Plan A Home with family   Discharge Plan B Home with family;Home Health   DME Needed Upon Discharge  none   Discharge Plan discussed with: Adult children   OTHER   Name(s) of People in Home Silvia Braga (Daughter)   [phone] (Mobile)

## 2023-08-14 PROBLEM — Z71.89 ADVANCED CARE PLANNING/COUNSELING DISCUSSION: Status: ACTIVE | Noted: 2023-08-14

## 2023-08-14 LAB
ALBUMIN SERPL BCP-MCNC: 1.8 G/DL (ref 3.5–5.2)
ALP SERPL-CCNC: 62 U/L (ref 55–135)
ALT SERPL W/O P-5'-P-CCNC: 210 U/L (ref 10–44)
ANION GAP SERPL CALC-SCNC: 12 MMOL/L (ref 8–16)
AST SERPL-CCNC: 265 U/L (ref 10–40)
BASOPHILS # BLD AUTO: 0.02 K/UL (ref 0–0.2)
BASOPHILS NFR BLD: 0.2 % (ref 0–1.9)
BILIRUB SERPL-MCNC: 0.7 MG/DL (ref 0.1–1)
BUN SERPL-MCNC: 76 MG/DL (ref 8–23)
CALCIUM SERPL-MCNC: 7.4 MG/DL (ref 8.7–10.5)
CHLORIDE SERPL-SCNC: 97 MMOL/L (ref 95–110)
CO2 SERPL-SCNC: 25 MMOL/L (ref 23–29)
CREAT SERPL-MCNC: 7.3 MG/DL (ref 0.5–1.4)
DIFFERENTIAL METHOD: ABNORMAL
EOSINOPHIL # BLD AUTO: 0 K/UL (ref 0–0.5)
EOSINOPHIL NFR BLD: 0.2 % (ref 0–8)
ERYTHROCYTE [DISTWIDTH] IN BLOOD BY AUTOMATED COUNT: 13.9 % (ref 11.5–14.5)
EST. GFR  (NO RACE VARIABLE): 7 ML/MIN/1.73 M^2
GLUCOSE SERPL-MCNC: 113 MG/DL (ref 70–110)
HCT VFR BLD AUTO: 39.2 % (ref 40–54)
HGB BLD-MCNC: 12.8 G/DL (ref 14–18)
IMM GRANULOCYTES # BLD AUTO: 0.04 K/UL (ref 0–0.04)
IMM GRANULOCYTES NFR BLD AUTO: 0.4 % (ref 0–0.5)
LYMPHOCYTES # BLD AUTO: 0.7 K/UL (ref 1–4.8)
LYMPHOCYTES NFR BLD: 7.4 % (ref 18–48)
MCH RBC QN AUTO: 29.7 PG (ref 27–31)
MCHC RBC AUTO-ENTMCNC: 32.7 G/DL (ref 32–36)
MCV RBC AUTO: 91 FL (ref 82–98)
MONOCYTES # BLD AUTO: 0.6 K/UL (ref 0.3–1)
MONOCYTES NFR BLD: 6.3 % (ref 4–15)
NEUTROPHILS # BLD AUTO: 7.7 K/UL (ref 1.8–7.7)
NEUTROPHILS NFR BLD: 85.5 % (ref 38–73)
NRBC BLD-RTO: 0 /100 WBC
PLATELET # BLD AUTO: 155 K/UL (ref 150–450)
PMV BLD AUTO: 10 FL (ref 9.2–12.9)
POTASSIUM SERPL-SCNC: 3.4 MMOL/L (ref 3.5–5.1)
PROT SERPL-MCNC: 4.8 G/DL (ref 6–8.4)
RBC # BLD AUTO: 4.31 M/UL (ref 4.6–6.2)
SODIUM SERPL-SCNC: 134 MMOL/L (ref 136–145)
WBC # BLD AUTO: 9.04 K/UL (ref 3.9–12.7)

## 2023-08-14 PROCEDURE — 27000207 HC ISOLATION

## 2023-08-14 PROCEDURE — 11000001 HC ACUTE MED/SURG PRIVATE ROOM

## 2023-08-14 PROCEDURE — 99497 PR ADVNCD CARE PLAN 30 MIN: ICD-10-PCS | Mod: 25,,, | Performed by: NURSE PRACTITIONER

## 2023-08-14 PROCEDURE — 25000003 PHARM REV CODE 250: Performed by: HOSPITALIST

## 2023-08-14 PROCEDURE — 63600175 PHARM REV CODE 636 W HCPCS: Performed by: NURSE PRACTITIONER

## 2023-08-14 PROCEDURE — 99223 1ST HOSP IP/OBS HIGH 75: CPT | Mod: 25,,, | Performed by: NURSE PRACTITIONER

## 2023-08-14 PROCEDURE — 25000003 PHARM REV CODE 250: Performed by: STUDENT IN AN ORGANIZED HEALTH CARE EDUCATION/TRAINING PROGRAM

## 2023-08-14 PROCEDURE — 99497 ADVNCD CARE PLAN 30 MIN: CPT | Mod: 25,,, | Performed by: NURSE PRACTITIONER

## 2023-08-14 PROCEDURE — 85025 COMPLETE CBC W/AUTO DIFF WBC: CPT | Performed by: HOSPITALIST

## 2023-08-14 PROCEDURE — 99223 PR INITIAL HOSPITAL CARE,LEVL III: ICD-10-PCS | Mod: 25,,, | Performed by: NURSE PRACTITIONER

## 2023-08-14 PROCEDURE — 80053 COMPREHEN METABOLIC PANEL: CPT | Performed by: HOSPITALIST

## 2023-08-14 PROCEDURE — 25000003 PHARM REV CODE 250: Performed by: INTERNAL MEDICINE

## 2023-08-14 RX ORDER — MEGESTROL ACETATE 40 MG/ML
200 SUSPENSION ORAL 2 TIMES DAILY
Status: DISCONTINUED | OUTPATIENT
Start: 2023-08-14 | End: 2023-08-14

## 2023-08-14 RX ORDER — ONDANSETRON 2 MG/ML
4 INJECTION INTRAMUSCULAR; INTRAVENOUS EVERY 6 HOURS PRN
Status: DISCONTINUED | OUTPATIENT
Start: 2023-08-14 | End: 2023-08-15 | Stop reason: HOSPADM

## 2023-08-14 RX ORDER — HYDROMORPHONE HYDROCHLORIDE 1 MG/ML
0.2 INJECTION, SOLUTION INTRAMUSCULAR; INTRAVENOUS; SUBCUTANEOUS EVERY 6 HOURS PRN
Status: DISCONTINUED | OUTPATIENT
Start: 2023-08-14 | End: 2023-08-15 | Stop reason: HOSPADM

## 2023-08-14 RX ORDER — LORAZEPAM 2 MG/ML
1 INJECTION INTRAMUSCULAR
Status: DISCONTINUED | OUTPATIENT
Start: 2023-08-14 | End: 2023-08-15 | Stop reason: HOSPADM

## 2023-08-14 RX ADMIN — SODIUM BICARBONATE: 84 INJECTION, SOLUTION INTRAVENOUS at 12:08

## 2023-08-14 RX ADMIN — MIDODRINE HYDROCHLORIDE 10 MG: 5 TABLET ORAL at 09:08

## 2023-08-14 RX ADMIN — HYDROMORPHONE HYDROCHLORIDE 0.2 MG: 1 INJECTION, SOLUTION INTRAMUSCULAR; INTRAVENOUS; SUBCUTANEOUS at 11:08

## 2023-08-14 RX ADMIN — ATORVASTATIN CALCIUM 40 MG: 40 TABLET, FILM COATED ORAL at 09:08

## 2023-08-14 RX ADMIN — MUPIROCIN: 20 OINTMENT TOPICAL at 09:08

## 2023-08-14 RX ADMIN — AMIODARONE HYDROCHLORIDE 200 MG: 200 TABLET ORAL at 09:08

## 2023-08-14 RX ADMIN — APIXABAN 5 MG: 5 TABLET, FILM COATED ORAL at 09:08

## 2023-08-14 RX ADMIN — TAMSULOSIN HYDROCHLORIDE 0.4 MG: 0.4 CAPSULE ORAL at 09:08

## 2023-08-14 NOTE — NURSING
Pt daughter Rajiv and Holly at bedside with Dr. Villalta. Pt daughters both agree in changing pt code status to DNR.

## 2023-08-14 NOTE — SUBJECTIVE & OBJECTIVE
Past Medical History:   Diagnosis Date    AICD (automatic cardioverter/defibrillator) present     Anticoagulant long-term use     Cardiomyopathy     Chronic combined systolic and diastolic congestive heart failure     Left ventricular ejection fraction is estimated at 15-20% and measured to be 25-33%.    Coronary artery disease     History of myocardial infarction     Hypertension     Paroxysmal A-fib     Stroke        Past Surgical History:   Procedure Laterality Date    CHOLECYSTECTOMY      LEFT HEART CATHETERIZATION Left 11/29/2022    Procedure: Left heart cath;  Surgeon: Wesley Rico MD;  Location: [facility] CATH LAB;  Service: Cardiology;  Laterality: Left;  1030am start, R rad access    LEFT HEART CATHETERIZATION N/A 12/1/2022    Procedure: Left heart cath;  Surgeon: Tam Cho MD;  Location: [facility] CATH LAB;  Service: Cardiology;  Laterality: N/A;    REPLACEMENT OF DUAL CHAMBER IMPLANTABLE CARDIOVERTER-DEFIBRILLATOR         Review of patient's allergies indicates:  No Known Allergies    No current facility-administered medications on file prior to encounter.     Current Outpatient Medications on File Prior to Encounter   Medication Sig    amiodarone (PACERONE) 200 MG Tab Take 1 tablet (200 mg total) by mouth once daily.    apixaban (ELIQUIS) 5 mg Tab Take 1 tablet (5 mg total) by mouth 2 (two) times daily.    clopidogreL (PLAVIX) 75 mg tablet Take 1 tablet (75 mg total) by mouth every evening.    dapagliflozin propanediol (FARXIGA) 10 mg tablet Take 1 tablet (10 mg total) by mouth once daily.    fluticasone propionate (FLONASE) 50 mcg/actuation nasal spray 1 spray by Each Nostril route once daily.    ipratropium (ATROVENT) 21 mcg (0.03 %) nasal spray 2 sprays by Each Nostril route 4 (four) times daily as needed for Rhinitis.    losartan (COZAAR) 25 MG tablet Take 1 tablet (25 mg total) by mouth once daily. Stop when able to start and afford Entresto.    metoprolol succinate (TOPROL-XL) 25 MG 24 hr  tablet Take 0.5 tablets (12.5 mg total) by mouth once daily. Lower dose    nitroGLYCERIN (NITROSTAT) 0.4 MG SL tablet Place 1 tablet (0.4 mg total) under the tongue every 5 (five) minutes as needed for Chest pain. Up to 3 doses. If chest pain is not relieved or worsens 3 to 5 minutes after 1 dose, call 911 and seek immediate emergency medical attention.    pantoprazole (PROTONIX) 40 MG tablet Take 1 tablet (40 mg total) by mouth once daily.    rosuvastatin (CRESTOR) 40 MG Tab Take 1 tablet (40 mg total) by mouth every evening.    sacubitriL-valsartan (ENTRESTO) 24-26 mg per tablet Take 1 tablet by mouth 2 (two) times daily. Stop losartan when starting Entresto.    sertraline (ZOLOFT) 50 MG tablet Take 1 tablet (50 mg total) by mouth once daily.    spironolactone (ALDACTONE) 25 MG tablet Take 1 tablet (25 mg total) by mouth once daily.     Family History    None       Tobacco Use    Smoking status: Former     Current packs/day: 0.00     Passive exposure: Past    Smokeless tobacco: Never   Substance and Sexual Activity    Alcohol use: Never    Drug use: Never    Sexual activity: Not on file     Review of Systems   Constitutional:  Positive for activity change, appetite change and fatigue.   HENT: Negative.     Eyes: Negative.    Respiratory: Negative.     Cardiovascular: Negative.    Gastrointestinal:  Positive for nausea and vomiting.   Endocrine: Negative.    Genitourinary: Negative.    Musculoskeletal: Negative.    Skin: Negative.    Allergic/Immunologic: Negative.    Neurological: Negative.    Psychiatric/Behavioral: Negative.       Objective:     Vital Signs (Most Recent):  Temp: 99.4 °F (37.4 °C) (08/14/23 0731)  Pulse: 65 (08/14/23 0731)  Resp: 18 (08/14/23 0731)  BP: (!) 97/54 (08/14/23 0731)  SpO2: (!) 94 % (08/14/23 0731) Vital Signs (24h Range):  Temp:  [97.9 °F (36.6 °C)-99.4 °F (37.4 °C)] 99.4 °F (37.4 °C)  Pulse:  [62-68] 65  Resp:  [16-18] 18  SpO2:  [91 %-94 %] 94 %  BP: ()/(51-58) 97/54      Weight: 61.1 kg (134 lb 11.2 oz)  Body mass index is 19.89 kg/m².     Physical Exam  Vitals and nursing note reviewed.   Constitutional:       General: He is not in acute distress.     Appearance: Normal appearance. He is ill-appearing.   HENT:      Head: Normocephalic and atraumatic.      Nose: Nose normal.      Mouth/Throat:      Mouth: Mucous membranes are moist.   Eyes:      Extraocular Movements: Extraocular movements intact.   Cardiovascular:      Rate and Rhythm: Normal rate.      Pulses: Normal pulses.      Heart sounds: No murmur heard.  Pulmonary:      Effort: Pulmonary effort is normal. No respiratory distress.   Abdominal:      General: Abdomen is flat.      Palpations: Abdomen is soft.      Tenderness: There is no abdominal tenderness.   Musculoskeletal:      Right lower leg: No edema.      Left lower leg: No edema.   Skin:     General: Skin is warm.      Capillary Refill: Capillary refill takes less than 2 seconds.   Neurological:      General: No focal deficit present.      Mental Status: He is alert.   Psychiatric:         Mood and Affect: Mood normal.                Significant Labs: All pertinent labs within the past 24 hours have been reviewed.    Significant Imaging: I have reviewed all pertinent imaging results/findings within the past 24 hours.

## 2023-08-14 NOTE — PROGRESS NOTES
[facility]  Progress Note    Patient Name: Balbir Rebollar Sr.  MRN: [id number]  Patient Class: IP- Inpatient   Admission Date: 8/11/2023  Length of Stay: 3 days  Attending Physician: Jane Villalta, *  Primary Care Provider: Suly Prescott NP        Subjective:     Principal Problem:TREVOR (acute kidney injury)        HPI:  This is a 73-year-old male with a past medical history of AFib (on Eliquis), CAD, ICM (20-25%, s/p ICD), CVA, seizure disorder, who presents with fatigue.    Patient initially presented to the ED on 08/09 with generalized fatigue.  Workup demonstrated positive COVID test, and elevated LFTs (being worked up as an outpatient).  He was discharged to follow-up as an outpatient.  Patient returned to the ED on 08/11 for persistence of symptoms. Patient reports generalized weakness, dizziness, presyncope, nausea and vomiting.  Associated symptoms include decreased p.o. intake and 2 falls without head trauma.  He denies having diarrhea or abdominal pain.    In the ED, the patient was hypotensive (85/49) and bradycardic.  Labs were remarkable for worsening creatinine (4.6 from a baseline of 1.2) hyperkalemia (5.6-moderately hemolyzed specimen), elevated troponin (0.044), elevated LFTs (AST: 444, ALT:  359), elevated procalcitonin (1.06).  Chest x-ray showed no acute cardiopulmonary process.  Patient was given 1.7 L of LR, Zosyn and was admitted for further management.      Overview/Hospital Course:  This is a 73-year-old male with a past medical history of AFib (on Eliquis), CAD, ICM (20-25%, s/p ICD), CVA, seizure disorder, who presents with fatigue.  Patient initially presented to the ED on 08/09 with generalized fatigue.  Workup demonstrated positive COVID test, and elevated LFTs (being worked up as an outpatient).  He was discharged to follow-up as an outpatient.  Patient returned to the ED on 08/11 for persistence of symptoms. Patient reports generalized  weakness, dizziness, presyncope, nausea and vomiting.  Associated symptoms include decreased p.o. intake and 2 falls without head trauma.  He denies having diarrhea or abdominal pain.  In the ED, the patient was hypotensive (85/49) and bradycardic.  Labs were remarkable for worsening creatinine (4.6 from a baseline of 1.2) hyperkalemia (5.6-moderately hemolyzed specimen), elevated troponin (0.044), elevated LFTs (AST: 444, ALT:  359), elevated procalcitonin (1.06).  Chest x-ray showed no acute cardiopulmonary process.  Patient was given 1.7 L of LR, Zosyn and was admitted for further manageme.,  ARF did not improved,started on IVF and consulted nephrology,check bladder scan.retaining ,placed vo.ARF did not still improved,poor candidate for HD  PT,OT .  Both daughters,including jeannine agree wit DNR status and see palliative.,      Past Medical History:   Diagnosis Date    AICD (automatic cardioverter/defibrillator) present     Anticoagulant long-term use     Cardiomyopathy     Chronic combined systolic and diastolic congestive heart failure     Left ventricular ejection fraction is estimated at 15-20% and measured to be 25-33%.    Coronary artery disease     History of myocardial infarction     Hypertension     Paroxysmal A-fib     Stroke        Past Surgical History:   Procedure Laterality Date    CHOLECYSTECTOMY      LEFT HEART CATHETERIZATION Left 11/29/2022    Procedure: Left heart cath;  Surgeon: Wesley Rico MD;  Location: [facility] CATH LAB;  Service: Cardiology;  Laterality: Left;  1030am start, R rad access    LEFT HEART CATHETERIZATION N/A 12/1/2022    Procedure: Left heart cath;  Surgeon: Tam Cho MD;  Location: [facility] CATH LAB;  Service: Cardiology;  Laterality: N/A;    REPLACEMENT OF DUAL CHAMBER IMPLANTABLE CARDIOVERTER-DEFIBRILLATOR         Review of patient's allergies indicates:  No Known Allergies    No current facility-administered medications on file prior to encounter.     Current  Outpatient Medications on File Prior to Encounter   Medication Sig    amiodarone (PACERONE) 200 MG Tab Take 1 tablet (200 mg total) by mouth once daily.    apixaban (ELIQUIS) 5 mg Tab Take 1 tablet (5 mg total) by mouth 2 (two) times daily.    clopidogreL (PLAVIX) 75 mg tablet Take 1 tablet (75 mg total) by mouth every evening.    dapagliflozin propanediol (FARXIGA) 10 mg tablet Take 1 tablet (10 mg total) by mouth once daily.    fluticasone propionate (FLONASE) 50 mcg/actuation nasal spray 1 spray by Each Nostril route once daily.    ipratropium (ATROVENT) 21 mcg (0.03 %) nasal spray 2 sprays by Each Nostril route 4 (four) times daily as needed for Rhinitis.    losartan (COZAAR) 25 MG tablet Take 1 tablet (25 mg total) by mouth once daily. Stop when able to start and afford Entresto.    metoprolol succinate (TOPROL-XL) 25 MG 24 hr tablet Take 0.5 tablets (12.5 mg total) by mouth once daily. Lower dose    nitroGLYCERIN (NITROSTAT) 0.4 MG SL tablet Place 1 tablet (0.4 mg total) under the tongue every 5 (five) minutes as needed for Chest pain. Up to 3 doses. If chest pain is not relieved or worsens 3 to 5 minutes after 1 dose, call 911 and seek immediate emergency medical attention.    pantoprazole (PROTONIX) 40 MG tablet Take 1 tablet (40 mg total) by mouth once daily.    rosuvastatin (CRESTOR) 40 MG Tab Take 1 tablet (40 mg total) by mouth every evening.    sacubitriL-valsartan (ENTRESTO) 24-26 mg per tablet Take 1 tablet by mouth 2 (two) times daily. Stop losartan when starting Entresto.    sertraline (ZOLOFT) 50 MG tablet Take 1 tablet (50 mg total) by mouth once daily.    spironolactone (ALDACTONE) 25 MG tablet Take 1 tablet (25 mg total) by mouth once daily.     Family History    None       Tobacco Use    Smoking status: Former     Current packs/day: 0.00     Passive exposure: Past    Smokeless tobacco: Never   Substance and Sexual Activity    Alcohol use: Never    Drug use: Never    Sexual activity: Not on file      Review of Systems   Constitutional:  Positive for activity change, appetite change and fatigue.   HENT: Negative.     Eyes: Negative.    Respiratory: Negative.     Cardiovascular: Negative.    Gastrointestinal:  Positive for nausea and vomiting.   Endocrine: Negative.    Genitourinary: Negative.    Musculoskeletal: Negative.    Skin: Negative.    Allergic/Immunologic: Negative.    Neurological: Negative.    Psychiatric/Behavioral: Negative.       Objective:     Vital Signs (Most Recent):  Temp: 99.4 °F (37.4 °C) (08/14/23 0731)  Pulse: 65 (08/14/23 0731)  Resp: 18 (08/14/23 0731)  BP: (!) 97/54 (08/14/23 0731)  SpO2: (!) 94 % (08/14/23 0731) Vital Signs (24h Range):  Temp:  [97.9 °F (36.6 °C)-99.4 °F (37.4 °C)] 99.4 °F (37.4 °C)  Pulse:  [62-68] 65  Resp:  [16-18] 18  SpO2:  [91 %-94 %] 94 %  BP: ()/(51-58) 97/54     Weight: 61.1 kg (134 lb 11.2 oz)  Body mass index is 19.89 kg/m².     Physical Exam  Vitals and nursing note reviewed.   Constitutional:       General: He is not in acute distress.     Appearance: Normal appearance. He is ill-appearing.   HENT:      Head: Normocephalic and atraumatic.      Nose: Nose normal.      Mouth/Throat:      Mouth: Mucous membranes are moist.   Eyes:      Extraocular Movements: Extraocular movements intact.   Cardiovascular:      Rate and Rhythm: Normal rate.      Pulses: Normal pulses.      Heart sounds: No murmur heard.  Pulmonary:      Effort: Pulmonary effort is normal. No respiratory distress.   Abdominal:      General: Abdomen is flat.      Palpations: Abdomen is soft.      Tenderness: There is no abdominal tenderness.   Musculoskeletal:      Right lower leg: No edema.      Left lower leg: No edema.   Skin:     General: Skin is warm.      Capillary Refill: Capillary refill takes less than 2 seconds.   Neurological:      General: No focal deficit present.      Mental Status: He is alert.   Psychiatric:         Mood and Affect: Mood normal.                 Significant Labs: All pertinent labs within the past 24 hours have been reviewed.    Significant Imaging: I have reviewed all pertinent imaging results/findings within the past 24 hours.      Assessment/Plan:      * TREVOR (acute kidney injury)  Patient with acute kidney injury/acute renal failure likely due to pre-renal azotemia due to IVVD, due to nephrotoxic medications & Covid nephropathy   TREVOR is currently worsening. Baseline creatinine 1.2 -   Labs reviewed- Renal function/electrolytes with Estimated Creatinine Clearance: 8.6 mL/min (A) (based on SCr of 6.4 mg/dL (H)). according to latest data. Monitor urine output and serial BMP and adjust therapy as needed.   Avoid nephrotoxins and renally dose meds for GFR listed above.  Received 1.7 L of LR in the ED  Continue with gentle hydration,    ARF did not improved,started on IVF and consulted nephrology,check bladder scan.retaining ,placed vo.  PT,OT     Advanced care planning/counseling discussion    Both daughters,including jeannine agree wit DNR status and see palliative.,  Spent more than 16 minutes   Elevated LFTs  Chronic, worsening  US abdomen (8/4/2023):  Two small indeterminate areas in the liver, incompletely characterized by this exam.  Prior cholecystectomy.  Could be secondary to amiodarone use, or hepatitis.   Obtain hepatitis panel   Needs follow-up with Cardiology for alternative for amiodarone  Improving.    COVID-19  Patient is identified as low risk (not hypoxic).   Initiate standard COVID protocols; COVID-19 testing ,Infection Control notification  and isolation- respiratory, contact and droplet per protocol  Does not meet criteria for treatment  Monitor respiratory status    QT prolongation  History noted. Avoid QT prolonging agents.       History of seizure disorder  Not currently on any medications      Chronic combined systolic and diastolic congestive heart failure  Patient is identified as having Combined Systolic and Diastolic heart  failure that is Chronic. CHF is currently controlled. Latest ECHO performed and demonstrates- Results for orders placed during the hospital encounter of 12/12/22    Echo Saline Bubble? No    Interpretation Summary  · Concentric hypertrophy and severely decreased systolic function.  · The estimated ejection fraction is 20-25%.  · Grade I left ventricular diastolic dysfunction.  · Normal right ventricular size with normal right ventricular systolic function.  · Mild to moderate left atrial enlargement.  · Mild right atrial enlargement.  · Mild aortic regurgitation.  · Normal central venous pressure (3 mmHg).  · The estimated PA systolic pressure is 17 mmHg.  · Anterior wall is thinned out and akinetic  . Continue Beta Blocker and monitor clinical status closely. Monitor on telemetry. Patient is off CHF pathway.  Monitor strict Is&Os and daily weights.  Place on fluid restriction of 1.5 L. Cardiology has not been any consulted. Continue to stress to patient importance of self efficacy and  on diet for CHF. Last BNP reviewed- and noted below   Recent Labs   Lab 08/11/23  1711   BNP 78   .    Ischemic cardiomyopathy  Results for orders placed during the hospital encounter of 12/12/22    Echo Saline Bubble? No    Interpretation Summary  · Concentric hypertrophy and severely decreased systolic function.  · The estimated ejection fraction is 20-25%.  · Grade I left ventricular diastolic dysfunction.  · Normal right ventricular size with normal right ventricular systolic function.  · Mild to moderate left atrial enlargement.  · Mild right atrial enlargement.  · Mild aortic regurgitation.  · Normal central venous pressure (3 mmHg).  · The estimated PA systolic pressure is 17 mmHg.  · Anterior wall is thinned out and akinetic  No acute issues.       ICD (implantable cardioverter-defibrillator), single, in situ  History noted.  Telemetry monitoring.      History of CVA (cerebrovascular accident)  Resume home  medications      Coronary artery disease involving native coronary artery of native heart without angina pectoris  Resume home medications      Paroxysmal atrial fibrillation  Resume home medications (Toprol-XL, amiodarone, Eliquis)   [id number] Score: 3.      VTE Risk Mitigation (From admission, onward)           Ordered     apixaban tablet 5 mg  2 times daily         08/11/23 2026     IP VTE HIGH RISK PATIENT  Once         08/11/23 2026     Place sequential compression device  Until discontinued         08/11/23 2026                    Discharge Planning   MOR:      Code Status: DNR   Is the patient medically ready for discharge?:     Reason for patient still in hospital (select all that apply): Patient trending condition  Discharge Plan A: Home with family                  Jane Villalta MD  Department of Hospital Medicine   [facility] [place]

## 2023-08-14 NOTE — PT/OT/SLP PROGRESS
Physical Therapy      Patient Name:  Balbir Rebollar Sr.   MRN:  [id number]    Patient not seen today secondary to  (PT order cancelled by NP prior to Evaluation.  Comfort measures at this time.  PT to sign off.).

## 2023-08-14 NOTE — HPI
This is a 73-year-old male with a past medical history of AFib (on Eliquis), CAD, ICM (20-25%, s/p ICD), CVA, seizure disorder, who presents with fatigue.     Patient initially presented to the ED on 08/09 with generalized fatigue.  Workup demonstrated positive COVID test, and elevated LFTs (being worked up as an outpatient).  He was discharged to follow-up as an outpatient.  Patient returned to the ED on 08/11 for persistence of symptoms. Patient reports generalized weakness, dizziness, presyncope, nausea and vomiting.  Associated symptoms include decreased p.o. intake and 2 falls without head trauma.  He denies having diarrhea or abdominal pain.     In the ED, the patient was hypotensive (85/49) and bradycardic.  Labs were remarkable for worsening creatinine (4.6 from a baseline of 1.2) hyperkalemia (5.6-moderately hemolyzed specimen), elevated troponin (0.044), elevated LFTs (AST: 444, ALT:  359), elevated procalcitonin (1.06).  Chest x-ray showed no acute cardiopulmonary process.  Patient was given 1.7 L of LR, Zosyn and was admitted for further management.        Overview/Hospital Course:  This is a 73-year-old male with a past medical history of AFib (on Eliquis), CAD, ICM (20-25%, s/p ICD), CVA, seizure disorder, who presents with fatigue.  Patient initially presented to the ED on 08/09 with generalized fatigue.  Workup demonstrated positive COVID test, and elevated LFTs (being worked up as an outpatient).  He was discharged to follow-up as an outpatient.  Patient returned to the ED on 08/11 for persistence of symptoms. Patient reports generalized weakness, dizziness, presyncope, nausea and vomiting.  Associated symptoms include decreased p.o. intake and 2 falls without head trauma.  He denies having diarrhea or abdominal pain.  In the ED, the patient was hypotensive (85/49) and bradycardic.  Labs were remarkable for worsening creatinine (4.6 from a baseline of 1.2) hyperkalemia (5.6-moderately hemolyzed  specimen), elevated troponin (0.044), elevated LFTs (AST: 444, ALT:  359), elevated procalcitonin (1.06).  Chest x-ray showed no acute cardiopulmonary process.  Patient was given 1.7 L of LR, Zosyn and was admitted for further manageme.,  ARF did not improved,started on IVF and consulted nephrology,check bladder scan.retaining ,placed vo.ARF did not still improved,poor candidate for HD

## 2023-08-14 NOTE — SUBJECTIVE & OBJECTIVE
Past Medical History:   Diagnosis Date    AICD (automatic cardioverter/defibrillator) present     Anticoagulant long-term use     Cardiomyopathy     Chronic combined systolic and diastolic congestive heart failure     Left ventricular ejection fraction is estimated at 15-20% and measured to be 25-33%.    Coronary artery disease     History of myocardial infarction     Hypertension     Paroxysmal A-fib     Stroke        Past Surgical History:   Procedure Laterality Date    CHOLECYSTECTOMY      LEFT HEART CATHETERIZATION Left 11/29/2022    Procedure: Left heart cath;  Surgeon: Wesley Rico MD;  Location: [facility] CATH LAB;  Service: Cardiology;  Laterality: Left;  1030am start, R rad access    LEFT HEART CATHETERIZATION N/A 12/1/2022    Procedure: Left heart cath;  Surgeon: Tam Cho MD;  Location: [facility] CATH LAB;  Service: Cardiology;  Laterality: N/A;    REPLACEMENT OF DUAL CHAMBER IMPLANTABLE CARDIOVERTER-DEFIBRILLATOR         Review of patient's allergies indicates:  No Known Allergies    Medications:  Continuous Infusions:   sodium bicarbonate 100 mEq in dextrose 5 % (D5W) 1,000 mL infusion 75 mL/hr at 08/14/23 1003     Scheduled Meds:   amiodarone  200 mg Oral Daily    apixaban  5 mg Oral BID    mupirocin   Nasal BID     PRN Meds:acetaminophen, aluminum-magnesium hydroxide-simethicone, bisacodyL, glucagon (human recombinant), glucose, glucose, HYDROmorphone, naloxone, sodium chloride 0.9%    Family History    None       Tobacco Use    Smoking status: Former     Current packs/day: 0.00     Passive exposure: Past    Smokeless tobacco: Never   Substance and Sexual Activity    Alcohol use: Never    Drug use: Never    Sexual activity: Not on file       Review of Systems   Constitutional:  Positive for activity change, appetite change, fatigue and unexpected weight change.   Respiratory:  Positive for shortness of breath.    Gastrointestinal:  Positive for nausea and vomiting.   Musculoskeletal:   Positive for arthralgias, gait problem and myalgias.   Neurological:  Positive for weakness.     Objective:     Vital Signs (Most Recent):  Temp: 97.5 °F (36.4 °C) (08/14/23 1141)  Pulse: 63 (08/14/23 1141)  Resp: 18 (08/14/23 1154)  BP: (!) 91/52 (08/14/23 1141)  SpO2: (!) 93 % (08/14/23 1141) Vital Signs (24h Range):  Temp:  [97.5 °F (36.4 °C)-99.4 °F (37.4 °C)] 97.5 °F (36.4 °C)  Pulse:  [62-68] 63  Resp:  [16-18] 18  SpO2:  [91 %-94 %] 93 %  BP: ()/(52-58) 91/52     Weight: 61.1 kg (134 lb 11.2 oz)  Body mass index is 19.89 kg/m².       Physical Exam  Vitals and nursing note reviewed.   Constitutional:       General: He is not in acute distress.     Appearance: He is cachectic. He is ill-appearing. He is not toxic-appearing or diaphoretic.      Interventions: Nasal cannula in place.   HENT:      Head: Normocephalic and atraumatic.      Right Ear: External ear normal.      Left Ear: External ear normal.      Nose: Nose normal.      Mouth/Throat:      Mouth: Mucous membranes are dry.   Eyes:      General:         Right eye: No discharge.         Left eye: No discharge.   Cardiovascular:      Rate and Rhythm: Normal rate and regular rhythm.   Pulmonary:      Effort: No respiratory distress.      Comments: Increased work of breathing  Abdominal:      General: There is no distension.      Palpations: Abdomen is soft.   Genitourinary:     Comments: vo  Musculoskeletal:         General: Tenderness (BLEs) present.      Right lower leg: No edema.      Left lower leg: No edema.   Skin:     General: Skin is warm and dry.      Coloration: Skin is pale.   Neurological:      Mental Status: He is oriented to person, place, and time.      Motor: Weakness present.      Coordination: Coordination abnormal.      Gait: Gait abnormal.   Psychiatric:         Mood and Affect: Affect is flat.         Behavior: Behavior is slowed.                Advance Care Planning   Advance Directives:   Do Not Resuscitate Status: Yes       Decision Making:  Family answered questions and Patient answered questions  Goals of Care: The patient and family endorses that what is most important right now is to focus on symptom/pain control, quality of life, even if it means sacrificing a little time, and comfort and QOL     Accordingly, we have decided that the best plan to meet the patient's goals includes enrolling in hospice care           Significant Labs: All pertinent labs within the past 24 hours have been reviewed.  CBC:   Recent Labs   Lab 08/14/23 0408   WBC 9.04   HGB 12.8*   HCT 39.2*   MCV 91        BMP:  Recent Labs   Lab 08/14/23 0408   *   *   K 3.4*   CL 97   CO2 25   BUN 76*   CREATININE 7.3*   CALCIUM 7.4*     LFT:  Lab Results   Component Value Date     (H) 08/14/2023    ALKPHOS 62 08/14/2023    BILITOT 0.7 08/14/2023     Albumin:   Albumin   Date Value Ref Range Status   08/14/2023 1.8 (L) 3.5 - 5.2 g/dL Final     Protein:   Total Protein   Date Value Ref Range Status   08/14/2023 4.8 (L) 6.0 - 8.4 g/dL Final     Lactic acid:   Lab Results   Component Value Date    LACTATE 1.6 08/11/2023    LACTATE 2.3 (H) 12/12/2022       Significant Imaging: I have reviewed all pertinent imaging results/findings within the past 24 hours. CXR

## 2023-08-14 NOTE — NURSING
Pt assisted  to BSC with x2 person assist. Pt became unconscious. Palliative NP at bedside. Pt assisted back into bed x3 assist. Palliative NP states pt to be strict bed rest indefinitely.  States pt is going to be comfort care/ hospice

## 2023-08-14 NOTE — PT/OT/SLP PROGRESS
Occupational Therapy  Discharge    Patient Name:  Balbir Rebollar Sr.   MRN:  [id number]    Patient not seen today secondary to Other (Comment) (OT orders cancelled by pallative care 2* patient will D/C with Hospice sevices).     8/14/2023

## 2023-08-14 NOTE — CONSULTS
"[facility]  Palliative Medicine  Consult Note    Patient Name: Balbir Rebollar Sr.  MRN: [id number]  Admission Date: 8/11/2023  Hospital Length of Stay: 3 days  Code Status: DNR   Attending Provider: Jane Villalta, *  Consulting Provider: Tiffany Bar NP  Primary Care Physician: Suly Prescott NP  Principal Problem:TREVOR (acute kidney injury)    Patient information was obtained from patient, relative(s), past medical records and ER records.      Inpatient consult to Palliative Care  Consult performed by: Tiffany Bar NP  Consult ordered by: Jane Villalta MD  Reason for consult: GOC        Assessment/Plan:   Palliative encounter:  Advance Care Planning   -Palliative consult received; patient known to me  -chart reviewed in detail  -discussed with Nephrology Dr Moura patient poor candidate for HD   -Met with patient and his 2 daughters. Patient is very weak and is not talking much. They did remember me from our previous encounter when we talked about GOC, and hospice as an option but at the time the patient was not accepting of hospice. Daughters report patient has continued to decline, no appetite, wt loss, increased weakness and now with maximum assistance and frequently comments he is "ready to die"   Patient tells me he is tired and c/o severe pain with the slightest touch to his legs.  Dr Moura Nephrology came in to update and let patient and family know that patient is a poor candidate for HD given his overall condition and that HD would likely cause more harm than benefit . Family agree and state patient just wants to be comfortable. Dr Moura left and we continued to discuss patient's current clinical condition, advanced heart failure and expected outcome of limited life expectancy, likely days to week if renal function continues to decline.    -patient at that time wanted to have a BM and insisted on using BSCC requiring 2 person max assist. While on BSCC he started " to fall backwards, turned very pale with LOC and appeared to have a seizure, (? valsalva) and soiled himself. He was put back on bed and he did open his eyes to our stimuli but did not verbally respond.  (postictal ?)  -daughters had stepped out in hallway. I did let them know what I had witnessed and let them know my concern that patient may be nearer to EOL than I had originally thought.   -we discussed hospice and comfort measures and they are agreeable to start comfort measures here and do hospice.    - I will place comfort orders and add opioid for pain and dyspnea. Nurse to give now.  Daughters state they would like to take him home but are very anxious about the care and also that patient lives on second floor and they will have to to move furniture in order to place HB, and no one to help until tonight or tomorrow.   -I was transparent and let them know that patient has hypotension and is on midodrine to keep his b/p up. He will no longer be taking this and he could potentially die in hospital. They verbalize understanding.    -addressed all questions and concerns  -emotional support provided  -updated Dr Murphy  -updated LEATHA Colindres       Acute Renal failure   -renal function continues to decline even after gentle hydration now Creatinine 7.3   -Nephrology consulted and states patient is poor candidate for -HD   -Patient and family forego HD after discussion with Dr Moura Nephrology      -comfort measures now and patient/family elect hospice      Hypotension  -midodrine started initially  -will d/c midodrine now and focus on comfort only  -patient/family elect hospice      Chronic combined systolic and diastolic congestive heart failure  Patient is identified as having Combined Systolic and Diastolic heart failure that is Chronic.   Interpretation Summary  · Concentric hypertrophy and severely decreased systolic function.  · The estimated ejection fraction is 20-25%.  · Grade I left ventricular diastolic  dysfunction.  · Normal right ventricular size with normal right ventricular systolic function.  · Mild to moderate left atrial enlargement.  · Mild right atrial enlargement.  · Mild aortic regurgitation.  · Normal central venous pressure (3 mmHg).  · The estimated PA systolic pressure is 17 mmHg.           -comfort care and hospice elected    Ischemic cardiomyopathy   see above      Severe protein calorie malnutrition  -Body mass index is 19.89 kg/m².  -Albumin 1.3      History of CVA (cerebrovascular accident)  -noted     Coronary artery disease involving native coronary artery of native heart without angina pectoris  -noted      Paroxysmal atrial fibrillation   -currently on Toprol-XL, amiodarone, Eliquis)   -now comfort care    Elevated LFTs  Chronic, worsening  US abdomen (8/4/2023):  Two small indeterminate areas in the liver, incompletely characterized by this exam.       History of seizure disorder  -witnessed seizure like activity  -comfort care    COVID-19 (diagnosed on 8/9)  -Patient is identified as low risk (not hypoxic).   Initiate standard COVID protocols; COVID-19 testing Infection Control notification  and isolation- respiratory, contact and droplet per protocol  -Does not meet criteria for treatment of Covid       QT prolongation  -noted    Thank you for your consult.     Subjective:     HPI:   This is a 73-year-old male with a past medical history of AFib (on Eliquis), CAD, ICM (20-25%, s/p ICD), CVA, seizure disorder, who presents with fatigue.     Patient initially presented to the ED on 08/09 with generalized fatigue.  Workup demonstrated positive COVID test, and elevated LFTs (being worked up as an outpatient).  He was discharged to follow-up as an outpatient.  Patient returned to the ED on 08/11 for persistence of symptoms. Patient reports generalized weakness, dizziness, presyncope, nausea and vomiting.  Associated symptoms include decreased p.o. intake and 2 falls without head trauma.  He  denies having diarrhea or abdominal pain.     In the ED, the patient was hypotensive (85/49) and bradycardic.  Labs were remarkable for worsening creatinine (4.6 from a baseline of 1.2) hyperkalemia (5.6-moderately hemolyzed specimen), elevated troponin (0.044), elevated LFTs (AST: 444, ALT:  359), elevated procalcitonin (1.06).  Chest x-ray showed no acute cardiopulmonary process.  Patient was given 1.7 L of LR, Zosyn and was admitted for further management.        Overview/Hospital Course:  This is a 73-year-old male with a past medical history of AFib (on Eliquis), CAD, ICM (20-25%, s/p ICD), CVA, seizure disorder, who presents with fatigue.  Patient initially presented to the ED on 08/09 with generalized fatigue.  Workup demonstrated positive COVID test, and elevated LFTs (being worked up as an outpatient).  He was discharged to follow-up as an outpatient.  Patient returned to the ED on 08/11 for persistence of symptoms. Patient reports generalized weakness, dizziness, presyncope, nausea and vomiting.  Associated symptoms include decreased p.o. intake and 2 falls without head trauma.  He denies having diarrhea or abdominal pain.  In the ED, the patient was hypotensive (85/49) and bradycardic.  Labs were remarkable for worsening creatinine (4.6 from a baseline of 1.2) hyperkalemia (5.6-moderately hemolyzed specimen), elevated troponin (0.044), elevated LFTs (AST: 444, ALT:  359), elevated procalcitonin (1.06).  Chest x-ray showed no acute cardiopulmonary process.  Patient was given 1.7 L of LR, Zosyn and was admitted for further manageme.,  ARF did not improved,started on IVF and consulted nephrology,check bladder scan.retaining ,placed vo.ARF did not still improved,poor candidate for HD      Hospital Course:  No notes on file        Past Medical History:   Diagnosis Date    AICD (automatic cardioverter/defibrillator) present     Anticoagulant long-term use     Cardiomyopathy     Chronic combined systolic and  diastolic congestive heart failure     Left ventricular ejection fraction is estimated at 15-20% and measured to be 25-33%.    Coronary artery disease     History of myocardial infarction     Hypertension     Paroxysmal A-fib     Stroke        Past Surgical History:   Procedure Laterality Date    CHOLECYSTECTOMY      LEFT HEART CATHETERIZATION Left 11/29/2022    Procedure: Left heart cath;  Surgeon: Wesley Rico MD;  Location: [facility] CATH LAB;  Service: Cardiology;  Laterality: Left;  1030am start, R rad access    LEFT HEART CATHETERIZATION N/A 12/1/2022    Procedure: Left heart cath;  Surgeon: Tam Cho MD;  Location: [facility] CATH LAB;  Service: Cardiology;  Laterality: N/A;    REPLACEMENT OF DUAL CHAMBER IMPLANTABLE CARDIOVERTER-DEFIBRILLATOR         Review of patient's allergies indicates:  No Known Allergies    Medications:  Continuous Infusions:   sodium bicarbonate 100 mEq in dextrose 5 % (D5W) 1,000 mL infusion 75 mL/hr at 08/14/23 1003     Scheduled Meds:   amiodarone  200 mg Oral Daily    apixaban  5 mg Oral BID    mupirocin   Nasal BID     PRN Meds:acetaminophen, aluminum-magnesium hydroxide-simethicone, bisacodyL, glucagon (human recombinant), glucose, glucose, HYDROmorphone, naloxone, sodium chloride 0.9%    Family History    None       Tobacco Use    Smoking status: Former     Current packs/day: 0.00     Passive exposure: Past    Smokeless tobacco: Never   Substance and Sexual Activity    Alcohol use: Never    Drug use: Never    Sexual activity: Not on file       Review of Systems   Constitutional:  Positive for activity change, appetite change, fatigue and unexpected weight change.   Respiratory:  Positive for shortness of breath.    Gastrointestinal:  Positive for nausea and vomiting.   Musculoskeletal:  Positive for arthralgias, gait problem and myalgias.   Neurological:  Positive for weakness.     Objective:     Vital Signs (Most Recent):  Temp: 97.5 °F (36.4 °C) (08/14/23 1141)  Pulse: 63  (08/14/23 1141)  Resp: 18 (08/14/23 1154)  BP: (!) 91/52 (08/14/23 1141)  SpO2: (!) 93 % (08/14/23 1141) Vital Signs (24h Range):  Temp:  [97.5 °F (36.4 °C)-99.4 °F (37.4 °C)] 97.5 °F (36.4 °C)  Pulse:  [62-68] 63  Resp:  [16-18] 18  SpO2:  [91 %-94 %] 93 %  BP: ()/(52-58) 91/52     Weight: 61.1 kg (134 lb 11.2 oz)  Body mass index is 19.89 kg/m².       Physical Exam  Vitals and nursing note reviewed.   Constitutional:       General: He is not in acute distress.     Appearance: He is cachectic. He is ill-appearing. He is not toxic-appearing or diaphoretic.      Interventions: Nasal cannula in place.   HENT:      Head: Normocephalic and atraumatic.      Right Ear: External ear normal.      Left Ear: External ear normal.      Nose: Nose normal.      Mouth/Throat:      Mouth: Mucous membranes are dry.   Eyes:      General:         Right eye: No discharge.         Left eye: No discharge.   Cardiovascular:      Rate and Rhythm: Normal rate and regular rhythm.   Pulmonary:      Effort: No respiratory distress.      Comments: Increased work of breathing  Abdominal:      General: There is no distension.      Palpations: Abdomen is soft.   Genitourinary:     Comments: vo  Musculoskeletal:         General: Tenderness (BLEs) present.      Right lower leg: No edema.      Left lower leg: No edema.   Skin:     General: Skin is warm and dry.      Coloration: Skin is pale.   Neurological:      Mental Status: He is oriented to person, place, and time.      Motor: Weakness present.      Coordination: Coordination abnormal.      Gait: Gait abnormal.   Psychiatric:         Mood and Affect: Affect is flat.         Behavior: Behavior is slowed.                Advance Care Planning  Advance Directives:   Do Not Resuscitate Status: Yes      Decision Making:  Family answered questions and Patient answered questions  Goals of Care: The patient and family endorses that what is most important right now is to focus on symptom/pain  control, quality of life, even if it means sacrificing a little time, and comfort and QOL     Accordingly, we have decided that the best plan to meet the patient's goals includes enrolling in hospice care           Significant Labs: All pertinent labs within the past 24 hours have been reviewed.  CBC:   Recent Labs   Lab 08/14/23 0408   WBC 9.04   HGB 12.8*   HCT 39.2*   MCV 91        BMP:  Recent Labs   Lab 08/14/23 0408   *   *   K 3.4*   CL 97   CO2 25   BUN 76*   CREATININE 7.3*   CALCIUM 7.4*     LFT:  Lab Results   Component Value Date     (H) 08/14/2023    ALKPHOS 62 08/14/2023    BILITOT 0.7 08/14/2023     Albumin:   Albumin   Date Value Ref Range Status   08/14/2023 1.8 (L) 3.5 - 5.2 g/dL Final     Protein:   Total Protein   Date Value Ref Range Status   08/14/2023 4.8 (L) 6.0 - 8.4 g/dL Final     Lactic acid:   Lab Results   Component Value Date    LACTATE 1.6 08/11/2023    LACTATE 2.3 (H) 12/12/2022       Significant Imaging: I have reviewed all pertinent imaging results/findings within the past 24 hours. CXR        I spent a total of 90 minutes on the day of the visit. This includes face to face time in discussion of goals of care (20 mins of total 90 mins), symptom assessment, coordination of care and emotional support.  This also includes non-face to face time preparing to see the patient (eg, review of tests/imaging), obtaining and/or reviewing separately obtained history, documenting clinical information in the electronic or other health record, independently interpreting results and communicating results to the patient/family/caregiver, or care coordinator.    Tiffany Bar, NP  Palliative Medicine  [facility]

## 2023-08-14 NOTE — NURSING
[facility] [place] 4 [place]      8/14/2023       Skin assessed on: Q Shift      [x] No Pressure Injuries Present    []Prevention Measures Documented    [] Yes LDA  for Pressure Injury Previously documented     [] Yes New Pressure Injury Discovered   [] LDA for New Pressure Injury Added      Attending RN:  Hallie Thrasher, [facility]     Second RN:  Sarah Arroyo LPN

## 2023-08-14 NOTE — CONSULTS
SW discussed discharge planning with daughter. Daughter does not have a preference for hospice agency. LEATHA notified Martha with [facility] about need for hospice service [facility] added to treatment team in [facility].     LEATHA notified by Palliative Care NPTiffany that family prefers to go to [facility]. LEATHA was instructed by NP to send referral to [facility] if [facility] does not accept.     LEATHA notified Martha that home hospice is not longer being pursued.

## 2023-08-14 NOTE — NURSING
Pt resting in bed asleep, no complaints of pain/discomfort. Scheduled medications given. Cont IVF. Tele #[id number]. Pt repositioned with assist; remains free from fall/injury. Pt on room air; no distress noted. Coude catheter in place. Safety measures maintained. Will cont to monitor

## 2023-08-14 NOTE — PROGRESS NOTES
Date of Admission:8/11/2023    SUBJECTIVE:not eating  Daugthers at bedside   Wt loss noted by his children    Current Facility-Administered Medications   Medication    acetaminophen tablet 650 mg    aluminum-magnesium hydroxide-simethicone 200-200-20 mg/5 mL suspension 30 mL    amiodarone tablet 200 mg    apixaban tablet 5 mg    bisacodyL suppository 10 mg    glucagon (human recombinant) injection 1 mg    glucose chewable tablet 16 g    glucose chewable tablet 24 g    HYDROmorphone injection 0.2 mg    mupirocin 2 % ointment    naloxone 0.4 mg/mL injection 0.02 mg    sodium bicarbonate 100 mEq in dextrose 5 % (D5W) 1,000 mL infusion    sodium chloride 0.9% flush 10 mL       Wt Readings from Last 3 Encounters:   08/11/23 61.1 kg (134 lb 11.2 oz)   08/03/23 58.8 kg (129 lb 10.1 oz)   07/14/23 59.9 kg (132 lb 0.9 oz)     Temp Readings from Last 3 Encounters:   08/14/23 97.5 °F (36.4 °C) (Oral)   08/09/23 99 °F (37.2 °C) (Oral)   08/03/23 97.5 °F (36.4 °C) (Oral)     BP Readings from Last 3 Encounters:   08/14/23 (!) 91/52   08/09/23 (!) 107/54   08/03/23 (!) 92/40     Pulse Readings from Last 3 Encounters:   08/14/23 63   08/09/23 60   08/03/23 (!) 47       Intake/Output Summary (Last 24 hours) at 8/14/2023 1237  Last data filed at 8/14/2023 1225  Gross per 24 hour   Intake 3343.4 ml   Output 775 ml   Net 2568.4 ml       PE:  GEN:wd thin male in nad  HEENT:ncat,eomi,mm  CVS:s1s2 regular  PULM:ctab  ABD:bs, soft  EXT:no leg edema  NEURO:awake    Recent Labs   Lab 08/14/23  0408   *   *   K 3.4*   CL 97   CO2 25   BUN 76*   CREATININE 7.3*   CALCIUM 7.4*       Lab Results   Component Value Date    CALCIUM 7.4 (L) 08/14/2023    PHOS 3.8 08/12/2023       Recent Labs   Lab 08/14/23  0408   WBC 9.04   RBC 4.31*   HGB 12.8*   HCT 39.2*      MCV 91   MCH 29.7   MCHC 32.7           A/P:  1.moe. creatinine is not better. Poor hd candidate.suspect cardiorenal also.  2.urinary retention. Not better with  gilda.  3.hypokalemia. k ok.  4.cmp. poor ef. Not much to offer with his heart.  5.htn. no ace/arb.  Talked to the family with palliative. Recommend comfort care and hospice.

## 2023-08-14 NOTE — PLAN OF CARE
LEATHA  notified Martha with [facility] about need for hospice. Waiting for response.     1:54 pm    Referral sent to [facility] hospice.        08/14/23 7140   Post-Acute Status   Post-Acute Authorization Hospice   Hospice Status Pending post acute provider review/more information requested   Coverage Medicare   Discharge Delays (!) Post-Acute Set-up   Discharge Plan   Discharge Plan A Hospice/home   Discharge Plan B Home with family

## 2023-08-15 ENCOUNTER — TELEPHONE (OUTPATIENT)
Dept: HEMATOLOGY/ONCOLOGY | Facility: CLINIC | Age: 73
End: 2023-08-15
Payer: MEDICARE

## 2023-08-15 VITALS
WEIGHT: 134.69 LBS | OXYGEN SATURATION: 96 % | HEIGHT: 69 IN | TEMPERATURE: 98 F | HEART RATE: 61 BPM | SYSTOLIC BLOOD PRESSURE: 98 MMHG | BODY MASS INDEX: 19.95 KG/M2 | DIASTOLIC BLOOD PRESSURE: 54 MMHG | RESPIRATION RATE: 16 BRPM

## 2023-08-15 LAB
BACTERIA BLD CULT: NORMAL
BACTERIA BLD CULT: NORMAL

## 2023-08-15 PROCEDURE — 99232 SBSQ HOSP IP/OBS MODERATE 35: CPT | Mod: ,,, | Performed by: NURSE PRACTITIONER

## 2023-08-15 PROCEDURE — 99232 PR SUBSEQUENT HOSPITAL CARE,LEVL II: ICD-10-PCS | Mod: ,,, | Performed by: NURSE PRACTITIONER

## 2023-08-15 PROCEDURE — 25000003 PHARM REV CODE 250: Performed by: INTERNAL MEDICINE

## 2023-08-15 RX ADMIN — SODIUM BICARBONATE: 84 INJECTION, SOLUTION INTRAVENOUS at 02:08

## 2023-08-15 RX ADMIN — MUPIROCIN: 20 OINTMENT TOPICAL at 09:08

## 2023-08-15 NOTE — PROGRESS NOTES
"[facility] Surg  Palliative Medicine  Progress Note    Patient Name: Balbir Rebollar Sr.  MRN: [id number]  Admission Date: 8/11/2023  Hospital Length of Stay: 4 days  Code Status: DNR   Attending Provider: Jane Villalta, *  Consulting Provider: Tiffany Bar NP  Primary Care Physician: Suly Prescott NP  Principal Problem:TREVOR (acute kidney injury)        Assessment/Plan:   Palliative encounter:  -interval chart review  -patient on comfort measures;   -F/u to see if comfortable this am.l Plans for inpatient hospice this am  -discussed with bedside nurse    -discussed with Dr Murphy      Palliative encounter: 8/14/23  Advance Care Planning   -Palliative consult received; patient known to me  -chart reviewed in detail  -discussed with Nephrology Dr Moura patient poor candidate for HD   -Met with patient and his 2 daughters. Patient is very weak and is not talking much. They did remember me from our previous encounter when we talked about GOC, and hospice as an option but at the time the patient was not accepting of hospice. Daughters report patient has continued to decline, no appetite, wt loss, increased weakness and now with maximum assistance and frequently comments he is "ready to die"   Patient tells me he is tired and c/o severe pain with the slightest touch to his legs.  Dr Moura Nephrology came in to update and let patient and family know that patient is a poor candidate for HD given his overall condition and that HD would likely cause more harm than benefit . Family agree and state patient just wants to be comfortable. Dr Moura left and we continued to discuss patient's current clinical condition, advanced heart failure and expected outcome of limited life expectancy, likely days to week if renal function continues to decline.    -patient at that time wanted to have a BM and insisted on using BSCC requiring 2 person max assist. While on BSCC he started to fall backwards, turned very pale " with LOC and appeared to have a seizure, (? valsalva) and soiled himself. He was put back on bed and he did open his eyes to our stimuli but did not verbally respond.  (postictal ?)  -daughters had stepped out in hallway. I did let them know what I had witnessed and let them know my concern that patient may be nearer to EOL than I had originally thought.   -we discussed hospice and comfort measures and they are agreeable to start comfort measures here and do hospice.    - I will place comfort orders and add opioid for pain and dyspnea. Nurse to give now.  Daughters state they would like to take him home but are very anxious about the care and also that patient lives on second floor and they will have to to move furniture in order to place HB, and no one to help until tonight or tomorrow.   -I was transparent and let them know that patient has hypotension and is on midodrine to keep his b/p up. He will no longer be taking this and he could potentially die in hospital. They verbalize understanding.    -addressed all questions and concerns  -emotional support provided  -updated Dr Murphy  -updated LEATHA Colindres        Acute Renal failure   -renal function continues to decline even after gentle hydration now Creatinine 7.3   -Nephrology consulted and states patient is poor candidate for -HD   -Patient and family forego HD after discussion with Dr Moura Nephrology      -comfort measures now and patient/family elect hospice        Hypotension  -midodrine started initially  -will d/c midodrine now and focus on comfort only  -patient/family elect hospice        Chronic combined systolic and diastolic congestive heart failure  Patient is identified as having Combined Systolic and Diastolic heart failure that is Chronic.   Interpretation Summary  · Concentric hypertrophy and severely decreased systolic function.  · The estimated ejection fraction is 20-25%.  · Grade I left ventricular diastolic dysfunction.  · Normal right ventricular  size with normal right ventricular systolic function.  · Mild to moderate left atrial enlargement.  · Mild right atrial enlargement.  · Mild aortic regurgitation.  · Normal central venous pressure (3 mmHg).  · The estimated PA systolic pressure is 17 mmHg.           -comfort care and hospice elected     Ischemic cardiomyopathy   see above        Severe protein calorie malnutrition  -Body mass index is 19.89 kg/m².  -Albumin 1.3        History of CVA (cerebrovascular accident)  -noted     Coronary artery disease involving native coronary artery of native heart without angina pectoris  -noted      Paroxysmal atrial fibrillation   -currently on Toprol-XL, amiodarone, Eliquis)   -now comfort care     Elevated LFTs  Chronic, worsening  US abdomen (8/4/2023):  Two small indeterminate areas in the liver, incompletely characterized by this exam.        History of seizure disorder  -witnessed seizure like activity  -comfort care     COVID-19 (diagnosed on 8/9)  -Patient is identified as low risk (not hypoxic).   Initiate standard COVID protocols; COVID-19 testing Infection Control notification  and isolation- respiratory, contact and droplet per protocol  -Does not meet criteria for treatment of Covid        QT prolongation  -noted        Subjective:     Chief Complaint:   Chief Complaint   Patient presents with    Fatigue     EMS called to 72yo male that has been having weakness, lack of appetite, vomiting x1 week. Was seen at [facility] a few days ago for same symptoms, treated, and discharged. Patient is hypotensive. Initial bp was 78/50 and was given 700ml NS enroute. Bp at triage was 85/49       HPI:   This is a 73-year-old male with a past medical history of AFib (on Eliquis), CAD, ICM (20-25%, s/p ICD), CVA, seizure disorder, who presents with fatigue.     Patient initially presented to the ED on 08/09 with generalized fatigue.  Workup demonstrated positive COVID test, and elevated LFTs (being worked up as an  outpatient).  He was discharged to follow-up as an outpatient.  Patient returned to the ED on 08/11 for persistence of symptoms. Patient reports generalized weakness, dizziness, presyncope, nausea and vomiting.  Associated symptoms include decreased p.o. intake and 2 falls without head trauma.  He denies having diarrhea or abdominal pain.     In the ED, the patient was hypotensive (85/49) and bradycardic.  Labs were remarkable for worsening creatinine (4.6 from a baseline of 1.2) hyperkalemia (5.6-moderately hemolyzed specimen), elevated troponin (0.044), elevated LFTs (AST: 444, ALT:  359), elevated procalcitonin (1.06).  Chest x-ray showed no acute cardiopulmonary process.  Patient was given 1.7 L of LR, Zosyn and was admitted for further management.        Overview/Hospital Course:  This is a 73-year-old male with a past medical history of AFib (on Eliquis), CAD, ICM (20-25%, s/p ICD), CVA, seizure disorder, who presents with fatigue.  Patient initially presented to the ED on 08/09 with generalized fatigue.  Workup demonstrated positive COVID test, and elevated LFTs (being worked up as an outpatient).  He was discharged to follow-up as an outpatient.  Patient returned to the ED on 08/11 for persistence of symptoms. Patient reports generalized weakness, dizziness, presyncope, nausea and vomiting.  Associated symptoms include decreased p.o. intake and 2 falls without head trauma.  He denies having diarrhea or abdominal pain.  In the ED, the patient was hypotensive (85/49) and bradycardic.  Labs were remarkable for worsening creatinine (4.6 from a baseline of 1.2) hyperkalemia (5.6-moderately hemolyzed specimen), elevated troponin (0.044), elevated LFTs (AST: 444, ALT:  359), elevated procalcitonin (1.06).  Chest x-ray showed no acute cardiopulmonary process.  Patient was given 1.7 L of LR, Zosyn and was admitted for further manageme.,  ARF did not improved,started on IVF and consulted nephrology,check bladder  scan.retaining ,placed vo.ARF did not still improved,poor candidate for HD      Hospital Course:  No notes on file    Interval History: patient alert to self. He denies pain or discomfort currently. Tachypnea with oxygen    Medications:  Continuous Infusions:  Scheduled Meds:   mupirocin   Nasal BID     PRN Meds:acetaminophen, bisacodyL, glucagon (human recombinant), HYDROmorphone, lorazepam, ondansetron, sodium chloride 0.9%    Objective:     Vital Signs (Most Recent):  Temp: 97.9 °F (36.6 °C) (08/15/23 0816)  Pulse: 68 (08/15/23 0816)  Resp: 15 (08/15/23 0816)  BP: (!) 91/52 (08/15/23 0816)  SpO2: (!) 92 % (08/15/23 0816) Vital Signs (24h Range):  Temp:  [97.5 °F (36.4 °C)-98.7 °F (37.1 °C)] 97.9 °F (36.6 °C)  Pulse:  [63-68] 68  Resp:  [15-18] 15  SpO2:  [92 %-93 %] 92 %  BP: (91-96)/(52-54) 91/52     Weight: 61.1 kg (134 lb 11.2 oz)  Body mass index is 19.89 kg/m².       Physical Exam  Vitals and nursing note reviewed.   Constitutional:       General: He is not in acute distress.     Appearance: He is cachectic. He is ill-appearing. He is not toxic-appearing or diaphoretic.      Interventions: Nasal cannula in place.   HENT:      Head: Normocephalic and atraumatic.      Right Ear: External ear normal.      Left Ear: External ear normal.      Nose: Nose normal.      Mouth/Throat:      Mouth: Mucous membranes are dry.   Eyes:      General:         Right eye: No discharge.         Left eye: No discharge.   Cardiovascular:      Rate and Rhythm: Normal rate and regular rhythm.   Pulmonary:      Effort: No respiratory distress.      Comments: Increased work of breathing  Abdominal:      General: There is no distension.      Palpations: Abdomen is soft.   Genitourinary:     Comments: vo  Musculoskeletal:         General: Tenderness (BLEs) present.      Right lower leg: No edema.      Left lower leg: No edema.   Skin:     General: Skin is warm and dry.      Coloration: Skin is pale.   Neurological:      Mental  "Status: He is alert.      Motor: Weakness present.      Coordination: Coordination abnormal.      Gait: Gait abnormal.      Comments: Oriented to self   Psychiatric:         Mood and Affect: Affect is flat.         Cognition and Memory: He exhibits impaired recent memory.                Advance Care Planning  Advance Directives:   Goals of Care: What is most important right now is to focus on symptom/pain control, quality of life, even if it means sacrificing a little time, comfort and QOL . Accordingly, we have decided that the best plan to meet the patient's goals includes enrolling in hospice care.         Significant Labs: All pertinent labs within the past 24 hours have been reviewed.  CBC:   Recent Labs   Lab 08/14/23  0408   WBC 9.04   HGB 12.8*   HCT 39.2*   MCV 91        BMP:  No results for input(s): "GLU", "NA", "K", "CL", "CO2", "BUN", "CREATININE", "CALCIUM", "MG" in the last 24 hours.  LFT:  Lab Results   Component Value Date     (H) 08/14/2023    ALKPHOS 62 08/14/2023    BILITOT 0.7 08/14/2023     Albumin:   Albumin   Date Value Ref Range Status   08/14/2023 1.8 (L) 3.5 - 5.2 g/dL Final     Protein:   Total Protein   Date Value Ref Range Status   08/14/2023 4.8 (L) 6.0 - 8.4 g/dL Final     Lactic acid:   Lab Results   Component Value Date    LACTATE 1.6 08/11/2023    LACTATE 2.3 (H) 12/12/2022       Significant Imaging: None      Tiffany Bar NP  Palliative Medicine  [facility] - Med Surg    50% of 25 mins spent in chart review, face to face discussion, goals of care, emotional support, symptom assessment, coordination of care with other specialists and d/c planning.               "

## 2023-08-15 NOTE — SUBJECTIVE & OBJECTIVE
Interval History: patient alert to self. He denies pain or discomfort currently. Tachypnea with oxygen    Medications:  Continuous Infusions:  Scheduled Meds:   mupirocin   Nasal BID     PRN Meds:acetaminophen, bisacodyL, glucagon (human recombinant), HYDROmorphone, lorazepam, ondansetron, sodium chloride 0.9%    Objective:     Vital Signs (Most Recent):  Temp: 97.9 °F (36.6 °C) (08/15/23 0816)  Pulse: 68 (08/15/23 0816)  Resp: 15 (08/15/23 0816)  BP: (!) 91/52 (08/15/23 0816)  SpO2: (!) 92 % (08/15/23 0816) Vital Signs (24h Range):  Temp:  [97.5 °F (36.4 °C)-98.7 °F (37.1 °C)] 97.9 °F (36.6 °C)  Pulse:  [63-68] 68  Resp:  [15-18] 15  SpO2:  [92 %-93 %] 92 %  BP: (91-96)/(52-54) 91/52     Weight: 61.1 kg (134 lb 11.2 oz)  Body mass index is 19.89 kg/m².       Physical Exam  Vitals and nursing note reviewed.   Constitutional:       General: He is not in acute distress.     Appearance: He is cachectic. He is ill-appearing. He is not toxic-appearing or diaphoretic.      Interventions: Nasal cannula in place.   HENT:      Head: Normocephalic and atraumatic.      Right Ear: External ear normal.      Left Ear: External ear normal.      Nose: Nose normal.      Mouth/Throat:      Mouth: Mucous membranes are dry.   Eyes:      General:         Right eye: No discharge.         Left eye: No discharge.   Cardiovascular:      Rate and Rhythm: Normal rate and regular rhythm.   Pulmonary:      Effort: No respiratory distress.      Comments: Increased work of breathing  Abdominal:      General: There is no distension.      Palpations: Abdomen is soft.   Genitourinary:     Comments: vo  Musculoskeletal:         General: Tenderness (BLEs) present.      Right lower leg: No edema.      Left lower leg: No edema.   Skin:     General: Skin is warm and dry.      Coloration: Skin is pale.   Neurological:      Mental Status: He is alert.      Motor: Weakness present.      Coordination: Coordination abnormal.      Gait: Gait abnormal.       "Comments: Oriented to self   Psychiatric:         Mood and Affect: Affect is flat.         Cognition and Memory: He exhibits impaired recent memory.                Advance Care Planning   Advance Directives:   Goals of Care: What is most important right now is to focus on symptom/pain control, quality of life, even if it means sacrificing a little time, comfort and QOL . Accordingly, we have decided that the best plan to meet the patient's goals includes enrolling in hospice care.         Significant Labs: All pertinent labs within the past 24 hours have been reviewed.  CBC:   Recent Labs   Lab 08/14/23  0408   WBC 9.04   HGB 12.8*   HCT 39.2*   MCV 91        BMP:  No results for input(s): "GLU", "NA", "K", "CL", "CO2", "BUN", "CREATININE", "CALCIUM", "MG" in the last 24 hours.  LFT:  Lab Results   Component Value Date     (H) 08/14/2023    ALKPHOS 62 08/14/2023    BILITOT 0.7 08/14/2023     Albumin:   Albumin   Date Value Ref Range Status   08/14/2023 1.8 (L) 3.5 - 5.2 g/dL Final     Protein:   Total Protein   Date Value Ref Range Status   08/14/2023 4.8 (L) 6.0 - 8.4 g/dL Final     Lactic acid:   Lab Results   Component Value Date    LACTATE 1.6 08/11/2023    LACTATE 2.3 (H) 12/12/2022       Significant Imaging: None  "

## 2023-08-15 NOTE — NURSING
[facility]  Nurses Note -- 4 Eyes      8/15/2023       Skin assessed on: Q Shift      [x] No Pressure Injuries Present    [x]Prevention Measures Documented    [] Yes LDA  for Pressure Injury Previously documented     [] Yes New Pressure Injury Discovered   [] LDA for New Pressure Injury Added      Attending RN:  Jinny Harris, RN     Second RN:  Michela HUDSON

## 2023-08-15 NOTE — PLAN OF CARE
2:00 PM  TN called Kali back and rescheduled for 4:40 PM transport to [facility].      ADT 30 order placed for St[employer]cher Transportation.  Requested  time: CHANGED TO 3:30 PM transport to [facility]. [address] [address]  If transportation does not arrive at ETA time nurse will be instructed to follow protocol for transportation below:  How can I get in touch directly with dispatch, if needed?                 Non-emergent (stretcher): [phone]  option 6      ++NURSING:  If Stretcher does not arrive at requested time please call the above Non Emergent Dispatcher.  If issue not resolved please escalate to your charge nurse for further instructions.

## 2023-08-15 NOTE — PLAN OF CARE
TN sent med surg nurse Jinny a  secure chat that this patient is clear for discharge to [facility].Call report to [phone]. Keep ALL IVs and have defibrillator deactivated.  ADT 30 order placed for Stretcher Transportation.  Requested  time:1:40 PM transport to [facility]. [address] [address]  If transportation does not arrive at ETA time nurse will be instructed to follow protocol for transportation below:  How can I get in touch directly with dispatch, if needed?                 Non-emergent (stretcher): [phone]  option 6     ++NURSING:  If Stretcher does not arrive at requested time please call the above Non Emergent Dispatcher.  If issue not resolved please escalate to your charge nurse for further instructions.       08/15/23 1245   Final Note   Assessment Type Final Discharge Note   Anticipated Discharge Disposition [facility]   What phone number can be called within the next 1-3 days to see how you are doing after discharge?   (see chart)   Hospital Resources/Appts/Education Provided Provided patient/caregiver with written discharge plan information;Post-Acute resouces added to AVS   Post-Acute Status   Post-Acute Authorization Hospice   Hospice Status Set-up Complete/Auth obtained   Discharge Delays None known at this time

## 2023-08-15 NOTE — PLAN OF CARE
Problem: Adult Inpatient Plan of Care  Goal: Plan of Care Review  Outcome: Ongoing, Progressing  Goal: Patient-Specific Goal (Individualized)  Outcome: Ongoing, Progressing  Goal: Absence of Hospital-Acquired Illness or Injury  Outcome: Ongoing, Progressing  Goal: Optimal Comfort and Wellbeing  Outcome: Ongoing, Progressing  Goal: Readiness for Transition of Care  Outcome: Ongoing, Progressing     Problem: Fluid and Electrolyte Imbalance (Acute Kidney Injury/Impairment)  Goal: Fluid and Electrolyte Balance  Outcome: Ongoing, Progressing     Problem: Oral Intake Inadequate (Acute Kidney Injury/Impairment)  Goal: Optimal Nutrition Intake  Outcome: Ongoing, Progressing     Problem: Renal Function Impairment (Acute Kidney Injury/Impairment)  Goal: Effective Renal Function  Outcome: Ongoing, Progressing     Problem: Skin Injury Risk Increased  Goal: Skin Health and Integrity  Outcome: Ongoing, Progressing     Problem: Infection  Goal: Absence of Infection Signs and Symptoms  Outcome: Ongoing, Progressing     Problem: Coping Ineffective  Goal: Effective Coping  Outcome: Ongoing, Progressing

## 2023-08-15 NOTE — PLAN OF CARE
9:40 AM TN called [facility] for inpatient Hospice, requested call back from answering service.  No answer in [place].   08/15/23 0952   Post-Acute Status   Post-Acute Authorization Hospice   Hospice Status Referrals Sent   Coverage [employer] MANAGED MEDICARE - [employer] MEDICARE HMO   Discharge Delays (!) Post-Acute Set-up   Discharge Plan   Discharge Plan A Inpatient Hospice   Discharge Plan B Inpatient Hospice;Hospice/home

## 2023-08-15 NOTE — PLAN OF CARE
[facility]  Department of Hospital Medicine  [address] [place] [address]  [phone] after hours  [phone] fax    HOSPICE  ORDERS    08/15/2023    Admit to Hospice: Inpatient Service     Diagnoses:   Active Hospital Problems    Diagnosis  POA    *TREVOR (acute kidney injury) [N17.9]  Yes    Advanced care planning/counseling discussion [Z71.89]  Not Applicable    COVID-19 [U07.1]  Yes    Elevated LFTs [R79.89]  Yes    QT prolongation [R94.31]  Yes    History of seizure disorder [Z86.69]  Not Applicable    Chronic combined systolic and diastolic congestive heart failure [I50.42]  Yes    Ischemic cardiomyopathy [I25.5]  Yes    ICD (implantable cardioverter-defibrillator), single, in situ [Z95.810]  Yes    Paroxysmal atrial fibrillation [I48.0]  Yes    History of CVA (cerebrovascular accident) [Z86.73]  Not Applicable    Coronary artery disease involving native coronary artery of native heart without angina pectoris [I25.10]  Yes    Dyslipidemia [E78.5]  Yes      Resolved Hospital Problems   No resolved problems to display.       Hospice Qualifying Diagnoses: Acute renal failure        Patient has a life expectancy < 6 months due to:      Vital Signs: Routine per Hospice Protocol.    Code Status: DNR     Allergies: Review of patient's allergies indicates:  No Known Allergies    Diet: puree,thin ,aspiration precaution ,fall precaution     Activities: As tolerated    Goals of Care Treatment Preferences:  Code Status: DNR          What is most important right now is to focus on symptom/pain control, quality of life, even if it means sacrificing a little time, comfort and QOL .  Accordingly, we have decided that the best plan to meet the patient's goals includes enrolling in hospice care.      Nursing: Per Hospice Routine.        Matamoros Care: Empty Matamoros bag Q shift and PRN.  Change Matamoros every month.    Routine Skin for Bedridden Patients: Apply moisture barrier cream to  all skin folds and   wet areas in perineal area daily and after baths and all bowel movements.        Oxygen: 2 liter NC O 2    Other Miscellaneous Care:                Medication List        START taking these medications      pulse oximeter device  Commonly known as: pulse oximeter  by Apply Externally route 2 (two) times a day. Use twice daily at 8 AM and 3 PM and record the value in MyChart as directed.            CONTINUE taking these medications      apixaban 5 mg Tab  Commonly known as: ELIQUIS  Take 1 tablet (5 mg total) by mouth 2 (two) times daily.     clopidogreL 75 mg tablet  Commonly known as: PLAVIX  Take 1 tablet (75 mg total) by mouth every evening.     fluticasone propionate 50 mcg/actuation nasal spray  Commonly known as: FLONASE  1 spray by Each Nostril route once daily.     ipratropium 21 mcg (0.03 %) nasal spray  Commonly known as: ATROVENT  2 sprays by Each Nostril route 4 (four) times daily as needed for Rhinitis.     nitroGLYCERIN 0.4 MG SL tablet  Commonly known as: NITROSTAT  Place 1 tablet (0.4 mg total) under the tongue every 5 (five) minutes as needed for Chest pain. Up to 3 doses. If chest pain is not relieved or worsens 3 to 5 minutes after 1 dose, call 911 and seek immediate emergency medical attention.     pantoprazole 40 MG tablet  Commonly known as: PROTONIX  Take 1 tablet (40 mg total) by mouth once daily.     rosuvastatin 40 MG Tab  Commonly known as: CRESTOR  Take 1 tablet (40 mg total) by mouth every evening.     sertraline 50 MG tablet  Commonly known as: ZOLOFT  Take 1 tablet (50 mg total) by mouth once daily.            STOP taking these medications      amiodarone 200 MG Tab  Commonly known as: PACERONE     ENTRESTO 24-26 mg per tablet  Generic drug: sacubitriL-valsartan     FARXIGA 10 mg tablet  Generic drug: dapagliflozin propanediol     losartan 25 MG tablet  Commonly known as: COZAAR     metoprolol succinate 25 MG 24 hr tablet  Commonly known as: TOPROL-XL      spironolactone 25 MG tablet  Commonly known as: ALDACTONE                      _________________________________  Jane Villalta MD  08/15/2023

## 2023-08-15 NOTE — PROGRESS NOTES
[facility]  Rapid Response Nurse Intervention/ Task    Date of Visit: 08/15/2023  Time of Visit: 1:13 PM       INTERVENTIONS/ TASK Completed:     Contact [employer]

## 2023-08-15 NOTE — TELEPHONE ENCOUNTER
Patient daughter called from room 407 to tell Ms Allen that she would like to talk to her about more options for hospice.

## 2023-08-15 NOTE — PLAN OF CARE
TN informed Dr. Murphy about the requirement of [facility] to have defibrillator deactivated.  An ICU nurse is to go to patient to complete.

## 2023-08-15 NOTE — CONSULTS
2026:    Consult was conducted with the patient's medical provider. As the patient is under isolation restrictions, a personal visit was not made.  The medical provider reported that the patient' grandson is currently visiting with him, She also reported that throughout the day the patient has received approximately six (6) visitors.  I requested the medical provider to inform the patient of my presence and prayers.  Prayers were offered for the patient and his family.  The Spiritual Care Team will continue to provide spiritual support to the patient and his family.        NOLVIA Quintanilla   [phone]

## 2023-08-15 NOTE — DISCHARGE SUMMARY
[facility] Medicine  Discharge Summary      Patient Name: Balbir Rebollar Sr.  MRN: [id number]  ANTHONY: [id number]  Patient Class: IP- Inpatient  Admission Date: 8/11/2023  Hospital Length of Stay: 4 days  Discharge Date and Time:  08/15/2023 10:51 AM  Attending Physician: Jane Villalta, *   Discharging Provider: Jane Villalta MD  Primary Care Provider: Suly Prescott NP    Primary Care Team: Networked reference to record PCT     HPI:   This is a 73-year-old male with a past medical history of AFib (on Eliquis), CAD, ICM (20-25%, s/p ICD), CVA, seizure disorder, who presents with fatigue.    Patient initially presented to the ED on 08/09 with generalized fatigue.  Workup demonstrated positive COVID test, and elevated LFTs (being worked up as an outpatient).  He was discharged to follow-up as an outpatient.  Patient returned to the ED on 08/11 for persistence of symptoms. Patient reports generalized weakness, dizziness, presyncope, nausea and vomiting.  Associated symptoms include decreased p.o. intake and 2 falls without head trauma.  He denies having diarrhea or abdominal pain.    In the ED, the patient was hypotensive (85/49) and bradycardic.  Labs were remarkable for worsening creatinine (4.6 from a baseline of 1.2) hyperkalemia (5.6-moderately hemolyzed specimen), elevated troponin (0.044), elevated LFTs (AST: 444, ALT:  359), elevated procalcitonin (1.06).  Chest x-ray showed no acute cardiopulmonary process.  Patient was given 1.7 L of LR, Zosyn and was admitted for further management.      * No surgery found *      Hospital Course:   This is a 73-year-old male with a past medical history of AFib (on Eliquis), CAD, ICM (20-25%, s/p ICD), CVA, seizure disorder, who presents with fatigue.  Patient initially presented to the ED on 08/09 with generalized fatigue.  Workup demonstrated positive COVID test, and elevated LFTs (being worked up as an outpatient).  He was  discharged to follow-up as an outpatient.  Patient returned to the ED on 08/11 for persistence of symptoms. Patient reports generalized weakness, dizziness, presyncope, nausea and vomiting.  Associated symptoms include decreased p.o. intake and 2 falls without head trauma.  He denies having diarrhea or abdominal pain.  In the ED, the patient was hypotensive (85/49) and bradycardic.  Labs were remarkable for worsening creatinine (4.6 from a baseline of 1.2) hyperkalemia (5.6-moderately hemolyzed specimen), elevated troponin (0.044), elevated LFTs (AST: 444, ALT:  359), elevated procalcitonin (1.06).  Chest x-ray showed no acute cardiopulmonary process.  Patient was given 1.7 L of LR, Zosyn and was admitted for further manageme.,  ARF did not improved,started on IVF and consulted nephrology,check bladder scan.retaining ,placed vo.ARF did not still improved,poor candidate for HD  PT,OT .  Both daughters,including jeannine agree wit DNR status and see palliative.,  ARF did not improved,per nephrology patient is not candidate for HD, Daughters agree with Inpatient Hospice,palliative care was following.  Patient was discharged to inpatient Hospice.       Goals of Care Treatment Preferences:  Code Status: DNR          What is most important right now is to focus on symptom/pain control, quality of life, even if it means sacrificing a little time, comfort and QOL .  Accordingly, we have decided that the best plan to meet the patient's goals includes enrolling in hospice care.      Consults:   Consults (From admission, onward)        Status Ordering Provider     Inpatient consult to Spiritual Care  Once        Provider:  (Not yet assigned)    Completed TIFFANY BAR     Inpatient consult to Social Work  Once        Provider:  (Not yet assigned)    Completed NISREEN MESA     Inpatient consult to Palliative Care  Once        Provider:  Tiffany Bar, NP    Completed REMBERTO COUCH     Inpatient consult to  Nephrology  Once        Provider:  Marcos Zee MD    Completed REMBERTO COUCH          No new Assessment & Plan notes have been filed under this hospital service since the last note was generated.  Service: Hospital Medicine    Final Active Diagnoses:    Diagnosis Date Noted POA    PRINCIPAL PROBLEM:  TREVOR (acute kidney injury) [N17.9] 08/11/2023 Yes    Advanced care planning/counseling discussion [Z71.89] 08/14/2023 Not Applicable    COVID-19 [U07.1] 08/11/2023 Yes    Elevated LFTs [R79.89] 08/11/2023 Yes    QT prolongation [R94.31] 12/03/2022 Yes    History of seizure disorder [Z86.69] 11/28/2022 Not Applicable    Chronic combined systolic and diastolic congestive heart failure [I50.42] 11/23/2022 Yes    Ischemic cardiomyopathy [I25.5] 02/16/2022 Yes    ICD (implantable cardioverter-defibrillator), single, in situ [Z95.810] 07/09/2020 Yes    Paroxysmal atrial fibrillation [I48.0] 03/06/2019 Yes    History of CVA (cerebrovascular accident) [Z86.73] 03/06/2019 Not Applicable    Coronary artery disease involving native coronary artery of native heart without angina pectoris [I25.10] 03/06/2019 Yes    Dyslipidemia [E78.5] 03/06/2019 Yes      Problems Resolved During this Admission:       Discharged Condition: stable    Disposition: Hospice/Medical Facility    Follow Up:   Follow-up Information     Suly Prescott NP Follow up.    Specialty: Family Medicine  Contact information:  [address] [facility] [address] [phone]             Suly Prescott NP Follow up in 1 week(s).    Specialty: Family Medicine  Contact information:  [address] [facility] [address] [phone]             [facility]    Specialties: Hospice and Palliative Medicine, Hospice Services  Contact information:  [address] [facility]  [address]  [facility] [address] [phone]                       Patient Instructions:      Activity as tolerated        Significant Diagnostic Studies: Labs:   BMP:   Recent Labs   Lab 08/14/23  0408   *   *   K 3.4*   CL 97   CO2 25   BUN 76*   CREATININE 7.3*   CALCIUM 7.4*   , CMP   Recent Labs   Lab 08/14/23  0408   *   K 3.4*   CL 97   CO2 25   *   BUN 76*   CREATININE 7.3*   CALCIUM 7.4*   PROT 4.8*   ALBUMIN 1.8*   BILITOT 0.7   ALKPHOS 62   *   *   ANIONGAP 12    and CBC   Recent Labs   Lab 08/14/23  0408   WBC 9.04   HGB 12.8*   HCT 39.2*        Radiology: X-Ray: CXR: X-Ray Chest 1 View (CXR): No results found for this visit on 08/11/23. and X-Ray Chest PA and Lateral (CXR): No results found for this visit on 08/11/23.    Pending Diagnostic Studies:     None         Medications:  Reconciled Home Medications:      Medication List      START taking these medications    pulse oximeter device  Commonly known as: pulse oximeter  by Apply Externally route 2 (two) times a day. Use twice daily at 8 AM and 3 PM and record the value in [employer] as directed.        CONTINUE taking these medications    apixaban 5 mg Tab  Commonly known as: ELIQUIS  Take 1 tablet (5 mg total) by mouth 2 (two) times daily.     clopidogreL 75 mg tablet  Commonly known as: PLAVIX  Take 1 tablet (75 mg total) by mouth every evening.     fluticasone propionate 50 mcg/actuation nasal spray  Commonly known as: FLONASE  1 spray by Each Nostril route once daily.     ipratropium 21 mcg (0.03 %) nasal spray  Commonly known as: ATROVENT  2 sprays by Each Nostril route 4 (four) times daily as needed for Rhinitis.     nitroGLYCERIN 0.4 MG SL tablet  Commonly known as: NITROSTAT  Place 1 tablet (0.4 mg total) under the tongue every 5 (five) minutes as needed for Chest pain. Up to 3 doses. If chest pain is not relieved or worsens 3 to 5 minutes after 1 dose, call 911 and seek immediate emergency medical attention.     pantoprazole 40 MG tablet  Commonly known as: PROTONIX  Take 1 tablet (40 mg total) by mouth once  daily.     rosuvastatin 40 MG Tab  Commonly known as: CRESTOR  Take 1 tablet (40 mg total) by mouth every evening.     sertraline 50 MG tablet  Commonly known as: ZOLOFT  Take 1 tablet (50 mg total) by mouth once daily.        STOP taking these medications    amiodarone 200 MG Tab  Commonly known as: PACERONE     ENTRESTO 24-26 mg per tablet  Generic drug: sacubitriL-valsartan     FARXIGA 10 mg tablet  Generic drug: dapagliflozin propanediol     losartan 25 MG tablet  Commonly known as: COZAAR     metoprolol succinate 25 MG 24 hr tablet  Commonly known as: TOPROL-XL     spironolactone 25 MG tablet  Commonly known as: ALDACTONE            Indwelling Lines/Drains at time of discharge:   Lines/Drains/Airways     Drain  Duration                Urethral Catheter 08/13/23 1006 Coude 2 days                Time spent on the discharge of patient: over 30  minutes         Jane Villalta MD  Department of Hospital Medicine  [facility]

## 2023-08-21 ENCOUNTER — PATIENT MESSAGE (OUTPATIENT)
Dept: ELECTROPHYSIOLOGY | Facility: CLINIC | Age: 73
End: 2023-08-21
Payer: MEDICARE

## 2023-08-21 ENCOUNTER — PATIENT MESSAGE (OUTPATIENT)
Dept: FAMILY MEDICINE | Facility: CLINIC | Age: 73
End: 2023-08-21
Payer: MEDICARE

## 2023-08-21 ENCOUNTER — PATIENT MESSAGE (OUTPATIENT)
Dept: CARDIOLOGY | Facility: CLINIC | Age: 73
End: 2023-08-21
Payer: MEDICARE

## 2023-08-21 DIAGNOSIS — N17.9 AKI (ACUTE KIDNEY INJURY): Primary | ICD-10-CM

## 2023-08-22 ENCOUNTER — PATIENT MESSAGE (OUTPATIENT)
Dept: OTOLARYNGOLOGY | Facility: CLINIC | Age: 73
End: 2023-08-22
Payer: MEDICARE

## 2023-09-07 ENCOUNTER — PATIENT MESSAGE (OUTPATIENT)
Dept: ELECTROPHYSIOLOGY | Facility: CLINIC | Age: 73
End: 2023-09-07
Payer: MEDICARE

## 2023-10-06 ENCOUNTER — TELEPHONE (OUTPATIENT)
Dept: CARDIOLOGY | Facility: CLINIC | Age: 73
End: 2023-10-06
Payer: MEDICARE

## 2025-03-27 NOTE — ASSESSMENT & PLAN NOTE
Noted elevated INR.  Patient was apparently recently taken off coumadin and transitioned to Eliquis.     Dr. Live

## (undated) DEVICE — KIT GLIDESHEATH SLEND 6FR 10CM

## (undated) DEVICE — CATH EMERGE MR 20 X 2.50

## (undated) DEVICE — PACK CATH LAB

## (undated) DEVICE — GUIDEWIRE SUPRA CORE 035 190CM

## (undated) DEVICE — TRANSDUCER ADULT DISP

## (undated) DEVICE — SHEATH INTRODUCER 7FR 11CM

## (undated) DEVICE — OMNIPAQUE 350MG 150ML VIAL

## (undated) DEVICE — KIT CUSTOM MANIFOLD

## (undated) DEVICE — PAD DEFIB CADENCE ADULT R2

## (undated) DEVICE — CATH EMERGE MR 8 X 4.00

## (undated) DEVICE — CATH IMPULSE 5F 100CM FR4

## (undated) DEVICE — KIT SYR REUSABLE

## (undated) DEVICE — KIT MANIFOLD LOW PRESS TUBING

## (undated) DEVICE — PAD RADI FEMORAL

## (undated) DEVICE — HEMOSTAT VASC BAND REG 24CM

## (undated) DEVICE — CATH JACKY RADIAL 3.5 110CM

## (undated) DEVICE — SPIKE CONTRAST CONTROLLER

## (undated) DEVICE — WIRE GUIDE SAFE-T-J .035 260CM

## (undated) DEVICE — INFLATOR ENCORE 26 BLLN INFL

## (undated) DEVICE — GUIDE VISTA XB 3.5

## (undated) DEVICE — KIT MICROINTRO 4F .018X40X7CM

## (undated) DEVICE — KIT COPILOT VALVE HEMO TOOL

## (undated) DEVICE — TRAY CATH LAB OMC

## (undated) DEVICE — OMNIPAQUE 350 200ML

## (undated) DEVICE — GUIDE WIRE BMW 014 X190

## (undated) DEVICE — DEVICE PERCLOSE SUT CLSR 6FR

## (undated) DEVICE — PROTECTION STATION PLUS

## (undated) DEVICE — KIT HAND CONTROL HIGH PRESSUR